# Patient Record
Sex: MALE | Race: WHITE | NOT HISPANIC OR LATINO | Employment: OTHER | URBAN - METROPOLITAN AREA
[De-identification: names, ages, dates, MRNs, and addresses within clinical notes are randomized per-mention and may not be internally consistent; named-entity substitution may affect disease eponyms.]

---

## 2017-01-18 ENCOUNTER — TELEPHONE (OUTPATIENT)
Dept: CARDIOLOGY | Facility: MEDICAL CENTER | Age: 67
End: 2017-01-18

## 2017-01-18 DIAGNOSIS — R91.8 PULMONARY NODULES: ICD-10-CM

## 2017-01-18 DIAGNOSIS — G47.33 OSA (OBSTRUCTIVE SLEEP APNEA): ICD-10-CM

## 2017-01-18 NOTE — TELEPHONE ENCOUNTER
----- Message from Evita Doherty sent at 1/18/2017 12:37 PM PST -----  Regarding: Referral to Pulmonary for sleep apnea  NEIL/Caro    Patient wants a call back about his referral to Pulmonary Medicine for sleep apnea. He said that the referral needs to be sent again and can be reached at [phone].

## 2017-01-18 NOTE — TELEPHONE ENCOUNTER
Pt states he received a letter from pulmonary stating since he did not return their call in 30 days his records were shredded, he needs referral resent so he can reinitiate. To MG. Thank you.

## 2017-01-18 NOTE — TELEPHONE ENCOUNTER
Pt prefers to be referred to [facility] as its near his house if they take his insurance. [phone]. Fax  [phone]

## 2017-01-20 ENCOUNTER — TELEPHONE (OUTPATIENT)
Dept: CARDIOLOGY | Facility: MEDICAL CENTER | Age: 67
End: 2017-01-20

## 2017-01-20 NOTE — TELEPHONE ENCOUNTER
"Dina Huddleston - REFERRAL TO PULMONOLOGY >','<< Less Detail',event)\" href=\"javascript:;\">More Detail >>     REFERRAL TO PULMONOLOGY        Dina Huddleston       Sent: Fri January 20, 2017  2:04 PM       To: Virgie Rome R.N.              Message        Patient has been referred to [facility]. If patient is not contacted in a timely manner, please contact [phone]. Thank You       "

## 2017-01-26 ENCOUNTER — ANTICOAGULATION VISIT (OUTPATIENT)
Dept: MEDICAL GROUP | Facility: MEDICAL CENTER | Age: 67
End: 2017-01-26
Payer: COMMERCIAL

## 2017-01-26 DIAGNOSIS — Z86.711 HISTORY OF PULMONARY EMBOLISM: ICD-10-CM

## 2017-01-26 LAB — INR PPP: 2.7 (ref 2–3.5)

## 2017-01-26 PROCEDURE — 85610 PROTHROMBIN TIME: CPT | Performed by: FAMILY MEDICINE

## 2017-01-26 NOTE — MR AVS SNAPSHOT
Abel Day III   2017 11:15 AM   Anticoagulation Visit   MRN: [id number]    Department:  [facility]   Dept Phone:  [phone]    Description:  Male : 1950   Provider:  [facility]AC[facility]           Allergies as of 2017     Allergen Noted Reactions    Other Misc 2016   Unspecified    Silk sutures (body rejected them)      You were diagnosed with     History of pulmonary embolism   [[id number]]         Vital Signs     Smoking Status                   Former Smoker           Basic Information     Date Of Birth Sex Race Ethnicity Preferred Language    1950 Male White Non- English      Your appointments     2017 11:20 AM   FOLLOW UP with Abel Gonsales M.D.   [facility] (--)    [address], [address]  [place] [id number] [phone]            2017 11:15 AM   Anti-Coag Routine with [facility] PHARMACIST   [facility]    [address]  [address]  [place] [address] [phone]              Problem List              ICD-10-CM Priority Class Noted - Resolved    Acid reflux disease K21.9 Low  2012 - Present    Obesity E66.9 Medium  6/3/2013 - Present    Left ventricular hypertrophy I51.7 Medium  6/3/2013 - Present    History of pulmonary embolism and DVT, unprovoked. 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 Low  10/17/2016 - Present    Hypokalemia E87.6 Low  10/17/2016 - Present    Atrial fibrillation (CMS-HCC) I48.91 Medium  10/17/2016 - Present    Pulmonary nodules, needs repeat CT chest between  4/2017 and 10/2017 R91.8 Low  10/17/2016 - Present      Health Maintenance        Date Due Completion Dates    IMM DTaP/Tdap/Td Vaccine (1 - Tdap) 1/14/1969 ---    COLONOSCOPY 1/14/2000 ---    IMM ZOSTER VACCINE 1/14/2010 ---            Results     POCT Protime      Component    INR    2.7                        Current Immunizations     13-VALENT PCV PREVNAR 10/5/2015    Influenza TIV (IM) 10/1/2012    Influenza Vaccine Adult HD 10/5/2015    Influenza Vaccine Quad Inj (Pf) 10/31/2016    Pneumococcal polysaccharide vaccine (PPSV-23) 12/6/2016  5:16 AM      Below and/or attached are the medications your provider expects you to take. Review all of your home medications and newly ordered medications with your provider and/or pharmacist. Follow medication instructions as directed by your provider and/or pharmacist. Please keep your medication list with you and share with your provider. Update the information when medications are discontinued, doses are changed, or new medications (including over-the-counter products) are added; and carry medication information at all times in the event of emergency situations     Allergies:  OTHER MISC - Unspecified               Medications  Valid as of: January 26, 2017 - 11:24 AM    Generic Name Brand Name Tablet Size Instructions for use    Calcium-Vitamin D   Take  by mouth.        Cetirizine HCl (Tab) ZYRTEC 10 MG Take 10 mg by mouth every morning. Indications: **OTC**        Lansoprazole   Take  by mouth.        Meclizine HCl (Tab) ANTIVERT 25 MG Take 1 Tab by mouth 3 times a day as needed.        Metoprolol Tartrate (Tab) LOPRESSOR 25 MG Take 1 Tab by mouth 2 times a day.        Multiple Vitamins-Minerals (Tab) ICAPS  Take 1 Tab by mouth 2 Times a Day.        Propafenone HCl (Tab) RYTHMOL 150 MG Take 1 Tab by mouth every 8 hours. One tablet PRN recurrence of atrial fibrillation        Warfarin Sodium (Tab) COUMADIN 5 MG Take 1 Tab by mouth every Friday. As directed per Protimes.         Warfarin Sodium (Tab) COUMADIN 2.5 MG Take 1 Tab by mouth every day.        .                 Medicines prescribed today were sent to:     [id number] - [place], [place] - [address]    [address] [place] [address]    Phone: [phone] Fax: [phone]    Open 24 Hours?: No    [facility] [place] [place] - [address] AT PORTAL TO REGISTERED [facility] SITES    [address] [facility] [address]    Phone: [phone] Fax: [phone]    Open 24 Hours?: No      Medication refill instructions:       If your prescription bottle indicates you have medication refills left, it is not necessary to call your provider’s office. Please contact your pharmacy and they will refill your medication.    If your prescription bottle indicates you do not have any refills left, you may request refills at any time through one of the following ways: The online Stir system (except Urgent Care), by calling your provider’s office, or by asking your pharmacy to contact your provider’s office with a refill request. Medication refills are processed only during regular business hours and may not be available until the next business day. Your provider may request additional information or to have a follow-up visit with you prior to refilling your medication.   *Please Note: Medication refills are assigned a new Rx number when refilled electronically. Your pharmacy may indicate that no refills were authorized even though a new prescription for the same medication is available at the pharmacy. Please request the medicine by name with the pharmacy before contacting your provider for a refill.        Warfarin Dosing Calendar   January 2017 Details    Sun Mon Tue Wed Thu Fri Sat     1               2               3               4               5               6               7                 8               9               10               11               12               13               14                  15               16               17               18               19               20               21                 22               23               24               25               26   2.7   2.5 mg   See details      27      5 mg         28      2.5 mg           29      2.5 mg         30      2.5 mg         31      2.5 mg              Date Details   01/26 This INR check   INR: 2.7               How to take your warfarin dose     To take:  2.5 mg Take 0.5 of a 5 mg tablet.    To take:  5 mg Take 1 of the 5 mg tablets.           Warfarin Dosing Calendar   February 2017 Details    Sun Mon Tue Wed Thu Fri Sat        1      2.5 mg         2      2.5 mg         3      5 mg         4      2.5 mg           5      2.5 mg         6      2.5 mg         7      2.5 mg         8      2.5 mg         9      2.5 mg         10      5 mg         11      2.5 mg           12      2.5 mg         13      2.5 mg         14      2.5 mg         15      2.5 mg         16      2.5 mg         17      5 mg         18      2.5 mg           19      2.5 mg         20      2.5 mg         21      2.5 mg         22      2.5 mg         23      2.5 mg         24      5 mg         25      2.5 mg           26      2.5 mg         27      2.5 mg         28      2.5 mg              Date Details   No additional details            How to take your warfarin dose     To take:  2.5 mg Take 0.5 of a 5 mg tablet.    To take:  5 mg Take 1 of the 5 mg tablets.           Warfarin Dosing Calendar   March 2017 Details    Sun Mon Tue Wed Thu Fri Sat        1      2.5 mg         2      2.5 mg         3               4                 5               6               7               8               9               10               11                 12               13               14               15               16               17               18                 19               20               21               22               23                24               25                 26               27               28               29               30               31                 Date Details   No additional details    Date of next INR:  3/2/2017         How to take your warfarin dose     To take:  2.5 mg Take 0.5 of a 5 mg tablet.              The Runthrough Access Code: Activation code not generated  Current The Runthrough Status: Active

## 2017-01-26 NOTE — PROGRESS NOTES
Anticoagulation Summary as of 1/26/2017     INR goal 2.0-3.0   Selected INR 2.7 (1/26/2017)   Maintenance plan 5 mg (5 mg x 1) on Fri; 2.5 mg (5 mg x 0.5) all other days   Weekly total 20 mg   Plan last modified Shana Barragan, PHARMD (12/12/2016)   Next INR check 3/2/2017   Target end date     Indications   Atrial fibrillation with RVR (HCC) (Resolved) [I48.91]  History of pulmonary embolism and DVT  unprovoked. [Z86.711]         Anticoagulation Episode Summary     INR check location     Preferred lab     Send INR reminders to     Comments       Anticoagulation Care Providers     Provider Role Specialty Phone number    [facility] Responsible  [phone]        Anticoagulation Patient Findings      Abel Day III seen in clinic today  INR  Therapeutic.  He has a appt with Cards in a few weeks, they may put him on a DOAC.   Denies signs/symptoms of bleeding and/or thrombosis.    Denies changes to diet or medications.   Follow up appointment in 5 week(s).    Continue weekly warfarin dose as noted    Kyree Al, PHARMD       Dr. Bloch,     I do not see a note form you.  Looks like he has A.FIB and DVT/PE, he had a ablation for the afib, but looks like cards does not want to take him off AC.

## 2017-02-03 ENCOUNTER — TELEPHONE (OUTPATIENT)
Dept: VASCULAR LAB | Facility: MEDICAL CENTER | Age: 67
End: 2017-02-03

## 2017-02-03 NOTE — TELEPHONE ENCOUNTER
Most recent anticoag note and d/c summary reviewed.    Pending any further recommendations from cardiology we will continue indefinite anticoagulation as recommended by cards in their d/c summary for h/o afib and 'unprovoked' DVT and PE.    Patient has cards f/u scheduled.  Will defer all further recs about whether or not to consider future d/c of anticoag and all other cardiovascular care to cardiology.    Michael Bloch, MD  Anticoagulation Clinic    Cc:    YAMILA Nguyen

## 2017-02-06 ENCOUNTER — OFFICE VISIT (OUTPATIENT)
Dept: CARDIOLOGY | Facility: MEDICAL CENTER | Age: 67
End: 2017-02-06
Payer: COMMERCIAL

## 2017-02-06 VITALS
HEART RATE: 73 BPM | SYSTOLIC BLOOD PRESSURE: 134 MMHG | BODY MASS INDEX: 38.92 KG/M2 | DIASTOLIC BLOOD PRESSURE: 90 MMHG | OXYGEN SATURATION: 95 % | WEIGHT: 313 LBS | HEIGHT: 75 IN

## 2017-02-06 DIAGNOSIS — I48.91 ATRIAL FIBRILLATION, UNSPECIFIED TYPE (HCC): ICD-10-CM

## 2017-02-06 LAB — EKG IMPRESSION: NORMAL

## 2017-02-06 PROCEDURE — 99213 OFFICE O/P EST LOW 20 MIN: CPT | Performed by: INTERNAL MEDICINE

## 2017-02-06 PROCEDURE — 93000 ELECTROCARDIOGRAM COMPLETE: CPT | Performed by: INTERNAL MEDICINE

## 2017-02-06 NOTE — MR AVS SNAPSHOT
"        Abel oGdinezter Barb III   2017 11:20 AM   Office Visit   MRN: [id number]    Department:  [facility]   Dept Phone:  [phone]    Description:  Male : 1950   Provider:  Abel Gonsales M.D.           Reason for Visit     Follow-Up           Allergies as of 2017     Allergen Noted Reactions    Other Misc 2016   Unspecified    Silk sutures (body rejected them)      You were diagnosed with     Atrial fibrillation, unspecified type (CMS-[facility])   [[id number]]         Vital Signs     Blood Pressure Pulse Height Weight Body Mass Index Oxygen Saturation    134/90 mmHg 73 1.905 m (6' 3\") 141.976 kg (313 lb) 39.12 kg/m2 95%    Smoking Status                   Former Smoker           Basic Information     Date Of Birth Sex Race Ethnicity Preferred Language    1950 Male White Non- English      Your appointments     2017 11:15 AM   Anti-Coag Routine with [facility] PHARMACIST   [facility]    [address]  [address]  [place] [id number] [phone]              Problem List              ICD-10-CM Priority Class Noted - Resolved    Acid reflux disease K21.9 Low  2012 - Present    Obesity E66.9 Medium  6/3/2013 - Present    Left ventricular hypertrophy I51.7 Medium  6/3/2013 - Present    History of pulmonary embolism and DVT, unprovoked. 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 Low  10/17/2016 - Present    Hypokalemia E87.6 Low  10/17/2016 - Present    Atrial fibrillation (CMS-HCC) I48.91 Medium  10/17/2016 - Present    Pulmonary nodules, needs repeat CT chest between 2017 and " 10/2017 R91.8 Low  10/17/2016 - Present      Health Maintenance        Date Due Completion Dates    IMM DTaP/Tdap/Td Vaccine (1 - Tdap) 1/14/1969 ---    COLONOSCOPY 1/14/2000 ---    IMM ZOSTER VACCINE 1/14/2010 ---            Results       Current Immunizations     13-VALENT PCV PREVNAR 10/5/2015    Influenza TIV (IM) 10/1/2012    Influenza Vaccine Adult HD 10/5/2015    Influenza Vaccine Quad Inj (Pf) 10/31/2016    Pneumococcal polysaccharide vaccine (PPSV-23) 12/6/2016  5:16 AM      Below and/or attached are the medications your provider expects you to take. Review all of your home medications and newly ordered medications with your provider and/or pharmacist. Follow medication instructions as directed by your provider and/or pharmacist. Please keep your medication list with you and share with your provider. Update the information when medications are discontinued, doses are changed, or new medications (including over-the-counter products) are added; and carry medication information at all times in the event of emergency situations     Allergies:  OTHER MISC - Unspecified               Medications  Valid as of: February 06, 2017 - 11:35 AM    Generic Name Brand Name Tablet Size Instructions for use    Calcium-Vitamin D   Take  by mouth.        Cetirizine HCl (Tab) ZYRTEC 10 MG Take 10 mg by mouth every morning. Indications: **OTC**        Lansoprazole   Take  by mouth.        Meclizine HCl (Tab) ANTIVERT 25 MG Take 1 Tab by mouth 3 times a day as needed.        Metoprolol Tartrate (Tab) LOPRESSOR 25 MG Take 1 Tab by mouth 2 times a day.        Multiple Vitamins-Minerals (Tab) ICAPS  Take 1 Tab by mouth 2 Times a Day.        Multiple Vitamins-Minerals   Take  by mouth 2 Times a Day.        Propafenone HCl (Tab) RYTHMOL 150 MG Take 1 Tab by mouth every 8 hours. One tablet PRN recurrence of atrial fibrillation        Warfarin Sodium (Tab) COUMADIN 5 MG Take 1 Tab by mouth every Friday. As directed per Protmartíns.         Warfarin Sodium (Tab) COUMADIN 2.5 MG Take 1 Tab by mouth every day.        .                 Medicines prescribed today were sent to:     [id number] - [place], [place] - [address]    [address] [place] [address]    Phone: [phone] Fax: [phone]    Open 24 Hours?: No    [facility] - [place], [place] - [address] AT PORTAL TO REGISTERED [facility] SITES    [address] [facility] [address]    Phone: [phone] Fax: [phone]    Open 24 Hours?: No      Medication refill instructions:       If your prescription bottle indicates you have medication refills left, it is not necessary to call your provider’s office. Please contact your pharmacy and they will refill your medication.    If your prescription bottle indicates you do not have any refills left, you may request refills at any time through one of the following ways: The online [employer] system (except Urgent Care), by calling your provider’s office, or by asking your pharmacy to contact your provider’s office with a refill request. Medication refills are processed only during regular business hours and may not be available until the next business day. Your provider may request additional information or to have a follow-up visit with you prior to refilling your medication.   *Please Note: Medication refills are assigned a new Rx number when refilled electronically. Your pharmacy may indicate that no refills were authorized even though a new prescription for the same medication is available at the pharmacy. Please request the medicine by name with the pharmacy before contacting your provider for a refill.           The[employer]adders Access Code: Activation code not generated  Current The[employer]ers Status: Active

## 2017-02-06 NOTE — PROGRESS NOTES
Subjective:   Abel Day III is a 67 y.o. male who presents today for follow up of a fib s/p ablation    No a fib since ablation.    Little palps initially but nothing in last 5-6 weeks at all.  Not needing any propafenone    Past Medical History   Diagnosis Date   • A-fib (CMS-HCC)    • GERD (gastroesophageal reflux disease)    • Paroxysmal atrial fibrillation (CMS-HCC) 11/26/2012   • Skin cancer      basil cell   • Hemorrhagic disorder      on coumadin   • ULCERATIVE COLITIS 11/26/2012   • Blood clotting disorder (CMS-[facility])      DVT right leg   • Obesity    • Cancer (CMS-[facility])      melanoma   • Hypertension      well controlled on meds     Past Surgical History   Procedure Laterality Date   • Colon resection           • Ileostomy     • Mass excision general Left 4/7/2016     Procedure: MASS EXCISION GENERAL FOR TEMPORAL MELANOMA;  Surgeon: Feng Sharpe M.D.;  Location: SURGERY SAME DAY [facility];  Service:    • Flap graft Left 4/7/2016     Procedure: FLAP GRAFT FOR CLOSURE WITH LOCAL FLAPS;  Surgeon: Feng Sharpe M.D.;  Location: SURGERY SAME DAY [facility];  Service:    • Recovery  5/4/2016     Procedure: [facility]-EPS ABLATION/AFIB 26371/92725/21641-LHJF I48.0;  Surgeon: Recoveryonhussain Surgery;  Location: SURGERY PRE-POST PROC UNIT [facility];  Service:      Family History   Problem Relation Age of Onset   • Hypertension Mother    • Heart Failure Father    • Heart Attack Maternal Uncle      History   Smoking status   • Former Smoker -- 1.00 packs/day for 10 years   • Types: Cigarettes   • Quit date: 11/26/1984   Smokeless tobacco   • Never Used     Allergies   Allergen Reactions   • Other Misc Unspecified     Silk sutures (body rejected them)     Outpatient Encounter Prescriptions as of 2/6/2017   Medication Sig Dispense Refill   • Multiple Vitamins-Minerals (PRESERVISION AREDS 2 PO) Take  by mouth 2 Times a Day.     • CALCIUM-VITAMIN D PO Take  by mouth.     • Lansoprazole  "(PREVACID PO) Take  by mouth.     • meclizine (ANTIVERT) 25 MG Tab Take 1 Tab by mouth 3 times a day as needed. 30 Tab 0   • warfarin (COUMADIN) 5 MG Tab Take 1 Tab by mouth every Friday. As directed per Protimes. 30 Tab 0   • warfarin (COUMADIN) 2.5 MG Tab Take 1 Tab by mouth every day. 30 Tab 0   • metoprolol (LOPRESSOR) 25 MG Tab Take 1 Tab by mouth 2 times a day. 180 Tab 3   • Multiple Vitamins-Minerals (ICAPS) TABS Take 1 Tab by mouth 2 Times a Day.     • cetirizine (ZYRTEC) 10 MG TABS Take 10 mg by mouth every morning. Indications: **OTC**     • propafenone (RYTHMOL) 150 MG Tab Take 1 Tab by mouth every 8 hours. One tablet PRN recurrence of atrial fibrillation 60 Tab 0     No facility-administered encounter medications on file as of 2/6/2017.     Review of Systems   Psychiatric/Behavioral: Negative for suicidal ideas.        Objective:   /90 mmHg  Pulse 73  Ht 1.905 m (6' 3\")  Wt 141.976 kg (313 lb)  BMI 39.12 kg/m2  SpO2 95%    Physical Exam   Constitutional: He is oriented to person, place, and time. He appears well-developed and well-nourished. No distress.   HENT:   Head: Normocephalic and atraumatic.   Right Ear: External ear normal.   Left Ear: External ear normal.   Nose: Nose normal.   Mouth/Throat: Oropharynx is clear and moist.   Eyes: Conjunctivae and EOM are normal. Pupils are equal, round, and reactive to light. Right eye exhibits no discharge. Left eye exhibits no discharge. No scleral icterus.   Neck: Normal range of motion. Neck supple. No JVD present. No tracheal deviation present.   Cardiovascular: Normal rate, regular rhythm, normal heart sounds and intact distal pulses.  Exam reveals no gallop and no friction rub.    No murmur heard.  Pulmonary/Chest: Effort normal and breath sounds normal. No stridor. No respiratory distress. He has no wheezes. He has no rales. He exhibits no tenderness.   Abdominal: Soft. He exhibits no distension. There is no tenderness.   Musculoskeletal: He " exhibits no edema or tenderness.   Neurological: He is alert and oriented to person, place, and time. No cranial nerve deficit. Coordination normal.   Skin: Skin is warm and dry. No rash noted. He is not diaphoretic. No erythema. No pallor.   Psychiatric: He has a normal mood and affect. His behavior is normal. Judgment and thought content normal.   Vitals reviewed.      Assessment:     1. Atrial fibrillation, unspecified type (CMS-HCC)  EKG       Medical Decision Making:  Today's Assessment / Status / Plan:   A fib- doing very well.  Continue oac as he has history of dvt/pe.  Wants to get home monitor as will be on coumadin long term  We discussed w/u for dayana and weight loss as most important factors to maintain nsr

## 2017-02-15 ENCOUNTER — TELEPHONE (OUTPATIENT)
Dept: CARDIOLOGY | Facility: MEDICAL CENTER | Age: 67
End: 2017-02-15

## 2017-02-15 NOTE — TELEPHONE ENCOUNTER
----- Message from Criss Herrera sent at 2/15/2017 11:11 AM PST -----  Regarding: Abel Deng is calling about Dr. Gonsales following patient's coumadin  JE/Caro Bhatt @ [facility] wants to speak to you about patient's coumadin management. He was told Dr. Gonsales is managing his INR and he wants a script sent to him. He can be reached at [phone], ext. [phone].

## 2017-02-22 ENCOUNTER — TELEPHONE (OUTPATIENT)
Dept: CARDIOLOGY | Facility: MEDICAL CENTER | Age: 67
End: 2017-02-22

## 2017-02-22 DIAGNOSIS — G47.30 SLEEP APNEA, UNSPECIFIED TYPE: ICD-10-CM

## 2017-02-22 DIAGNOSIS — R91.1 PULMONARY NODULE: ICD-10-CM

## 2017-02-22 NOTE — TELEPHONE ENCOUNTER
Pt has in the past declined to be scheduled with pulmonary.  Left detailed message on personal v/m:   Sleep study indicates treatment with CPAP/O2 may be beneficial.  Dr. Gonsales does not order these items and recommends seeing pulmonary to establish and receive Tx.   If pt does not wish to schedule with pulmonary it is his choice.  Call with questions.

## 2017-02-22 NOTE — TELEPHONE ENCOUNTER
"Dina Huddleston - REFERRAL TO PULMONOLOGY >','<< Less Detail',event)\" href=\"javascript:;\">More Detail >>     REFERRAL TO PULMONOLOGY         Dina Huddleston       Sent: Wed February 22, 2017  1:13 PM       To: Virgie Rome R.N.              Message        Patient has been sent to Central Scheduling for Pulmonology         If patient is not contacted in a timely manner, please contact [phone]              Thank You        "

## 2017-02-27 ENCOUNTER — ANTICOAGULATION VISIT (OUTPATIENT)
Dept: MEDICAL GROUP | Facility: MEDICAL CENTER | Age: 67
End: 2017-02-27
Payer: COMMERCIAL

## 2017-02-27 DIAGNOSIS — Z86.711 HISTORY OF PULMONARY EMBOLISM: ICD-10-CM

## 2017-02-27 LAB — INR PPP: 2 (ref 2–3.5)

## 2017-02-27 PROCEDURE — 85610 PROTHROMBIN TIME: CPT | Performed by: FAMILY MEDICINE

## 2017-02-27 NOTE — MR AVS SNAPSHOT
Abel Day III   2017 11:30 AM   Anticoagulation Visit   MRN: [id number]    Department[facility]  [facility]   Dept Phone:  [phone]    Description:  Male : 1950   Provider:  [facility]           Allergies as of 2017     Allergen Noted Reactions    Other Misc 2016   Unspecified    Silk sutures (body rejected them)      You were diagnosed with     History of pulmonary embolism   [050901]         Vital Signs     Smoking Status                   Former Smoker           Basic Information     Date Of Birth Sex Race Ethnicity Preferred Language    1950 Male White Non- English      Your appointments     2017 11:30 AM   Anti-Coag Routine with [facility]ARMAC[facility]   [facility]    [address]  [address]  [place] [id number] [phone]            Apr 10, 2017 11:15 AM   Anti-Coag Routine with [facility]ARMAC[facility]   [facility]    [address]  [address]  [place] [id number] [phone]              Problem List              ICD-10-CM Priority Class Noted - Resolved    Acid reflux disease K21.9 Low  2012 - Present    Obesity E66.9 Medium  6/3/2013 - Present    Left ventricular hypertrophy I51.7 Medium  6/3/2013 - Present    History of pulmonary embolism and DVT, unprovoked. 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 Low  10/17/2016 - Present    Hypokalemia E87.6 Low  10/17/2016 - Present    Atrial fibrillation (CMS-HCC) I48.91 Medium  10/17/2016 - Present    Pulmonary nodules, needs  repeat CT chest between 4/2017 and 10/2017 R91.8 Low  10/17/2016 - Present      Health Maintenance        Date Due Completion Dates    IMM DTaP/Tdap/Td Vaccine (1 - Tdap) 1/14/1969 ---    COLONOSCOPY 1/14/2000 ---    IMM ZOSTER VACCINE 1/14/2010 ---            Results     POCT Protime      Component    INR    2.0    Comment:     ic valid [id number] 160 exp 01/2018                        Current Immunizations     13-VALENT PCV PREVNAR 10/5/2015    Influenza TIV (IM) 10/1/2012    Influenza Vaccine Adult HD 10/5/2015    Influenza Vaccine Quad Inj (Pf) 10/31/2016    Pneumococcal polysaccharide vaccine (PPSV-23) 12/6/2016  5:16 AM      Below and/or attached are the medications your provider expects you to take. Review all of your home medications and newly ordered medications with your provider and/or pharmacist. Follow medication instructions as directed by your provider and/or pharmacist. Please keep your medication list with you and share with your provider. Update the information when medications are discontinued, doses are changed, or new medications (including over-the-counter products) are added; and carry medication information at all times in the event of emergency situations     Allergies:  OTHER MISC - Unspecified               Medications  Valid as of: February 27, 2017 - 11:09 AM    Generic Name Brand Name Tablet Size Instructions for use    Calcium-Vitamin D   Take  by mouth.        Cetirizine HCl (Tab) ZYRTEC 10 MG Take 10 mg by mouth every morning. Indications: **OTC**        Lansoprazole   Take  by mouth.        Meclizine HCl (Tab) ANTIVERT 25 MG Take 1 Tab by mouth 3 times a day as needed.        Metoprolol Tartrate (Tab) LOPRESSOR 25 MG TAKE 1 TAB BY MOUTH 2 TIMES A DAY.        Multiple Vitamins-Minerals (Tab) ICAPS  Take 1 Tab by mouth 2 Times a Day.        Multiple Vitamins-Minerals   Take  by mouth 2 Times a Day.        Propafenone HCl (Tab) RYTHMOL 150 MG Take 1 Tab by mouth every 8 hours. One tablet  PRN recurrence of atrial fibrillation        Warfarin Sodium (Tab) COUMADIN 5 MG Take 1 Tab by mouth every Friday. As directed per Protimes.        Warfarin Sodium (Tab) COUMADIN 2.5 MG Take 1 Tab by mouth every day.        .                 Medicines prescribed today were sent to:     [facility]/PHARMACY #[id number] - [place], [place] - [address]    [address] [place] [address]    Phone: [phone] Fax: [phone]    Open 24 Hours?: No    [facility] [place] [place] - [address] AT PORTAL TO [facility]ED [facility]    [address] [facility] [address]    Phone: [phone] Fax: [phone]    Open 24 Hours?: No      Medication refill instructions:       If your prescription bottle indicates you have medication refills left, it is not necessary to call your provider’s office. Please contact your pharmacy and they will refill your medication.    If your prescription bottle indicates you do not have any refills left, you may request refills at any time through one of the following ways: The online [employer] system (except Urgent Care), by calling your provider’s office, or by asking your pharmacy to contact your provider’s office with a refill request. Medication refills are processed only during regular business hours and may not be available until the next business day. Your provider may request additional information or to have a follow-up visit with you prior to refilling your medication.   *Please Note: Medication refills are assigned a new Rx number when refilled electronically. Your pharmacy may indicate that no refills were authorized even though a new prescription for the same medication is available at the pharmacy. Please request the medicine by name with the pharmacy before contacting your provider for a refill.        Warfarin Dosing Calendar   February 2017 Details    Sun Mon Tue Wed Thu Fri Sat        1               2               3               4                 5                 6               7               8               9               10               11                 12               13               14               15               16               17               18                 19               20               21               22               23               24               25                 26               27   2.0   2.5 mg   See details      28      2.5 mg              Date Details   02/27 This INR check   INR: 2.0   ic valid [id number] 160 exp 01/2018               How to take your warfarin dose     To take:  2.5 mg Take 0.5 of a 5 mg tablet.           Warfarin Dosing Calendar   March 2017 Details    Sun Mon Tue Wed Thu Fri Sat        1      2.5 mg         2      2.5 mg         3      5 mg         4      2.5 mg           5      2.5 mg         6      2.5 mg         7      2.5 mg         8      2.5 mg         9      2.5 mg         10      5 mg         11      2.5 mg           12      2.5 mg         13      2.5 mg         14      2.5 mg         15      2.5 mg         16      2.5 mg         17      5 mg         18      2.5 mg           19      2.5 mg         20      2.5 mg         21      2.5 mg         22      2.5 mg         23      2.5 mg         24      5 mg         25      2.5 mg           26      2.5 mg         27      2.5 mg         28      2.5 mg         29      2.5 mg         30      2.5 mg         31      5 mg           Date Details   No additional details            How to take your warfarin dose     To take:  2.5 mg Take 0.5 of a 5 mg tablet.    To take:  5 mg Take 1 of the 5 mg tablets.           Warfarin Dosing Calendar   April 2017 Details    Sun Mon Tue Wed Thu Fri Sat           1      2.5 mg           2      2.5 mg         3      2.5 mg         4      2.5 mg         5      2.5 mg         6      2.5 mg         7      5 mg         8      2.5 mg           9      2.5 mg         10      2.5 mg         11               12               13                14               15                 16               17               18               19               20               21               22                 23               24               25               26               27               28               29                 30                      Date Details   No additional details    Date of next INR:  4/10/2017         How to take your warfarin dose     To take:  2.5 mg Take 0.5 of a 5 mg tablet.    To take:  5 mg Take 1 of the 5 mg tablets.              [employer] Access Code: Activation code not generated  Current MaPS Status: Active

## 2017-02-27 NOTE — PROGRESS NOTES
Anticoagulation Summary as of 2/27/2017     INR goal 2.0-3.0   Selected INR 2.0 (2/27/2017)   Maintenance plan 5 mg (5 mg x 1) on Fri; 2.5 mg (5 mg x 0.5) all other days   Weekly total 20 mg   Plan last modified Shana Barragan, PHARMD (12/12/2016)   Next INR check 4/10/2017   Target end date Indefinite    Indications   Atrial fibrillation with RVR (HCC) (Resolved) [I48.91]  History of pulmonary embolism and DVT  unprovoked. [Z86.711]         Anticoagulation Episode Summary     INR check location     Preferred lab     Send INR reminders to     Comments       Anticoagulation Care Providers     Provider Role Specialty Phone number    [facility] Responsible  [phone]        Anticoagulation Patient Findings      Abel Day III seen in clinic today  INR  therapeutic.    Denies signs/symptoms of bleeding and/or thrombosis.    Denies changes to diet or medications.   Follow up appointment in 6 week(s).      Continue weekly warfarin dose as noted    Kyree Al, PHARMD

## 2017-04-10 ENCOUNTER — ANTICOAGULATION VISIT (OUTPATIENT)
Dept: MEDICAL GROUP | Facility: MEDICAL CENTER | Age: 67
End: 2017-04-10
Payer: COMMERCIAL

## 2017-04-10 VITALS — DIASTOLIC BLOOD PRESSURE: 72 MMHG | SYSTOLIC BLOOD PRESSURE: 155 MMHG | HEART RATE: 73 BPM

## 2017-04-10 DIAGNOSIS — Z86.711 HISTORY OF PULMONARY EMBOLISM: ICD-10-CM

## 2017-04-10 LAB — INR PPP: 3 (ref 2–3.5)

## 2017-04-10 PROCEDURE — 85610 PROTHROMBIN TIME: CPT | Performed by: PHYSICIAN ASSISTANT

## 2017-04-10 NOTE — PROGRESS NOTES
Anticoagulation Summary as of 4/10/2017     INR goal 2.0-3.0   Selected INR 3.0 (4/10/2017)   Maintenance plan 5 mg (5 mg x 1) on Fri; 2.5 mg (5 mg x 0.5) all other days   Weekly total 20 mg   Plan last modified Shana Barragan, PHARMD (12/12/2016)   Next INR check 6/5/2017   Target end date Indefinite    Indications   Atrial fibrillation with RVR (HCC) (Resolved) [I48.91]  History of pulmonary embolism and DVT  unprovoked. [Z86.711]         Anticoagulation Episode Summary     INR check location     Preferred lab     Send INR reminders to     Comments       Anticoagulation Care Providers     Provider Role Specialty Phone number    [facility] Responsible  [phone]        Anticoagulation Patient Findings      Abel Day III seen in clinic today  INR  therapeutic.    Denies signs/symptoms of bleeding and/or thrombosis.    Denies changes to diet or medications.   Follow up appointment in 8 week(s).    Continue weekly warfarin dose as noted    Kyree Al, PHARMD

## 2017-04-10 NOTE — MR AVS SNAPSHOT
Abel Day III   4/10/2017 11:15 AM   Anticoagulation Visit   MRN: [id number]    [facility]  [facility]   Dept Phone:  [phone]    Description:  Male : 1950   Provider:  [facility]           Allergies as of 4/10/2017     Allergen Noted Reactions    Other Misc 2016   Unspecified    Silk sutures (body rejected them)      You were diagnosed with     History of pulmonary embolism   [772727]         Vital Signs     Blood Pressure Pulse Smoking Status             155/72 mmHg 73 Former Smoker         Basic Information     Date Of Birth Sex Race Ethnicity Preferred Language    1950 Male White Non- English      Your appointments     2017 11:15 AM   Anti-Coag Routine with [facility]AC[facility]   [facility] [place] [facility]    [address]  [address]  [place] [id number] [phone]              Problem List              ICD-10-CM Priority Class Noted - Resolved    Acid reflux disease K21.9 Low  2012 - Present    Obesity E66.9 Medium  6/3/2013 - Present    Left ventricular hypertrophy I51.7 Medium  6/3/2013 - Present    History of pulmonary embolism and DVT, unprovoked. 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 Low  10/17/2016 - Present    Hypokalemia E87.6 Low  10/17/2016 - Present    Atrial fibrillation (CMS-HCC) I48.91 Medium  10/17/2016 - Present    Pulmonary nodules, needs repeat CT chest between 2017 and 10/2017 R91.8 Low  10/17/2016 - Present      Health Maintenance        Date Due Completion Dates    IMM DTaP/Tdap/Td Vaccine (1 - Tdap) 1969 ---    COLONOSCOPY 1/14/2000 ---    IMM ZOSTER VACCINE 1/14/2010 ---            Results     POCT Protime      Component    INR    3.0    Comment:     ic valid [id number] 160 exp 02/2018                        Current Immunizations     13-VALENT PCV PREVNAR 10/5/2015    Influenza TIV (IM) 10/1/2012    Influenza Vaccine Adult HD 10/5/2015    Influenza Vaccine Quad Inj (Pf) 10/31/2016    Pneumococcal polysaccharide vaccine (PPSV-23) 12/6/2016  5:16 AM      Below and/or attached are the medications your provider expects you to take. Review all of your home medications and newly ordered medications with your provider and/or pharmacist. Follow medication instructions as directed by your provider and/or pharmacist. Please keep your medication list with you and share with your provider. Update the information when medications are discontinued, doses are changed, or new medications (including over-the-counter products) are added; and carry medication information at all times in the event of emergency situations     Allergies:  OTHER MISC - Unspecified               Medications  Valid as of: April 10, 2017 - 11:20 AM    Generic Name Brand Name Tablet Size Instructions for use    Calcium-Vitamin D   Take  by mouth.        Cetirizine HCl (Tab) ZYRTEC 10 MG Take 10 mg by mouth every morning. Indications: **OTC**        Lansoprazole   Take  by mouth.        Meclizine HCl (Tab) ANTIVERT 25 MG Take 1 Tab by mouth 3 times a day as needed.        Metoprolol Tartrate (Tab) LOPRESSOR 25 MG TAKE 1 TAB BY MOUTH 2 TIMES A DAY.        Multiple Vitamins-Minerals (Tab) ICAPS  Take 1 Tab by mouth 2 Times a Day.        Multiple Vitamins-Minerals   Take  by mouth 2 Times a Day.        Propafenone HCl (Tab) RYTHMOL 150 MG Take 1 Tab by mouth every 8 hours. One tablet PRN recurrence of atrial fibrillation        Warfarin Sodium (Tab) COUMADIN 5 MG Take 1 Tab by mouth every Friday. As directed per Protimes.        Warfarin Sodium (Tab) COUMADIN 2.5 MG Take  1 Tab by mouth every day.        .                 Medicines prescribed today were sent to:     [facility]/PHARMACY #[id number] - [place], [place] - [address]    [address] [place] [address]    Phone: [phone] Fax: [phone]    Open 24 Hours?: No    [facility] - [place], [place] - [address] AT PORTAL TO REGISTERED [facility] SITES    [address] [facility] [address]    Phone: [phone] Fax: [phone]    Open 24 Hours?: No      Medication refill instructions:       If your prescription bottle indicates you have medication refills left, it is not necessary to call your provider’s office. Please contact your pharmacy and they will refill your medication.    If your prescription bottle indicates you do not have any refills left, you may request refills at any time through one of the following ways: The online [employer] system (except Urgent Care), by calling your provider’s office, or by asking your pharmacy to contact your provider’s office with a refill request. Medication refills are processed only during regular business hours and may not be available until the next business day. Your provider may request additional information or to have a follow-up visit with you prior to refilling your medication.   *Please Note: Medication refills are assigned a new Rx number when refilled electronically. Your pharmacy may indicate that no refills were authorized even though a new prescription for the same medication is available at the pharmacy. Please request the medicine by name with the pharmacy before contacting your provider for a refill.        Instructions    kos       Warfarin Dosing Calendar   April 2017 Details    Sun Mon Tue Wed Thu Fri Sat           1                 2               3               4               5               6               7               8                 9               10   3.0   2.5 mg   See details      11      2.5 mg         12      2.5 mg         13       2.5 mg         14      5 mg         15      2.5 mg           16      2.5 mg         17      2.5 mg         18      2.5 mg         19      2.5 mg         20      2.5 mg         21      5 mg         22      2.5 mg           23      2.5 mg         24      2.5 mg         25      2.5 mg         26      2.5 mg         27      2.5 mg         28      5 mg         29      2.5 mg           30      2.5 mg                Date Details   04/10 This INR check   INR: 3.0    valid [id number] 160 exp 02/2018               How to take your warfarin dose     To take:  2.5 mg Take 0.5 of a 5 mg tablet.    To take:  5 mg Take 1 of the 5 mg tablets.           Warfarin Dosing Calendar   May 2017 Details    Sun Mon Tue Wed Thu Fri Sat      1      2.5 mg         2      2.5 mg         3      2.5 mg         4      2.5 mg         5      5 mg         6      2.5 mg           7      2.5 mg         8      2.5 mg         9      2.5 mg         10      2.5 mg         11      2.5 mg         12      5 mg         13      2.5 mg           14      2.5 mg         15      2.5 mg         16      2.5 mg         17      2.5 mg         18      2.5 mg         19      5 mg         20      2.5 mg           21      2.5 mg         22      2.5 mg         23      2.5 mg         24      2.5 mg         25      2.5 mg         26      5 mg         27      2.5 mg           28      2.5 mg         29      2.5 mg         30      2.5 mg         31      2.5 mg             Date Details   No additional details            How to take your warfarin dose     To take:  2.5 mg Take 0.5 of a 5 mg tablet.    To take:  5 mg Take 1 of the 5 mg tablets.           Warfarin Dosing Calendar   June 2017 Details    Sun Mon Tue Wed Thu Fri Sat         1      2.5 mg         2      5 mg         3      2.5 mg           4      2.5 mg         5      2.5 mg         6               7               8               9               10                 11               12               13               14                 15               16               17                 18               19               20               21               22               23               24                 25               26               27               28               29               30                 Date Details   No additional details    Date of next INR:  6/5/2017         How to take your warfarin dose     To take:  2.5 mg Take 0.5 of a 5 mg tablet.    To take:  5 mg Take 1 of the 5 mg tablets.              [employer] Access Code: Activation code not generated  Current [employer] Status: Active

## 2017-06-05 ENCOUNTER — ANTICOAGULATION VISIT (OUTPATIENT)
Dept: MEDICAL GROUP | Facility: MEDICAL CENTER | Age: 67
End: 2017-06-05
Payer: COMMERCIAL

## 2017-06-05 VITALS — HEART RATE: 75 BPM | DIASTOLIC BLOOD PRESSURE: 93 MMHG | SYSTOLIC BLOOD PRESSURE: 163 MMHG

## 2017-06-05 DIAGNOSIS — Z86.711 HISTORY OF PULMONARY EMBOLISM: ICD-10-CM

## 2017-06-05 LAB — INR PPP: 2.1 (ref 2–3.5)

## 2017-06-05 PROCEDURE — 85610 PROTHROMBIN TIME: CPT | Performed by: PHYSICIAN ASSISTANT

## 2017-06-05 NOTE — PROGRESS NOTES
Anticoagulation Summary as of 6/5/2017     INR goal 2.0-3.0   Selected INR 2.1 (6/5/2017)   Maintenance plan 5 mg (5 mg x 1) on Fri; 2.5 mg (5 mg x 0.5) all other days   Weekly total 20 mg   Plan last modified Shana Barragan, PHARMD (12/12/2016)   Next INR check 7/31/2017   Target end date Indefinite    Indications   Atrial fibrillation with RVR (HCC) (Resolved) [I48.91]  History of pulmonary embolism and DVT  unprovoked. [Z86.711]         Anticoagulation Episode Summary     INR check location     Preferred lab     Send INR reminders to     Comments       Anticoagulation Care Providers     Provider Role Specialty Phone number    [facility] Responsible  [phone]        Anticoagulation Patient Findings      Current Outpatient Prescriptions on File Prior to Visit   Medication Sig Dispense Refill   • metoprolol (LOPRESSOR) 25 MG Tab TAKE 1 TAB BY MOUTH 2 TIMES A DAY. 180 Tab 3   • Multiple Vitamins-Minerals (PRESERVISION AREDS 2 PO) Take  by mouth 2 Times a Day.     • CALCIUM-VITAMIN D PO Take  by mouth.     • propafenone (RYTHMOL) 150 MG Tab Take 1 Tab by mouth every 8 hours. One tablet PRN recurrence of atrial fibrillation 60 Tab 0   • Lansoprazole (PREVACID PO) Take  by mouth.     • meclizine (ANTIVERT) 25 MG Tab Take 1 Tab by mouth 3 times a day as needed. 30 Tab 0   • warfarin (COUMADIN) 5 MG Tab Take 1 Tab by mouth every Friday. As directed per Protimes. 30 Tab 0   • warfarin (COUMADIN) 2.5 MG Tab Take 1 Tab by mouth every day. 30 Tab 0   • Multiple Vitamins-Minerals (ICAPS) TABS Take 1 Tab by mouth 2 Times a Day.     • cetirizine (ZYRTEC) 10 MG TABS Take 10 mg by mouth every morning. Indications: **OTC**       No current facility-administered medications on file prior to visit.       Lab Results   Component Value Date/Time    SODIUM 136 12/06/2016 01:29 AM    POTASSIUM 3.6 12/06/2016 01:29 AM    CHLORIDE 100 12/06/2016 01:29 AM    CO2 25 12/06/2016 01:29 AM    GLUCOSE 118* 12/06/2016  01:29 AM    BUN 13 12/06/2016 01:29 AM    CREATININE 0.78 12/06/2016 01:29 AM    BUN-CREATININE RATIO 19 06/03/2013 04:03 PM        Abel Day III seen in clinic today  INR  therapeutic.    Denies signs/symptoms of bleeding and/or thrombosis.    Denies changes to diet or medications.   Follow up appointment in 8 week(s).    Continue weekly warfarin dose as noted     Kyree Al, PHARMD

## 2017-06-05 NOTE — MR AVS SNAPSHOT
Abel Day III   2017 11:15 AM   Anticoagulation Visit   MRN: [id number]    Department:  [facility]   Dept Phone:  [phone]    Description:  Male : 1950   Provider:  [facility]           Allergies as of 2017     Allergen Noted Reactions    Other Misc 2016   Unspecified    Silk sutures (body rejected them)      You were diagnosed with     History of pulmonary embolism   [057207]         Vital Signs     Blood Pressure Pulse Smoking Status             163/93 mmHg 75 Former Smoker         Basic Information     Date Of Birth Sex Race Ethnicity Preferred Language    1950 Male White Non- English      Your appointments     2017 11:15 AM   Anti-Coag Routine with [facility]ARMACIST   [facility]    [address]  [address]  [place] [id number] [phone]              Problem List              ICD-10-CM Priority Class Noted - Resolved    Acid reflux disease K21.9 Low  2012 - Present    Obesity E66.9 Medium  6/3/2013 - Present    Left ventricular hypertrophy I51.7 Medium  6/3/2013 - Present    History of pulmonary embolism and DVT, unprovoked. 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 Low  10/17/2016 - Present    Hypokalemia E87.6 Low  10/17/2016 - Present    Atrial fibrillation (CMS-HCC) I48.91 Medium  10/17/2016 - Present    Pulmonary nodules, needs repeat CT chest between 2017 and 10/2017 R91.8 Low  10/17/2016 - Present      Health Maintenance        Date Due Completion Dates    IMM DTaP/Tdap/Td Vaccine (1 - Tdap) 1969 ---    COLONOSCOPY 1/14/2000 ---    IMM ZOSTER VACCINE 1/14/2010 ---            Results     POCT Protime      Component    INR    2.1    Comment:     ic valid [id number] 160 exp 03/2018                        Current Immunizations     13-VALENT PCV PREVNAR 10/5/2015    Influenza TIV (IM) 10/1/2012    Influenza Vaccine Adult HD 10/5/2015    Influenza Vaccine Quad Inj (Pf) 10/31/2016    Pneumococcal polysaccharide vaccine (PPSV-23) 12/6/2016  5:16 AM      Below and/or attached are the medications your provider expects you to take. Review all of your home medications and newly ordered medications with your provider and/or pharmacist. Follow medication instructions as directed by your provider and/or pharmacist. Please keep your medication list with you and share with your provider. Update the information when medications are discontinued, doses are changed, or new medications (including over-the-counter products) are added; and carry medication information at all times in the event of emergency situations     Allergies:  OTHER MISC - Unspecified               Medications  Valid as of: June 05, 2017 - 11:28 AM    Generic Name Brand Name Tablet Size Instructions for use    Calcium-Vitamin D   Take  by mouth.        Cetirizine HCl (Tab) ZYRTEC 10 MG Take 10 mg by mouth every morning. Indications: **OTC**        Lansoprazole   Take  by mouth.        Meclizine HCl (Tab) ANTIVERT 25 MG Take 1 Tab by mouth 3 times a day as needed.        Metoprolol Tartrate (Tab) LOPRESSOR 25 MG TAKE 1 TAB BY MOUTH 2 TIMES A DAY.        Multiple Vitamins-Minerals (Tab) ICAPS  Take 1 Tab by mouth 2 Times a Day.        Multiple Vitamins-Minerals   Take  by mouth 2 Times a Day.        Propafenone HCl (Tab) RYTHMOL 150 MG Take 1 Tab by mouth every 8 hours. One tablet PRN recurrence of atrial fibrillation        Warfarin Sodium (Tab) COUMADIN 5 MG Take 1 Tab by mouth every Friday. As directed per Protimes.        Warfarin Sodium (Tab) COUMADIN 2.5 MG Take  1 Tab by mouth every day.        .                 Medicines prescribed today were sent to:     [facility][id number] - [place], [place] - [address]    [address] [place] [address]    Phone: [phone] Fax: [phone]    Open 24 Hours?: No    [facility] - [place], [place] - [address] AT PORTAL TO REGISTERED [facility] SITES    [address] [facility] [address]    Phone: [phone] Fax: [phone]    Open 24 Hours?: No      Medication refill instructions:       If your prescription bottle indicates you have medication refills left, it is not necessary to call your provider’s office. Please contact your pharmacy and they will refill your medication.    If your prescription bottle indicates you do not have any refills left, you may request refills at any time through one of the following ways: The online [employer] system (except Urgent Care), by calling your provider’s office, or by asking your pharmacy to contact your provider’s office with a refill request. Medication refills are processed only during regular business hours and may not be available until the next business day. Your provider may request additional information or to have a follow-up visit with you prior to refilling your medication.   *Please Note: Medication refills are assigned a new Rx number when refilled electronically. Your pharmacy may indicate that no refills were authorized even though a new prescription for the same medication is available at the pharmacy. Please request the medicine by name with the pharmacy before contacting your provider for a refill.        Warfarin Dosing Calendar   June 2017 Details    Sun Mon Tue Wed Thu Fri Sat         1               2               3                 4               5   2.1   2.5 mg   See details      6      2.5 mg         7      2.5 mg         8      2.5 mg         9      5 mg         10      2.5 mg           11      2.5 mg         12      2.5 mg         13        2.5 mg         14      2.5 mg         15      2.5 mg         16      5 mg         17      2.5 mg           18      2.5 mg         19      2.5 mg         20      2.5 mg         21      2.5 mg         22      2.5 mg         23      5 mg         24      2.5 mg           25      2.5 mg         26      2.5 mg         27      2.5 mg         28      2.5 mg         29      2.5 mg         30      5 mg           Date Details   06/05 This INR check   INR: 2.1   ic valid [id number] 160 exp 03/2018               How to take your warfarin dose     To take:  2.5 mg Take 0.5 of a 5 mg tablet.    To take:  5 mg Take 1 of the 5 mg tablets.           Warfarin Dosing Calendar   July 2017 Details    Sun Mon Tue Wed Thu Fri Sat           1      2.5 mg           2      2.5 mg         3      2.5 mg         4      2.5 mg         5      2.5 mg         6      2.5 mg         7      5 mg         8      2.5 mg           9      2.5 mg         10      2.5 mg         11      2.5 mg         12      2.5 mg         13      2.5 mg         14      5 mg         15      2.5 mg           16      2.5 mg         17      2.5 mg         18      2.5 mg         19      2.5 mg         20      2.5 mg         21      5 mg         22      2.5 mg           23      2.5 mg         24      2.5 mg         25      2.5 mg         26      2.5 mg         27      2.5 mg         28      5 mg         29      2.5 mg           30      2.5 mg         31      2.5 mg               Date Details   No additional details    Date of next INR:  7/31/2017         How to take your warfarin dose     To take:  2.5 mg Take 0.5 of a 5 mg tablet.    To take:  5 mg Take 1 of the 5 mg tablets.              [employer] Access Code: Activation code not generated  Current [employer] Status: Active

## 2017-07-13 ENCOUNTER — ANTICOAGULATION MONITORING (OUTPATIENT)
Dept: VASCULAR LAB | Facility: MEDICAL CENTER | Age: 67
End: 2017-07-13

## 2017-07-13 DIAGNOSIS — Z86.711 HISTORY OF PULMONARY EMBOLISM: ICD-10-CM

## 2017-07-31 ENCOUNTER — ANTICOAGULATION VISIT (OUTPATIENT)
Dept: MEDICAL GROUP | Facility: MEDICAL CENTER | Age: 67
End: 2017-07-31
Payer: COMMERCIAL

## 2017-07-31 VITALS — HEART RATE: 85 BPM | DIASTOLIC BLOOD PRESSURE: 98 MMHG | SYSTOLIC BLOOD PRESSURE: 155 MMHG

## 2017-07-31 DIAGNOSIS — Z86.711 HISTORY OF PULMONARY EMBOLISM: ICD-10-CM

## 2017-07-31 LAB — INR PPP: 2.5 (ref 2–3.5)

## 2017-07-31 PROCEDURE — 85610 PROTHROMBIN TIME: CPT | Performed by: PHYSICIAN ASSISTANT

## 2017-07-31 NOTE — MR AVS SNAPSHOT
Abel Ogden Barb III   2017 11:15 AM   Anticoagulation Visit   MRN: [id number]    Department:  [facility]   Dept Phone:  [phone]    Description:  Male : 1950   Provider:  [facility]ARMAC[facility]           Allergies as of 2017     Allergen Noted Reactions    Other Misc 2016   Unspecified    Silk sutures (body rejected them)      You were diagnosed with     History of pulmonary embolism   [408290]         Vital Signs     Smoking Status                   Former Smoker           Basic Information     Date Of Birth Sex Race Ethnicity Preferred Language    1950 Male White Non- English      Your appointments     Oct 09, 2017 11:15 AM   Anti-Coag Routine with [facility] PHARMACIST   [facility]    [address]  [address]  [place] [address] [phone]              Problem List              ICD-10-CM Priority Class Noted - Resolved    Acid reflux disease K21.9 Low  2012 - Present    Obesity E66.9 Medium  6/3/2013 - Present    Left ventricular hypertrophy I51.7 Medium  6/3/2013 - Present    History of pulmonary embolism and DVT, unprovoked. 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 Low  10/17/2016 - Present    Hypokalemia E87.6 Low  10/17/2016 - Present    Atrial fibrillation (CMS-HCC) I48.91 Medium  10/17/2016 - Present    Pulmonary nodules, needs repeat CT chest between 2017 and 10/2017 R91.8 Low  10/17/2016 - Present      Health Maintenance        Date Due Completion Dates    IMM DTaP/Tdap/Td Vaccine (1 - Tdap) 1969 ---    COLONOSCOPY 2000 ---    IMM ZOSTER VACCINE 1/14/2010 ---    IMM INFLUENZA (1) 9/1/2017 10/31/2016, 10/5/2015, 10/1/2012            Results     POCT Protime      Component    INR    2.5    Comment:     ic valid [id number] 160 exp 03/2018                        Current Immunizations     13-VALENT PCV PREVNAR 10/5/2015    Influenza TIV (IM) 10/1/2012    Influenza Vaccine Adult HD 10/5/2015    Influenza Vaccine Quad Inj (Pf) 10/31/2016    Pneumococcal polysaccharide vaccine (PPSV-23) 12/6/2016  5:16 AM      Below and/or attached are the medications your provider expects you to take. Review all of your home medications and newly ordered medications with your provider and/or pharmacist. Follow medication instructions as directed by your provider and/or pharmacist. Please keep your medication list with you and share with your provider. Update the information when medications are discontinued, doses are changed, or new medications (including over-the-counter products) are added; and carry medication information at all times in the event of emergency situations     Allergies:  OTHER MISC - Unspecified               Medications  Valid as of: July 31, 2017 - 11:24 AM    Generic Name Brand Name Tablet Size Instructions for use    Calcium-Vitamin D   Take  by mouth.        Cetirizine HCl (Tab) ZYRTEC 10 MG Take 10 mg by mouth every morning. Indications: **OTC**        Lansoprazole   Take  by mouth.        Meclizine HCl (Tab) ANTIVERT 25 MG Take 1 Tab by mouth 3 times a day as needed.        Metoprolol Tartrate (Tab) LOPRESSOR 25 MG TAKE 1 TAB BY MOUTH 2 TIMES A DAY.        Multiple Vitamins-Minerals (Tab) ICAPS  Take 1 Tab by mouth 2 Times a Day.        Multiple Vitamins-Minerals   Take  by mouth 2 Times a Day.        Propafenone HCl (Tab) RYTHMOL 150 MG Take 1 Tab by mouth every 8 hours. One tablet PRN recurrence of atrial fibrillation        Warfarin Sodium (Tab) COUMADIN 5 MG Take 1 Tab by mouth every Friday. As directed per Protimes.        Warfarin  Sodium (Tab) COUMADIN 2.5 MG Take 1 Tab by mouth every day.        .                 Medicines prescribed today were sent to:     [facility]Y #[id number] - [place], [place] - [address]    [address] [place] [place] [address]    Phone: [phone] Fax: [phone]    Open 24 Hours?: No    [facility] [place], [place] - [address] AT PORTAL TO REGISTERED [facility]    [address] [facility] [address]    Phone: [phone] Fax: [phone]    Open 24 Hours?: No      Medication refill instructions:       If your prescription bottle indicates you have medication refills left, it is not necessary to call your provider’s office. Please contact your pharmacy and they will refill your medication.    If your prescription bottle indicates you do not have any refills left, you may request refills at any time through one of the following ways: The online [employer] system (except Urgent Care), by calling your provider’s office, or by asking your pharmacy to contact your provider’s office with a refill request. Medication refills are processed only during regular business hours and may not be available until the next business day. Your provider may request additional information or to have a follow-up visit with you prior to refilling your medication.   *Please Note: Medication refills are assigned a new Rx number when refilled electronically. Your pharmacy may indicate that no refills were authorized even though a new prescription for the same medication is available at the pharmacy. Please request the medicine by name with the pharmacy before contacting your provider for a refill.        Warfarin Dosing Calendar   July 2017 Details    Sun Mon Tue Wed Thu Fri Sat           1                 2               3               4               5               6               7               8                 9               10               11               12               13               14                  15                 16               17               18               19               20               21               22                 23               24               25               26               27               28               29                 30               31   2.5   2.5 mg   See details            Date Details   07/31 This INR check   INR: 2.5   ic valid [id number] 160 exp 03/2018               How to take your warfarin dose     To take:  2.5 mg Take 0.5 of a 5 mg tablet.           Warfarin Dosing Calendar   August 2017 Details    Sun Mon Tue Wed Thu Fri Sat       1      2.5 mg         2      2.5 mg         3      2.5 mg         4      5 mg         5      2.5 mg           6      2.5 mg         7      2.5 mg         8      2.5 mg         9      2.5 mg         10      2.5 mg         11      5 mg         12      2.5 mg           13      2.5 mg         14      2.5 mg         15      2.5 mg         16      2.5 mg         17      2.5 mg         18      5 mg         19      2.5 mg           20      2.5 mg         21      2.5 mg         22      2.5 mg         23      2.5 mg         24      2.5 mg         25      5 mg         26      2.5 mg           27      2.5 mg         28      2.5 mg         29      2.5 mg         30      2.5 mg         31      2.5 mg            Date Details   No additional details            How to take your warfarin dose     To take:  2.5 mg Take 0.5 of a 5 mg tablet.    To take:  5 mg Take 1 of the 5 mg tablets.           Warfarin Dosing Calendar   September 2017 Details    Sun Mon Tue Wed Thu Fri Sat          1      5 mg         2      2.5 mg           3      2.5 mg         4      2.5 mg         5      2.5 mg         6      2.5 mg         7      2.5 mg         8      5 mg         9      2.5 mg           10      2.5 mg         11      2.5 mg         12      2.5 mg         13      2.5 mg         14      2.5 mg         15      5 mg         16      2.5 mg           17      2.5 mg          18      2.5 mg         19      2.5 mg         20      2.5 mg         21      2.5 mg         22      5 mg         23      2.5 mg           24      2.5 mg         25      2.5 mg         26      2.5 mg         27      2.5 mg         28      2.5 mg         29      5 mg         30      2.5 mg          Date Details   No additional details            How to take your warfarin dose     To take:  2.5 mg Take 0.5 of a 5 mg tablet.    To take:  5 mg Take 1 of the 5 mg tablets.           Warfarin Dosing Calendar   October 2017 Details    Sun Mon Tue Wed Thu Fri Sat     1      2.5 mg         2      2.5 mg         3      2.5 mg         4      2.5 mg         5      2.5 mg         6      5 mg         7      2.5 mg           8      2.5 mg         9      2.5 mg         10               11               12               13               14                 15               16               17               18               19               20               21                 22               23               24               25               26               27               28                 29               30               31                    Date Details   No additional details    Date of next INR:  10/9/2017         How to take your warfarin dose     To take:  2.5 mg Take 0.5 of a 5 mg tablet.    To take:  5 mg Take 1 of the 5 mg tablets.              [employer] Access Code: Activation code not generated  Current [employer] Status: Active

## 2017-07-31 NOTE — PROGRESS NOTES
Anticoagulation Summary as of 7/31/2017     INR goal 2.0-3.0   Selected INR 2.5 (7/31/2017)   Maintenance plan 5 mg (5 mg x 1) on Fri; 2.5 mg (5 mg x 0.5) all other days   Weekly total 20 mg   Plan last modified Shana Barragan, PHARMD (12/12/2016)   Next INR check 10/9/2017   Target end date Indefinite    Indications   Atrial fibrillation with RVR (HCC) (Resolved) [I48.91]  History of pulmonary embolism and DVT  unprovoked. [Z86.711]         Anticoagulation Episode Summary     INR check location     Preferred lab     Send INR reminders to     Comments       Anticoagulation Care Providers     Provider Role Specialty Phone number    [facility] Responsible  [phone]        Anticoagulation Patient Findings      Current Outpatient Prescriptions on File Prior to Visit   Medication Sig Dispense Refill   • metoprolol (LOPRESSOR) 25 MG Tab TAKE 1 TAB BY MOUTH 2 TIMES A DAY. 180 Tab 3   • Multiple Vitamins-Minerals (PRESERVISION AREDS 2 PO) Take  by mouth 2 Times a Day.     • CALCIUM-VITAMIN D PO Take  by mouth.     • propafenone (RYTHMOL) 150 MG Tab Take 1 Tab by mouth every 8 hours. One tablet PRN recurrence of atrial fibrillation 60 Tab 0   • Lansoprazole (PREVACID PO) Take  by mouth.     • meclizine (ANTIVERT) 25 MG Tab Take 1 Tab by mouth 3 times a day as needed. 30 Tab 0   • warfarin (COUMADIN) 5 MG Tab Take 1 Tab by mouth every Friday. As directed per Protimes. 30 Tab 0   • warfarin (COUMADIN) 2.5 MG Tab Take 1 Tab by mouth every day. 30 Tab 0   • Multiple Vitamins-Minerals (ICAPS) TABS Take 1 Tab by mouth 2 Times a Day.     • cetirizine (ZYRTEC) 10 MG TABS Take 10 mg by mouth every morning. Indications: **OTC**       No current facility-administered medications on file prior to visit.       Lab Results   Component Value Date/Time    SODIUM 136 12/06/2016 01:29 AM    POTASSIUM 3.6 12/06/2016 01:29 AM    CHLORIDE 100 12/06/2016 01:29 AM    CO2 25 12/06/2016 01:29 AM    GLUCOSE 118* 12/06/2016  01:29 AM    BUN 13 12/06/2016 01:29 AM    CREATININE 0.78 12/06/2016 01:29 AM    BUN-CREATININE RATIO 19 06/03/2013 04:03 PM          Abel Day III seen in clinic today  INR  therapeutic.    Denies signs/symptoms of bleeding and/or thrombosis.    Denies changes to diet or medications.   Follow up appointment in 10 week(s).    Continue weekly warfarin dose as noted     Kyree Al, PHARMD

## 2017-10-09 ENCOUNTER — APPOINTMENT (OUTPATIENT)
Dept: MEDICAL GROUP | Facility: MEDICAL CENTER | Age: 67
End: 2017-10-09
Payer: COMMERCIAL

## 2017-10-12 ENCOUNTER — ANTICOAGULATION VISIT (OUTPATIENT)
Dept: MEDICAL GROUP | Facility: MEDICAL CENTER | Age: 67
End: 2017-10-12
Payer: COMMERCIAL

## 2017-10-12 VITALS — HEART RATE: 74 BPM | DIASTOLIC BLOOD PRESSURE: 84 MMHG | SYSTOLIC BLOOD PRESSURE: 155 MMHG

## 2017-10-12 DIAGNOSIS — Z86.711 HISTORY OF PULMONARY EMBOLISM: ICD-10-CM

## 2017-10-12 LAB — INR PPP: 2.1 (ref 2–3.5)

## 2017-10-12 PROCEDURE — 85610 PROTHROMBIN TIME: CPT | Performed by: FAMILY MEDICINE

## 2017-10-12 PROCEDURE — 99211 OFF/OP EST MAY X REQ PHY/QHP: CPT | Performed by: FAMILY MEDICINE

## 2017-10-12 NOTE — PROGRESS NOTES
Anticoagulation Summary  As of 10/12/2017    INR goal:   2.0-3.0   TTR:   54.8 % (2.4 y)   Today's INR:   2.1   Maintenance plan:   5 mg (5 mg x 1) on Fri; 2.5 mg (5 mg x 0.5) all other days   Weekly total:   20 mg   Plan last modified:   Yoni KirbyD (12/12/2016)   Next INR check:   1/4/2018   Target end date:   Indefinite    Indications    Atrial fibrillation with RVR (HCC) (Resolved) [I48.91]  History of pulmonary embolism and DVT  unprovoked. [Z86.711]             Anticoagulation Episode Summary     INR check location:       Preferred lab:       Send INR reminders to:       Comments:         Anticoagulation Care Providers     Provider Role Specialty Phone number    [facility] Responsible  [phone]        Anticoagulation Patient Findings  Patient Findings     Negatives:   Signs/symptoms of thrombosis, Signs/symptoms of bleeding, Laboratory test error suspected, Change in health, Change in alcohol use, Change in activity, Upcoming invasive procedure, Emergency department visit, Upcoming dental procedure, Missed doses, Extra doses, Change in medications, Change in diet/appetite, Hospital admission, Bruising, Other complaints        History of Present Illness: follow up appointment for chronic anticoagulation for atrial fibrillation with history of PE    Pt remains therapeutic today. Pt is to continue with current warfarin dosing regimen.  Pt denies any unusual s/s of bleeding, bruising, clotting or any changes to diet or medications.  Follow up in 12 weeks.    Makenna Hopkins, YoniD

## 2017-12-18 ENCOUNTER — TELEPHONE (OUTPATIENT)
Dept: CARDIOLOGY | Facility: MEDICAL CENTER | Age: 67
End: 2017-12-18

## 2017-12-18 NOTE — TELEPHONE ENCOUNTER
Spoke to pt, for the last week he has felt intermittent flutters  For 5-10 seconds episodes lasting up to 30 min. He was told by [facility] at last ov can use prophenone prn if needed. He will restart at 150 tid prn. To Je should he restart daily, what dose use prn? Has Jan fu. Please advise what to do until then.

## 2017-12-18 NOTE — TELEPHONE ENCOUNTER
----- Message from Geovani Christensen, Med Ass't sent at 12/18/2017  9:46 AM PST -----  Regarding: BONY pt   BONY Ramirez pt states that he is having similar symptoms to what he had before his ablasion in 2016. He is leaving town tomorrow, but would like a call back at your earliest convenience.     Thanks,  Geovani [phone]

## 2017-12-21 NOTE — TELEPHONE ENCOUNTER
Discussed with pt.  He has had no flutters in 3 days but will use propafenone 150mg at 1/2 tab 3 times a day PRN.

## 2018-01-04 ENCOUNTER — ANTICOAGULATION VISIT (OUTPATIENT)
Dept: MEDICAL GROUP | Facility: MEDICAL CENTER | Age: 68
End: 2018-01-04
Payer: COMMERCIAL

## 2018-01-04 VITALS — DIASTOLIC BLOOD PRESSURE: 97 MMHG | HEART RATE: 73 BPM | SYSTOLIC BLOOD PRESSURE: 152 MMHG

## 2018-01-04 DIAGNOSIS — Z86.711 HISTORY OF PULMONARY EMBOLISM: ICD-10-CM

## 2018-01-04 LAB — INR PPP: 1.5 (ref 2–3.5)

## 2018-01-04 PROCEDURE — 99211 OFF/OP EST MAY X REQ PHY/QHP: CPT | Performed by: INTERNAL MEDICINE

## 2018-01-04 PROCEDURE — 85610 PROTHROMBIN TIME: CPT | Performed by: INTERNAL MEDICINE

## 2018-01-04 NOTE — PROGRESS NOTES
OP Anticoagulation Service Note    Date: 1/4/2018  Vitals:    01/04/18 0918   BP: 152/97   Pulse: 73       Anticoagulation Summary  As of 1/4/2018    INR goal:   2.0-3.0   TTR:   51.4 % (2.6 y)   Today's INR:   1.5!   Maintenance plan:   5 mg (5 mg x 1) on Mon, Fri; 2.5 mg (5 mg x 0.5) all other days   Weekly total:   22.5 mg   Plan last modified:   Shana Barragan PharmD (1/4/2018)   Next INR check:   1/18/2018   Target end date:   Indefinite    Indications    Atrial fibrillation with RVR (HCC) (Resolved) [I48.91]  History of pulmonary embolism and DVT  unprovoked. [Z86.711]             Anticoagulation Episode Summary     INR check location:       Preferred lab:       Send INR reminders to:       Comments:         Anticoagulation Care Providers     Provider Role Specialty Phone number    [facility] Responsible  [phone]        Anticoagulation Patient Findings      HPI:   Abel Day III seen in clinic today, on anticoagulation therapy with warfarin (a high risk medication) for a fib and hx of PE and DVT      Pt is here today to evaluate anticoagulation therapy    Previous INR was 2.1 on 10-12-17, pt was instructed to continue current regimen    Confirmed warfarin dosing regimen, denies missed or extra doses of coumadin.   Diet has been consistent with foods rich in vitamin K: No, he started the bodaplanes diet  Changes in ETOH:  No  Changes in smoking status: No  Changes in medication: No   Cost restriction: No  S/s of bleeding:  No  Signs/symptoms  thrombosis since the last appt: No    A/P   INR  sub-therapeutic today, will require dose adjust ment today to prevent  recurrence of thrombosis or stroke and closer follow up.     Tonight 5 mg then begin increased weekly regimen     Pt educated to contact our clinic with any changes in medications or s/s of bleeding or thrombosis    Follow up appointment in 2 week(s) to reduce risk of adverse events from warfarin  Shana Barragan, Pharm  D

## 2018-01-18 ENCOUNTER — ANTICOAGULATION VISIT (OUTPATIENT)
Dept: MEDICAL GROUP | Facility: MEDICAL CENTER | Age: 68
End: 2018-01-18
Payer: COMMERCIAL

## 2018-01-18 VITALS — HEART RATE: 66 BPM | DIASTOLIC BLOOD PRESSURE: 77 MMHG | SYSTOLIC BLOOD PRESSURE: 148 MMHG

## 2018-01-18 DIAGNOSIS — Z86.711 HISTORY OF PULMONARY EMBOLISM: ICD-10-CM

## 2018-01-18 LAB — INR PPP: 1.6 (ref 2–3.5)

## 2018-01-18 PROCEDURE — 99211 OFF/OP EST MAY X REQ PHY/QHP: CPT | Performed by: INTERNAL MEDICINE

## 2018-01-18 PROCEDURE — 85610 PROTHROMBIN TIME: CPT | Performed by: INTERNAL MEDICINE

## 2018-01-18 NOTE — PROGRESS NOTES
OP Anticoagulation Service Note    Date: 1/18/2018  Vitals:    01/18/18 0924 01/18/18 0930   BP: 159/74 148/77   Pulse: 63 66         Anticoagulation Summary  As of 1/18/2018    INR goal:   2.0-3.0   TTR:   50.7 % (2.6 y)   Today's INR:   1.6!   Maintenance plan:   2.5 mg (5 mg x 0.5) on Sun, Tue, Thu; 5 mg (5 mg x 1) all other days   Weekly total:   27.5 mg   Plan last modified:   Shana Barragan, PharmD (1/18/2018)   Next INR check:   2/1/2018   Target end date:   Indefinite    Indications    Atrial fibrillation with RVR (HCC) (Resolved) [I48.91]  History of pulmonary embolism and DVT  unprovoked. [Z86.711]             Anticoagulation Episode Summary     INR check location:       Preferred lab:       Send INR reminders to:       Comments:         Anticoagulation Care Providers     Provider Role Specialty Phone number    [facility] Responsible  [phone]        Anticoagulation Patient Findings      HPI:   Abel Day III seen in clinic today, on anticoagulation therapy with warfarin (a high risk medication) for atrial fibrillation and DVT      Pt is here today to evaluate anticoagulation therapy    Previous INR was 1.5 on 1-4-18, pt was instructed to increase weekly warfarin dosing regimen    Confirmed warfarin dosing regimen, denies missed or extra doses of coumadin.   Diet has been consistent with foods rich in vitamin K: Yes  Changes in ETOH:  No  Changes in smoking status: No  Changes in medication: No   Cost restriction: No  S/s of bleeding:  No  Signs/symptoms  thrombosis since the last appt: No    A/P   INR sub-therapeutic today, will require dose adjust ment today to prevent  recurrence of thrombosis  and closer follow up.   Tonight take 5 mg then begin increased weekly regimen     Pt educated to contact our clinic with any changes in medications or s/s of bleeding or thrombosis    Follow up appointment in 2 week(s) to reduce risk of adverse events from warfarin  Shana  Laurel, Pharm D

## 2018-01-26 ENCOUNTER — OFFICE VISIT (OUTPATIENT)
Dept: CARDIOLOGY | Facility: MEDICAL CENTER | Age: 68
End: 2018-01-26
Payer: COMMERCIAL

## 2018-01-26 VITALS
BODY MASS INDEX: 36.8 KG/M2 | WEIGHT: 296 LBS | HEART RATE: 84 BPM | DIASTOLIC BLOOD PRESSURE: 78 MMHG | SYSTOLIC BLOOD PRESSURE: 122 MMHG | HEIGHT: 75 IN | OXYGEN SATURATION: 94 %

## 2018-01-26 DIAGNOSIS — Z98.890 S/P ABLATION OF ATRIAL FIBRILLATION: ICD-10-CM

## 2018-01-26 DIAGNOSIS — I48.0 PAF (PAROXYSMAL ATRIAL FIBRILLATION) (HCC): ICD-10-CM

## 2018-01-26 DIAGNOSIS — Z86.79 S/P ABLATION OF ATRIAL FIBRILLATION: ICD-10-CM

## 2018-01-26 DIAGNOSIS — I48.91 ATRIAL FIBRILLATION, UNSPECIFIED TYPE (HCC): ICD-10-CM

## 2018-01-26 PROCEDURE — 99214 OFFICE O/P EST MOD 30 MIN: CPT | Performed by: INTERNAL MEDICINE

## 2018-01-26 PROCEDURE — 93000 ELECTROCARDIOGRAM COMPLETE: CPT | Performed by: INTERNAL MEDICINE

## 2018-01-26 RX ORDER — TOBRAMYCIN AND DEXAMETHASONE 3; 1 MG/ML; MG/ML
SUSPENSION/ DROPS OPHTHALMIC
Refills: 3 | COMMUNITY
Start: 2017-11-01 | End: 2018-03-07

## 2018-01-26 NOTE — PROGRESS NOTES
Subjective:   Abel Day III is a 68 y.o. male who presents today for follow up    Chief Complaint: some palpitations    Fluttering every once in a while gets a passing feeling.  Never sustained    Working on weight but backslid over holidays.  Has gone on [facility] and doing very well    Past Medical History:   Diagnosis Date   • A-fib (CMS-[facility])    • Blood clotting disorder (CMS-[facility])     DVT right leg   • Cancer (CMS-[facility])     melanoma   • GERD (gastroesophageal reflux disease)    • Hemorrhagic disorder     on coumadin   • Hypertension     well controlled on meds   • Obesity    • Paroxysmal atrial fibrillation (CMS-HCC) 11/26/2012   • Skin cancer     basil cell   • ULCERATIVE COLITIS 11/26/2012     Past Surgical History:   Procedure Laterality Date   • RECOVERY  5/4/2016    Procedure: CATH LAB-[facility]-EPS ABLATION/AFIB [id number] I48.0;  Surgeon: Recoveryonhussain Surgery;  Location: SURGERY PRE-POST PROC UNIT [facility] [place];  Service:    • MASS EXCISION GENERAL Left 4/7/2016    Procedure: MASS EXCISION GENERAL FOR TEMPORAL MELANOMA;  Surgeon: Feng Sharpe M.D.;  Location: SURGERY SAME DAY [facility];  Service:    • FLAP GRAFT Left 4/7/2016    Procedure: FLAP GRAFT FOR CLOSURE WITH LOCAL FLAPS;  Surgeon: Feng Sharpe M.D.;  Location: SURGERY SAME DAY [facility];  Service:    • COLON RESECTION          • ILEOSTOMY       Family History   Problem Relation Age of Onset   • Hypertension Mother    • Heart Failure Father    • Heart Attack Maternal Uncle      History   Smoking Status   • Former Smoker   • Packs/day: 1.00   • Years: 10.00   • Types: Cigarettes   • Quit date: 11/26/1984   Smokeless Tobacco   • Never Used     Allergies   Allergen Reactions   • Other Misc Unspecified     Silk sutures (body rejected them)     Outpatient Encounter Prescriptions as of 1/26/2018   Medication Sig Dispense Refill   • metoprolol (LOPRESSOR) 25 MG Tab TAKE 1 TAB BY MOUTH 2 TIMES A DAY. 180 Tab 3   •  "Multiple Vitamins-Minerals (PRESERVISION AREDS 2 PO) Take  by mouth 2 Times a Day.     • CALCIUM-VITAMIN D PO Take  by mouth.     • Lansoprazole (PREVACID PO) Take  by mouth.     • warfarin (COUMADIN) 5 MG Tab Take 1 Tab by mouth every Friday. As directed per Protimes. 30 Tab 0   • warfarin (COUMADIN) 2.5 MG Tab Take 1 Tab by mouth every day. 30 Tab 0   • Multiple Vitamins-Minerals (ICAPS) TABS Take 1 Tab by mouth 2 Times a Day.     • cetirizine (ZYRTEC) 10 MG TABS Take 10 mg by mouth every morning. Indications: **OTC**     • ciclopirox (LOPROX) 0.77 % cream 1 APPLICATION APPLY ON THE SKIN TWICE A DAY  5   • tobramycin-dexamethasone (TOBRADEX) 0.3-0.1 % Suspension INSITLL ONE DROP IN BOTH EYES FOUR TIMES DAILY  3   • propafenone (RYTHMOL) 150 MG Tab Take 1 Tab by mouth every 8 hours. One tablet PRN recurrence of atrial fibrillation 60 Tab 0   • meclizine (ANTIVERT) 25 MG Tab Take 1 Tab by mouth 3 times a day as needed. 30 Tab 0     No facility-administered encounter medications on file as of 1/26/2018.      Review of Systems   Cardiovascular: Positive for palpitations.        Objective:   /78   Pulse 84   Ht 1.905 m (6' 3\")   Wt (!) 134.3 kg (296 lb)   SpO2 94%   BMI 37.00 kg/m²     Physical Exam   Constitutional: He is oriented to person, place, and time. He appears well-developed and well-nourished. No distress.   obese   HENT:   Head: Normocephalic and atraumatic.   Right Ear: External ear normal.   Left Ear: External ear normal.   Nose: Nose normal.   Mouth/Throat: Oropharynx is clear and moist.   Eyes: Conjunctivae and EOM are normal. Pupils are equal, round, and reactive to light. Right eye exhibits no discharge. Left eye exhibits no discharge. No scleral icterus.   Neck: Normal range of motion. Neck supple. No JVD present. No tracheal deviation present.   Cardiovascular: Normal rate, regular rhythm, normal heart sounds and intact distal pulses.  Exam reveals no gallop and no friction rub.    No " murmur heard.  Pulmonary/Chest: Effort normal and breath sounds normal. No stridor. No respiratory distress. He has no wheezes. He has no rales. He exhibits no tenderness.   Abdominal: Soft. He exhibits no distension. There is no tenderness.   Musculoskeletal: He exhibits no edema or tenderness.   Neurological: He is alert and oriented to person, place, and time. No cranial nerve deficit. Coordination normal.   Skin: Skin is warm and dry. No rash noted. He is not diaphoretic. No erythema. No pallor.   Psychiatric: He has a normal mood and affect. His behavior is normal. Judgment and thought content normal.   Vitals reviewed.      Assessment:     1. PAF (paroxysmal atrial fibrillation) (CMS-[facility])  EKG       Medical Decision Making:  Today's Assessment / Status / Plan:   zio to document etiology of palpiations and if this is recurrence of a fib or just short runs of arrhythmia.  Can take propafenone as needed to suppress.  Encourage weight loss.  Would continue oac

## 2018-01-28 LAB — EKG IMPRESSION: NORMAL

## 2018-02-01 ENCOUNTER — ANTICOAGULATION VISIT (OUTPATIENT)
Dept: MEDICAL GROUP | Facility: MEDICAL CENTER | Age: 68
End: 2018-02-01
Payer: COMMERCIAL

## 2018-02-01 DIAGNOSIS — Z86.711 HISTORY OF PULMONARY EMBOLISM: ICD-10-CM

## 2018-02-01 LAB — INR PPP: 2.9 (ref 2–3.5)

## 2018-02-01 PROCEDURE — 99999 PR NO CHARGE: CPT | Performed by: INTERNAL MEDICINE

## 2018-02-01 PROCEDURE — 85610 PROTHROMBIN TIME: CPT | Performed by: INTERNAL MEDICINE

## 2018-02-01 NOTE — PROGRESS NOTES
OP Anticoagulation Service Note    Date: 2/1/2018      Anticoagulation Summary  As of 2/1/2018    INR goal:   2.0-3.0   TTR:   50.9 % (2.7 y)   Today's INR:   2.9   Maintenance plan:   2.5 mg (5 mg x 0.5) on Sun, Tue, Thu; 5 mg (5 mg x 1) all other days   Weekly total:   27.5 mg   Plan last modified:   Shana Barragan PharmD (1/18/2018)   Next INR check:   3/1/2018   Target end date:   Indefinite    Indications    Atrial fibrillation with RVR (HCC) (Resolved) [I48.91]  History of pulmonary embolism and DVT  unprovoked. [Z86.711]             Anticoagulation Episode Summary     INR check location:       Preferred lab:       Send INR reminders to:       Comments:         Anticoagulation Care Providers     Provider Role Specialty Phone number    [facility] Responsible  [phone]        Anticoagulation Patient Findings      HPI:   Abel Day III seen in clinic today, on anticoagulation therapy with warfarin (a high risk medication) for atrial fibrillation and hx of PE and DVT    Pt is here today to evaluate anticoagulation therapy    Previous INR was 1.6 on 1-18-18, pt was instructed to increase weekly warfarin regimen    Confirmed warfarin dosing regimen, denies missed or extra doses of coumadin.   Diet has been consistent with foods rich in vitamin K: Yes, he continues to diet and lose weight  Changes in ETOH:  No  Changes in smoking status: No  Changes in medication: No   Cost restriction: No  S/s of bleeding:  No  Signs/symptoms  thrombosis since the last appt: No    A/P   INR  therapeutic today,will require close follow up as previous INR was sub-therapeutic   Continue current warfarin regimen     Pt educated to contact our clinic with any changes in medications or s/s of bleeding or thrombosis    Follow up appointment in 4 week(s) to reduce risk of adverse events from warfarin  Shana Barragan, Pharm D

## 2018-02-02 ENCOUNTER — NON-PROVIDER VISIT (OUTPATIENT)
Dept: CARDIOLOGY | Facility: MEDICAL CENTER | Age: 68
End: 2018-02-02
Attending: INTERNAL MEDICINE
Payer: COMMERCIAL

## 2018-02-02 ENCOUNTER — TELEPHONE (OUTPATIENT)
Dept: CARDIOLOGY | Facility: MEDICAL CENTER | Age: 68
End: 2018-02-02

## 2018-02-02 DIAGNOSIS — I47.29 NSVT (NONSUSTAINED VENTRICULAR TACHYCARDIA) (HCC): ICD-10-CM

## 2018-02-02 DIAGNOSIS — I48.91 ATRIAL FIBRILLATION, UNSPECIFIED TYPE (HCC): ICD-10-CM

## 2018-02-02 DIAGNOSIS — Z86.79 S/P ABLATION OF ATRIAL FIBRILLATION: ICD-10-CM

## 2018-02-02 DIAGNOSIS — Z98.890 S/P ABLATION OF ATRIAL FIBRILLATION: ICD-10-CM

## 2018-03-01 ENCOUNTER — ANTICOAGULATION VISIT (OUTPATIENT)
Dept: MEDICAL GROUP | Facility: MEDICAL CENTER | Age: 68
End: 2018-03-01
Payer: COMMERCIAL

## 2018-03-01 VITALS — DIASTOLIC BLOOD PRESSURE: 78 MMHG | SYSTOLIC BLOOD PRESSURE: 144 MMHG | HEART RATE: 76 BPM

## 2018-03-01 DIAGNOSIS — Z86.711 HISTORY OF PULMONARY EMBOLISM: ICD-10-CM

## 2018-03-01 LAB — INR PPP: 2.9 (ref 2–3.5)

## 2018-03-01 PROCEDURE — 85610 PROTHROMBIN TIME: CPT | Performed by: FAMILY MEDICINE

## 2018-03-01 PROCEDURE — 99999 PR NO CHARGE: CPT | Performed by: FAMILY MEDICINE

## 2018-03-01 NOTE — PROGRESS NOTES
OP Anticoagulation Service Note    Date: 3/1/2018  Vitals:    03/01/18 1106   BP: 144/78   Pulse: 76         Anticoagulation Summary  As of 3/1/2018    INR goal:   2.0-3.0   TTR:   52.3 % (2.7 y)   Today's INR:   2.9   Maintenance plan:   2.5 mg (5 mg x 0.5) on Sun, Tue, Thu; 5 mg (5 mg x 1) all other days   Weekly total:   27.5 mg   Plan last modified:   Shana Barragan PharmD (1/18/2018)   Next INR check:   4/5/2018   Target end date:   Indefinite    Indications    Atrial fibrillation with RVR (HCC) (Resolved) [I48.91]  History of pulmonary embolism and DVT  unprovoked. [Z86.711]             Anticoagulation Episode Summary     INR check location:       Preferred lab:       Send INR reminders to:       Comments:         Anticoagulation Care Providers     Provider Role Specialty Phone number    [facility] Responsible  [phone]        Anticoagulation Patient Findings      HPI:   Abel Day III seen in clinic today, on anticoagulation therapy with warfarin (a high risk medication) for a fib with hx of PE and DVT    Pt is here today to evaluate anticoagulation therapy    Previous INR was  2.9 on 2-1-18    Pt was instructed to Continue current warfarin regimen       Confirmed warfarin dosing regimen, denies missed or extra doses of coumadin.   Diet has been consistent with foods rich in vitamin K: Yes, he continues to diet  Changes in ETOH:  No  Changes in smoking status: No  Changes in medication: No   Cost restriction: No  S/s of bleeding:  No  Signs/symptoms  thrombosis since the last appt: No    A/P   INR  therapeutic today,  Continue current warfarin regimen        Pt educated to contact our clinic with any changes in medications or s/s of bleeding or thrombosis    Follow up appointment in 5 week(s) to reduce risk of adverse events from warfarin  Shana Barragan, Pharm D

## 2018-03-05 PROCEDURE — 0296T PR EXT ECG > 48HR TO 21 DAY RCRD W/CONECT INTL RCRD: CPT | Performed by: INTERNAL MEDICINE

## 2018-03-05 PROCEDURE — 0298T PR EXT ECG > 48HR TO 21 DAY REVIEW AND INTERPRETATN: CPT | Performed by: INTERNAL MEDICINE

## 2018-03-07 ENCOUNTER — OFFICE VISIT (OUTPATIENT)
Dept: CARDIOLOGY | Facility: MEDICAL CENTER | Age: 68
End: 2018-03-07
Payer: COMMERCIAL

## 2018-03-07 VITALS
WEIGHT: 288 LBS | DIASTOLIC BLOOD PRESSURE: 76 MMHG | OXYGEN SATURATION: 97 % | HEART RATE: 76 BPM | BODY MASS INDEX: 35.81 KG/M2 | HEIGHT: 75 IN | SYSTOLIC BLOOD PRESSURE: 124 MMHG

## 2018-03-07 DIAGNOSIS — I47.20 VT (VENTRICULAR TACHYCARDIA) (HCC): ICD-10-CM

## 2018-03-07 DIAGNOSIS — I48.0 PAF (PAROXYSMAL ATRIAL FIBRILLATION) (HCC): ICD-10-CM

## 2018-03-07 LAB — EKG IMPRESSION: NORMAL

## 2018-03-07 PROCEDURE — 93000 ELECTROCARDIOGRAM COMPLETE: CPT | Performed by: INTERNAL MEDICINE

## 2018-03-07 PROCEDURE — 99214 OFFICE O/P EST MOD 30 MIN: CPT | Performed by: INTERNAL MEDICINE

## 2018-03-07 RX ORDER — PROPAFENONE HYDROCHLORIDE 150 MG/1
150 TABLET, COATED ORAL EVERY 8 HOURS
Qty: 60 TAB | Refills: 5 | Status: SHIPPED | OUTPATIENT
Start: 2018-03-07 | End: 2019-06-20

## 2018-03-07 ASSESSMENT — ENCOUNTER SYMPTOMS
PALPITATIONS: 1
PALPITATIONS: 1

## 2018-03-07 NOTE — PROGRESS NOTES
Subjective:   Abel Day III is a 68 y.o. male who presents today for follow up  S/p a fib ablation    Chief Complaint: feeling palps a lot while on the zio but not much since.    Reviewed the zio together.  10 second runs both wide and narrow.      Past Medical History:   Diagnosis Date   • A-fib (CMS-HCC)    • Blood clotting disorder (CMS-HCC)     DVT right leg   • Cancer (CMS-HCC)     melanoma   • GERD (gastroesophageal reflux disease)    • Hemorrhagic disorder     on coumadin   • Hypertension     well controlled on meds   • Obesity    • Paroxysmal atrial fibrillation (CMS-HCC) 11/26/2012   • Skin cancer     basil cell   • ULCERATIVE COLITIS 11/26/2012     Past Surgical History:   Procedure Laterality Date   • RECOVERY  5/4/2016    Procedure: CATH LAB-[facility]-EPS ABLATION/AFIB [id number] I48.0;  Surgeon: Recoveryonhussain Surgery;  Location: SURGERY PRE-POST PROC UNIT [facility] [place];  Service:    • MASS EXCISION GENERAL Left 4/7/2016    Procedure: MASS EXCISION GENERAL FOR TEMPORAL MELANOMA;  Surgeon: Feng Sharpe M.D.;  Location: SURGERY SAME DAY [facility];  Service:    • FLAP GRAFT Left 4/7/2016    Procedure: FLAP GRAFT FOR CLOSURE WITH LOCAL FLAPS;  Surgeon: Feng Sharpe M.D.;  Location: SURGERY SAME DAY [facility];  Service:    • COLON RESECTION          • ILEOSTOMY       Family History   Problem Relation Age of Onset   • Hypertension Mother    • Heart Failure Father    • Heart Attack Maternal Uncle      History   Smoking Status   • Former Smoker   • Packs/day: 1.00   • Years: 10.00   • Types: Cigarettes   • Quit date: 11/26/1984   Smokeless Tobacco   • Never Used     Allergies   Allergen Reactions   • Other Misc Unspecified     Silk sutures (body rejected them)     Outpatient Encounter Prescriptions as of 3/7/2018   Medication Sig Dispense Refill   • propafenone (RYTHMOL) 150 MG Tab Take 1 Tab by mouth every 8 hours. One tablet PRN recurrence of atrial fibrillation 60 Tab 5   •  "ciclopirox (LOPROX) 0.77 % cream 1 APPLICATION APPLY ON THE SKIN TWICE A DAY  5   • metoprolol (LOPRESSOR) 25 MG Tab TAKE 1 TAB BY MOUTH 2 TIMES A DAY. 180 Tab 3   • Multiple Vitamins-Minerals (PRESERVISION AREDS 2 PO) Take  by mouth 2 Times a Day.     • CALCIUM-VITAMIN D PO Take  by mouth.     • Lansoprazole (PREVACID PO) Take  by mouth.     • warfarin (COUMADIN) 5 MG Tab Take 1 Tab by mouth every Friday. As directed per Protimes. 30 Tab 0   • warfarin (COUMADIN) 2.5 MG Tab Take 1 Tab by mouth every day. 30 Tab 0   • Multiple Vitamins-Minerals (ICAPS) TABS Take 1 Tab by mouth 2 Times a Day.     • cetirizine (ZYRTEC) 10 MG TABS Take 10 mg by mouth every morning. Indications: **OTC**     • [DISCONTINUED] tobramycin-dexamethasone (TOBRADEX) 0.3-0.1 % Suspension INSITLL ONE DROP IN BOTH EYES FOUR TIMES DAILY  3   • [DISCONTINUED] propafenone (RYTHMOL) 150 MG Tab Take 1 Tab by mouth every 8 hours. One tablet PRN recurrence of atrial fibrillation 60 Tab 0   • [DISCONTINUED] meclizine (ANTIVERT) 25 MG Tab Take 1 Tab by mouth 3 times a day as needed. 30 Tab 0     No facility-administered encounter medications on file as of 3/7/2018.      Review of Systems   Cardiovascular: Positive for palpitations.        Objective:   /76   Pulse 76   Ht 1.905 m (6' 3\")   Wt (!) 130.6 kg (288 lb)   SpO2 97%   BMI 36.00 kg/m²     Physical Exam   Constitutional: He is oriented to person, place, and time. He appears well-developed and well-nourished. No distress.   HENT:   Head: Normocephalic and atraumatic.   Right Ear: External ear normal.   Left Ear: External ear normal.   Nose: Nose normal.   Mouth/Throat: Oropharynx is clear and moist.   Eyes: Conjunctivae and EOM are normal. Pupils are equal, round, and reactive to light. Right eye exhibits no discharge. Left eye exhibits no discharge. No scleral icterus.   Neck: Normal range of motion. Neck supple. No JVD present. No tracheal deviation present.   Cardiovascular: Normal " rate, regular rhythm, normal heart sounds and intact distal pulses.  Exam reveals no gallop and no friction rub.    No murmur heard.  Pulmonary/Chest: Effort normal and breath sounds normal. No stridor. No respiratory distress. He has no wheezes. He has no rales. He exhibits no tenderness.   Abdominal: Soft. He exhibits no distension. There is no tenderness.   Musculoskeletal: He exhibits no edema or tenderness.   Neurological: He is alert and oriented to person, place, and time. No cranial nerve deficit. Coordination normal.   Skin: Skin is warm and dry. No rash noted. He is not diaphoretic. No erythema. No pallor.   Psychiatric: He has a normal mood and affect. His behavior is normal. Judgment and thought content normal.   Vitals reviewed.      Assessment:     1. PAF (paroxysmal atrial fibrillation) (CMS-[facility])  EKG       Medical Decision Making:  Today's Assessment / Status / Plan:   Echo to see that heart still structurally normal given wide complex rhythm  Can take propafenone prn for a fib spectrum arrhythmia.  Continue oac

## 2018-03-14 ENCOUNTER — HOSPITAL ENCOUNTER (OUTPATIENT)
Dept: CARDIOLOGY | Facility: MEDICAL CENTER | Age: 68
End: 2018-03-14
Attending: INTERNAL MEDICINE
Payer: COMMERCIAL

## 2018-03-14 DIAGNOSIS — I47.20 VT (VENTRICULAR TACHYCARDIA) (HCC): ICD-10-CM

## 2018-03-14 DIAGNOSIS — I48.0 PAF (PAROXYSMAL ATRIAL FIBRILLATION) (HCC): ICD-10-CM

## 2018-03-14 PROCEDURE — 93306 TTE W/DOPPLER COMPLETE: CPT

## 2018-03-14 PROCEDURE — 93306 TTE W/DOPPLER COMPLETE: CPT | Mod: 26 | Performed by: INTERNAL MEDICINE

## 2018-03-15 LAB
LV EJECT FRACT  99904: 60
LV EJECT FRACT MOD 2C 99903: 67.63
LV EJECT FRACT MOD 4C 99902: 66.17
LV EJECT FRACT MOD BP 99901: 66.85

## 2018-03-21 ENCOUNTER — TELEPHONE (OUTPATIENT)
Dept: CARDIOLOGY | Facility: MEDICAL CENTER | Age: 68
End: 2018-03-21

## 2018-03-21 DIAGNOSIS — I27.20 PULMONARY HTN (HCC): ICD-10-CM

## 2018-03-21 NOTE — TELEPHONE ENCOUNTER
Left detailed vm for pt providing echo results. Forwarded to Dr. Gonsales for additional review and recommendations as necessary.

## 2018-03-21 NOTE — TELEPHONE ENCOUNTER
----- Message from Evita Doherty sent at 3/21/2018 10:12 AM PDT -----  Regarding: Patient wants to get Echo results  BONY/Verna    Patient wants to get the results of his Echo that was done on 3/14 at [facility] and he can be reached at [phone].

## 2018-03-31 NOTE — TELEPHONE ENCOUNTER
Mild lvh.  Increased pulmonary pressures.  Suspect obesity hypoventilation syndrome.  Encourage weight loss.  As this is as much an issue as his arrhythmia, would have him establish with SW or RO.

## 2018-04-05 ENCOUNTER — ANTICOAGULATION VISIT (OUTPATIENT)
Dept: MEDICAL GROUP | Facility: MEDICAL CENTER | Age: 68
End: 2018-04-05
Payer: COMMERCIAL

## 2018-04-05 DIAGNOSIS — Z86.711 HISTORY OF PULMONARY EMBOLISM: ICD-10-CM

## 2018-04-05 LAB — INR PPP: 4.5 (ref 2–3.5)

## 2018-04-05 PROCEDURE — 99211 OFF/OP EST MAY X REQ PHY/QHP: CPT | Performed by: FAMILY MEDICINE

## 2018-04-05 PROCEDURE — 85610 PROTHROMBIN TIME: CPT | Performed by: FAMILY MEDICINE

## 2018-04-05 NOTE — PROGRESS NOTES
OP Anticoagulation Service Note    Date: 4/5/2018  There were no vitals filed for this visit.   pt declined vitals    Anticoagulation Summary  As of 4/5/2018    INR goal:   2.0-3.0   TTR:   50.8 % (2.8 y)   Today's INR:   4.5!   Maintenance plan:   5 mg (5 mg x 1) on Mon, Wed, Fri; 2.5 mg (5 mg x 0.5) all other days   Weekly total:   25 mg   Plan last modified:   Shana Barragan, PharmD (4/5/2018)   Next INR check:   5/1/2018   Target end date:   Indefinite    Indications    Atrial fibrillation with RVR (HCC) (Resolved) [I48.91]  History of pulmonary embolism and DVT  unprovoked. [Z86.711]             Anticoagulation Episode Summary     INR check location:       Preferred lab:       Send INR reminders to:       Comments:         Anticoagulation Care Providers     Provider Role Specialty Phone number    [facility] Responsible  [phone]        Anticoagulation Patient Findings  Patient Findings     Negatives:   Signs/symptoms of thrombosis, Signs/symptoms of bleeding, Laboratory test error suspected, Change in health, Change in alcohol use, Change in activity, Upcoming invasive procedure, Emergency department visit, Upcoming dental procedure, Missed doses, Extra doses, Change in medications, Change in diet/appetite, Hospital admission, Bruising, Other complaints          HPI:   Abel Alin Day III seen in clinic today, on anticoagulation therapy with warfarin (a high risk medication) for atrial fibrillation and hx of DVT    Pt is here today to evaluate anticoagulation therapy    Previous INR was  2.9 on 3-1-18    Pt was instructed to Continue current warfarin regimen       Confirmed warfarin dosing regimen, denies missed or extra doses of coumadin.   Diet has been consistent with foods rich in vitamin K: Yes, he is still dieting, wt is stay stable  Changes in ETOH:  No  Changes in smoking status: No  Changes in medication: No   Cost restriction: No  S/s of bleeding:  No  Signs/symptoms   thrombosis since the last appt: No    A/P   INR  supratherapeutic today, will require dose adjust ment today to prevent bleeding complications and closer follow up.   Tonight no warfarin then decrease weekly regimen     Pt educated to contact our clinic with any changes in medications or s/s of bleeding or thrombosis    Follow up appointment in 3 week(s) as this is soonest pt can return.   Shana Barragan, Pharm D

## 2018-04-20 DIAGNOSIS — I48.91 ATRIAL FIBRILLATION, UNSPECIFIED TYPE (HCC): ICD-10-CM

## 2018-05-01 ENCOUNTER — ANTICOAGULATION VISIT (OUTPATIENT)
Dept: MEDICAL GROUP | Facility: MEDICAL CENTER | Age: 68
End: 2018-05-01
Payer: COMMERCIAL

## 2018-05-01 VITALS — DIASTOLIC BLOOD PRESSURE: 93 MMHG | HEART RATE: 78 BPM | SYSTOLIC BLOOD PRESSURE: 142 MMHG | HEIGHT: 75 IN

## 2018-05-01 DIAGNOSIS — Z86.711 HISTORY OF PULMONARY EMBOLISM: ICD-10-CM

## 2018-05-01 LAB — INR PPP: 3.6 (ref 2–3.5)

## 2018-05-01 PROCEDURE — 99999 POCT PROTIME: CPT | Performed by: PHYSICIAN ASSISTANT

## 2018-05-01 PROCEDURE — 85610 PROTHROMBIN TIME: CPT | Performed by: PHYSICIAN ASSISTANT

## 2018-05-01 PROCEDURE — 99211 OFF/OP EST MAY X REQ PHY/QHP: CPT | Performed by: PHYSICIAN ASSISTANT

## 2018-05-01 NOTE — PROGRESS NOTES
Anticoagulation Summary  As of 5/1/2018    INR goal:   2.0-3.0   TTR:   --   Today's INR:   3.6!   Warfarin maintenance plan:   5 mg (5 mg x 1) on Mon, Wed, Fri; 2.5 mg (5 mg x 0.5) all other days   Weekly warfarin total:   25 mg   Plan last modified:   Shana Barragan, PharmD (4/5/2018)   Next INR check:   5/22/2018   Target end date:   Indefinite    Indications    Atrial fibrillation with RVR (HCC) (Resolved) [I48.91]  History of pulmonary embolism and DVT  unprovoked. [Z86.711]             Anticoagulation Episode Summary     INR check location:       Preferred lab:       Send INR reminders to:       Comments:         Anticoagulation Care Providers     Provider Role Specialty Phone number    [facility] Responsible  [phone]        Anticoagulation Patient Findings  Patient Findings     Positives:   Change in alcohol use    Negatives:   Signs/symptoms of thrombosis, Signs/symptoms of bleeding, Laboratory test error suspected, Change in health, Change in activity, Upcoming invasive procedure, Emergency department visit, Upcoming dental procedure, Missed doses, Extra doses, Change in medications, Change in diet/appetite, Hospital admission, Bruising, Other complaints        HPI:   Seen in clinic today, on anticoagulation therapy with warfarin for stroke prevention due to history of atrial fibrillation and PE/DVT    Previous INR  4.5 on 4/5/18     Does patient have any changes to current medical/health status since last appt (Y/N):  NO  Does patient have any signs/symptoms of bleeding and/or thrombosis since the last appt (Y/N):  NO  Does patient have any interval changes to diet or medications since last appt (Y/N):  NO  Are there any complications or cost restrictions with current therapy (Y/N):  NO      Vitals:  Patient BP/vitals in chart     Asssessment:       INR SUPRAtherapeutic therefore putting patient at higher risk of bleeding  Reason(s) for out of range INR today: increased alcohol  intake over the weekend     Plan:  Instructed patient to hold today's dose, then resume current warfarin regimen.       Follow up:  Because warfarin is a high risk medication and current CHEST guidelines recommend regular monitoring intervals (few days up to 12 weeks), will have patient return to clinic in 3 weeks to recheck INR.    Che Rogers, PharmD

## 2018-05-22 ENCOUNTER — ANTICOAGULATION VISIT (OUTPATIENT)
Dept: MEDICAL GROUP | Facility: MEDICAL CENTER | Age: 68
End: 2018-05-22
Payer: COMMERCIAL

## 2018-05-22 DIAGNOSIS — Z86.711 HISTORY OF PULMONARY EMBOLISM: ICD-10-CM

## 2018-05-22 DIAGNOSIS — Z79.01 LONG TERM (CURRENT) USE OF ANTICOAGULANTS: Primary | ICD-10-CM

## 2018-05-22 LAB — INR PPP: 3.4 (ref 2–3.5)

## 2018-05-22 PROCEDURE — 85610 PROTHROMBIN TIME: CPT | Performed by: FAMILY MEDICINE

## 2018-05-22 PROCEDURE — 99211 OFF/OP EST MAY X REQ PHY/QHP: CPT | Performed by: FAMILY MEDICINE

## 2018-05-22 NOTE — PROGRESS NOTES
OP Anticoagulation Service Note    Date: 5/22/2018  There were no vitals filed for this visit.   pt declined vitals    Anticoagulation Summary  As of 5/22/2018    INR goal:   2.0-3.0   TTR:   48.5 % (3 y)   Today's INR:   3.4!   Warfarin maintenance plan:   5 mg (5 mg x 1) on Mon, Fri; 2.5 mg (5 mg x 0.5) all other days   Weekly warfarin total:   22.5 mg   Plan last modified:   Shana Barragan, PharmD (5/22/2018)   Next INR check:   6/5/2018   Target end date:   Indefinite    Indications    Atrial fibrillation with RVR (HCC) (Resolved) [I48.91]  History of pulmonary embolism and DVT  unprovoked. [Z86.711]             Anticoagulation Episode Summary     INR check location:       Preferred lab:       Send INR reminders to:       Comments:         Anticoagulation Care Providers     Provider Role Specialty Phone number    [facility] Responsible  [phone]        Anticoagulation Patient Findings      HPI:   Abel Day III seen in clinic today, on anticoagulation therapy with warfarin (a high risk medication) for atrial fibrillation with hx of DVT and PE      Pt is here today to evaluate anticoagulation therapy    Previous INR was  3.6 on 5-1-18    Pt was instructed to HOLD then continue current warfarin regimen      Confirmed warfarin dosing regimen, denies missed or extra doses of coumadin.   Diet has been consistent with foods rich in vitamin K: Yes - still dieting to lose weight, has lost about 35 lbs  Changes in ETOH:  No  Changes in smoking status: No  Changes in medication: No   Cost restriction: No  S/s of bleeding:  No  Signs/symptoms  thrombosis since the last appt: No    A/P   INR  supratherapeutic today, will require dose adjust ment today to prevent bleeding complications  and closer follow up.   Begin lower weekly regimen       7-2018 check referral      Pt educated to contact our clinic with any changes in medications or s/s of bleeding or thrombosis. Pt is aware to seek  immediate medical attention for falls, head injury or deep cuts    Follow up appointment in 2 week(s) to reduce risk of adverse events from warfarin   Shana Barragan, Yoni D

## 2018-06-07 ENCOUNTER — ANTICOAGULATION VISIT (OUTPATIENT)
Dept: MEDICAL GROUP | Facility: MEDICAL CENTER | Age: 68
End: 2018-06-07
Payer: COMMERCIAL

## 2018-06-07 VITALS — HEART RATE: 79 BPM | SYSTOLIC BLOOD PRESSURE: 140 MMHG | DIASTOLIC BLOOD PRESSURE: 81 MMHG

## 2018-06-07 DIAGNOSIS — Z79.01 LONG TERM (CURRENT) USE OF ANTICOAGULANTS: Primary | ICD-10-CM

## 2018-06-07 DIAGNOSIS — Z86.711 HISTORY OF PULMONARY EMBOLISM: ICD-10-CM

## 2018-06-07 LAB — INR PPP: 2.3 (ref 2–3.5)

## 2018-06-07 PROCEDURE — 99999 PR NO CHARGE: CPT | Performed by: INTERNAL MEDICINE

## 2018-06-07 PROCEDURE — 85610 PROTHROMBIN TIME: CPT | Performed by: INTERNAL MEDICINE

## 2018-06-07 NOTE — PROGRESS NOTES
OP Anticoagulation Service Note    Date: 6/7/2018  Vitals:    06/07/18 0938   BP: 140/81   Pulse: 79         Anticoagulation Summary  As of 6/7/2018    INR goal:   2.0-3.0   TTR:   48.8 % (3 y)   Today's INR:   2.3   Warfarin maintenance plan:   5 mg (5 mg x 1) on Mon, Fri; 2.5 mg (5 mg x 0.5) all other days   Weekly warfarin total:   22.5 mg   Plan last modified:   Shana Barragan, PharmD (5/22/2018)   Next INR check:   6/21/2018   Target end date:   Indefinite    Indications    History of pulmonary embolism and DVT  unprovoked. [Z86.711]  Atrial fibrillation with RVR (HCC) (Resolved) [I48.91]  Long term (current) use of anticoagulants [Z79.01] [Z79.01]             Anticoagulation Episode Summary     INR check location:       Preferred lab:       Send INR reminders to:       Comments:         Anticoagulation Care Providers     Provider Role Specialty Phone number    [facility] Responsible  [phone]        Anticoagulation Patient Findings      HPI:   Abel Day III seen in clinic today, on anticoagulation therapy with warfarin (a high risk medication) for hx of PE and DVT and atrial fibrillation      Pt is here today to evaluate anticoagulation therapy    Previous INR was  3.4 on 5-22-18    Pt was instructed to lower weekly regimen.     Confirmed warfarin dosing regimen, denies missed or extra doses of coumadin.   Diet has been consistent with foods rich in vitamin K: Yes  Changes in ETOH:  No  Changes in smoking status: No  Changes in medication: No   Cost restriction: No  S/s of bleeding:  No  Signs/symptoms  thrombosis since the last appt: No    A/P   INR  therapeutic today, will require close follow up as previous INR was supra-therapeutic   Continue current warfarin regimen   Pt will be leaving town for the summer, will give SO for pt/inr before he leaves       7-2018 check referral    Pt educated to contact our clinic with any changes in medications or s/s of bleeding or  thrombosis. Pt is aware to seek immediate medical attention for falls, head injury or deep cuts    Follow up appointment in 2 week(s) to reduce risk of adverse events from warfarin  Yoni Lacey D

## 2018-06-21 ENCOUNTER — ANTICOAGULATION VISIT (OUTPATIENT)
Dept: MEDICAL GROUP | Facility: MEDICAL CENTER | Age: 68
End: 2018-06-21
Payer: COMMERCIAL

## 2018-06-21 VITALS — DIASTOLIC BLOOD PRESSURE: 82 MMHG | SYSTOLIC BLOOD PRESSURE: 151 MMHG | HEART RATE: 74 BPM

## 2018-06-21 DIAGNOSIS — Z79.01 LONG TERM (CURRENT) USE OF ANTICOAGULANTS: Primary | ICD-10-CM

## 2018-06-21 DIAGNOSIS — Z86.711 HISTORY OF PULMONARY EMBOLISM: ICD-10-CM

## 2018-06-21 DIAGNOSIS — I48.91 ATRIAL FIBRILLATION, UNSPECIFIED TYPE (HCC): ICD-10-CM

## 2018-06-21 LAB — INR PPP: 3.5 (ref 2–3.5)

## 2018-06-21 PROCEDURE — 99211 OFF/OP EST MAY X REQ PHY/QHP: CPT | Performed by: INTERNAL MEDICINE

## 2018-06-21 PROCEDURE — 85610 PROTHROMBIN TIME: CPT | Performed by: INTERNAL MEDICINE

## 2018-06-22 NOTE — PROGRESS NOTES
OP Anticoagulation Service Note    Date: 6/21/2018  Vitals:    06/21/18 1616   BP: 151/82   Pulse: 74       Anticoagulation Summary  As of 6/21/2018    INR goal:   2.0-3.0   TTR:   48.9 % (3 y)   Today's INR:   3.5!   Warfarin maintenance plan:   5 mg (5 mg x 1) on Mon, Fri; 2.5 mg (5 mg x 0.5) all other days   Weekly warfarin total:   22.5 mg   Plan last modified:   Shana Barragan, PharmD (5/22/2018)   Next INR check:   7/12/2018   Target end date:   Indefinite    Indications    History of pulmonary embolism and DVT  unprovoked. [Z86.711]  Atrial fibrillation with RVR (HCC) (Resolved) [I48.91]  Long term (current) use of anticoagulants [Z79.01] [Z79.01]             Anticoagulation Episode Summary     INR check location:       Preferred lab:       Send INR reminders to:       Comments:         Anticoagulation Care Providers     Provider Role Specialty Phone number    [facility] Responsible  [phone]        Anticoagulation Patient Findings      HPI:   Abel Day III seen in clinic today, on anticoagulation therapy with warfarin (a high risk medication) for hx of PE and DVT and atrial fibrillation      Pt is here today to evaluate anticoagulation therapy    Previous INR was  2.3 on 6-7-2018    Pt was instructed to Continue current warfarin regimen       Confirmed warfarin dosing regimen, denies missed or extra doses of coumadin.   Diet has been consistent with foods rich in vitamin K: Yes  Changes in ETOH:  No  Changes in smoking status: No  Changes in medication: No   Cost restriction: No  S/s of bleeding:  No  Signs/symptoms  thrombosis since the last appt: No    A/P   INR  supratherapeutic today, will require dose adjust ment today to prevent bleeding complications and closer follow up.    Tonight no warfarin then resume usual regimen.      Pt given pt/INR to get INR while on vacation in [place]    7-2018 check referral    Pt educated to contact our clinic with any changes in  medications or s/s of bleeding or thrombosis. Pt is aware to seek immediate medical attention for falls, head injury or deep cuts    Follow up appointment in 2 week(s) to reduce risk of adverse events from warfarin   Shana Barragan, PharmD

## 2018-07-07 LAB — INR PPP: 2.68 (ref 2–3.5)

## 2018-07-13 ENCOUNTER — ANTICOAGULATION MONITORING (OUTPATIENT)
Dept: VASCULAR LAB | Facility: MEDICAL CENTER | Age: 68
End: 2018-07-13

## 2018-07-13 DIAGNOSIS — Z86.711 HISTORY OF PULMONARY EMBOLISM: ICD-10-CM

## 2018-07-13 DIAGNOSIS — Z79.01 LONG TERM (CURRENT) USE OF ANTICOAGULANTS: ICD-10-CM

## 2018-07-13 NOTE — PROGRESS NOTES
OP Anticoagulation Service Note    Date: 7/13/2018  Anticoagulation Summary  As of 7/13/2018    INR goal:   2.0-3.0   TTR:   48.8 % (3.1 y)   Today's INR:   2.68 (7/7/2018)   Warfarin maintenance plan:   5 mg (5 mg x 1) on Mon, Fri; 2.5 mg (5 mg x 0.5) all other days   Weekly warfarin total:   22.5 mg   No change documented:   Yonas Lan Ass't   Plan last modified:   Shana Barragan, PharmD (5/22/2018)   Next INR check:   7/20/2018   Target end date:   Indefinite    Indications    History of pulmonary embolism and DVT  unprovoked. [Z86.711]  Atrial fibrillation with RVR (HCC) (Resolved) [I48.91]  Long term (current) use of anticoagulants [Z79.01] [Z79.01]             Anticoagulation Episode Summary     INR check location:       Preferred lab:       Send INR reminders to:       Comments:         Anticoagulation Care Providers     Provider Role Specialty Phone number    [facility] Responsible  [phone]        Anticoagulation Patient Findings  Patient Findings     Negatives:   Signs/symptoms of thrombosis, Signs/symptoms of bleeding, Laboratory test error suspected, Change in health, Change in alcohol use, Change in activity, Upcoming invasive procedure, Emergency department visit, Upcoming dental procedure, Missed doses, Extra doses, Change in medications, Change in diet/appetite, Hospital admission, Bruising, Other complaints        Plan: Left patient a message. Patient is therapeutic and will remain on the same dose. Patient was instructed to call back if needed to report any unusual bleeding or bruising or any changes to medication or diet. Patient is to be checked again in 1 week.    Yonas Lan. Ass't  [facility]    I have reviewed and am in agreement with the above stated plan on 7-13-18.  Daniel Rutledge, YoniD

## 2018-07-26 ENCOUNTER — APPOINTMENT (OUTPATIENT)
Dept: MEDICAL GROUP | Facility: MEDICAL CENTER | Age: 68
End: 2018-07-26
Payer: COMMERCIAL

## 2018-08-02 ENCOUNTER — ANTICOAGULATION VISIT (OUTPATIENT)
Dept: MEDICAL GROUP | Facility: MEDICAL CENTER | Age: 68
End: 2018-08-02
Payer: COMMERCIAL

## 2018-08-02 VITALS — HEART RATE: 75 BPM | SYSTOLIC BLOOD PRESSURE: 133 MMHG | DIASTOLIC BLOOD PRESSURE: 69 MMHG

## 2018-08-02 DIAGNOSIS — Z86.711 HISTORY OF PULMONARY EMBOLISM: ICD-10-CM

## 2018-08-02 DIAGNOSIS — Z79.01 LONG TERM (CURRENT) USE OF ANTICOAGULANTS: Primary | ICD-10-CM

## 2018-08-02 LAB — INR PPP: 3.2 (ref 2–3.5)

## 2018-08-02 PROCEDURE — 99211 OFF/OP EST MAY X REQ PHY/QHP: CPT | Performed by: PHYSICIAN ASSISTANT

## 2018-08-02 PROCEDURE — 85610 PROTHROMBIN TIME: CPT | Performed by: PHYSICIAN ASSISTANT

## 2018-08-02 NOTE — PROGRESS NOTES
OP Anticoagulation Service Note    Date: 8/2/2018  Vitals:    08/02/18 0953   BP: 133/69   Pulse: 75       Anticoagulation Summary  As of 8/2/2018    INR goal:   2.0-3.0   TTR:   49.0 % (3.2 y)   Today's INR:   3.2!   Warfarin maintenance plan:   5 mg (5 mg x 1) on Mon, Fri; 2.5 mg (5 mg x 0.5) all other days   Weekly warfarin total:   22.5 mg   Plan last modified:   Shana Barragan, PharmD (5/22/2018)   Next INR check:   9/6/2018   Target end date:   Indefinite    Indications    History of pulmonary embolism and DVT  unprovoked. [Z86.711]  Atrial fibrillation with RVR (HCC) (Resolved) [I48.91]  Long term (current) use of anticoagulants [Z79.01] [Z79.01]             Anticoagulation Episode Summary     INR check location:       Preferred lab:       Send INR reminders to:       Comments:         Anticoagulation Care Providers     Provider Role Specialty Phone number    [facility] Responsible  [phone]        Anticoagulation Patient Findings      HPI:   Abel Day III seen in clinic today, on anticoagulation therapy with warfarin (a high risk medication) for hx of PE and DVT      Pt is here today to evaluate anticoagulation therapy    Previous INR was  2.7 on 7-    Pt was instructed to Continue current warfarin regimen       Confirmed warfarin dosing regimen, denies missed or extra doses of coumadin.   Diet has been consistent with foods rich in vitamin K: Yes  Changes in ETOH:  No  Changes in smoking status: No  Changes in medication: No   Cost restriction: No  S/s of bleeding:  No  Signs/symptoms  thrombosis since the last appt: No    A/P   INR  supra-therapeutic today, will require dose adjust ment today to prevent bleeding complications and closer follow up.   HOLD warfarin today then Continue current warfarin regimen          7-2018 check referral    Pt educated to contact our clinic with any changes in medications or s/s of bleeding or thrombosis. Pt is aware to seek  immediate medical attention for falls, head injury or deep cuts    Follow up appointment in 4 week(s) to reduce risk of adverse events from warfarin  Shana Barragan, PharmD

## 2018-09-06 ENCOUNTER — ANTICOAGULATION VISIT (OUTPATIENT)
Dept: MEDICAL GROUP | Facility: MEDICAL CENTER | Age: 68
End: 2018-09-06
Payer: COMMERCIAL

## 2018-09-06 DIAGNOSIS — Z86.711 HISTORY OF PULMONARY EMBOLISM: ICD-10-CM

## 2018-09-06 DIAGNOSIS — Z79.01 LONG TERM (CURRENT) USE OF ANTICOAGULANTS: Primary | ICD-10-CM

## 2018-09-06 LAB — INR PPP: 2.5 (ref 2–3.5)

## 2018-09-06 PROCEDURE — 85610 PROTHROMBIN TIME: CPT | Performed by: INTERNAL MEDICINE

## 2018-09-06 PROCEDURE — 99999 PR NO CHARGE: CPT | Performed by: INTERNAL MEDICINE

## 2018-09-06 NOTE — PROGRESS NOTES
OP Anticoagulation Service Note    Date: 9/6/2018  There were no vitals filed for this visit.   pt declined vitals    Anticoagulation Summary  As of 9/6/2018    INR goal:   2.0-3.0   TTR:   49.7 % (3.3 y)   Today's INR:   2.5   Warfarin maintenance plan:   5 mg (5 mg x 1) on Mon, Fri; 2.5 mg (5 mg x 0.5) all other days   Weekly warfarin total:   22.5 mg   Plan last modified:   Shana Barragan, PharmD (5/22/2018)   Next INR check:   10/25/2018   Target end date:   Indefinite    Indications    History of pulmonary embolism and DVT  unprovoked. [Z86.711]  Atrial fibrillation with RVR (HCC) (Resolved) [I48.91]  Long term (current) use of anticoagulants [Z79.01] [Z79.01]             Anticoagulation Episode Summary     INR check location:       Preferred lab:       Send INR reminders to:       Comments:         Anticoagulation Care Providers     Provider Role Specialty Phone number    [facility] Responsible  [phone]        Anticoagulation Patient Findings      HPI:   Abel Day III seen in clinic today, on anticoagulation therapy with warfarin (a high risk medication) for hx of PE and DVT and atrial fibrillation    Pt is here today to evaluate anticoagulation therapy    Previous INR was  3.2 on 8-2-2018    Pt was instructed to hold then continue current regimen    Confirmed warfarin dosing regimen, denies missed or extra doses of coumadin.   Diet has been consistent with foods rich in vitamin K: Yes  Changes in ETOH:  No  Changes in smoking status: No  Changes in medication: No   Cost restriction: No  S/s of bleeding:  No  Signs/symptoms  thrombosis since the last appt: No    A/P   INR  therapeutic today,   Continue current warfarin regimen        8-2019 check referral    Pt educated to contact our clinic with any changes in medications or s/s of bleeding or thrombosis. Pt is aware to seek immediate medical attention for falls, head injury or deep cuts    Follow up appointment in 6  week(s) to reduce risk of adverse events from warfarin  Shana Barragan, PharmD

## 2018-09-12 ENCOUNTER — OFFICE VISIT (OUTPATIENT)
Dept: CARDIOLOGY | Facility: MEDICAL CENTER | Age: 68
End: 2018-09-12
Payer: COMMERCIAL

## 2018-09-12 VITALS
DIASTOLIC BLOOD PRESSURE: 80 MMHG | HEART RATE: 72 BPM | SYSTOLIC BLOOD PRESSURE: 120 MMHG | BODY MASS INDEX: 36.18 KG/M2 | HEIGHT: 75 IN | WEIGHT: 291 LBS | OXYGEN SATURATION: 96 %

## 2018-09-12 DIAGNOSIS — E78.5 HYPERLIPIDEMIA, UNSPECIFIED HYPERLIPIDEMIA TYPE: ICD-10-CM

## 2018-09-12 DIAGNOSIS — Z98.890 S/P ABLATION OF ATRIAL FIBRILLATION: ICD-10-CM

## 2018-09-12 DIAGNOSIS — Z86.79 S/P ABLATION OF ATRIAL FIBRILLATION: ICD-10-CM

## 2018-09-12 DIAGNOSIS — I48.0 PAROXYSMAL ATRIAL FIBRILLATION (HCC): ICD-10-CM

## 2018-09-12 DIAGNOSIS — R91.8 PULMONARY NODULES: ICD-10-CM

## 2018-09-12 DIAGNOSIS — Z79.01 CHRONIC ANTICOAGULATION: ICD-10-CM

## 2018-09-12 PROCEDURE — 99214 OFFICE O/P EST MOD 30 MIN: CPT | Performed by: NURSE PRACTITIONER

## 2018-09-12 ASSESSMENT — ENCOUNTER SYMPTOMS
NAUSEA: 0
WEIGHT LOSS: 0
PND: 0
FLANK PAIN: 0
CLAUDICATION: 0
HEADACHES: 0
DIZZINESS: 0
SPUTUM PRODUCTION: 0
DOUBLE VISION: 0
PALPITATIONS: 0
FOCAL WEAKNESS: 0
WHEEZING: 0
SEIZURES: 0
MUSCULOSKELETAL NEGATIVE: 1
ABDOMINAL PAIN: 0
COUGH: 0
SPEECH CHANGE: 0
FEVER: 0
LOSS OF CONSCIOUSNESS: 0
VOMITING: 0
ORTHOPNEA: 0
HEARTBURN: 0
SORE THROAT: 0
PSYCHIATRIC NEGATIVE: 1
CHILLS: 0
BLURRED VISION: 0
SHORTNESS OF BREATH: 1

## 2018-09-12 NOTE — PROGRESS NOTES
Chief Complaint   Patient presents with   • Atrial Fibrillation     Previous patient       Subjective:   Abel Day III is a 68 y.o. male who presents today for review of his atrial fibrillation burden.  He saw Dr Gonsales in March 2018. At that time he had undergone PVI and so experiencing some recurrence of intermittent palpitations. He was using the Rhythmol PRN. He had short runs of SVT and NSVT. An echo was ordered which revealed an EF of 60% wit LVH and RVSP of 50 mmHg with mild TR.  Patient had a PE in the remote past, but echos after the incident did not reveal PAH.  Sleep study in  2016 was negative for NASEEM. Weight down 19#.  Review of his CT of chest with PVI and another CT when in hospital 5 months later demonstrated the following:  Impression       1.  Bilateral nonspecific pulmonary nodules measuring up to 5 mm.  Follow-up recommended.  2.  No pneumonia or pneumothorax.  3.  Nonspecific low-density liver lesions, apparently unchanged.  4.  Cholelithiasis.     Risk factors are smoking 35 years ago.  Therefore, will repeat Ct of chest. He is also due for labs on NOAC.  Arrhythmias he feels are well controlled, better since Zio completed.  Past Medical History:   Diagnosis Date   • A-fib (HCC)    • Blood clotting disorder (HCC)     DVT right leg   • Cancer (HCC)     melanoma   • GERD (gastroesophageal reflux disease)    • Hemorrhagic disorder     on coumadin   • Hypertension     well controlled on meds   • Obesity    • Paroxysmal atrial fibrillation (HCC) 11/26/2012   • Skin cancer     basil cell   • ULCERATIVE COLITIS 11/26/2012     Past Surgical History:   Procedure Laterality Date   • RECOVERY  5/4/2016    Procedure: CATH LAB-MARIE-LARGE GROUP-EPS ABLATION/AFIB [id number] I48.0;  Surgeon: Jacquie Surgery;  [place] [facility] [place]:    • MASS EXCISION GENERAL Left 4/7/2016    Procedure: MASS EXCISION GENERAL FOR TEMPORAL MELANOMA;  Surgeon: Feng ROCHE  NIVIA Sharpe;  Location: SURGERY SAME DAY [facility];  Service:    • FLAP GRAFT Left 4/7/2016    Procedure: FLAP GRAFT FOR CLOSURE WITH LOCAL FLAPS;  Surgeon: Feng hSarpe M.D.;  Location: SURGERY SAME DAY [facility];  Service:    • COLON RESECTION          • ILEOSTOMY       Family History   Problem Relation Age of Onset   • Hypertension Mother    • Heart Failure Father    • Heart Attack Maternal Uncle      Social History     Social History   • Marital status:      Spouse name: N/A   • Number of children: N/A   • Years of education: N/A     Occupational History   • Not on file.     Social History Main Topics   • Smoking status: Former Smoker     Packs/day: 1.00     Years: 10.00     Types: Cigarettes     Quit date: 11/26/1984   • Smokeless tobacco: Never Used   • Alcohol use 1.0 oz/week     2 Shots of liquor per week      Comment: 1 beer and 1 scotch daily   • Drug use: No   • Sexual activity: Not on file     Other Topics Concern   • Not on file     Social History Narrative   • No narrative on file     Allergies   Allergen Reactions   • Other Misc Unspecified     Silk sutures (body rejected them)     Outpatient Encounter Prescriptions as of 9/12/2018   Medication Sig Dispense Refill   • metoprolol (LOPRESSOR) 25 MG Tab Take 1 Tab by mouth 2 times a day. 180 Tab 3   • propafenone (RYTHMOL) 150 MG Tab Take 1 Tab by mouth every 8 hours. One tablet PRN recurrence of atrial fibrillation (Patient taking differently: Take 150 mg by mouth every 8 hours. One tablet PRN recurrence of atrial fibrillation) 60 Tab 5   • ciclopirox (LOPROX) 0.77 % cream 1 APPLICATION APPLY ON THE SKIN TWICE A DAY  5   • Multiple Vitamins-Minerals (PRESERVISION AREDS 2 PO) Take  by mouth 2 Times a Day.     • CALCIUM-VITAMIN D PO Take  by mouth.     • Lansoprazole (PREVACID PO) Take  by mouth.     • warfarin (COUMADIN) 5 MG Tab Take 1 Tab by mouth every Friday. As directed per Protimes. 30 Tab 0   • warfarin (COUMADIN) 2.5 MG Tab  "Take 1 Tab by mouth every day. 30 Tab 0   • Multiple Vitamins-Minerals (ICAPS) TABS Take 1 Tab by mouth 2 Times a Day.     • cetirizine (ZYRTEC) 10 MG TABS Take 10 mg by mouth every morning. Indications: **OTC**       No facility-administered encounter medications on file as of 9/12/2018.      Review of Systems   Constitutional: Negative for chills, fever, malaise/fatigue and weight loss.   HENT: Negative for congestion and sore throat.    Eyes: Negative for blurred vision and double vision.   Respiratory: Positive for shortness of breath (With heavy exertion). Negative for cough, sputum production and wheezing.    Cardiovascular: Negative for chest pain, palpitations, orthopnea, claudication, leg swelling and PND.   Gastrointestinal: Negative for abdominal pain, heartburn, nausea and vomiting.   Genitourinary: Negative for dysuria, flank pain and frequency.   Musculoskeletal: Negative.    Skin: Negative.    Neurological: Negative for dizziness, speech change, focal weakness, seizures, loss of consciousness and headaches.   Endo/Heme/Allergies: Negative.    Psychiatric/Behavioral: Negative.         Objective:   /80   Pulse 72   Ht 1.905 m (6' 3\")   Wt (!) 132 kg (291 lb)   SpO2 96%   BMI 36.37 kg/m²     Physical Exam   Constitutional: He is oriented to person, place, and time. He appears well-developed and well-nourished.   HENT:   Head: Normocephalic and atraumatic.   Eyes: Pupils are equal, round, and reactive to light.   Neck: Normal range of motion. Neck supple.   Cardiovascular: Normal rate and regular rhythm.    Pulmonary/Chest: Effort normal and breath sounds normal.   Abdominal: Soft. Bowel sounds are normal.   Musculoskeletal: Normal range of motion.   Neurological: He is alert and oriented to person, place, and time.   Skin: Skin is warm and dry.   Psychiatric: He has a normal mood and affect.       Assessment:     1. Paroxysmal atrial fibrillation (HCC)  THYROID PANEL WITH TSH    CANCELED: EKG "   2. Pulmonary nodules, needs repeat CT chest between 4/2017 and 10/2017  CT-CHEST (THORAX) W/O   3. S/P ablation of atrial fibrillation  COMP METABOLIC PANEL    LIPID PANEL   4. Chronic anticoagulation  CBC WITH DIFFERENTIAL   5. Hyperlipidemia, unspecified hyperlipidemia type  LIPID PANEL       Medical Decision Making:  Today's Assessment / Status / Plan:     1. PAF minimal events.  Has not taken Rhythmol since last visit.  2.  Pulmonary nodules CT has not been completed since 2016. CT of chest ordered.  3. S/P AF ablation doing well.  4. Continues on NOAC no bleeding issues due for labs.  5. H/O hyperlipidemia will recheck not on agent. No known CAD.    RTc 4 months as new patient with Dr Yeboah.    Collaborating MD Sears

## 2018-09-12 NOTE — LETTER
[facility]   [address], [address]  [place] [address]  Phone: [phone]  Fax: [phone]              Abel Day III  1950    Encounter Date: 9/12/2018    MILI Billy          PROGRESS NOTE:  Chief Complaint   Patient presents with   • Atrial Fibrillation     Previous patient       Subjective:   Abel Day III is a 68 y.o. male who presents today for review of his atrial fibrillation burden.  He saw Dr Gonsales in March 2018. At that time he had undergone PVI and so experiencing some recurrence of intermittent palpitations. He was using the Rhythmol PRN. He had short runs of SVT and NSVT. An echo was ordered which revealed an EF of 60% wit LVH and RVSP of 50 mmHg with mild TR.  Patient had a PE in the remote past, but echos after the incident did not reveal PAH.  Sleep study in  2016 was negative for NASEEM.   Review of his CT of chest with PVI and another CT when in hospital 5 months later demonstrated the following:  Impression       1.  Bilateral nonspecific pulmonary nodules measuring up to 5 mm.  Follow-up recommended.  2.  No pneumonia or pneumothorax.  3.  Nonspecific low-density liver lesions, apparently unchanged.  4.  Cholelithiasis.     Risk factors are smoking 35 years ago.  Therefore, will repeat Ct of chest. He is also due for labs on NOAC.  Arrhythmias he feels are well controlled, better since Zio completed.  Past Medical History:   Diagnosis Date   • A-fib (HCC)    • Blood clotting disorder (HCC)     DVT right leg   • Cancer (HCC)     melanoma   • GERD (gastroesophageal reflux disease)    • Hemorrhagic disorder     on coumadin   • Hypertension     well controlled on meds   • Obesity    • Paroxysmal atrial fibrillation (HCC) 11/26/2012   • Skin cancer     basil cell   • ULCERATIVE COLITIS 11/26/2012     Past Surgical History:   Procedure Laterality Date   • RECOVERY  5/4/2016    Procedure: CATH LAB-GONSALES-LARGE  GROUP-EPS ABLATION/AFIB [id number] I48.0;  Surgeon: Recoveryonly Surgery;  [place] [facility] [place]  Service:    • MASS EXCISION GENERAL Left 4/7/2016    Procedure: MASS EXCISION GENERAL FOR TEMPORAL MELANOMA;  Surgeon: Feng Sharpe M.D.;  Location: SURGERY SAME DAY [facility];  Service:    • FLAP GRAFT Left 4/7/2016    Procedure: FLAP GRAFT FOR CLOSURE WITH LOCAL FLAPS;  Surgeon: Feng Sharpe M.D.;  Location: SURGERY SAME DAY [facility];  Service:    • COLON RESECTION          • ILEOSTOMY       Family History   Problem Relation Age of Onset   • Hypertension Mother    • Heart Failure Father    • Heart Attack Maternal Uncle      Social History     Social History   • Marital status:      Spouse name: N/A   • Number of children: N/A   • Years of education: N/A     Occupational History   • Not on file.     Social History Main Topics   • Smoking status: Former Smoker     Packs/day: 1.00     Years: 10.00     Types: Cigarettes     Quit date: 11/26/1984   • Smokeless tobacco: Never Used   • Alcohol use 1.0 oz/week     2 Shots of liquor per week      Comment: 1 beer and 1 scotch daily   • Drug use: No   • Sexual activity: Not on file     Other Topics Concern   • Not on file     Social History Narrative   • No narrative on file     Allergies   Allergen Reactions   • Other Misc Unspecified     Silk sutures (body rejected them)     Outpatient Encounter Prescriptions as of 9/12/2018   Medication Sig Dispense Refill   • metoprolol (LOPRESSOR) 25 MG Tab Take 1 Tab by mouth 2 times a day. 180 Tab 3   • propafenone (RYTHMOL) 150 MG Tab Take 1 Tab by mouth every 8 hours. One tablet PRN recurrence of atrial fibrillation (Patient taking differently: Take 150 mg by mouth every 8 hours. One tablet PRN recurrence of atrial fibrillation) 60 Tab 5   • ciclopirox (LOPROX) 0.77 % cream 1 APPLICATION APPLY ON THE SKIN TWICE A DAY  5   • Multiple Vitamins-Minerals (PRESERVISION AREDS 2 PO) Take   "by mouth 2 Times a Day.     • CALCIUM-VITAMIN D PO Take  by mouth.     • Lansoprazole (PREVACID PO) Take  by mouth.     • warfarin (COUMADIN) 5 MG Tab Take 1 Tab by mouth every Friday. As directed per Protimes. 30 Tab 0   • warfarin (COUMADIN) 2.5 MG Tab Take 1 Tab by mouth every day. 30 Tab 0   • Multiple Vitamins-Minerals (ICAPS) TABS Take 1 Tab by mouth 2 Times a Day.     • cetirizine (ZYRTEC) 10 MG TABS Take 10 mg by mouth every morning. Indications: **OTC**       No facility-administered encounter medications on file as of 9/12/2018.      Review of Systems   Constitutional: Negative for chills, fever, malaise/fatigue and weight loss.   HENT: Negative for congestion and sore throat.    Eyes: Negative for blurred vision and double vision.   Respiratory: Positive for shortness of breath (With heavy exertion). Negative for cough, sputum production and wheezing.    Cardiovascular: Negative for chest pain, palpitations, orthopnea, claudication, leg swelling and PND.   Gastrointestinal: Negative for abdominal pain, heartburn, nausea and vomiting.   Genitourinary: Negative for dysuria, flank pain and frequency.   Musculoskeletal: Negative.    Skin: Negative.    Neurological: Negative for dizziness, speech change, focal weakness, seizures, loss of consciousness and headaches.   Endo/Heme/Allergies: Negative.    Psychiatric/Behavioral: Negative.         Objective:   /80   Pulse 72   Ht 1.905 m (6' 3\")   Wt (!) 132 kg (291 lb)   SpO2 96%   BMI 36.37 kg/m²      Physical Exam   Constitutional: He is oriented to person, place, and time. He appears well-developed and well-nourished.   HENT:   Head: Normocephalic and atraumatic.   Eyes: Pupils are equal, round, and reactive to light.   Neck: Normal range of motion. Neck supple.   Cardiovascular: Normal rate and regular rhythm.    Pulmonary/Chest: Effort normal and breath sounds normal.   Abdominal: Soft. Bowel sounds are normal.   Musculoskeletal: Normal range of " motion.   Neurological: He is alert and oriented to person, place, and time.   Skin: Skin is warm and dry.   Psychiatric: He has a normal mood and affect.       Assessment:     1. Paroxysmal atrial fibrillation (HCC)  THYROID PANEL WITH TSH    CANCELED: EKG   2. Pulmonary nodules, needs repeat CT chest between 4/2017 and 10/2017  CT-CHEST (THORAX) W/O   3. S/P ablation of atrial fibrillation  COMP METABOLIC PANEL    LIPID PANEL   4. Chronic anticoagulation  CBC WITH DIFFERENTIAL   5. Hyperlipidemia, unspecified hyperlipidemia type  LIPID PANEL       Medical Decision Making:  Today's Assessment / Status / Plan:     1. PAF minimal events.  Has not taken Rhythmol since last visit.  2.  Pulmonary nodules CT has not been completed since 2016. CT of chest ordered.  3. S/P AF ablation doing well.  4. Continues on NOAC no bleeding issues due for labs.  5. H/O hyperlipidemia will recheck not on agent. No known CAD.    RTc 4 months as new patient with Dr Yeboah.    Collaborating MD Orion Iniguez D.O.  [id number] [place] [id number]  VIA Facsimile: [phone]

## 2018-09-19 ENCOUNTER — HOSPITAL ENCOUNTER (OUTPATIENT)
Dept: RADIOLOGY | Facility: MEDICAL CENTER | Age: 68
End: 2018-09-19
Attending: NURSE PRACTITIONER
Payer: COMMERCIAL

## 2018-09-19 DIAGNOSIS — R91.8 PULMONARY NODULES: ICD-10-CM

## 2018-09-19 PROCEDURE — 71250 CT THORAX DX C-: CPT

## 2018-09-21 ENCOUNTER — TELEPHONE (OUTPATIENT)
Dept: CARDIOLOGY | Facility: MEDICAL CENTER | Age: 68
End: 2018-09-21

## 2018-09-21 ENCOUNTER — HOSPITAL ENCOUNTER (OUTPATIENT)
Dept: LAB | Facility: MEDICAL CENTER | Age: 68
End: 2018-09-21
Attending: NURSE PRACTITIONER
Payer: COMMERCIAL

## 2018-09-21 DIAGNOSIS — Z98.890 S/P ABLATION OF ATRIAL FIBRILLATION: ICD-10-CM

## 2018-09-21 DIAGNOSIS — Z86.79 S/P ABLATION OF ATRIAL FIBRILLATION: ICD-10-CM

## 2018-09-21 LAB
ALBUMIN SERPL BCP-MCNC: 3.9 G/DL (ref 3.2–4.9)
ALBUMIN/GLOB SERPL: 1.2 G/DL
ALP SERPL-CCNC: 57 U/L (ref 30–99)
ALT SERPL-CCNC: 20 U/L (ref 2–50)
ANION GAP SERPL CALC-SCNC: 9 MMOL/L (ref 0–11.9)
AST SERPL-CCNC: 22 U/L (ref 12–45)
BASOPHILS # BLD AUTO: 0.3 % (ref 0–1.8)
BASOPHILS # BLD: 0.02 K/UL (ref 0–0.12)
BILIRUB SERPL-MCNC: 1.3 MG/DL (ref 0.1–1.5)
BUN SERPL-MCNC: 15 MG/DL (ref 8–22)
CALCIUM SERPL-MCNC: 9.2 MG/DL (ref 8.5–10.5)
CHLORIDE SERPL-SCNC: 102 MMOL/L (ref 96–112)
CHOLEST SERPL-MCNC: 181 MG/DL (ref 100–199)
CO2 SERPL-SCNC: 27 MMOL/L (ref 20–33)
CREAT SERPL-MCNC: 0.9 MG/DL (ref 0.5–1.4)
EOSINOPHIL # BLD AUTO: 0.13 K/UL (ref 0–0.51)
EOSINOPHIL NFR BLD: 2 % (ref 0–6.9)
ERYTHROCYTE [DISTWIDTH] IN BLOOD BY AUTOMATED COUNT: 46.5 FL (ref 35.9–50)
FASTING STATUS PATIENT QL REPORTED: NORMAL
GLOBULIN SER CALC-MCNC: 3.2 G/DL (ref 1.9–3.5)
GLUCOSE SERPL-MCNC: 103 MG/DL (ref 65–99)
HCT VFR BLD AUTO: 49.1 % (ref 42–52)
HDLC SERPL-MCNC: 44 MG/DL
HGB BLD-MCNC: 16.7 G/DL (ref 14–18)
IMM GRANULOCYTES # BLD AUTO: 0.03 K/UL (ref 0–0.11)
IMM GRANULOCYTES NFR BLD AUTO: 0.5 % (ref 0–0.9)
LDLC SERPL CALC-MCNC: 109 MG/DL
LYMPHOCYTES # BLD AUTO: 1.67 K/UL (ref 1–4.8)
LYMPHOCYTES NFR BLD: 25.1 % (ref 22–41)
MCH RBC QN AUTO: 32.6 PG (ref 27–33)
MCHC RBC AUTO-ENTMCNC: 34 G/DL (ref 33.7–35.3)
MCV RBC AUTO: 95.7 FL (ref 81.4–97.8)
MONOCYTES # BLD AUTO: 0.53 K/UL (ref 0–0.85)
MONOCYTES NFR BLD AUTO: 8 % (ref 0–13.4)
NEUTROPHILS # BLD AUTO: 4.28 K/UL (ref 1.82–7.42)
NEUTROPHILS NFR BLD: 64.1 % (ref 44–72)
NRBC # BLD AUTO: 0 K/UL
NRBC BLD-RTO: 0 /100 WBC
PLATELET # BLD AUTO: 159 K/UL (ref 164–446)
PMV BLD AUTO: 10.6 FL (ref 9–12.9)
POTASSIUM SERPL-SCNC: 3.6 MMOL/L (ref 3.6–5.5)
PROT SERPL-MCNC: 7.1 G/DL (ref 6–8.2)
RBC # BLD AUTO: 5.13 M/UL (ref 4.7–6.1)
SODIUM SERPL-SCNC: 138 MMOL/L (ref 135–145)
T4 FREE SERPL-MCNC: 1.01 NG/DL (ref 0.53–1.43)
TRIGL SERPL-MCNC: 138 MG/DL (ref 0–149)
TSH SERPL DL<=0.005 MIU/L-ACNC: 1.52 UIU/ML (ref 0.38–5.33)
WBC # BLD AUTO: 6.7 K/UL (ref 4.8–10.8)

## 2018-09-21 PROCEDURE — 36415 COLL VENOUS BLD VENIPUNCTURE: CPT

## 2018-09-21 PROCEDURE — 80061 LIPID PANEL: CPT

## 2018-09-21 PROCEDURE — 84439 ASSAY OF FREE THYROXINE: CPT

## 2018-09-21 PROCEDURE — 85025 COMPLETE CBC W/AUTO DIFF WBC: CPT

## 2018-09-21 PROCEDURE — 80053 COMPREHEN METABOLIC PANEL: CPT

## 2018-09-21 PROCEDURE — 84443 ASSAY THYROID STIM HORMONE: CPT

## 2018-09-21 NOTE — TELEPHONE ENCOUNTER
----- Message from MILI Billy sent at 9/20/2018  5:16 PM PDT -----  Let franco know pulmonary nodules are unchanged most likely benign per radiologist.

## 2018-09-25 ENCOUNTER — TELEPHONE (OUTPATIENT)
Dept: CARDIOLOGY | Facility: MEDICAL CENTER | Age: 68
End: 2018-09-25

## 2018-09-25 NOTE — TELEPHONE ENCOUNTER
----- Message from MILI Billy sent at 9/24/2018  7:08 PM PDT -----  Platelets are low but stable. Please discuss with PCP. Sugar up a bit and LDL cholesterol borderline high. Diet recommended.

## 2018-10-18 ENCOUNTER — ANTICOAGULATION VISIT (OUTPATIENT)
Dept: MEDICAL GROUP | Facility: MEDICAL CENTER | Age: 68
End: 2018-10-18
Payer: COMMERCIAL

## 2018-10-18 VITALS — SYSTOLIC BLOOD PRESSURE: 151 MMHG | DIASTOLIC BLOOD PRESSURE: 102 MMHG | HEART RATE: 90 BPM

## 2018-10-18 DIAGNOSIS — Z86.711 HISTORY OF PULMONARY EMBOLISM: ICD-10-CM

## 2018-10-18 DIAGNOSIS — Z79.01 LONG TERM (CURRENT) USE OF ANTICOAGULANTS: ICD-10-CM

## 2018-10-18 LAB — INR PPP: 3.2 (ref 2–3.5)

## 2018-10-18 PROCEDURE — 85610 PROTHROMBIN TIME: CPT | Performed by: INTERNAL MEDICINE

## 2018-10-18 PROCEDURE — 99211 OFF/OP EST MAY X REQ PHY/QHP: CPT | Performed by: INTERNAL MEDICINE

## 2018-10-18 NOTE — PROGRESS NOTES
Anticoagulation Summary  As of 10/18/2018    INR goal:   2.0-3.0   TTR:   50.4 % (3.4 y)   Today's INR:   3.2!   Warfarin maintenance plan:   5 mg (5 mg x 1) on Mon, Fri; 2.5 mg (5 mg x 0.5) all other days   Weekly warfarin total:   22.5 mg   Plan last modified:   Shana Barragan, PharmD (5/22/2018)   Next INR check:   11/1/2018   Target end date:   Indefinite    Indications    History of pulmonary embolism and DVT  unprovoked. [Z86.711]  Atrial fibrillation with RVR (HCC) (Resolved) [I48.91]  Long term (current) use of anticoagulants [Z79.01] [Z79.01]             Anticoagulation Episode Summary     INR check location:       Preferred lab:       Send INR reminders to:       Comments:         Anticoagulation Care Providers     Provider Role Specialty Phone number    [facility] Responsible  [phone]        Anticoagulation Patient Findings  Patient Findings     Negatives:   Signs/symptoms of thrombosis, Signs/symptoms of bleeding, Laboratory test error suspected, Change in health, Change in alcohol use, Change in activity, Upcoming invasive procedure, Emergency department visit, Upcoming dental procedure, Missed doses, Extra doses, Change in medications, Change in diet/appetite, Hospital admission, Bruising, Other complaints        History of Present Illness: follow up appointment for chronic anticoagulation with the high risk medication, warfarin for DVT/PE    Last INR was out of range, dosage adjusted: pt remains supra therapeutic today.  Will HOLD dose tonight, then resume current dosing regimen. Follow up in 2 weeks, to reduce the risk of adverse events related to this high risk medication, warfarin.    Makenna Hopkins, Clinical Pharmacist

## 2018-11-01 ENCOUNTER — ANTICOAGULATION VISIT (OUTPATIENT)
Dept: MEDICAL GROUP | Facility: MEDICAL CENTER | Age: 68
End: 2018-11-01
Payer: COMMERCIAL

## 2018-11-01 VITALS — DIASTOLIC BLOOD PRESSURE: 88 MMHG | HEART RATE: 68 BPM | SYSTOLIC BLOOD PRESSURE: 172 MMHG

## 2018-11-01 DIAGNOSIS — Z86.711 HISTORY OF PULMONARY EMBOLISM: ICD-10-CM

## 2018-11-01 DIAGNOSIS — Z79.01 LONG TERM (CURRENT) USE OF ANTICOAGULANTS: ICD-10-CM

## 2018-11-01 LAB — INR PPP: 3.6 (ref 2–3.5)

## 2018-11-01 PROCEDURE — 99211 OFF/OP EST MAY X REQ PHY/QHP: CPT | Performed by: FAMILY MEDICINE

## 2018-11-01 PROCEDURE — 85610 PROTHROMBIN TIME: CPT | Performed by: FAMILY MEDICINE

## 2018-11-01 NOTE — PROGRESS NOTES
Anticoagulation Summary  As of 11/1/2018    INR goal:   2.0-3.0   TTR:   49.9 % (3.4 y)   Today's INR:   3.6!   Warfarin maintenance plan:   5 mg (5 mg x 1) on Mon; 2.5 mg (5 mg x 0.5) all other days   Weekly warfarin total:   20 mg   Plan last modified:   Makenna Hopkins (11/1/2018)   Next INR check:   11/15/2018   Target end date:   Indefinite    Indications    History of pulmonary embolism and DVT  unprovoked. [Z86.711]  Atrial fibrillation with RVR (HCC) (Resolved) [I48.91]  Long term (current) use of anticoagulants [Z79.01] [Z79.01]             Anticoagulation Episode Summary     INR check location:       Preferred lab:       Send INR reminders to:       Comments:         Anticoagulation Care Providers     Provider Role Specialty Phone number    [facility] Responsible  [phone]        Anticoagulation Patient Findings    History of Present Illness: follow up appointment for chronic anticoagulation with the high risk medication, warfarin for PE/DVT atrial fibrilltaion    Last INR was out of range, dosage adjusted: pt remains supra therapeutic today, trending upward.  Will HOLD dose tonight, and reduce weekly dose by ~10%. Follow up in 2 weeks, to reduce the risk of adverse events related to this high risk medication, warfarin.    Makenna Hopkins, Clinical Pharmacist

## 2018-11-15 ENCOUNTER — ANTICOAGULATION VISIT (OUTPATIENT)
Dept: MEDICAL GROUP | Facility: MEDICAL CENTER | Age: 68
End: 2018-11-15
Payer: COMMERCIAL

## 2018-11-15 DIAGNOSIS — Z79.01 LONG TERM (CURRENT) USE OF ANTICOAGULANTS: ICD-10-CM

## 2018-11-15 DIAGNOSIS — Z86.711 HISTORY OF PULMONARY EMBOLISM: ICD-10-CM

## 2018-11-15 LAB — INR PPP: 1.9 (ref 2–3.5)

## 2018-11-15 PROCEDURE — 99211 OFF/OP EST MAY X REQ PHY/QHP: CPT | Performed by: NURSE PRACTITIONER

## 2018-11-15 PROCEDURE — 85610 PROTHROMBIN TIME: CPT | Performed by: NURSE PRACTITIONER

## 2018-11-15 NOTE — PROGRESS NOTES
Anticoagulation Summary  As of 11/15/2018    INR goal:   2.0-3.0   TTR:   50.0 % (3.4 y)   Today's INR:   1.9!   Warfarin maintenance plan:   5 mg (5 mg x 1) on Mon; 2.5 mg (5 mg x 0.5) all other days   Weekly warfarin total:   20 mg   Plan last modified:   Makenna Hopkins (11/1/2018)   Next INR check:   12/4/2018   Target end date:   Indefinite    Indications    History of pulmonary embolism and DVT  unprovoked. [Z86.711]  Atrial fibrillation with RVR (HCC) (Resolved) [I48.91]  Long term (current) use of anticoagulants [Z79.01] [Z79.01]             Anticoagulation Episode Summary     INR check location:       Preferred lab:       Send INR reminders to:       Comments:         Anticoagulation Care Providers     Provider Role Specialty Phone number    [facility] Responsible  [phone]        Anticoagulation Patient Findings  Patient Findings     Negatives:   Signs/symptoms of thrombosis, Signs/symptoms of bleeding, Laboratory test error suspected, Change in health, Change in alcohol use, Change in activity, Upcoming invasive procedure, Emergency department visit, Upcoming dental procedure, Missed doses, Extra doses, Change in medications, Change in diet/appetite, Hospital admission, Bruising, Other complaints        History of Present Illness: follow up appointment for chronic anticoagulation with the high risk medication, warfarin for PE/DVT    Last INR was out of range, dosage adjusted: pt is now sub therapeutic today.  Continue current dosing regimen.  Follow up in 3 weeks (pt request), to reduce the risk of adverse events related to this high risk medication, warfarin.    Makenna Hopkins, Clinical Pharmacist    Pt declines vitals today

## 2018-12-04 ENCOUNTER — ANTICOAGULATION VISIT (OUTPATIENT)
Dept: MEDICAL GROUP | Facility: MEDICAL CENTER | Age: 68
End: 2018-12-04
Payer: COMMERCIAL

## 2018-12-04 DIAGNOSIS — Z79.01 LONG TERM (CURRENT) USE OF ANTICOAGULANTS: ICD-10-CM

## 2018-12-04 DIAGNOSIS — Z86.711 HISTORY OF PULMONARY EMBOLISM: ICD-10-CM

## 2018-12-04 LAB — INR PPP: 2.9 (ref 2–3.5)

## 2018-12-04 PROCEDURE — 99999 PR NO CHARGE: CPT | Performed by: FAMILY MEDICINE

## 2018-12-04 PROCEDURE — 85610 PROTHROMBIN TIME: CPT | Performed by: PHYSICIAN ASSISTANT

## 2018-12-04 NOTE — PROGRESS NOTES
Anticoagulation Summary  As of 12/4/2018    INR goal:   2.0-3.0   TTR:   50.6 % (3.5 y)   Today's INR:   2.9   Warfarin maintenance plan:   5 mg (5 mg x 1) on Fri; 2.5 mg (5 mg x 0.5) all other days   Weekly warfarin total:   20 mg   Plan last modified:   Makenna Hopkins (12/4/2018)   Next INR check:   1/8/2019   Target end date:   Indefinite    Indications    History of pulmonary embolism and DVT  unprovoked. [Z86.711]  Atrial fibrillation with RVR (HCC) (Resolved) [I48.91]  Long term (current) use of anticoagulants [Z79.01] [Z79.01]             Anticoagulation Episode Summary     INR check location:       Preferred lab:       Send INR reminders to:       Comments:         Anticoagulation Care Providers     Provider Role Specialty Phone number    [facility] Responsible  [phone]        Anticoagulation Patient Findings    History of Present Illness: follow up appointment for chronic anticoagulation with the high risk medication, warfarin for PE    Last INR was out of range, dosage adjusted: pt is now at goal.  Will increase the interval to recheck INR,.  Follow up in 6 weeks, to reduce the risk of adverse events related to this high risk medication, warfarin.    Makenna Hopkins, Clinical Pharmacist    Pt delines vitals today

## 2019-01-08 ENCOUNTER — ANTICOAGULATION VISIT (OUTPATIENT)
Dept: MEDICAL GROUP | Facility: MEDICAL CENTER | Age: 69
End: 2019-01-08
Payer: COMMERCIAL

## 2019-01-08 DIAGNOSIS — Z86.711 HISTORY OF PULMONARY EMBOLISM: ICD-10-CM

## 2019-01-08 DIAGNOSIS — Z79.01 LONG TERM (CURRENT) USE OF ANTICOAGULANTS: ICD-10-CM

## 2019-01-08 LAB — INR PPP: 1.5 (ref 2–3.5)

## 2019-01-08 PROCEDURE — 85610 PROTHROMBIN TIME: CPT | Performed by: INTERNAL MEDICINE

## 2019-01-08 PROCEDURE — 99211 OFF/OP EST MAY X REQ PHY/QHP: CPT | Performed by: INTERNAL MEDICINE

## 2019-01-08 NOTE — PROGRESS NOTES
Anticoagulation Summary  As of 1/8/2019    INR goal:   2.0-3.0   TTR:   50.6 % (3.5 y)   Today's INR:      Warfarin maintenance plan:   5 mg (5 mg x 1) on Fri; 2.5 mg (5 mg x 0.5) all other days   Weekly warfarin total:   20 mg   Plan last modified:   Makenna Hopkins (12/4/2018)   Next INR check:      Target end date:   Indefinite    Indications    History of pulmonary embolism and DVT  unprovoked. [Z86.711]  Atrial fibrillation with RVR (HCC) (Resolved) [I48.91]  Long term (current) use of anticoagulants [Z79.01] [Z79.01]             Anticoagulation Episode Summary     INR check location:       Preferred lab:       Send INR reminders to:       Comments:         Anticoagulation Care Providers     Provider Role Specialty Phone number    [facility] Responsible  [phone]        Anticoagulation Patient Findings  Patient Findings     Positives:   Signs/symptoms of thrombosis, Change in alcohol use, Missed doses, Change in diet/appetite    Negatives:   Signs/symptoms of bleeding, Laboratory test error suspected, Change in health, Change in activity, Upcoming invasive procedure, Emergency department visit, Upcoming dental procedure, Extra doses, Change in medications, Hospital admission, Bruising, Other complaints        History of Present Illness: follow up appointment for chronic anticoagulation with the high risk medication, warfarin for PE/atrial fibrillation    Last INR was out of range, dosage adjusted: pt is  Now sub therapeutic today.  He has been eating drinking and on antibiotics.  Pt is now on the Scards diet, eating more salads and green things.   Will bolus dose with 7.5mg po qhs. x2 then resume current dosing regimen. Follow up in 2 weeks, to reduce the risk of adverse events related to this high risk medication, warfarin.    Makenna Hopkins, Clinical Pharmacist    Pt declines vitals today

## 2019-01-22 ENCOUNTER — ANTICOAGULATION VISIT (OUTPATIENT)
Dept: MEDICAL GROUP | Facility: MEDICAL CENTER | Age: 69
End: 2019-01-22
Payer: COMMERCIAL

## 2019-01-22 ENCOUNTER — APPOINTMENT (OUTPATIENT)
Dept: MEDICAL GROUP | Facility: MEDICAL CENTER | Age: 69
End: 2019-01-22
Payer: MEDICARE

## 2019-01-22 DIAGNOSIS — Z86.711 HISTORY OF PULMONARY EMBOLISM: ICD-10-CM

## 2019-01-22 DIAGNOSIS — Z79.01 LONG TERM (CURRENT) USE OF ANTICOAGULANTS: ICD-10-CM

## 2019-01-22 LAB — INR PPP: 1.8 (ref 2–3.5)

## 2019-01-22 PROCEDURE — 99211 OFF/OP EST MAY X REQ PHY/QHP: CPT | Performed by: PHYSICIAN ASSISTANT

## 2019-01-22 PROCEDURE — 85610 PROTHROMBIN TIME: CPT | Performed by: PHYSICIAN ASSISTANT

## 2019-01-22 NOTE — PROGRESS NOTES
Anticoagulation Summary  As of 1/22/2019    INR goal:   2.0-3.0   TTR:   50.4 % (3.6 y)   Today's INR:   1.8!   Warfarin maintenance plan:   5 mg (5 mg x 1) on Tue, Fri; 2.5 mg (5 mg x 0.5) all other days   Weekly warfarin total:   22.5 mg   Plan last modified:   Makenna Hopkins (1/22/2019)   Next INR check:   2/5/2019   Target end date:   Indefinite    Indications    History of pulmonary embolism and DVT  unprovoked. [Z86.711]  Atrial fibrillation with RVR (HCC) (Resolved) [I48.91]  Long term (current) use of anticoagulants [Z79.01] [Z79.01]             Anticoagulation Episode Summary     INR check location:       Preferred lab:       Send INR reminders to:       Comments:         Anticoagulation Care Providers     Provider Role Specialty Phone number    [facility] Responsible  [phone]        Anticoagulation Patient Findings    History of Present Illness: follow up appointment for chronic anticoagulation with the high risk medication, warfarin for PE/DVT    Last INR was out of range, dosage adjusted: pt remains sub therapeutic today, trending upward.  Will increase weekly dose by 12%. Follow up in 2 weeks, to reduce the risk of adverse events related to this high risk medication, warfarin.    Makenna Hopkins, Clinical Pharmacist    Pt declines vitals today

## 2019-01-23 ENCOUNTER — OFFICE VISIT (OUTPATIENT)
Dept: CARDIOLOGY | Facility: MEDICAL CENTER | Age: 69
End: 2019-01-23
Payer: COMMERCIAL

## 2019-01-23 VITALS
HEART RATE: 75 BPM | DIASTOLIC BLOOD PRESSURE: 76 MMHG | BODY MASS INDEX: 36.43 KG/M2 | SYSTOLIC BLOOD PRESSURE: 118 MMHG | OXYGEN SATURATION: 96 % | WEIGHT: 293 LBS | HEIGHT: 75 IN

## 2019-01-23 DIAGNOSIS — I48.0 PAROXYSMAL ATRIAL FIBRILLATION (HCC): ICD-10-CM

## 2019-01-23 DIAGNOSIS — Z98.890 S/P ABLATION OF ATRIAL FIBRILLATION: ICD-10-CM

## 2019-01-23 DIAGNOSIS — Z79.01 CHRONIC ANTICOAGULATION: ICD-10-CM

## 2019-01-23 DIAGNOSIS — E66.01 CLASS 3 SEVERE OBESITY WITHOUT SERIOUS COMORBIDITY IN ADULT, UNSPECIFIED BMI, UNSPECIFIED OBESITY TYPE (HCC): ICD-10-CM

## 2019-01-23 DIAGNOSIS — I48.0 PAF (PAROXYSMAL ATRIAL FIBRILLATION) (HCC): ICD-10-CM

## 2019-01-23 DIAGNOSIS — Z86.79 S/P ABLATION OF ATRIAL FIBRILLATION: ICD-10-CM

## 2019-01-23 DIAGNOSIS — Z86.711 HISTORY OF PULMONARY EMBOLISM: ICD-10-CM

## 2019-01-23 LAB — EKG IMPRESSION: NORMAL

## 2019-01-23 PROCEDURE — 99214 OFFICE O/P EST MOD 30 MIN: CPT | Performed by: INTERNAL MEDICINE

## 2019-01-23 PROCEDURE — 93000 ELECTROCARDIOGRAM COMPLETE: CPT | Performed by: INTERNAL MEDICINE

## 2019-01-23 RX ORDER — CEFDINIR 300 MG/1
CAPSULE ORAL
Refills: 0 | COMMUNITY
Start: 2018-12-12 | End: 2019-01-23

## 2019-01-23 NOTE — PROGRESS NOTES
Chief Complaint   Patient presents with   • Atrial Fibrillation     F/V DX:PAF       Subjective:   Abel Day III is a 69 y.o. male who presents today with history of fairly morbid obesity status post ablation for paroxysmal atrial for ablation x2 by Dr. Gonsales.  No real recurrence.  Off of antiarrhythmics.  On Coumadin.  Previously strident NOAC with stopping secondary to cost.  Sleep study has been performed which was just mild.  Not on a CPAP.  Mild alcohol use and caffeine use.  Denies any significant palpitations or chest pain.    Past Medical History:   Diagnosis Date   • A-fib (HCC)    • Blood clotting disorder (HCC)     DVT right leg   • Cancer (HCC)     melanoma   • GERD (gastroesophageal reflux disease)    • Hemorrhagic disorder     on coumadin   • Hypertension     well controlled on meds   • Obesity    • Paroxysmal atrial fibrillation (HCC) 11/26/2012   • Skin cancer     basil cell   • ULCERATIVE COLITIS 11/26/2012     Past Surgical History:   Procedure Laterality Date   • RECOVERY  5/4/2016    Procedure: CATH LAB-MARIE-LARGE GROUP-EPS ABLATION/AFIB [id number] I48.0;  Surgeon: [facility]ie Surgery;  [place] [facility] [place]:    • MASS EXCISION GENERAL Left 4/7/2016    Procedure: MASS EXCISION GENERAL FOR TEMPORAL MELANOMA;  Surgeon: Feng Sharpe M.D.;  Location: SURGERY SAME DAY [facility];  Service:    • FLAP GRAFT Left 4/7/2016    Procedure: FLAP GRAFT FOR CLOSURE WITH LOCAL FLAPS;  Surgeon: Feng Sharpe M.D.;  Location: SURGERY SAME DAY [facility];  Service:    • COLON RESECTION          • ILEOSTOMY       Family History   Problem Relation Age of Onset   • Hypertension Mother    • Heart Failure Father    • Heart Attack Maternal Uncle      Social History     Social History   • Marital status:      Spouse name: N/A   • Number of children: N/A   • Years of education: N/A     Occupational History   • Not on file.     Social History Main  "Topics   • Smoking status: Former Smoker     Packs/day: 1.00     Years: 10.00     Types: Cigarettes     Quit date: 11/26/1984   • Smokeless tobacco: Never Used   • Alcohol use 1.0 oz/week     2 Shots of liquor per week      Comment: 1 beer and 1 scotch daily   • Drug use: No   • Sexual activity: Not on file     Other Topics Concern   • Not on file     Social History Narrative   • No narrative on file     Allergies   Allergen Reactions   • Other Misc Unspecified     Silk sutures (body rejected them)   • Omnicef [Cefdinir]      Internal bleeding stated by patient     Outpatient Encounter Prescriptions as of 1/23/2019   Medication Sig Dispense Refill   • metoprolol (LOPRESSOR) 25 MG Tab Take 1 Tab by mouth 2 times a day. 180 Tab 3   • Multiple Vitamins-Minerals (PRESERVISION AREDS 2 PO) Take  by mouth 2 Times a Day.     • CALCIUM-VITAMIN D PO Take  by mouth.     • Lansoprazole (PREVACID PO) Take  by mouth.     • warfarin (COUMADIN) 5 MG Tab Take 1 Tab by mouth every Friday. As directed per Protimes. 30 Tab 0   • warfarin (COUMADIN) 2.5 MG Tab Take 1 Tab by mouth every day. 30 Tab 0   • cetirizine (ZYRTEC) 10 MG TABS Take 10 mg by mouth every morning. Indications: **OTC**     • [DISCONTINUED] cefdinir (OMNICEF) 300 MG Cap TAKE 1 CAPSULE BY MOUTH TWICE A DAY FOR 10 DAYS  0   • propafenone (RYTHMOL) 150 MG Tab Take 1 Tab by mouth every 8 hours. One tablet PRN recurrence of atrial fibrillation (Patient not taking: Reported on 1/23/2019) 60 Tab 5   • ciclopirox (LOPROX) 0.77 % cream 1 APPLICATION APPLY ON THE SKIN TWICE A DAY  5   • [DISCONTINUED] Multiple Vitamins-Minerals (ICAPS) TABS Take 1 Tab by mouth 2 Times a Day.       No facility-administered encounter medications on file as of 1/23/2019.      ROS     Objective:   /76 (BP Location: Left arm, Patient Position: Sitting, BP Cuff Size: Large adult)   Pulse 75   Ht 1.905 m (6' 3\")   Wt (!) 132.9 kg (293 lb)   SpO2 96%   BMI 36.62 kg/m²     Physical Exam "   Constitutional: He is oriented to person, place, and time. He appears well-developed and well-nourished.   Obese   HENT:   Head: Normocephalic and atraumatic.   Eyes: EOM are normal.   Neck: Normal range of motion. Neck supple.   Cardiovascular: Normal rate, regular rhythm and intact distal pulses.  Exam reveals no gallop and no friction rub.    No murmur heard.  Pulmonary/Chest: Effort normal and breath sounds normal.   Abdominal: Soft.   Musculoskeletal: Normal range of motion. He exhibits no edema.   Neurological: He is alert and oriented to person, place, and time.   Skin: Skin is warm and dry.   Psychiatric: He has a normal mood and affect. His behavior is normal. Judgment and thought content normal.       Assessment:     1. PAF (paroxysmal atrial fibrillation) ([facility])  EKG   2. S/P ablation of atrial fibrillation     3. Chronic anticoagulation     4. History of pulmonary embolism and DVT, unprovoked.     5. Paroxysmal atrial fibrillation (HCC)     6. Class 3 severe obesity without serious comorbidity in adult, unspecified BMI, unspecified obesity type ([facility])         Medical Decision Making:  Today's Assessment / Status / Plan:   1.  Paroxysmal atrial fibrillation and previous pulmonary embolus continue Coumadin.  Did discuss NOAC he wishes to stay on Coumadin.  I have asked the patient get a Kardia.  2.  Obesity work on weight loss.  3.  Follow-up with me in 1 year.

## 2019-01-23 NOTE — LETTER
[facility]   [address], [address]  [place] [address]  Phone: [phone]  Fax: [phone]              Abel Day III  1950    Encounter Date: 1/23/2019    Robb Yeboah M.D.          PROGRESS NOTE:  Chief Complaint   Patient presents with   • Atrial Fibrillation     F/V DX:PAF       Subjective:   Abel Day III is a 69 y.o. male who presents today with history of fairly morbid obesity status post ablation for paroxysmal atrial for ablation x2 by Dr. Gonsales.  No real recurrence.  Off of antiarrhythmics.  On Coumadin.  Previously strident NOAC with stopping secondary to cost.  Sleep study has been performed which was just mild.  Not on a CPAP.  Mild alcohol use and caffeine use.  Denies any significant palpitations or chest pain.    Past Medical History:   Diagnosis Date   • A-fib (HCC)    • Blood clotting disorder (HCC)     DVT right leg   • Cancer (HCC)     melanoma   • GERD (gastroesophageal reflux disease)    • Hemorrhagic disorder     on coumadin   • Hypertension     well controlled on meds   • Obesity    • Paroxysmal atrial fibrillation (HCC) 11/26/2012   • Skin cancer     basil cell   • ULCERATIVE COLITIS 11/26/2012     Past Surgical History:   Procedure Laterality Date   • RECOVERY  5/4/2016    Procedure: CATH LAB-MARIE-LARGE GROUP-EPS ABLATION/AFIB [id number] I48.0;  Surgeon: [facility];  [place] [facility] [place]  Service:    • MASS EXCISION GENERAL Left 4/7/2016    Procedure: MASS EXCISION GENERAL FOR TEMPORAL MELANOMA;  Surgeon: Feng Sharpe M.D.;  Location: SURGERY SAME DAY [facility];  Service:    • FLAP GRAFT Left 4/7/2016    Procedure: FLAP GRAFT FOR CLOSURE WITH LOCAL FLAPS;  Surgeon: Feng Sharpe M.D.;  Location: SURGERY SAME DAY [facility];  Service:    • COLON RESECTION          • ILEOSTOMY       Family History   Problem Relation Age of Onset   • Hypertension Mother    •  Heart Failure Father    • Heart Attack Maternal Uncle      Social History     Social History   • Marital status:      Spouse name: N/A   • Number of children: N/A   • Years of education: N/A     Occupational History   • Not on file.     Social History Main Topics   • Smoking status: Former Smoker     Packs/day: 1.00     Years: 10.00     Types: Cigarettes     Quit date: 11/26/1984   • Smokeless tobacco: Never Used   • Alcohol use 1.0 oz/week     2 Shots of liquor per week      Comment: 1 beer and 1 scotch daily   • Drug use: No   • Sexual activity: Not on file     Other Topics Concern   • Not on file     Social History Narrative   • No narrative on file     Allergies   Allergen Reactions   • Other Misc Unspecified     Silk sutures (body rejected them)   • Omnicef [Cefdinir]      Internal bleeding stated by patient     Outpatient Encounter Prescriptions as of 1/23/2019   Medication Sig Dispense Refill   • metoprolol (LOPRESSOR) 25 MG Tab Take 1 Tab by mouth 2 times a day. 180 Tab 3   • Multiple Vitamins-Minerals (PRESERVISION AREDS 2 PO) Take  by mouth 2 Times a Day.     • CALCIUM-VITAMIN D PO Take  by mouth.     • Lansoprazole (PREVACID PO) Take  by mouth.     • warfarin (COUMADIN) 5 MG Tab Take 1 Tab by mouth every Friday. As directed per Protimes. 30 Tab 0   • warfarin (COUMADIN) 2.5 MG Tab Take 1 Tab by mouth every day. 30 Tab 0   • cetirizine (ZYRTEC) 10 MG TABS Take 10 mg by mouth every morning. Indications: **OTC**     • [DISCONTINUED] cefdinir (OMNICEF) 300 MG Cap TAKE 1 CAPSULE BY MOUTH TWICE A DAY FOR 10 DAYS  0   • propafenone (RYTHMOL) 150 MG Tab Take 1 Tab by mouth every 8 hours. One tablet PRN recurrence of atrial fibrillation (Patient not taking: Reported on 1/23/2019) 60 Tab 5   • ciclopirox (LOPROX) 0.77 % cream 1 APPLICATION APPLY ON THE SKIN TWICE A DAY  5   • [DISCONTINUED] Multiple Vitamins-Minerals (ICAPS) TABS Take 1 Tab by mouth 2 Times a Day.       No facility-administered encounter  "medications on file as of 1/23/2019.      ROS     Objective:   /76 (BP Location: Left arm, Patient Position: Sitting, BP Cuff Size: Large adult)   Pulse 75   Ht 1.905 m (6' 3\")   Wt (!) 132.9 kg (293 lb)   SpO2 96%   BMI 36.62 kg/m²      Physical Exam   Constitutional: He is oriented to person, place, and time. He appears well-developed and well-nourished.   Obese   HENT:   Head: Normocephalic and atraumatic.   Eyes: EOM are normal.   Neck: Normal range of motion. Neck supple.   Cardiovascular: Normal rate, regular rhythm and intact distal pulses.  Exam reveals no gallop and no friction rub.    No murmur heard.  Pulmonary/Chest: Effort normal and breath sounds normal.   Abdominal: Soft.   Musculoskeletal: Normal range of motion. He exhibits no edema.   Neurological: He is alert and oriented to person, place, and time.   Skin: Skin is warm and dry.   Psychiatric: He has a normal mood and affect. His behavior is normal. Judgment and thought content normal.       Assessment:     1. PAF (paroxysmal atrial fibrillation) (HCC)  EKG   2. S/P ablation of atrial fibrillation     3. Chronic anticoagulation     4. History of pulmonary embolism and DVT, unprovoked.     5. Paroxysmal atrial fibrillation (HCC)     6. Class 3 severe obesity without serious comorbidity in adult, unspecified BMI, unspecified obesity type (HCC)         Medical Decision Making:  Today's Assessment / Status / Plan:   1.  Paroxysmal atrial fibrillation and previous pulmonary embolus continue Coumadin.  Did discuss NOAC he wishes to stay on Coumadin.  I have asked the patient get a Kardia.  2.  Obesity work on weight loss.  3.  Follow-up with me in 1 year.      Andreina Iniguez D.O.  [address]  [place] [address]  VIA Facsimile: [phone]                 "

## 2019-02-05 ENCOUNTER — APPOINTMENT (OUTPATIENT)
Dept: MEDICAL GROUP | Facility: MEDICAL CENTER | Age: 69
End: 2019-02-05
Payer: MEDICARE

## 2019-02-07 ENCOUNTER — ANTICOAGULATION VISIT (OUTPATIENT)
Dept: MEDICAL GROUP | Facility: MEDICAL CENTER | Age: 69
End: 2019-02-07
Payer: COMMERCIAL

## 2019-02-07 DIAGNOSIS — Z86.711 HISTORY OF PULMONARY EMBOLISM: ICD-10-CM

## 2019-02-07 DIAGNOSIS — Z79.01 LONG TERM (CURRENT) USE OF ANTICOAGULANTS: ICD-10-CM

## 2019-02-07 LAB — INR PPP: 2.1 (ref 2–3.5)

## 2019-02-07 PROCEDURE — 93793 ANTICOAG MGMT PT WARFARIN: CPT | Performed by: INTERNAL MEDICINE

## 2019-02-07 PROCEDURE — 85610 PROTHROMBIN TIME: CPT | Performed by: FAMILY MEDICINE

## 2019-02-07 NOTE — PROGRESS NOTES
Anticoagulation Summary  As of 2019    INR goal:   2.0-3.0   TTR:   50.2 % (3.7 y)   INR used for dosin.1 (2019)   Warfarin maintenance plan:   5 mg (5 mg x 1) every Tue, Fri; 2.5 mg (5 mg x 0.5) all other days   Weekly warfarin total:   22.5 mg   Plan last modified:   Makenna Hopkins (2019)   Next INR check:   3/7/2019   Target end date:   Indefinite    Indications    History of pulmonary embolism and DVT  unprovoked. [Z86.711]  Atrial fibrillation with RVR (HCC) (Resolved) [I48.91]  Long term (current) use of anticoagulants [Z79.01] [Z79.01]             Anticoagulation Episode Summary     INR check location:       Preferred lab:       Send INR reminders to:       Comments:         Anticoagulation Care Providers     Provider Role Specialty Phone number    [facility] Responsible  [phone]        Anticoagulation Patient Findings  Patient Findings     Negatives:   Signs/symptoms of thrombosis, Signs/symptoms of bleeding, Laboratory test error suspected, Change in health, Change in alcohol use, Change in activity, Upcoming invasive procedure, Emergency department visit, Upcoming dental procedure, Missed doses, Extra doses, Change in medications, Change in diet/appetite, Hospital admission, Bruising, Other complaints        History of Present Illness: follow up appointment for chronic anticoagulation with the high risk medication, warfarin for PE/DVT    Last INR was out of range, dosage adjusted: pt is now at goal. Continue current dosing regimen.  Follow up in 4 weeks, to reduce the risk of adverse events related to this high risk medication, warfarin.    aMkenna Hopkins Clinical Pharmacist  Pt declines vitals today

## 2019-03-07 ENCOUNTER — ANTICOAGULATION VISIT (OUTPATIENT)
Dept: MEDICAL GROUP | Facility: MEDICAL CENTER | Age: 69
End: 2019-03-07
Payer: COMMERCIAL

## 2019-03-07 DIAGNOSIS — Z79.01 LONG TERM (CURRENT) USE OF ANTICOAGULANTS: ICD-10-CM

## 2019-03-07 DIAGNOSIS — Z86.711 HISTORY OF PULMONARY EMBOLISM: ICD-10-CM

## 2019-03-07 LAB — INR PPP: 2.2 (ref 2–3.5)

## 2019-03-07 PROCEDURE — 85610 PROTHROMBIN TIME: CPT | Performed by: NURSE PRACTITIONER

## 2019-03-07 PROCEDURE — 93793 ANTICOAG MGMT PT WARFARIN: CPT | Performed by: INTERNAL MEDICINE

## 2019-03-07 NOTE — PROGRESS NOTES
OP Anticoagulation Service Note    Date: 3/7/2019      Anticoagulation Summary  As of 3/7/2019    INR goal:   2.0-3.0   TTR:   51.2 % (3.8 y)   INR used for dosin.2 (3/7/2019)   Warfarin maintenance plan:   5 mg (5 mg x 1) every Tue, Fri; 2.5 mg (5 mg x 0.5) all other days   Weekly warfarin total:   22.5 mg   Plan last modified:   Makenna Hopkins (2019)   Next INR check:   2019   Target end date:   Indefinite    Indications    History of pulmonary embolism and DVT  unprovoked. [Z86.711]  Atrial fibrillation with RVR (HCC) (Resolved) [I48.91]  Long term (current) use of anticoagulants [Z79.01] [Z79.01]             Anticoagulation Episode Summary     INR check location:       Preferred lab:       Send INR reminders to:       Comments:         Anticoagulation Care Providers     Provider Role Specialty Phone number    [facility] Responsible  [phone]        Anticoagulation Patient Findings      HPI:   Abel Day III seen in clinic today, on anticoagulation therapy with warfarin (a high risk medication) for hx of PE and DVT, atrial fibrillation,       Pt is here today to evaluate anticoagulation therapy    Previous INR was  2.1 on 2019    Pt was instructed to Continue current warfarin regimen       Confirmed warfarin dosing regimen, denies missed or extra doses of coumadin.   Diet has been consistent with foods rich in vitamin K: Yes  Changes in ETOH:  No  Changes in smoking status: No  Changes in medication: No   Cost restriction: No  S/s of bleeding:  No  Falls or accidents since last visit No  Signs/symptoms  thrombosis since the last appt: No    A/P   INR  therapeutic today  Continue current warfarin regimen           check referral    Pt educated to contact our clinic with any changes in medications or s/s of bleeding or thrombosis. Pt is aware to seek immediate medical attention for falls, head injury or deep cuts    Follow up appointment in 4 week(s) to  reduce risk of adverse events from warfarin   Shana Barragan, PharmD

## 2019-04-08 ENCOUNTER — ANTICOAGULATION VISIT (OUTPATIENT)
Dept: MEDICAL GROUP | Facility: MEDICAL CENTER | Age: 69
End: 2019-04-08
Payer: COMMERCIAL

## 2019-04-08 DIAGNOSIS — Z86.711 HISTORY OF PULMONARY EMBOLISM: ICD-10-CM

## 2019-04-08 DIAGNOSIS — Z79.01 LONG TERM (CURRENT) USE OF ANTICOAGULANTS: Primary | ICD-10-CM

## 2019-04-08 LAB — INR PPP: 2 (ref 2–3.5)

## 2019-04-08 PROCEDURE — 85610 PROTHROMBIN TIME: CPT | Performed by: FAMILY MEDICINE

## 2019-04-08 PROCEDURE — 93793 ANTICOAG MGMT PT WARFARIN: CPT | Performed by: FAMILY MEDICINE

## 2019-04-08 NOTE — PROGRESS NOTES
OP Anticoagulation Service Note    Date: 2019      Anticoagulation Summary  As of 2019    INR goal:   2.0-3.0   TTR:   52.3 % (3.8 y)   INR used for dosin.0 (2019)   Warfarin maintenance plan:   5 mg (5 mg x 1) every Tue, Fri; 2.5 mg (5 mg x 0.5) all other days   Weekly warfarin total:   22.5 mg   Plan last modified:   Makenna Hopkins (2019)   Next INR check:      Target end date:   Indefinite    Indications    History of pulmonary embolism and DVT  unprovoked. [Z86.711]  Atrial fibrillation with RVR (HCC) (Resolved) [I48.91]  Long term (current) use of anticoagulants [Z79.01] [Z79.01]             Anticoagulation Episode Summary     INR check location:       Preferred lab:       Send INR reminders to:       Comments:         Anticoagulation Care Providers     Provider Role Specialty Phone number    [facility] Responsible  [phone]        Anticoagulation Patient Findings      HPI:   Abel Day III seen in clinic today, on anticoagulation therapy with warfarin (a high risk medication) for hx of PE, DVT and atrial fibrillation,       Pt is here today to evaluate anticoagulation therapy    Previous INR was  2.2 on 3-7-2019    Pt was instructed to Continue current warfarin regimen       Confirmed warfarin dosing regimen, denies missed or extra doses of coumadin.   Diet has been consistent with foods rich in vitamin K: Yes - he continues to diet to lose weight. [employer] diet.   Changes in ETOH:  No  Changes in smoking status: No  Changes in medication: No   Cost restriction: No  S/s of bleeding:  No  Falls or accidents since last visit No  Signs/symptoms  thrombosis since the last appt: No    A/P   INR  therapeutic today,  Continue current warfarin regimen           check referral    Pt educated to contact our clinic with any changes in medications or s/s of bleeding or thrombosis. Pt is aware to seek immediate medical attention for falls, head injury or  deep cuts    Follow up appointment in 4 week(s) to reduce risk of adverse events from warfarin  Shana Barragan, PharmD

## 2019-05-03 DIAGNOSIS — I48.91 ATRIAL FIBRILLATION, UNSPECIFIED TYPE (HCC): ICD-10-CM

## 2019-05-06 ENCOUNTER — ANTICOAGULATION VISIT (OUTPATIENT)
Dept: MEDICAL GROUP | Facility: MEDICAL CENTER | Age: 69
End: 2019-05-06
Payer: COMMERCIAL

## 2019-05-06 DIAGNOSIS — Z79.01 LONG TERM (CURRENT) USE OF ANTICOAGULANTS: Primary | ICD-10-CM

## 2019-05-06 DIAGNOSIS — Z86.711 HISTORY OF PULMONARY EMBOLISM: ICD-10-CM

## 2019-05-06 LAB — INR PPP: 2 (ref 2–3.5)

## 2019-05-06 PROCEDURE — 93793 ANTICOAG MGMT PT WARFARIN: CPT | Performed by: FAMILY MEDICINE

## 2019-05-06 PROCEDURE — 85610 PROTHROMBIN TIME: CPT | Performed by: FAMILY MEDICINE

## 2019-05-06 NOTE — PROGRESS NOTES
OP Anticoagulation Service Note    Date: 2019      Anticoagulation Summary  As of 2019    INR goal:   2.0-3.0   TTR:   53.3 % (3.9 y)   INR used for dosin.00 (2019)   Warfarin maintenance plan:   5 mg (5 mg x 1) every Mon, Wed, Fri; 2.5 mg (5 mg x 0.5) all other days   Weekly warfarin total:   25 mg   Plan last modified:   Shana Barragan, PharmD (2019)   Next INR check:   6/3/2019   Target end date:   Indefinite    Indications    History of pulmonary embolism and DVT  unprovoked. [Z86.711]  Atrial fibrillation with RVR (HCC) (Resolved) [I48.91]  Long term (current) use of anticoagulants [Z79.01] [Z79.01]             Anticoagulation Episode Summary     INR check location:       Preferred lab:       Send INR reminders to:       Comments:         Anticoagulation Care Providers     Provider Role Specialty Phone number    [facility] Responsible  [phone]        Anticoagulation Patient Findings      HPI:   Abel Day III seen in clinic today, on anticoagulation therapy with warfarin (a high risk medication) for atrial fibrillation and hx of PE/DVT      Pt is here today to evaluate anticoagulation therapy    Previous INR was  2.0 on 2019    Pt was instructed to continue current regimen    Confirmed warfarin dosing regimen, denies missed or extra doses of coumadin.   Diet has been consistent with foods rich in vitamin K: Yes  Changes in ETOH:  No  Changes in smoking status: No  Changes in medication: No   Cost restriction: No  S/s of bleeding:  No  Falls or accidents since last visit No  Signs/symptoms  thrombosis since the last appt: No    A/P   INR  therapeutic today, but still on lower end of range   Increase weekly regimen.        919 check referral    Pt educated to contact our clinic with any changes in medications or s/s of bleeding or thrombosis. Pt is aware to seek immediate medical attention for falls, head injury or deep cuts    Follow up appointment  in 4 week(s) to reduce risk of adverse events from warfarin  Shana Barragan, PharmD

## 2019-06-03 ENCOUNTER — ANTICOAGULATION VISIT (OUTPATIENT)
Dept: MEDICAL GROUP | Facility: MEDICAL CENTER | Age: 69
End: 2019-06-03
Payer: COMMERCIAL

## 2019-06-03 DIAGNOSIS — Z79.01 LONG TERM (CURRENT) USE OF ANTICOAGULANTS: ICD-10-CM

## 2019-06-03 DIAGNOSIS — Z86.711 HISTORY OF PULMONARY EMBOLISM: ICD-10-CM

## 2019-06-03 LAB — INR PPP: 3 (ref 2–3.5)

## 2019-06-03 PROCEDURE — 85610 PROTHROMBIN TIME: CPT | Performed by: FAMILY MEDICINE

## 2019-06-03 NOTE — PROGRESS NOTES
OP Anticoagulation Service Note    Date: 6/3/2019    Anticoagulation Summary  As of 6/3/2019    INR goal:   2.0-3.0   TTR:   54.2 % (4 y)   INR used for dosing:   3.00 (6/3/2019)   Warfarin maintenance plan:   5 mg (5 mg x 1) every Mon, Fri; 2.5 mg (5 mg x 0.5) all other days   Weekly warfarin total:   22.5 mg   Plan last modified:   Shana Barragan, PharmD (6/3/2019)   Next INR check:   7/1/2019   Target end date:   Indefinite    Indications    History of pulmonary embolism and DVT  unprovoked. [Z86.711]  Atrial fibrillation with RVR (HCC) (Resolved) [I48.91]  Long term (current) use of anticoagulants [Z79.01] [Z79.01]             Anticoagulation Episode Summary     INR check location:       Preferred lab:       Send INR reminders to:       Comments:         Anticoagulation Care Providers     Provider Role Specialty Phone number    [facility] Responsible  [phone]        Anticoagulation Patient Findings      HPI:   Abel Day III seen in clinic today, on anticoagulation therapy with warfarin (a high risk medication) for atrial fibrillation, hx of PE and DVT       Pt is here today to evaluate anticoagulation therapy    Previous INR was  2.0 on 5-6-19    Pt was instructed to increase weekly regimen    Confirmed warfarin dosing regimen, denies missed or extra doses of coumadin.   Diet has been consistent with foods rich in vitamin K: Yes  Changes in ETOH:  No  Changes in smoking status: No  Changes in medication: No   Cost restriction: No  S/s of bleeding:  Yes pt reports he has noticed when he cuts himself it bleeds more than usual  Falls or accidents since last visit No  Signs/symptoms  thrombosis since the last appt: No    A/P   INR  therapeutic today,   Pt would like to reduce regimen d/t bleeding from cuts that he has gotten on his hand. Will reduce weekly regimen.     Pt has non-[facility] PCP. Will submit [facility] application for pt, he will follow up at our [facility] location if he  does not have HM by 7-1-19 9-19 check referral    Pt educated to contact our clinic with any changes in medications or s/s of bleeding or thrombosis. Pt is aware to seek immediate medical attention for falls, head injury or deep cuts    Follow up appointment in 4 week(s) to reduce risk of adverse events from warfarin   Shana Barragan, PharmD

## 2019-06-20 ENCOUNTER — OFFICE VISIT (OUTPATIENT)
Dept: CARDIOLOGY | Facility: MEDICAL CENTER | Age: 69
End: 2019-06-20
Payer: COMMERCIAL

## 2019-06-20 VITALS
WEIGHT: 273 LBS | DIASTOLIC BLOOD PRESSURE: 80 MMHG | OXYGEN SATURATION: 96 % | HEART RATE: 70 BPM | SYSTOLIC BLOOD PRESSURE: 130 MMHG | HEIGHT: 75 IN | BODY MASS INDEX: 33.94 KG/M2

## 2019-06-20 DIAGNOSIS — Z79.01 LONG TERM (CURRENT) USE OF ANTICOAGULANTS: ICD-10-CM

## 2019-06-20 DIAGNOSIS — I48.0 PAF (PAROXYSMAL ATRIAL FIBRILLATION) (HCC): ICD-10-CM

## 2019-06-20 PROCEDURE — 93000 ELECTROCARDIOGRAM COMPLETE: CPT | Performed by: INTERNAL MEDICINE

## 2019-06-20 PROCEDURE — 99214 OFFICE O/P EST MOD 30 MIN: CPT | Performed by: NURSE PRACTITIONER

## 2019-06-20 RX ORDER — PROPAFENONE HYDROCHLORIDE 150 MG/1
150 TABLET, COATED ORAL EVERY 8 HOURS
Qty: 60 TAB | Refills: 5 | Status: SHIPPED | OUTPATIENT
Start: 2019-06-20 | End: 2020-06-29

## 2019-06-20 ASSESSMENT — ENCOUNTER SYMPTOMS
FOCAL WEAKNESS: 0
ABDOMINAL PAIN: 0
DIZZINESS: 0
NAUSEA: 0
SPUTUM PRODUCTION: 0
CLAUDICATION: 0
PALPITATIONS: 0
SHORTNESS OF BREATH: 0
SEIZURES: 0
FEVER: 0
DOUBLE VISION: 0
SORE THROAT: 0
PND: 0
ORTHOPNEA: 0
SPEECH CHANGE: 0
CHILLS: 0
VOMITING: 0
PSYCHIATRIC NEGATIVE: 1
BLURRED VISION: 0
MUSCULOSKELETAL NEGATIVE: 1
COUGH: 0
HEARTBURN: 0
HEADACHES: 0
WEIGHT LOSS: 0
LOSS OF CONSCIOUSNESS: 0
FLANK PAIN: 0
WHEEZING: 0

## 2019-06-20 NOTE — PROGRESS NOTES
Chief Complaint   Patient presents with   • Atrial Fibrillation     F/V DX:PAF       Subjective:   Abel Day III is a 69 y.o. male who presents today for review of his atrial fibrillation burden.  He saw Dr Gonsales in March 2018. At that time he had undergone PVI and so experiencing some recurrence of intermittent palpitations. He was using the Rhythmol PRN. He had short runs of SVT and NSVT. An echo was ordered which revealed an EF of 60% with LVH and RVSP of 50 mmHg with mild TR.  Patient had a PE in the remote past, but echos after the incident did not reveal PAH.  Sleep study in  2016 was negative for NASEEM. Weight down 21#.  Review of his CT of chest with PVI and another CT when in hospital 5 months later demonstrated the following:  Impression        1.  Bilateral nonspecific pulmonary nodules measuring up to 5 mm.  Follow-up recommended.  2.  No pneumonia or pneumothorax.  3.  Nonspecific low-density liver lesions, apparently unchanged.  4.  Cholelithiasis.      Risk factors are smoking 35 years ago.  Arrhythmias he feels are well controlled  Off Propafenone no arrhythmias except one episode for few hours after a few drinks in February at a super bowl party.  He is loosing weight and he is feeling well.  Due for labs in September.    Past Medical History:   Diagnosis Date   • A-fib (HCC)    • Blood clotting disorder (HCC)     DVT right leg   • Cancer (HCC)     melanoma   • GERD (gastroesophageal reflux disease)    • Hemorrhagic disorder     on coumadin   • Hypertension     well controlled on meds   • Obesity    • Paroxysmal atrial fibrillation (HCC) 11/26/2012   • Skin cancer     basil cell   • ULCERATIVE COLITIS 11/26/2012     Past Surgical History:   Procedure Laterality Date   • RECOVERY  5/4/2016    Procedure: CATH LAB-MARIE-LARGE GROUP-EPS ABLATION/AFIB [id number] I48.0;  Surgeon: Jacquie Surgery;  Location[place] SURG[place] PRE-[place] [facility] [place];  Service:    • MASS EXCISION GENERAL Left  4/7/2016    Procedure: MASS EXCISION GENERAL FOR TEMPORAL MELANOMA;  Surgeon: Feng Sharpe M.D.;  Location: SURGERY SAME DAY [facility];  Service:    • FLAP GRAFT Left 4/7/2016    Procedure: FLAP GRAFT FOR CLOSURE WITH LOCAL FLAPS;  Surgeon: Feng Sharpe M.D.;  Location: SURGERY SAME DAY [facility];  Service:    • COLON RESECTION          • ILEOSTOMY       Family History   Problem Relation Age of Onset   • Hypertension Mother    • Heart Failure Father    • Heart Attack Maternal Uncle      Social History     Social History   • Marital status:      Spouse name: N/A   • Number of children: N/A   • Years of education: N/A     Occupational History   • Not on file.     Social History Main Topics   • Smoking status: Former Smoker     Packs/day: 1.00     Years: 10.00     Types: Cigarettes     Quit date: 11/26/1984   • Smokeless tobacco: Never Used   • Alcohol use 1.0 oz/week     2 Shots of liquor per week      Comment: 1 beer and 1 scotch daily   • Drug use: No   • Sexual activity: Not on file     Other Topics Concern   • Not on file     Social History Narrative   • No narrative on file     Allergies   Allergen Reactions   • Other Misc Unspecified     Silk sutures (body rejected them)   • Omnicef [Cefdinir]      Internal bleeding stated by patient     Outpatient Encounter Prescriptions as of 6/20/2019   Medication Sig Dispense Refill   • propafenone (RYTHMOL) 150 MG Tab Take 1 Tab by mouth every 8 hours. One tablet PRN recurrence of atrial fibrillation 60 Tab 5   • metoprolol (LOPRESSOR) 25 MG Tab Take 1 Tab by mouth 2 times a day. 180 Tab 3   • Multiple Vitamins-Minerals (PRESERVISION AREDS 2 PO) Take  by mouth 2 Times a Day.     • CALCIUM-VITAMIN D PO Take  by mouth.     • Lansoprazole (PREVACID PO) Take  by mouth.     • warfarin (COUMADIN) 5 MG Tab Take 1 Tab by mouth every Friday. As directed per Protimes. 30 Tab 0   • warfarin (COUMADIN) 2.5 MG Tab Take 1 Tab by mouth every day. 30 Tab 0   •  "cetirizine (ZYRTEC) 10 MG TABS Take 10 mg by mouth every morning. Indications: **OTC**     • [DISCONTINUED] propafenone (RYTHMOL) 150 MG Tab Take 1 Tab by mouth every 8 hours. One tablet PRN recurrence of atrial fibrillation (Patient not taking: Reported on 1/23/2019) 60 Tab 5   • ciclopirox (LOPROX) 0.77 % cream 1 APPLICATION APPLY ON THE SKIN TWICE A DAY  5     No facility-administered encounter medications on file as of 6/20/2019.      Review of Systems   Constitutional: Negative for chills, fever, malaise/fatigue and weight loss.   HENT: Negative for congestion and sore throat.    Eyes: Negative for blurred vision and double vision.   Respiratory: Negative for cough, sputum production, shortness of breath and wheezing.    Cardiovascular: Negative for chest pain, palpitations, orthopnea, claudication, leg swelling and PND.   Gastrointestinal: Negative for abdominal pain, heartburn, nausea and vomiting.   Genitourinary: Negative for dysuria, flank pain and frequency.   Musculoskeletal: Negative.    Skin: Negative.    Neurological: Negative for dizziness, speech change, focal weakness, seizures, loss of consciousness and headaches.   Endo/Heme/Allergies: Negative.    Psychiatric/Behavioral: Negative.         Objective:   /80 (BP Location: Left arm, Patient Position: Sitting, BP Cuff Size: Large adult)   Pulse 70   Ht 1.905 m (6' 3\")   Wt 123.8 kg (273 lb)   SpO2 96%   BMI 34.12 kg/m²     Physical Exam   Constitutional: He is oriented to person, place, and time. He appears well-developed and well-nourished.   HENT:   Head: Normocephalic and atraumatic.   Eyes: Pupils are equal, round, and reactive to light.   Neck: Normal range of motion. Neck supple.   Cardiovascular: Normal rate and regular rhythm.    Pulmonary/Chest: Effort normal and breath sounds normal.   Abdominal: Soft. Bowel sounds are normal.   Musculoskeletal: Normal range of motion.   Neurological: He is alert and oriented to person, place, " and time.   Skin: Skin is warm and dry.   Psychiatric: He has a normal mood and affect.       Assessment:     1. PAF (paroxysmal atrial fibrillation) (HCC)  EKG    CBC WITH DIFFERENTIAL    Comp Metabolic Panel    THYROID PANEL WITH TSH    LIPID PANEL   2. Long term (current) use of anticoagulants [Z79.01]         Medical Decision Making:  Today's Assessment / Status / Plan:     1. PAF stable off AA. May use propafenone PRN for break through. New Rx given to patient. Call if recurrence of break through of AF.  2. OAC on Warfarin will start finger sticks at home.  Due for labs as noted.  RTC 1 year.    Collaborating MD Barrera

## 2019-06-20 NOTE — LETTER
[facility]   [address], [address]  [place] [address]  Phone: [phone]  Fax: [phone]              Abel Day III  1950    Encounter Date: 6/20/2019    MILI Billy          PROGRESS NOTE:  Chief Complaint   Patient presents with   • Atrial Fibrillation     F/V DX:PAF       Subjective:   Abel Day III is a 69 y.o. male who presents today for review of his atrial fibrillation burden.  He saw Dr Gonsales in March 2018. At that time he had undergone PVI and so experiencing some recurrence of intermittent palpitations. He was using the Rhythmol PRN. He had short runs of SVT and NSVT. An echo was ordered which revealed an EF of 60% with LVH and RVSP of 50 mmHg with mild TR.  Patient had a PE in the remote past, but echos after the incident did not reveal PAH.  Sleep study in  2016 was negative for NASEEM. Weight down 21#.  Review of his CT of chest with PVI and another CT when in hospital 5 months later demonstrated the following:  Impression        1.  Bilateral nonspecific pulmonary nodules measuring up to 5 mm.  Follow-up recommended.  2.  No pneumonia or pneumothorax.  3.  Nonspecific low-density liver lesions, apparently unchanged.  4.  Cholelithiasis.      Risk factors are smoking 35 years ago.  Arrhythmias he feels are well controlled  Off Propafenone no arrhythmias except one episode for few hours after a few drinks in February at a super [employer] party.  He is loosing weight and he is feeling well.  Due for labs in September.    Past Medical History:   Diagnosis Date   • A-fib (HCC)    • Blood clotting disorder (HCC)     DVT right leg   • Cancer (HCC)     melanoma   • GERD (gastroesophageal reflux disease)    • Hemorrhagic disorder     on coumadin   • Hypertension     well controlled on meds   • Obesity    • Paroxysmal atrial fibrillation (HCC) 11/26/2012   • Skin cancer     basil cell   • ULCERATIVE COLITIS 11/26/2012     Past  Surgical History:   Procedure Laterality Date   • RECOVERY  5/4/2016    [facility] [id number] I48.0;  Surgeon: Recoveryonly Surgery;  Location: SURGERY PRE-POST PROC UNIT [facility] [place];  Service:    • MASS EXCISION GENERAL Left 4/7/2016    Procedure: MASS EXCISION GENERAL FOR TEMPORAL MELANOMA;  Surgeon: Feng Sharpe M.D.;  Location: SURGERY SAME DAY [facility];  Service:    • FLAP GRAFT Left 4/7/2016    Procedure: FLAP GRAFT FOR CLOSURE WITH LOCAL FLAPS;  Surgeon: Feng Sharpe M.D.;  Location: SURGERY SAME DAY [facility];  Service:    • COLON RESECTION          • ILEOSTOMY       Family History   Problem Relation Age of Onset   • Hypertension Mother    • Heart Failure Father    • Heart Attack Maternal Uncle      Social History     Social History   • Marital status:      Spouse name: N/A   • Number of children: N/A   • Years of education: N/A     Occupational History   • Not on file.     Social History Main Topics   • Smoking status: Former Smoker     Packs/day: 1.00     Years: 10.00     Types: Cigarettes     Quit date: 11/26/1984   • Smokeless tobacco: Never Used   • Alcohol use 1.0 oz/week     2 Shots of liquor per week      Comment: 1 beer and 1 scotch daily   • Drug use: No   • Sexual activity: Not on file     Other Topics Concern   • Not on file     Social History Narrative   • No narrative on file     Allergies   Allergen Reactions   • Other Misc Unspecified     Silk sutures (body rejected them)   • Omnicef [Cefdinir]      Internal bleeding stated by patient     Outpatient Encounter Prescriptions as of 6/20/2019   Medication Sig Dispense Refill   • propafenone (RYTHMOL) 150 MG Tab Take 1 Tab by mouth every 8 hours. One tablet PRN recurrence of atrial fibrillation 60 Tab 5   • metoprolol (LOPRESSOR) 25 MG Tab Take 1 Tab by mouth 2 times a day. 180 Tab 3   • Multiple Vitamins-Minerals (PRESERVISION AREDS 2 PO) Take  by mouth 2 Times a Day.     •  "CALCIUM-VITAMIN D PO Take  by mouth.     • Lansoprazole (PREVACID PO) Take  by mouth.     • warfarin (COUMADIN) 5 MG Tab Take 1 Tab by mouth every Friday. As directed per Protimes. 30 Tab 0   • warfarin (COUMADIN) 2.5 MG Tab Take 1 Tab by mouth every day. 30 Tab 0   • cetirizine (ZYRTEC) 10 MG TABS Take 10 mg by mouth every morning. Indications: **OTC**     • [DISCONTINUED] propafenone (RYTHMOL) 150 MG Tab Take 1 Tab by mouth every 8 hours. One tablet PRN recurrence of atrial fibrillation (Patient not taking: Reported on 1/23/2019) 60 Tab 5   • ciclopirox (LOPROX) 0.77 % cream 1 APPLICATION APPLY ON THE SKIN TWICE A DAY  5     No facility-administered encounter medications on file as of 6/20/2019.      Review of Systems   Constitutional: Negative for chills, fever, malaise/fatigue and weight loss.   HENT: Negative for congestion and sore throat.    Eyes: Negative for blurred vision and double vision.   Respiratory: Negative for cough, sputum production, shortness of breath and wheezing.    Cardiovascular: Negative for chest pain, palpitations, orthopnea, claudication, leg swelling and PND.   Gastrointestinal: Negative for abdominal pain, heartburn, nausea and vomiting.   Genitourinary: Negative for dysuria, flank pain and frequency.   Musculoskeletal: Negative.    Skin: Negative.    Neurological: Negative for dizziness, speech change, focal weakness, seizures, loss of consciousness and headaches.   Endo/Heme/Allergies: Negative.    Psychiatric/Behavioral: Negative.         Objective:   /80 (BP Location: Left arm, Patient Position: Sitting, BP Cuff Size: Large adult)   Pulse 70   Ht 1.905 m (6' 3\")   Wt 123.8 kg (273 lb)   SpO2 96%   BMI 34.12 kg/m²      Physical Exam   Constitutional: He is oriented to person, place, and time. He appears well-developed and well-nourished.   HENT:   Head: Normocephalic and atraumatic.   Eyes: Pupils are equal, round, and reactive to light.   Neck: Normal range of motion. " Neck supple.   Cardiovascular: Normal rate and regular rhythm.    Pulmonary/Chest: Effort normal and breath sounds normal.   Abdominal: Soft. Bowel sounds are normal.   Musculoskeletal: Normal range of motion.   Neurological: He is alert and oriented to person, place, and time.   Skin: Skin is warm and dry.   Psychiatric: He has a normal mood and affect.       Assessment:     1. PAF (paroxysmal atrial fibrillation) ([facility])  EKG    CBC WITH DIFFERENTIAL    Comp Metabolic Panel    THYROID PANEL WITH TSH    LIPID PANEL   2. Long term (current) use of anticoagulants [Z79.01]         Medical Decision Making:  Today's Assessment / Status / Plan:     1. PAF stable off AA. May use propafenone PRN for break through. New Rx given to patient. Call if recurrence of break through of AF.  2. OAC on Warfarin will start finger sticks at home.  Due for labs as noted.  RTC 1 year.    Collaborating MD Holly Iniguez D.O.  [address]  [place] [address]  VIA Facsimile: [phone]

## 2019-06-21 LAB — EKG IMPRESSION: NORMAL

## 2019-07-09 LAB — INR PPP: 1.9 (ref 2–3.5)

## 2019-07-10 ENCOUNTER — ANTICOAGULATION MONITORING (OUTPATIENT)
Dept: VASCULAR LAB | Facility: MEDICAL CENTER | Age: 69
End: 2019-07-10

## 2019-07-10 DIAGNOSIS — Z86.711 HISTORY OF PULMONARY EMBOLISM: ICD-10-CM

## 2019-07-10 DIAGNOSIS — Z79.01 LONG TERM (CURRENT) USE OF ANTICOAGULANTS: ICD-10-CM

## 2019-07-10 NOTE — PROGRESS NOTES
OP Telephone Anticoagulation Service Note    Date: 7/10/2019      Anticoagulation Summary  As of 7/10/2019    INR goal:   2.0-3.0   TTR:   55.0 % (4.1 y)   INR used for dosin.90! (2019)   Warfarin maintenance plan:   5 mg (5 mg x 1) every Mon, Fri; 2.5 mg (5 mg x 0.5) all other days   Weekly warfarin total:   22.5 mg   Plan last modified:   Shana Barragan, PharmD (6/3/2019)   Next INR check:   2019   Target end date:   Indefinite    Indications    History of pulmonary embolism and DVT  unprovoked. [Z86.711]  Atrial fibrillation with RVR (HCC) (Resolved) [I48.91]  Long term (current) use of anticoagulants [Z79.01] [Z79.01]             Anticoagulation Episode Summary     INR check location:       Preferred lab:       Send INR reminders to:       Comments:         Anticoagulation Care Providers     Provider Role Specialty Phone number    [facility] Responsible  [phone]        Anticoagulation Patient Findings        Plan: Spoke with patient on the phone. Patient is subtherapeutic today. Confirmed dosing. No missed tablets in the last week. Patient denies any changes in medications or diet. Patient denies any signs or symptoms of bleeding or clotting. Instructed patient to call clinic if any unusual bleeding or bruising occurs. Instructed patient to bolus once with 5 mg, then resume normal dosing regimen. Will follow-up with patient in 1 week(s).              Herman Watson, Pharmacy Intern

## 2019-07-10 NOTE — PROGRESS NOTES
OP Telephone Anticoagulation Service Note    Date: 7/10/2019      Anticoagulation Summary  As of 7/10/2019    INR goal:   2.0-3.0   TTR:   55.0 % (4.1 y)   INR used for dosin.90! (2019)   Warfarin maintenance plan:   5 mg (5 mg x 1) every Mon, Fri; 2.5 mg (5 mg x 0.5) all other days   Weekly warfarin total:   22.5 mg   Plan last modified:   Shana Barragan, PharmD (6/3/2019)   Next INR check:   2019   Target end date:   Indefinite    Indications    History of pulmonary embolism and DVT  unprovoked. [Z86.711]  Atrial fibrillation with RVR (HCC) (Resolved) [I48.91]  Long term (current) use of anticoagulants [Z79.01] [Z79.01]             Anticoagulation Episode Summary     INR check location:       Preferred lab:       Send INR reminders to:       Comments:         Anticoagulation Care Providers     Provider Role Specialty Phone number    [facility] Responsible  [phone]        Anticoagulation Patient Findings        Plan: Spoke with patient on the phone. Patient is subtherapeutic today. Confirmed dosing. No missed tablets in the last week. Patient denies any changes in medications or diet. Patient denies any signs or symptoms of bleeding or clotting. Instructed patient to call clinic if any unusual bleeding or bruising occurs. Instructed patient to bolus once with 5 mg, then resume normal dosing schedule. Will follow-up with patient in 1 week(s). This plan was discussed with Makenna Watson, Pharmacy Intern

## 2019-07-17 ENCOUNTER — ANTICOAGULATION MONITORING (OUTPATIENT)
Dept: VASCULAR LAB | Facility: MEDICAL CENTER | Age: 69
End: 2019-07-17

## 2019-07-17 DIAGNOSIS — Z79.01 LONG TERM (CURRENT) USE OF ANTICOAGULANTS: ICD-10-CM

## 2019-07-17 DIAGNOSIS — Z86.711 HISTORY OF PULMONARY EMBOLISM: ICD-10-CM

## 2019-07-17 LAB — INR PPP: 2.7 (ref 2–3.5)

## 2019-07-17 NOTE — PROGRESS NOTES
Anticoagulation Summary  As of 2019    INR goal:   2.0-3.0   TTR:   55.2 % (4.1 y)   INR used for dosin.70 (2019)   Warfarin maintenance plan:   5 mg (5 mg x 1) every Mon, Fri; 2.5 mg (5 mg x 0.5) all other days   Weekly warfarin total:   22.5 mg   Plan last modified:   Yoni KirbyD (6/3/2019)   Next INR check:   2019   Target end date:   Indefinite    Indications    History of pulmonary embolism and DVT  unprovoked. [Z86.711]  Atrial fibrillation with RVR (HCC) (Resolved) [I48.91]  Long term (current) use of anticoagulants [Z79.01] [Z79.01]             Anticoagulation Episode Summary     INR check location:       Preferred lab:       Send INR reminders to:       Comments:         Anticoagulation Care Providers     Provider Role Specialty Phone number    [facility] Responsible  [phone]        Anticoagulation Patient Findings  Patient Findings     Negatives:   Signs/symptoms of thrombosis, Signs/symptoms of bleeding, Laboratory test error suspected, Change in health, Change in alcohol use, Change in activity, Upcoming invasive procedure, Emergency department visit, Upcoming dental procedure, Missed doses, Extra doses, Change in medications, Change in diet/appetite, Hospital admission, Bruising, Other complaints        Spoke with patient today regarding therapeutic INR of 2.7.  Patient denies any signs/symptoms of bruising or bleeding or any changes in diet and medications.  Instructed patient to call clinic with any questions or concerns.  Pt is to continue with current warfarin dosing regimen.  Follow up in 2 weeks, to reduce risk of adverse events related to this high risk medication,  Warfarin.    Daniel Rutledge, PharmD, BCACP

## 2019-07-31 ENCOUNTER — ANTICOAGULATION MONITORING (OUTPATIENT)
Dept: VASCULAR LAB | Facility: MEDICAL CENTER | Age: 69
End: 2019-07-31

## 2019-07-31 DIAGNOSIS — Z86.711 HISTORY OF PULMONARY EMBOLISM: ICD-10-CM

## 2019-07-31 DIAGNOSIS — Z79.01 LONG TERM (CURRENT) USE OF ANTICOAGULANTS: ICD-10-CM

## 2019-07-31 LAB — INR PPP: 3.4 (ref 2–3.5)

## 2019-07-31 NOTE — PROGRESS NOTES
Anticoagulation Summary  As of 7/31/2019    INR goal:   2.0-3.0   TTR:   55.1 % (4.2 y)   INR used for dosing:   3.40! (7/31/2019)   Warfarin maintenance plan:   5 mg (5 mg x 1) every Mon, Fri; 2.5 mg (5 mg x 0.5) all other days   Weekly warfarin total:   22.5 mg   Plan last modified:   Yoni KirbyD (6/3/2019)   Next INR check:   8/14/2019   Target end date:   Indefinite    Indications    History of pulmonary embolism and DVT  unprovoked. [Z86.711]  Atrial fibrillation with RVR (HCC) (Resolved) [I48.91]  Long term (current) use of anticoagulants [Z79.01] [Z79.01]             Anticoagulation Episode Summary     INR check location:       Preferred lab:       Send INR reminders to:       Comments:   [place]      Anticoagulation Care Providers     Provider Role Specialty Phone number    [facility] Responsible  [phone]        Anticoagulation Patient Findings          HPI:  Abel Day III, on anticoagulation therapy with warfarin for PE.   Changes to current medical/health status since last appt: none  Denies signs/symptoms of bleeding and/or thrombosis since the last appt.    Denies any interval changes to diet  Denies any interval changes to medications since last appt.   Denies any complications or cost restrictions with current therapy.     A/P   INR  SUPRA-therapeutic.   No known cause of elevated INR.   Hold today then Pt is to continue with current warfarin dosing regimen.     Next INR in 2 week(s).    Ra Bell, PharmD

## 2019-08-16 ENCOUNTER — ANTICOAGULATION MONITORING (OUTPATIENT)
Dept: VASCULAR LAB | Facility: MEDICAL CENTER | Age: 69
End: 2019-08-16

## 2019-08-16 DIAGNOSIS — Z86.711 HISTORY OF PULMONARY EMBOLISM: ICD-10-CM

## 2019-08-16 DIAGNOSIS — Z79.01 LONG TERM (CURRENT) USE OF ANTICOAGULANTS: ICD-10-CM

## 2019-08-16 LAB — INR PPP: 1.9 (ref 2–3.5)

## 2019-08-16 NOTE — PROGRESS NOTES
Anticoagulation Summary  As of 2019    INR goal:   2.0-3.0   TTR:   55.2 % (4.2 y)   INR used for dosin.90! (2019)   Warfarin maintenance plan:   5 mg (5 mg x 1) every Mon, Fri; 2.5 mg (5 mg x 0.5) all other days   Weekly warfarin total:   22.5 mg   Plan last modified:   Shana Barragan, PharmD (6/3/2019)   Next INR check:   2019   Target end date:   Indefinite    Indications    History of pulmonary embolism and DVT  unprovoked. [Z86.711]  Atrial fibrillation with RVR (HCC) (Resolved) [I48.91]  Long term (current) use of anticoagulants [Z79.01] [Z79.01]             Anticoagulation Episode Summary     INR check location:       Preferred lab:       Send INR reminders to:       Comments:   [place]      Anticoagulation Care Providers     Provider Role Specialty Phone number    [facility] Responsible  [phone]        Anticoagulation Patient Findings  Patient Findings     Negatives:   Signs/symptoms of thrombosis, Signs/symptoms of bleeding, Laboratory test error suspected, Change in health, Change in alcohol use, Change in activity, Upcoming invasive procedure, Emergency department visit, Upcoming dental procedure, Missed doses, Extra doses, Change in medications, Change in diet/appetite, Hospital admission, Bruising, Other complaints        Spoke with the patient on the phone today, reporting a slightly SUB-therapeutic INR of 1.9.  Confirmed the current warfarin dosing regimen and patient compliance.  Patient did miss one 2.5mg dose last week. Patient denies any interval changes to diet and/or medications. Patient denies any signs/symptoms of bleeding or clotting.  Patient instructed to bolus with extra 2.5mg TONIGHT, but patient would rather not bolus. According to CHEST guidelines, no dosage adjustment is necessary if within 0.1 of goal range. Patient instructed to continue with the current warfarin dosing regimen, and asked to follow up again in 2 weeks.     Jay Styles   PharmD

## 2019-09-03 DIAGNOSIS — Z79.01 CHRONIC ANTICOAGULATION: ICD-10-CM

## 2019-09-10 ENCOUNTER — ANTICOAGULATION MONITORING (OUTPATIENT)
Dept: VASCULAR LAB | Facility: MEDICAL CENTER | Age: 69
End: 2019-09-10

## 2019-09-10 DIAGNOSIS — Z86.711 HISTORY OF PULMONARY EMBOLISM: ICD-10-CM

## 2019-09-10 DIAGNOSIS — Z79.01 LONG TERM (CURRENT) USE OF ANTICOAGULANTS: ICD-10-CM

## 2019-09-10 LAB — INR PPP: 3 (ref 2–3.5)

## 2019-09-10 NOTE — PROGRESS NOTES
Anticoagulation Summary  As of 9/10/2019    INR goal:   2.0-3.0   TTR:   55.8 % (4.3 y)   INR used for dosing:   3.00 (9/10/2019)   Warfarin maintenance plan:   5 mg (5 mg x 1) every Mon, Fri; 2.5 mg (5 mg x 0.5) all other days   Weekly warfarin total:   22.5 mg   Plan last modified:   Shana Barragan PharmD (6/3/2019)   Next INR check:   9/24/2019   Target end date:   Indefinite    Indications    History of pulmonary embolism and DVT  unprovoked. [Z86.711]  Atrial fibrillation with RVR (HCC) (Resolved) [I48.91]  Long term (current) use of anticoagulants [Z79.01] [Z79.01]             Anticoagulation Episode Summary     INR check location:       Preferred lab:       Send INR reminders to:       Comments:   [place]      Anticoagulation Care Providers     Provider Role Specialty Phone number    [facility] Responsible  [phone]        Anticoagulation Patient Findings  Patient Findings     Negatives:   Signs/symptoms of thrombosis, Signs/symptoms of bleeding, Laboratory test error suspected, Change in health, Change in alcohol use, Change in activity, Upcoming invasive procedure, Emergency department visit, Upcoming dental procedure, Missed doses, Extra doses, Change in medications, Change in diet/appetite, Hospital admission, Bruising, Other complaints        Spoke with the patient on the phone today, reporting a therapeutic INR of 3.0.  Confirmed the current warfarin dosing regimen and patient compliance. Patient denies any interval changes to diet and/or medications. Patient denies any signs/symptoms of bleeding or clotting.  Patient instructed to continue with the current warfarin dosing regimen, and asked to follow up again in 2 weeks.     Jay Styles  PharmD

## 2019-10-06 LAB — INR PPP: 2.4 (ref 2–3.5)

## 2019-10-07 ENCOUNTER — ANTICOAGULATION MONITORING (OUTPATIENT)
Dept: MEDICAL GROUP | Facility: PHYSICIAN GROUP | Age: 69
End: 2019-10-07

## 2019-10-07 DIAGNOSIS — Z86.711 HISTORY OF PULMONARY EMBOLISM: ICD-10-CM

## 2019-10-07 DIAGNOSIS — Z79.01 LONG TERM (CURRENT) USE OF ANTICOAGULANTS: ICD-10-CM

## 2019-10-07 NOTE — PROGRESS NOTES
Anticoagulation Summary  As of 10/7/2019    INR goal:   2.0-3.0   TTR:   56.5 % (4.3 y)   INR used for dosin.40 (10/6/2019)   Warfarin maintenance plan:   5 mg (5 mg x 1) every Mon, Fri; 2.5 mg (5 mg x 0.5) all other days   Weekly warfarin total:   22.5 mg   No change documented:   Yonas Burnette Ass't   Plan last modified:   Yoni KirbyD (6/3/2019)   Next INR check:   2019   Target end date:   Indefinite    Indications    History of pulmonary embolism and DVT  unprovoked. [Z86.711]  Atrial fibrillation with RVR (HCC) (Resolved) [I48.91]  Long term (current) use of anticoagulants [Z79.01] [Z79.01]             Anticoagulation Episode Summary     INR check location:       Preferred lab:       Send INR reminders to:       Comments:   [place]      Anticoagulation Care Providers     Provider Role Specialty Phone number    [facility] Responsible  [phone]        Anticoagulation Patient Findings  Patient Findings     Negatives:   Signs/symptoms of thrombosis, Signs/symptoms of bleeding, Laboratory test error suspected, Change in health, Change in alcohol use, Change in activity, Upcoming invasive procedure, Emergency department visit, Upcoming dental procedure, Missed doses, Extra doses, Change in medications, Change in diet/appetite, Hospital admission, Bruising, Other complaints      Left voicemail message to report 2.4 therapeutic INR of 2.0-3.0.  Patient to continue with current warfarin dosing regimen. Requested pt contact the clinic for any change to diet or medication, and to report any signs or symptoms of bleeding, bruising or clotting.  Pt to follow up in 4 weeks.  Saranya Santacruz Med Ass't     I have reviewed and agree with the plan  on  10/7/2019    Shana Barragan, Pharm D

## 2019-10-24 ENCOUNTER — HOSPITAL ENCOUNTER (OUTPATIENT)
Dept: LAB | Facility: MEDICAL CENTER | Age: 69
End: 2019-10-24
Attending: FAMILY MEDICINE
Payer: MEDICARE

## 2019-10-24 ENCOUNTER — HOSPITAL ENCOUNTER (OUTPATIENT)
Dept: LAB | Facility: MEDICAL CENTER | Age: 69
End: 2019-10-24
Attending: NURSE PRACTITIONER
Payer: MEDICARE

## 2019-10-24 DIAGNOSIS — I48.0 PAF (PAROXYSMAL ATRIAL FIBRILLATION) (HCC): ICD-10-CM

## 2019-10-24 LAB
ALBUMIN SERPL BCP-MCNC: 3.9 G/DL (ref 3.2–4.9)
ALBUMIN SERPL BCP-MCNC: 4 G/DL (ref 3.2–4.9)
ALBUMIN/GLOB SERPL: 1.2 G/DL
ALBUMIN/GLOB SERPL: 1.3 G/DL
ALP SERPL-CCNC: 58 U/L (ref 30–99)
ALP SERPL-CCNC: 59 U/L (ref 30–99)
ALT SERPL-CCNC: 17 U/L (ref 2–50)
ALT SERPL-CCNC: 18 U/L (ref 2–50)
ANION GAP SERPL CALC-SCNC: 9 MMOL/L (ref 0–11.9)
ANION GAP SERPL CALC-SCNC: 9 MMOL/L (ref 0–11.9)
AST SERPL-CCNC: 21 U/L (ref 12–45)
AST SERPL-CCNC: 21 U/L (ref 12–45)
BASOPHILS # BLD AUTO: 0.3 % (ref 0–1.8)
BASOPHILS # BLD AUTO: 0.4 % (ref 0–1.8)
BASOPHILS # BLD: 0.02 K/UL (ref 0–0.12)
BASOPHILS # BLD: 0.03 K/UL (ref 0–0.12)
BILIRUB SERPL-MCNC: 1.2 MG/DL (ref 0.1–1.5)
BILIRUB SERPL-MCNC: 1.2 MG/DL (ref 0.1–1.5)
BUN SERPL-MCNC: 17 MG/DL (ref 8–22)
BUN SERPL-MCNC: 17 MG/DL (ref 8–22)
CALCIUM SERPL-MCNC: 8.9 MG/DL (ref 8.5–10.5)
CALCIUM SERPL-MCNC: 9 MG/DL (ref 8.5–10.5)
CHLORIDE SERPL-SCNC: 101 MMOL/L (ref 96–112)
CHLORIDE SERPL-SCNC: 102 MMOL/L (ref 96–112)
CHOLEST SERPL-MCNC: 197 MG/DL (ref 100–199)
CHOLEST SERPL-MCNC: 199 MG/DL (ref 100–199)
CO2 SERPL-SCNC: 27 MMOL/L (ref 20–33)
CO2 SERPL-SCNC: 28 MMOL/L (ref 20–33)
CREAT SERPL-MCNC: 0.91 MG/DL (ref 0.5–1.4)
CREAT SERPL-MCNC: 0.94 MG/DL (ref 0.5–1.4)
EOSINOPHIL # BLD AUTO: 0.11 K/UL (ref 0–0.51)
EOSINOPHIL # BLD AUTO: 0.13 K/UL (ref 0–0.51)
EOSINOPHIL NFR BLD: 1.6 % (ref 0–6.9)
EOSINOPHIL NFR BLD: 1.9 % (ref 0–6.9)
ERYTHROCYTE [DISTWIDTH] IN BLOOD BY AUTOMATED COUNT: 48 FL (ref 35.9–50)
ERYTHROCYTE [DISTWIDTH] IN BLOOD BY AUTOMATED COUNT: 48.9 FL (ref 35.9–50)
FASTING STATUS PATIENT QL REPORTED: NORMAL
GLOBULIN SER CALC-MCNC: 3.2 G/DL (ref 1.9–3.5)
GLOBULIN SER CALC-MCNC: 3.3 G/DL (ref 1.9–3.5)
GLUCOSE SERPL-MCNC: 104 MG/DL (ref 65–99)
GLUCOSE SERPL-MCNC: 105 MG/DL (ref 65–99)
HCT VFR BLD AUTO: 46.8 % (ref 42–52)
HCT VFR BLD AUTO: 47.8 % (ref 42–52)
HDLC SERPL-MCNC: 48 MG/DL
HDLC SERPL-MCNC: 48 MG/DL
HGB BLD-MCNC: 16.3 G/DL (ref 14–18)
HGB BLD-MCNC: 16.4 G/DL (ref 14–18)
IMM GRANULOCYTES # BLD AUTO: 0.01 K/UL (ref 0–0.11)
IMM GRANULOCYTES # BLD AUTO: 0.01 K/UL (ref 0–0.11)
IMM GRANULOCYTES NFR BLD AUTO: 0.1 % (ref 0–0.9)
IMM GRANULOCYTES NFR BLD AUTO: 0.1 % (ref 0–0.9)
LDLC SERPL CALC-MCNC: 116 MG/DL
LDLC SERPL CALC-MCNC: 118 MG/DL
LYMPHOCYTES # BLD AUTO: 1.49 K/UL (ref 1–4.8)
LYMPHOCYTES # BLD AUTO: 1.51 K/UL (ref 1–4.8)
LYMPHOCYTES NFR BLD: 21.8 % (ref 22–41)
LYMPHOCYTES NFR BLD: 21.9 % (ref 22–41)
MCH RBC QN AUTO: 34.2 PG (ref 27–33)
MCH RBC QN AUTO: 34.2 PG (ref 27–33)
MCHC RBC AUTO-ENTMCNC: 34.3 G/DL (ref 33.7–35.3)
MCHC RBC AUTO-ENTMCNC: 34.8 G/DL (ref 33.7–35.3)
MCV RBC AUTO: 98.3 FL (ref 81.4–97.8)
MCV RBC AUTO: 99.8 FL (ref 81.4–97.8)
MONOCYTES # BLD AUTO: 0.56 K/UL (ref 0–0.85)
MONOCYTES # BLD AUTO: 0.57 K/UL (ref 0–0.85)
MONOCYTES NFR BLD AUTO: 8.2 % (ref 0–13.4)
MONOCYTES NFR BLD AUTO: 8.2 % (ref 0–13.4)
NEUTROPHILS # BLD AUTO: 4.6 K/UL (ref 1.82–7.42)
NEUTROPHILS # BLD AUTO: 4.69 K/UL (ref 1.82–7.42)
NEUTROPHILS NFR BLD: 67.6 % (ref 44–72)
NEUTROPHILS NFR BLD: 67.9 % (ref 44–72)
NRBC # BLD AUTO: 0 K/UL
NRBC # BLD AUTO: 0 K/UL
NRBC BLD-RTO: 0 /100 WBC
NRBC BLD-RTO: 0 /100 WBC
PLATELET # BLD AUTO: 143 K/UL (ref 164–446)
PLATELET # BLD AUTO: 148 K/UL (ref 164–446)
PMV BLD AUTO: 10.5 FL (ref 9–12.9)
PMV BLD AUTO: 10.9 FL (ref 9–12.9)
POTASSIUM SERPL-SCNC: 3.6 MMOL/L (ref 3.6–5.5)
POTASSIUM SERPL-SCNC: 3.6 MMOL/L (ref 3.6–5.5)
PROT SERPL-MCNC: 7.2 G/DL (ref 6–8.2)
PROT SERPL-MCNC: 7.2 G/DL (ref 6–8.2)
PSA SERPL-MCNC: 1.7 NG/ML (ref 0–4)
RBC # BLD AUTO: 4.76 M/UL (ref 4.7–6.1)
RBC # BLD AUTO: 4.79 M/UL (ref 4.7–6.1)
SODIUM SERPL-SCNC: 137 MMOL/L (ref 135–145)
SODIUM SERPL-SCNC: 139 MMOL/L (ref 135–145)
T4 FREE SERPL-MCNC: 0.98 NG/DL (ref 0.53–1.43)
TRIGL SERPL-MCNC: 163 MG/DL (ref 0–149)
TRIGL SERPL-MCNC: 165 MG/DL (ref 0–149)
TSH SERPL DL<=0.005 MIU/L-ACNC: 1.55 UIU/ML (ref 0.38–5.33)
TSH SERPL DL<=0.005 MIU/L-ACNC: 1.65 UIU/ML (ref 0.38–5.33)
URATE SERPL-MCNC: 7.8 MG/DL (ref 2.5–8.3)
WBC # BLD AUTO: 6.8 K/UL (ref 4.8–10.8)
WBC # BLD AUTO: 6.9 K/UL (ref 4.8–10.8)

## 2019-10-24 PROCEDURE — 84153 ASSAY OF PSA TOTAL: CPT | Mod: GA

## 2019-10-24 PROCEDURE — 84443 ASSAY THYROID STIM HORMONE: CPT

## 2019-10-24 PROCEDURE — 84550 ASSAY OF BLOOD/URIC ACID: CPT

## 2019-10-24 PROCEDURE — 84443 ASSAY THYROID STIM HORMONE: CPT | Mod: 91

## 2019-10-24 PROCEDURE — 80053 COMPREHEN METABOLIC PANEL: CPT | Mod: 91

## 2019-10-24 PROCEDURE — 85025 COMPLETE CBC W/AUTO DIFF WBC: CPT

## 2019-10-24 PROCEDURE — 80061 LIPID PANEL: CPT | Mod: 91

## 2019-10-24 PROCEDURE — 36415 COLL VENOUS BLD VENIPUNCTURE: CPT

## 2019-10-24 PROCEDURE — 80061 LIPID PANEL: CPT

## 2019-10-24 PROCEDURE — 84439 ASSAY OF FREE THYROXINE: CPT

## 2019-10-24 PROCEDURE — 85025 COMPLETE CBC W/AUTO DIFF WBC: CPT | Mod: 91

## 2019-10-24 PROCEDURE — 80053 COMPREHEN METABOLIC PANEL: CPT

## 2019-11-04 ENCOUNTER — ANTICOAGULATION MONITORING (OUTPATIENT)
Dept: VASCULAR LAB | Facility: MEDICAL CENTER | Age: 69
End: 2019-11-04

## 2019-11-04 DIAGNOSIS — Z79.01 LONG TERM (CURRENT) USE OF ANTICOAGULANTS: ICD-10-CM

## 2019-11-04 DIAGNOSIS — Z86.711 HISTORY OF PULMONARY EMBOLISM: ICD-10-CM

## 2019-11-04 LAB — INR PPP: 2.1 (ref 2–3.5)

## 2019-11-05 NOTE — PROGRESS NOTES
Anticoagulation Summary  As of 2019    INR goal:   2.0-3.0   TTR:   57.3 % (4.4 y)   INR used for dosin.10 (2019)   Warfarin maintenance plan:   5 mg (5 mg x 1) every Mon, Fri; 2.5 mg (5 mg x 0.5) all other days   Weekly warfarin total:   22.5 mg   Plan last modified:   Shana Barragan PharmD (6/3/2019)   Next INR check:   2019   Target end date:   Indefinite    Indications    History of pulmonary embolism and DVT  unprovoked. [Z86.711]  Atrial fibrillation with RVR (HCC) (Resolved) [I48.91]  Long term (current) use of anticoagulants [Z79.01] [Z79.01]             Anticoagulation Episode Summary     INR check location:       Preferred lab:       Send INR reminders to:       Comments:   [place]      Anticoagulation Care Providers     Provider Role Specialty Phone number    [facility] Responsible  [phone]        Anticoagulation Patient Findings     Left a message on the patient's voicemail today, informing the patient of a therapeutic INR of 2.1.  Instructed patient to call the clinic with any changes to diet or medications, with any signs/sx of bleeding or clotting or with any questions or concerns.     Patient instructed to continue with the current warfarin dosing regimen, and asked to follow up again in 4 weeks.     Jay Styles  PharmD

## 2019-12-04 ENCOUNTER — ANTICOAGULATION MONITORING (OUTPATIENT)
Dept: VASCULAR LAB | Facility: MEDICAL CENTER | Age: 69
End: 2019-12-04

## 2019-12-04 DIAGNOSIS — Z86.711 HISTORY OF PULMONARY EMBOLISM: ICD-10-CM

## 2019-12-04 DIAGNOSIS — Z79.01 LONG TERM (CURRENT) USE OF ANTICOAGULANTS: ICD-10-CM

## 2019-12-04 LAB — INR PPP: 3.6 (ref 2–3.5)

## 2019-12-04 NOTE — PROGRESS NOTES
Anticoagulation Summary  As of 12/4/2019    INR goal:   2.0-3.0   TTR:   57.3 % (4.5 y)   INR used for dosing:   3.60! (12/4/2019)   Warfarin maintenance plan:   5 mg (5 mg x 1) every Mon, Fri; 2.5 mg (5 mg x 0.5) all other days   Weekly warfarin total:   22.5 mg   Plan last modified:   Shana Barragan, PharmD (6/3/2019)   Next INR check:   12/18/2019   Target end date:   Indefinite    Indications    History of pulmonary embolism and DVT  unprovoked. [Z86.711]  Atrial fibrillation with RVR (HCC) (Resolved) [I48.91]  Long term (current) use of anticoagulants [Z79.01] [Z79.01]             Anticoagulation Episode Summary     INR check location:       Preferred lab:       Send INR reminders to:       Comments:   [place]      Anticoagulation Care Providers     Provider Role Specialty Phone number    [facility] Responsible  [phone]        Anticoagulation Patient Findings          Left voicemail message to report a SUPRA- therapeutic INR of 3.60.   Hold warfarin tonight then pt to continue with current warfarin dosing regimen.  Requested pt contact the clinic for any s/s of unusual bleeding, bruising, clotting or any changes to diet or medication.   Will follow up in 2 weeks.    Kayleigh Watters, Pharmacy Intern

## 2019-12-19 ENCOUNTER — ANTICOAGULATION MONITORING (OUTPATIENT)
Dept: VASCULAR LAB | Facility: MEDICAL CENTER | Age: 69
End: 2019-12-19

## 2019-12-19 DIAGNOSIS — Z86.711 HISTORY OF PULMONARY EMBOLISM: ICD-10-CM

## 2019-12-19 DIAGNOSIS — Z79.01 LONG TERM (CURRENT) USE OF ANTICOAGULANTS: ICD-10-CM

## 2019-12-19 LAB — INR PPP: 2 (ref 2–3.5)

## 2019-12-20 NOTE — PROGRESS NOTES
OP Telephone Anticoagulation Service Note    Date: 2019      Anticoagulation Summary  As of 2019    INR goal:   2.0-3.0   TTR:   57.4 % (4.5 y)   INR used for dosin.00 (2019)   Warfarin maintenance plan:   5 mg (5 mg x 1) every Mon, Fri; 2.5 mg (5 mg x 0.5) all other days   Weekly warfarin total:   22.5 mg   Plan last modified:   Shana Barragan PharmD (6/3/2019)   Next INR check:   2020   Target end date:   Indefinite    Indications    History of pulmonary embolism and DVT  unprovoked. [Z86.711]  Atrial fibrillation with RVR (HCC) (Resolved) [I48.91]  Long term (current) use of anticoagulants [Z79.01] [Z79.01]             Anticoagulation Episode Summary     INR check location:       Preferred lab:       Send INR reminders to:       Comments:   [place]      Anticoagulation Care Providers     Provider Role Specialty Phone number    [facility] Responsible  [phone]        Anticoagulation Patient Findings        Plan: INR is in range. Left message on patient's answering machine/voicemail. Instructed patient to call back with any concerns regarding any unusual bleeding or bruising, any medication or diet changes or any signs or symptoms of thrombosis. Instructed patient to resume medication as outlined above. Patient to follow up in 2 week(s).             Shana Barragan, [place]

## 2019-12-28 ENCOUNTER — NON-PROVIDER VISIT (OUTPATIENT)
Dept: WOUND CARE | Facility: MEDICAL CENTER | Age: 69
End: 2019-12-28
Attending: FAMILY MEDICINE
Payer: MEDICARE

## 2019-12-28 PROCEDURE — 99211 OFF/OP EST MAY X REQ PHY/QHP: CPT

## 2019-12-29 NOTE — CERTIFICATION
"Advanced [facility]  [facility]  [address]  [place] [address]  [phone] Fax#    Initial Ostomy Evaluation  For 90 Day Certification Period:12/28/2019 - 03/28/2019      Referring Provider:  Dr. Andreina Iniguez MD  Primary Provider:same  Consulting Providers:  Surgeon: \"jacoby whipple 30 years ago in [place] ...\" per pt      Start of Care:12/28/2019  Reason for referral:leaking ileostomy troubleshooting      SUBJECTIVE:  \"I'Ve been using the same stuff for 30 years, and now its leaking. I have gained about 100lbs since then tho\"    HPI: Pt is a 69 year old genleman who reports having a colectomy with ileostomy 30 years ago in [place], secondary to uncontrolled UC.     Past Medical Hx:  Past Medical History:   Diagnosis Date   • A-fib (HCC)    • Blood clotting disorder (HCC)     DVT right leg   • Cancer (HCC)     melanoma   • GERD (gastroesophageal reflux disease)    • Hemorrhagic disorder     on coumadin   • Hypertension     well controlled on meds   • Obesity    • Paroxysmal atrial fibrillation (HCC) 11/26/2012   • Skin cancer     basil cell   • ULCERATIVE COLITIS 11/26/2012     Surgical Hx:  Past Surgical History:   Procedure Laterality Date   • RECOVERY  5/4/2016    Procedure: CATH LAB-[facility]-EPS ABLATION/AFIB [id number] I48.0;  Surgeon: Recoveryonly Surgery;  [place] [facility] [place]  Service:    • MASS EXCISION GENERAL Left 4/7/2016    Procedure: MASS EXCISION GENERAL FOR TEMPORAL MELANOMA;  Surgeon: Feng Sharpe M.D.;  Location: SURGERY SAME DAY [facility];  Service:    • FLAP GRAFT Left 4/7/2016    Procedure: FLAP GRAFT FOR CLOSURE WITH LOCAL FLAPS;  Surgeon: Feng Sharpe M.D.;  Location: SURGERY SAME DAY [facility];  Service:    • COLON RESECTION          • ILEOSTOMY       Medications:  Current Outpatient Medications:   •  propafenone (RYTHMOL) 150 MG Tab, Take 1 Tab by mouth every 8 hours. One tablet PRN " recurrence of atrial fibrillation, Disp: 60 Tab, Rfl: 5  •  metoprolol (LOPRESSOR) 25 MG Tab, Take 1 Tab by mouth 2 times a day., Disp: 180 Tab, Rfl: 3  •  ciclopirox (LOPROX) 0.77 % cream, 1 APPLICATION APPLY ON THE SKIN TWICE A DAY, Disp: , Rfl: 5  •  Multiple Vitamins-Minerals (PRESERVISION AREDS 2 PO), Take  by mouth 2 Times a Day., Disp: , Rfl:   •  CALCIUM-VITAMIN D PO, Take  by mouth., Disp: , Rfl:   •  Lansoprazole (PREVACID PO), Take  by mouth., Disp: , Rfl:   •  warfarin (COUMADIN) 5 MG Tab, Take 1 Tab by mouth every Friday. As directed per Protimes., Disp: 30 Tab, Rfl: 0  •  warfarin (COUMADIN) 2.5 MG Tab, Take 1 Tab by mouth every day., Disp: 30 Tab, Rfl: 0  •  cetirizine (ZYRTEC) 10 MG TABS, Take 10 mg by mouth every morning. Indications: **OTC**, Disp: , Rfl:   Allergies:Other misc and Omnicef [cefdinir]    Social Hx:  Social History     Socioeconomic History   • Marital status:      Spouse name: Not on file   • Number of children: Not on file   • Years of education: Not on file   • Highest education level: Not on file   Occupational History   • Not on file   Social Needs   • Financial resource strain: Not on file   • Food insecurity:     Worry: Not on file     Inability: Not on file   • Transportation needs:     Medical: Not on file     Non-medical: Not on file   Tobacco Use   • Smoking status: Former Smoker     Packs/day: 1.00     Years: 10.00     Pack years: 10.00     Types: Cigarettes     Last attempt to quit: 1984     Years since quittin.1   • Smokeless tobacco: Never Used   Substance and Sexual Activity   • Alcohol use: Yes     Alcohol/week: 1.0 oz     Types: 2 Shots of liquor per week     Comment: 1 beer and 1 scotch daily   • Drug use: No   • Sexual activity: Not on file   Lifestyle   • Physical activity:     Days per week: Not on file     Minutes per session: Not on file   • Stress: Not on file   Relationships   • Social connections:     Talks on phone: Not on file     Gets  "together: Not on file     Attends Rastafari service: Not on file     Active member of club or organization: Not on file     Attends meetings of clubs or organizations: Not on file     Relationship status: Not on file   • Intimate partner violence:     Fear of current or ex partner: Not on file     Emotionally abused: Not on file     Physically abused: Not on file     Forced sexual activity: Not on file   Other Topics Concern   • Not on file   Social History Narrative   • Not on file         OBJECTIVE:     STOMA ASSESSMENT:   Type:  __x__Ileostomy     ____Colostomy    ____Urostomy    ____Other     Location:  RLQ    Size:3.0 x 3.5 cm   Shape:oval, and mushroomed   Color:red, moist   Protrudes:      ___ >1 inch               ___ <1 inch   [place]:  Central and patent   Mucocutaneous Junction:  Intact circumferentially   Skin indentations, creases or scar tissue:deep crease 3-9 o'clock when pt sits   Divya-stomal Skin Problems:none - skin looks good   Effluent / Flatus:   Photo  __x__ Yes ____ No            Pouching Procedure:   Old appliance removed using [place] adhesive spray remover.    Peristomal skin cleansed with:warm water on a washcloth   Peristomal skin treated with: Linda medical adhesive spray   Ostomy appliance used:  Brava sheet CTF, then pie wedges cut from a 4\" Adapt paste ring - 2 stacked to 3 and 2 to 9 o'clock positions to build up crease. Then applied a 70mm [place] Cera plus CTF flange with corresponding pouch, Brava strip to sides, and then a stoma belt.       Patient Education:   Change ileostomies every 3-5 days.  Change appliance immediately if it is leaking or peristomal skin feels irritated, has itching, or  burning.   To change the appliance, remove previous appliance (using adhesive remover spray), cleanse peristomal skin with warm water/washcloth (NO SOAP!), pat dry.  Use template to trace ostomy shape onto back of Brava sheet, then cut out.  Spray skin around stoma with medical " "adhesive spray, spread it out with finger, then wait approx 10 seconds until it feels tacky.  Apply the cut to fit Brava sheet sticky side (printed side) down.  Spray over this with medical adhesive spray, allow to get tacky, then apply Adapt paste pie wedges to the 3 and 9 o'clock positions to buil up the crease (2 per side).  Now apply the Convex barrier ring, with the \"volcano\" side facing down and flat side up.  Now place the cut to fit flange over this, and secure both sides with a Brava strip.  Apply pouch and snap into place.  Now apply the stoma belt and tighten so it is snug but comfortable.    Empty pouches when no more than ½ full. Check contents every 2 hours or as needed. Do not leave soap residue on skin and do not use baby wipes or skin prep wipes.     Should you experience any significant changes in your condition, such as infection (redness, swelling, localized heat, increased pain, fever > 101 F, chills) or have any questions regarding your home care instructions, please contact the wound center at [phone]. If after hours, contact your primary care physician or go to the hospital emergency room.               Response: Pt and spouse with good understanding of instr      PLAN:weekly to assess efficacy    Supplies Needed:   Appliance type:    [place] as above              Other:      Accessories:    As above     Supplier:[employer]    Frequency:  weekly      Professional Collaboration:  Evaluation notes forwarded to referring provider.      At the time of each visit, a thorough assessment of the patient is completed to assure appropriateness of our plan of care.  The plan of care may need to be adapted from the original plan of care to address any significant changes in patient status.    Clinician Signature:  _________________________________  Date:  ____________    Physician Signature:  ________________________________  Date:  ____________       "

## 2020-01-02 ENCOUNTER — OFFICE VISIT (OUTPATIENT)
Dept: WOUND CARE | Facility: MEDICAL CENTER | Age: 70
End: 2020-01-02
Attending: FAMILY MEDICINE
Payer: MEDICARE

## 2020-01-02 VITALS
HEART RATE: 62 BPM | SYSTOLIC BLOOD PRESSURE: 151 MMHG | RESPIRATION RATE: 18 BRPM | OXYGEN SATURATION: 97 % | TEMPERATURE: 98.6 F | DIASTOLIC BLOOD PRESSURE: 82 MMHG

## 2020-01-02 DIAGNOSIS — Z93.2 ILEOSTOMY PRESENT (HCC): ICD-10-CM

## 2020-01-02 DIAGNOSIS — E66.01 CLASS 3 SEVERE OBESITY WITHOUT SERIOUS COMORBIDITY IN ADULT, UNSPECIFIED BMI, UNSPECIFIED OBESITY TYPE (HCC): ICD-10-CM

## 2020-01-02 PROCEDURE — 99213 OFFICE O/P EST LOW 20 MIN: CPT | Performed by: NURSE PRACTITIONER

## 2020-01-02 PROCEDURE — 99212 OFFICE O/P EST SF 10 MIN: CPT

## 2020-01-02 ASSESSMENT — ENCOUNTER SYMPTOMS
NAUSEA: 0
CHILLS: 0
SHORTNESS OF BREATH: 0
FEVER: 0
COUGH: 0
VOMITING: 0

## 2020-01-02 ASSESSMENT — PAIN SCALES - GENERAL: PAINLEVEL: NO PAIN

## 2020-01-02 NOTE — PATIENT INSTRUCTIONS
Change urostomies and ileostomies every 3-5 days. Change colostomies every 5-7 days. Change appliance immediately if it is leaking or peristomal skin feels irritated, has itching, or  burning.   To change the appliance, remove previous appliance, cleanse peristomal skin with warm water/washcloth, pat dry, make an ostomy template or use cardboard measuring guide and trace ostomy shape onto back of barrier, cut out barrier, apply a paste ring around barrier opening and apply appliance. Empty pouches when no more than ½ full. Check contents every 2 hours or as needed. Do not leave soap residue on tissue and do not use baby wipes or skin prep wipes.     Should you experience any significant changes in your condition, such as infection (redness, swelling, localized heat, increased pain, fever > 101 F, chills) or have any questions regarding your home care instructions, please contact the wound center at [phone]. If after hours, contact your primary care physician or go to the hospital emergency room.

## 2020-01-02 NOTE — WOUND TEAM
"Advanced [facility]ound [facility]  [facility]  [address]  [place] [address]  [phone] Fax#     Ostomy Encounter  For 90 Day Certification Period:12/28/2019 - 03/28/2019      Referring Provider:  Dr. Andreina Iniguez MD  Primary Provider:same  Consulting Providers:  Surgeon: \"jacoby whipple 30 years ago in [place] ...\" per pt      Start of Care:12/28/2019  Reason for referral:leaking ileostomy troubleshooting      SUBJECTIVE:  \"I leaked on the 3rd day\"    VS - see entry under rooming    HPI: Pt is a 69 year old genleman who reports having a colectomy with ileostomy 30 years ago in [place], secondary to uncontrolled UC.     Past Medical Hx:  Past Medical History:   Diagnosis Date   • A-fib (HCC)    • Blood clotting disorder (HCC)     DVT right leg   • Cancer (HCC)     melanoma   • GERD (gastroesophageal reflux disease)    • Hemorrhagic disorder     on coumadin   • Hypertension     well controlled on meds   • Obesity    • Paroxysmal atrial fibrillation (HCC) 11/26/2012   • Skin cancer     basil cell   • ULCERATIVE COLITIS 11/26/2012     Surgical Hx:  Past Surgical History:   Procedure Laterality Date   • RECOVERY  5/4/2016    Procedure: CATH LAB-[facility]-EPS ABLATION/AFIB [id number] I48.0;  Surgeon: Jacquie Surgery;  [place]: [place] PRE[place] PROC [place] [facility] [place];  Service:    • MASS EXCISION GENERAL Left 4/7/2016    Procedure: MASS EXCISION GENERAL FOR TEMPORAL MELANOMA;  Surgeon: Feng Sharpe M.D.;  Location: SURGERY SAME DAY [facility];  Service:    • FLAP GRAFT Left 4/7/2016    Procedure: FLAP GRAFT FOR CLOSURE WITH LOCAL FLAPS;  Surgeon: Feng Sharpe M.D.;  Location: SURGERY SAME DAY [facility];  Service:    • COLON RESECTION          • ILEOSTOMY       Medications:  Current Outpatient Medications:   •  propafenone (RYTHMOL) 150 MG Tab, Take 1 Tab by mouth every 8 hours. One tablet PRN recurrence of atrial fibrillation, Disp: 60 Tab, Rfl: 5  •  " metoprolol (LOPRESSOR) 25 MG Tab, Take 1 Tab by mouth 2 times a day., Disp: 180 Tab, Rfl: 3  •  ciclopirox (LOPROX) 0.77 % cream, 1 APPLICATION APPLY ON THE SKIN TWICE A DAY, Disp: , Rfl: 5  •  Multiple Vitamins-Minerals (PRESERVISION AREDS 2 PO), Take  by mouth 2 Times a Day., Disp: , Rfl:   •  CALCIUM-VITAMIN D PO, Take  by mouth., Disp: , Rfl:   •  Lansoprazole (PREVACID PO), Take  by mouth., Disp: , Rfl:   •  warfarin (COUMADIN) 5 MG Tab, Take 1 Tab by mouth every Friday. As directed per Protimes., Disp: 30 Tab, Rfl: 0  •  warfarin (COUMADIN) 2.5 MG Tab, Take 1 Tab by mouth every day., Disp: 30 Tab, Rfl: 0  •  cetirizine (ZYRTEC) 10 MG TABS, Take 10 mg by mouth every morning. Indications: **OTC**, Disp: , Rfl:   Allergies:Other misc and Omnicef [cefdinir]    Social Hx:  Social History     Socioeconomic History   • Marital status:      Spouse name: Not on file   • Number of children: Not on file   • Years of education: Not on file   • Highest education level: Not on file   Occupational History   • Not on file   Social Needs   • Financial resource strain: Not on file   • Food insecurity:     Worry: Not on file     Inability: Not on file   • Transportation needs:     Medical: Not on file     Non-medical: Not on file   Tobacco Use   • Smoking status: Former Smoker     Packs/day: 1.00     Years: 10.00     Pack years: 10.00     Types: Cigarettes     Last attempt to quit: 1984     Years since quittin.1   • Smokeless tobacco: Never Used   Substance and Sexual Activity   • Alcohol use: Yes     Alcohol/week: 1.0 oz     Types: 2 Shots of liquor per week     Comment: 1 beer and 1 scotch daily   • Drug use: No   • Sexual activity: Not on file   Lifestyle   • Physical activity:     Days per week: Not on file     Minutes per session: Not on file   • Stress: Not on file   Relationships   • Social connections:     Talks on phone: Not on file     Gets together: Not on file     Attends Orthodoxy service: Not on  "file     Active member of club or organization: Not on file     Attends meetings of clubs or organizations: Not on file     Relationship status: Not on file   • Intimate partner violence:     Fear of current or ex partner: Not on file     Emotionally abused: Not on file     Physically abused: Not on file     Forced sexual activity: Not on file   Other Topics Concern   • Not on file   Social History Narrative   • Not on file         OBJECTIVE:     STOMA ASSESSMENT:   Type:  __x__Ileostomy     ____Colostomy    ____Urostomy    ____Other     Location:  RLQ    Size:3.0 x 3.5 cm   Shape:oval, and mushroomed   Color:red, moist   Protrudes:      ___ >1 inch               ___ <1 inch   [place]:  Central and patent   Mucocutaneous Junction:  Intact circumferentially   Skin indentations, creases or scar tissue:deep crease 3-9 o'clock when pt sits   Divya-stomal Skin Problems:minimal tape irritation/contact dermatitis at area of borders of applianceskin looks good   Effluent / Flatus:   Photo  __x__ Yes ____ No            Pouching Procedure:   Old appliance removed using Linda adhesive spray remover.    Peristomal skin cleansed with:warm water on a washcloth   Peristomal skin treated with: [place] medical adhesive spray   Ostomy appliance used:  Pie wedges cut from a 4\" Adapt paste ring - 2 stacked to 3 and 2 to 9 o'clock positions to build up crease. Then applied a Linda Cera plus CTF 80810 1 piece appliance (Brava strips NOT USED due to possible contact dermatitis), and then a stoma belt.       Patient Education:   Change ileostomies every 3-5 days.  Change appliance immediately if it is leaking or peristomal skin feels irritated, has itching, or  burning.   To change the appliance, remove previous appliance (using adhesive remover spray), cleanse peristomal skin with warm water/washcloth (NO SOAP!), pat dry.  Use template to trace ostomy shape onto back of Brava sheet, then cut out.  Spray skin around stoma with " "medical adhesive spray, spread it out with finger, then wait approx 10 seconds until it feels tacky.  Apply the cut to fit Brava sheet sticky side (printed side) down.  Spray over this with medical adhesive spray, allow to get tacky, then apply Adapt paste pie wedges to the 3 and 9 o'clock positions to buil up the crease (2 per side).  Now apply the Convex barrier ring, with the \"volcano\" side facing down and flat side up.  Now place the cut to fit flange over this, and secure both sides with a Brava strip.  Apply pouch and snap into place.  Now apply the stoma belt and tighten so it is snug but comfortable.    Empty pouches when no more than ½ full. Check contents every 2 hours or as needed. Do not leave soap residue on skin and do not use baby wipes or skin prep wipes.     Should you experience any significant changes in your condition, such as infection (redness, swelling, localized heat, increased pain, fever > 101 F, chills) or have any questions regarding your home care instructions, please contact the wound center at [phone]. If after hours, contact your primary care physician or go to the hospital emergency room.               Response: Pt and spouse with good understanding of instr      PLAN:weekly to assess efficacy    Supplies Needed:   Appliance type:    Linda as above              Other:      Accessories:    As above     Supplier:[employer]    Frequency:  weekly      Professional Collaboration:  Pt seen by Jessica LAM       At the time of each visit, a thorough assessment of the patient is completed to assure appropriateness of our plan of care.  The plan of care may need to be adapted from the original plan of care to address any significant changes in patient status.    Clinician Signature:  _________________________________  Date:  ____________    Physician Signature:  ________________________________  Date:  ____________       "

## 2020-01-03 NOTE — PROGRESS NOTES
Provider Encounter- Ostomy Encounter        HISTORY OF PRESENT ILLNESS  Wound History:    START OF CARE IN CLINIC: 12/28/2019    REFERRING PROVIDER: Dr. Andreina Iniguez MD     OSTOMY TYPE ileostomy   LOCATION: Right lower quad              OSTOMY HISTORY: Patient underwent a total colectomy with ileostomy creation in 1990 due to ulcerative colitis.  He is been managing his ostomy independently since then.  Started having problems with frequent leakage after gaining a significant amount of weight.  Referred to [facility] for assist with peristomal skin breakdown and pouching.   SURGERY: Total colectomy in 1990   OSTOMY ISSUES: Frequent leakage, peristomal skin breakdown    Pertinent Medical History: Ulcerative colitis status post total colectomy, obesity,    Patient's problem list, allergies, and current medications reviewed and updated in Epic    Interval History:  1/2/2020 Joint visit with ostomy RN, refer to nursing note.  The appliance placed in clinic last visit lasted for approximately 2 days.  Frequency of emptying 4-6 times per day.  Prior to coming to the clinic, he was using [employer] appliances, the same pouches he was placed into after his surgery 30 years ago.      REVIEW OF SYSTEMS:   Review of Systems   Constitutional: Negative for chills and fever.   Respiratory: Negative for cough and shortness of breath.    Cardiovascular: Negative for chest pain.   Gastrointestinal: Negative for nausea and vomiting.        Ileostomy x30 years   Genitourinary: Negative for dysuria.       PHYSICAL EXAMINATION:   /82   Pulse 62   Temp 37 °C (98.6 °F)   Resp 18   SpO2 97%     Physical Exam   Constitutional: He is oriented to person, place, and time and well-developed, well-nourished, and in no distress.   Obese   HENT:   Head: Normocephalic.   Eyes: Pupils are equal, round, and reactive to light.   Pulmonary/Chest: Effort normal.   Abdominal: Soft.   Musculoskeletal: Normal range of motion.   Neurological: He is  alert and oriented to person, place, and time.   Skin: Skin is warm.   Rash/denudation peristomal skin distal from stoma, mimicking shape of removed barrier.  Skin directly around stoma is intact and without redness.   Psychiatric: Mood, memory, affect and judgment normal.             STOMA ASSESSMENT/PROCEDURE: Peristomal skin care, pouching procedure and ostomy teaching by ostomy RN.  Refer to Ostomy RN Assessment and Photo.      ASSESSMENT AND PLAN:   1. Ileostomy present (HCC)  Comments: Ileostomy x30 years after total colectomy for treatment of ulcerative colitis    1/2/2020: Initial provider visit.  Patient reports that the appliance placed last clinic visit lasted 2 days.  -Pouching modified by ostomy RN, please refer to his notes    2. Peristomal skin complication    1/20/2020: Rash to peristomal skin distal from stoma, mimicking shape of removed barrier.  -Skin treated by ostomy RN, pouching modified    3. Class 3 severe obesity without serious comorbidity in adult, unspecified BMI, unspecified obesity type ([facility])  Comments: Patient admits to significant weight gain over the past few years, developed problems with pouching, frequently leaks beginning several months ago.    1/20/2020:  -Benefits of weight loss for improved adhesion of ostomy appliance and for overall better health discussed.  Patient states he is trying to lose weight.    15 min spent face to face with patient, >50% of time spent counseling, coordinating care, reviewing records, discussing POC, educating patient regarding ostomy care, peristomal skin care, nutrition. Emotional support provided regarding life change following ostomy- support resources provided.     Please note that this note may have been created using voice recognition software. I have worked with technical experts from [employer] to optimize the interface.  I have made every reasonable attempt to correct obvious errors, but there may be errors of grammar and possibly  content that I did not discover before finalizing the note.    N

## 2020-01-06 NOTE — WOUND TEAM
"Advanced Wound Care  [facility]  [address]  [place] [address]  [phone] Fax#     Ostomy Encounter  For 90 Day Certification Period:12/28/2019 - 03/28/2019      Referring Provider:  Dr. Andreina Iniguez MD  Primary Provider:same  Consulting Providers:  Surgeon: \"jacoby whipple 30 years ago in [place] ...\" per pt      Start of Care:12/28/2019  Reason for referral:leaking ileostomy troubleshooting      SUBJECTIVE:  \"I left it on for five days, haven't changed it since I was here.\"    VS - see entry under rooming    HPI: Pt is a 69 year old genleman who reports having a colectomy with ileostomy 30 years ago in [place], secondary to uncontrolled UC.     Past Medical Hx:  Past Medical History:   Diagnosis Date   • A-fib (HCC)    • Blood clotting disorder (HCC)     DVT right leg   • Cancer (HCC)     melanoma   • GERD (gastroesophageal reflux disease)    • Hemorrhagic disorder     on coumadin   • Hypertension     well controlled on meds   • Obesity    • Paroxysmal atrial fibrillation (HCC) 11/26/2012   • Skin cancer     basil cell   • ULCERATIVE COLITIS 11/26/2012     Surgical Hx:  Past Surgical History:   Procedure Laterality Date   • RECOVERY  5/4/2016    Procedure: CATH LAB-[facility]-EPS ABLATION/AFIB [id number] I48.0;  Surgeon: Recoveryonly Surgery;  [place] [facility]  Service:    • MASS EXCISION GENERAL Left 4/7/2016    Procedure: MASS EXCISION GENERAL FOR TEMPORAL MELANOMA;  Surgeon: Feng Sharpe M.D.;  Location: SURGERY SAME DAY [facility];  Service:    • FLAP GRAFT Left 4/7/2016    Procedure: FLAP GRAFT FOR CLOSURE WITH LOCAL FLAPS;  Surgeon: Feng Sharpe M.D.;  Location: SURGERY SAME DAY [facility];  Service:    • COLON RESECTION          • ILEOSTOMY       Medications:  Current Outpatient Medications:   •  propafenone (RYTHMOL) 150 MG Tab, Take 1 Tab by mouth every 8 hours. One tablet PRN recurrence of atrial " "fibrillation, Disp: 60 Tab, Rfl: 5  •  metoprolol (LOPRESSOR) 25 MG Tab, Take 1 Tab by mouth 2 times a day., Disp: 180 Tab, Rfl: 3  •  ciclopirox (LOPROX) 0.77 % cream, 1 APPLICATION APPLY ON THE SKIN TWICE A DAY, Disp: , Rfl: 5  •  Multiple Vitamins-Minerals (PRESERVISION AREDS 2 PO), Take  by mouth 2 Times a Day., Disp: , Rfl:   •  CALCIUM-VITAMIN D PO, Take  by mouth., Disp: , Rfl:   •  Lansoprazole (PREVACID PO), Take  by mouth., Disp: , Rfl:   •  warfarin (COUMADIN) 5 MG Tab, Take 1 Tab by mouth every Friday. As directed per Protimes., Disp: 30 Tab, Rfl: 0  •  warfarin (COUMADIN) 2.5 MG Tab, Take 1 Tab by mouth every day., Disp: 30 Tab, Rfl: 0  •  cetirizine (ZYRTEC) 10 MG TABS, Take 10 mg by mouth every morning. Indications: **OTC**, Disp: , Rfl:   Allergies:Other misc and Omnicef [cefdinir]          OBJECTIVE:     STOMA ASSESSMENT:   Type:  __x__Ileostomy     ____Colostomy    ____Urostomy    ____Other     Location:  RLQ    Size:3.0 x 3.5 cm   Shape:oval, and mushroomed   Color:red, moist   Protrudes:      ___ >1 inch               ___ <1 inch   [place]:  Central and patent   Mucocutaneous Junction:  Intact circumferentially   Skin indentations, creases or scar tissue:deep crease 3-9 o'clock when pt sits   Divya-stomal Skin Problems: irritation and denudation from 2-10 o'clock   Effluent / Flatus:   Photo  __x__ Yes ____ No                Pouching Procedure:   Old appliance removed using Linda adhesive spray remover.    Peristomal skin cleansed with:warm water on a washcloth   Peristomal skin treated with: crusting with stoma powder and No Sting, Linda medical adhesive spray   Ostomy appliance used:  Pie wedges cut from a 4\" Adapt paste ring - 2 stacked to 3 and 2 to 9 o'clock positions to build up crease. Then applied 42832 oval convex barrier ring, then a [place] Cera plus CTF 00184 1 piece appliance (Brava strips NOT USED due to possible contact dermatitis), secured with Hy Tapeand then a stoma belt. " "      Patient Education: Pt would like to keep system as simple as possible, so I encouraged him to change the appliance every three days as is standard for ileostomy,  crust if necessary and then we will consider tweaking appliance as necessary. Pt stated he would like to keep this system. He will also need to order appliances before mid-February as he is going on a trip. Also gave pt MARYJO schedule.     Change ileostomies every 3-5 days.  Change appliance immediately if it is leaking or peristomal skin feels irritated, has itching, or  burning.   To change the appliance, remove previous appliance (using adhesive remover spray), cleanse peristomal skin with warm water/washcloth (NO SOAP!), pat dry.  Use template to trace ostomy shape onto back of Brava sheet, then cut out.  Spray skin around stoma with medical adhesive spray, spread it out with finger, then wait approx 10 seconds until it feels tacky.  Apply the cut to fit Brava sheet sticky side (printed side) down.  Spray over this with medical adhesive spray, allow to get tacky, then apply Adapt paste pie wedges to the 3 and 9 o'clock positions to buil up the crease (2 per side).  Now apply the Convex barrier ring, with the \"volcano\" side facing down and flat side up.  Now place the cut to fit flange over this, and secure both sides with a Brava strip.  Apply pouch and snap into place.  Now apply the stoma belt and tighten so it is snug but comfortable.    Empty pouches when no more than ½ full. Check contents every 2 hours or as needed. Do not leave soap residue on skin and do not use baby wipes or skin prep wipes.     Should you experience any significant changes in your condition, such as infection (redness, swelling, localized heat, increased pain, fever > 101 F, chills) or have any questions regarding your home care instructions, please contact the wound center at [phone]. If after hours, contact your primary care physician or go to the hospital " emergency room.               Response: Pt and spouse with good understanding of instr      PLAN:weekly to assess efficacy    Supplies Needed:   Appliance type:    [place] as above              Other:      Accessories:    As above     Supplier:[facility]    Frequency:  weekly      Professional Collaboration:  Pt seen by Jessica LAM       At the time of each visit, a thorough assessment of the patient is completed to assure appropriateness of our plan of care.  The plan of care may need to be adapted from the original plan of care to address any significant changes in patient status.    Clinician Signature:  _________________________________  Date:  ____________    Physician Signature:  ________________________________  Date:  ____________

## 2020-01-07 ENCOUNTER — NON-PROVIDER VISIT (OUTPATIENT)
Dept: WOUND CARE | Facility: MEDICAL CENTER | Age: 70
End: 2020-01-07
Attending: FAMILY MEDICINE
Payer: MEDICARE

## 2020-01-07 ENCOUNTER — APPOINTMENT (OUTPATIENT)
Dept: WOUND CARE | Facility: MEDICAL CENTER | Age: 70
End: 2020-01-07
Payer: MEDICARE

## 2020-01-07 PROCEDURE — 99211 OFF/OP EST MAY X REQ PHY/QHP: CPT

## 2020-01-08 ENCOUNTER — ANTICOAGULATION MONITORING (OUTPATIENT)
Dept: VASCULAR LAB | Facility: MEDICAL CENTER | Age: 70
End: 2020-01-08

## 2020-01-08 DIAGNOSIS — Z79.01 LONG TERM (CURRENT) USE OF ANTICOAGULANTS: ICD-10-CM

## 2020-01-08 DIAGNOSIS — Z86.711 HISTORY OF PULMONARY EMBOLISM: ICD-10-CM

## 2020-01-08 LAB — INR PPP: 1.9 (ref 2–3.5)

## 2020-01-08 NOTE — PROGRESS NOTES
OP Telephone Anticoagulation Service Note    Date: 2020      Anticoagulation Summary  As of 2020    INR goal:   2.0-3.0   TTR:   56.7 % (4.6 y)   INR used for dosin.90! (2020)   Warfarin maintenance plan:   5 mg (5 mg x 1) every Mon, Fri; 2.5 mg (5 mg x 0.5) all other days   Weekly warfarin total:   22.5 mg   Plan last modified:   Shana Barragan, PharmD (6/3/2019)   Next INR check:   2020   Target end date:   Indefinite    Indications    History of pulmonary embolism and DVT  unprovoked. [Z86.711]  Atrial fibrillation with RVR (HCC) (Resolved) [I48.91]  Long term (current) use of anticoagulants [Z79.01] [Z79.01]             Anticoagulation Episode Summary     INR check location:       Preferred lab:       Send INR reminders to:       Comments:   [place]      Anticoagulation Care Providers     Provider Role Specialty Phone number    [facility] Responsible  [phone]        Anticoagulation Patient Findings        Plan: INR 1.9. Left message on patient's answering machine/voicemail. Instructed patient to call back with any concerns regarding any unusual bleeding or bruising, any medication or diet changes or any signs or symptoms of thrombosis. Instructed patient to resume medication as outlined above. Patient to follow up in 2 week(s).         Isabel Irwin, Pharmacy Intern

## 2020-01-16 ENCOUNTER — OFFICE VISIT (OUTPATIENT)
Dept: WOUND CARE | Facility: MEDICAL CENTER | Age: 70
End: 2020-01-16
Attending: FAMILY MEDICINE
Payer: MEDICARE

## 2020-01-16 PROCEDURE — 99211 OFF/OP EST MAY X REQ PHY/QHP: CPT

## 2020-01-16 NOTE — WOUND TEAM
"Advanced Wound Care  [facility]  [address]  [place] [address]  [phone] Fax#     Ostomy Encounter  For 90 Day Certification Period:12/28/2019 - 03/28/2019      Referring Provider:  Dr. Andreina Iniguez MD  Primary Provider:same  Consulting Providers:  Surgeon: \"jacoby whipple 30 years ago in [place] ...\" per pt      Start of Care:12/28/2019  Reason for referral:leaking ileostomy troubleshooting      SUBJECTIVE:  \"I left it on for five days, haven't changed it since I was here. It hasn't leaked\"    VS - see entry under rooming    HPI: Pt is a 69 year old genleman who reports having a colectomy with ileostomy 30 years ago in [place], secondary to uncontrolled UC.     Past Medical Hx:  Past Medical History:   Diagnosis Date   • A-fib (HCC)    • Blood clotting disorder (HCC)     DVT right leg   • Cancer (HCC)     melanoma   • GERD (gastroesophageal reflux disease)    • Hemorrhagic disorder     on coumadin   • Hypertension     well controlled on meds   • Obesity    • Paroxysmal atrial fibrillation (HCC) 11/26/2012   • Skin cancer     basil cell   • ULCERATIVE COLITIS 11/26/2012     Surgical Hx:  Past Surgical History:   Procedure Laterality Date   • RECOVERY  5/4/2016    Procedure: CATH LAB-[facility]-EPS ABLATION/AFIB [id number] I48.0;  Surgeon: Recoveryonly Surgery;  Location: SURG[place] PRE-[place] PROC [place]IT [facility] [place];  Service:    • MASS EXCISION GENERAL Left 4/7/2016    Procedure: MASS EXCISION GENERAL FOR TEMPORAL MELANOMA;  Surgeon: Feng Sharpe M.D.;  Location: SURGERY SAME DAY [facility];  Service:    • FLAP GRAFT Left 4/7/2016    Procedure: FLAP GRAFT FOR CLOSURE WITH LOCAL FLAPS;  Surgeon: Feng Sharpe M.D.;  Location: SURGERY SAME DAY [facility];  Service:    • COLON RESECTION          • ILEOSTOMY       Medications:  Current Outpatient Medications:   •  propafenone (RYTHMOL) 150 MG Tab, Take 1 Tab by mouth every 8 hours. One tablet PRN " "recurrence of atrial fibrillation, Disp: 60 Tab, Rfl: 5  •  metoprolol (LOPRESSOR) 25 MG Tab, Take 1 Tab by mouth 2 times a day., Disp: 180 Tab, Rfl: 3  •  ciclopirox (LOPROX) 0.77 % cream, 1 APPLICATION APPLY ON THE SKIN TWICE A DAY, Disp: , Rfl: 5  •  Multiple Vitamins-Minerals (PRESERVISION AREDS 2 PO), Take  by mouth 2 Times a Day., Disp: , Rfl:   •  CALCIUM-VITAMIN D PO, Take  by mouth., Disp: , Rfl:   •  Lansoprazole (PREVACID PO), Take  by mouth., Disp: , Rfl:   •  warfarin (COUMADIN) 5 MG Tab, Take 1 Tab by mouth every Friday. As directed per Protimes., Disp: 30 Tab, Rfl: 0  •  warfarin (COUMADIN) 2.5 MG Tab, Take 1 Tab by mouth every day., Disp: 30 Tab, Rfl: 0  •  cetirizine (ZYRTEC) 10 MG TABS, Take 10 mg by mouth every morning. Indications: **OTC**, Disp: , Rfl:   Allergies:Other misc and Omnicef [cefdinir]          OBJECTIVE:     STOMA ASSESSMENT:   Type:  __x__Ileostomy     ____Colostomy    ____Urostomy    ____Other     Location:  RLQ    Size:3.0 x 3.5 cm   Shape:oval, and mushroomed   Color:red, moist   Protrudes:      ___ >1 inch               ___ <1 inch   [place]:  Central and patent   Mucocutaneous Junction:  Intact circumferentially   Skin indentations, creases or scar tissue:deep crease 3-9 o'clock when pt sits   Divya-stomal Skin Problems: very mild erythema circumferentially probably from the pressure of the convexity - pt wearing belt too tight, instr to loosen it up   Effluent / Flatus:green/brown, paste-like   Photo  __x__ Yes ____ No        Pouching Procedure:   Old appliance removed using Linda adhesive spray remover.    Peristomal skin cleansed with:warm water on a washcloth   Peristomal skin treated with: crusting with stoma powder and No Sting, Linda medical adhesive spray   Ostomy appliance used:  Pie wedges cut from a 4\" Adapt paste ring - 2 stacked to 3 and 2 to 9 o'clock positions to build up crease. Then applied 21248 oval convex barrier ring, then a [place] Cera plus CTF " "53402 1 piece appliance (Brava strips NOT USED due to possible contact dermatitis), secured with Hy Tapeand then a stoma belt.       Patient Education: Pt would like to keep system as simple as possible, so I encouraged him to change the appliance every three days as is standard for ileostomy,  crust if necessary and then we will consider tweaking appliance as necessary. Pt stated he would like to keep this system. He will also need to order appliances before mid-February as he is going on a trip. Also gave pt MARYJO schedule.     Change ileostomies every 3-5 days.  Change appliance immediately if it is leaking or peristomal skin feels irritated, has itching, or  burning.   To change the appliance, remove previous appliance (using adhesive remover spray), cleanse peristomal skin with warm water/washcloth (NO SOAP!), pat dry.  Use template to trace ostomy shape onto back of Brava sheet, then cut out.  Spray skin around stoma with medical adhesive spray, spread it out with finger, then wait approx 10 seconds until it feels tacky.  Apply the cut to fit Brava sheet sticky side (printed side) down.  Spray over this with medical adhesive spray, allow to get tacky, then apply Adapt paste pie wedges to the 3 and 9 o'clock positions to buil up the crease (2 per side).  Now apply the Convex barrier ring, with the \"volcano\" side facing down and flat side up.  Now place the cut to fit flange over this, and secure both sides with a Brava strip.  Apply pouch and snap into place.  Now apply the stoma belt and tighten so it is snug but comfortable.    Empty pouches when no more than ½ full. Check contents every 2 hours or as needed. Do not leave soap residue on skin and do not use baby wipes or skin prep wipes.     Should you experience any significant changes in your condition, such as infection (redness, swelling, localized heat, increased pain, fever > 101 F, chills) or have any questions regarding your home care instructions, " please contact the wound center at [phone]. If after hours, contact your primary care physician or go to the hospital emergency room.               Response: Pt and spouse with good understanding of instr      PLAN:pt will call for appt prn for problems. Will place supply order with [facility] today. Pt is agreeable.   Supplies Needed:   Appliance type:    [place] as above              Other:      Accessories:    As above     Supplier:[facility]    Frequency:  weekly      Professional Collaboration:  Supply order placed with [facility]. Pt given a copy of Rx      At the time of each visit, a thorough assessment of the patient is completed to assure appropriateness of our plan of care.  The plan of care may need to be adapted from the original plan of care to address any significant changes in patient status.    Clinician Signature:  _________________________________  Date:  ____________    Physician Signature:  ________________________________  Date:  ____________

## 2020-01-16 NOTE — PATIENT INSTRUCTIONS
Change urostomies and ileostomies every 3-5 days. Change colostomies every 5-7 days. Change appliance immediately if it is leaking or peristomal skin feels irritated, has itching, or  burning.   To change the appliance, remove previous appliance, cleanse peristomal skin with warm water/washcloth, pat dry, make an ostomy template or use cardboard measuring guide and trace ostomy shape onto back of barrier, cut out barrier, apply a paste ring around barrier opening and apply appliance. Empty pouches when no more than ½ full. Check contents every 2 hours or as needed. Do not leave soap residue on tissue and do not use baby wipes or skin prep wipes.     Should you experience any significant changes in your condition, such as infection (redness, swelling, localized heat, increased pain, fever > 101 F, chills) or have any questions regarding your home care instructions, please contact the wound center at [phone]. If after hours, contact your primary care physician or go to the hospital emergency room.

## 2020-01-23 ENCOUNTER — APPOINTMENT (OUTPATIENT)
Dept: WOUND CARE | Facility: MEDICAL CENTER | Age: 70
End: 2020-01-23
Attending: FAMILY MEDICINE
Payer: MEDICARE

## 2020-01-27 ENCOUNTER — ANTICOAGULATION MONITORING (OUTPATIENT)
Dept: VASCULAR LAB | Facility: MEDICAL CENTER | Age: 70
End: 2020-01-27

## 2020-01-27 DIAGNOSIS — Z86.711 HISTORY OF PULMONARY EMBOLISM: ICD-10-CM

## 2020-01-27 DIAGNOSIS — Z79.01 LONG TERM (CURRENT) USE OF ANTICOAGULANTS: ICD-10-CM

## 2020-01-27 LAB — INR PPP: 2.4 (ref 2–3.5)

## 2020-01-30 ENCOUNTER — APPOINTMENT (OUTPATIENT)
Dept: WOUND CARE | Facility: MEDICAL CENTER | Age: 70
End: 2020-01-30
Attending: FAMILY MEDICINE
Payer: MEDICARE

## 2020-02-23 LAB — INR PPP: 2.5 (ref 2–3.5)

## 2020-02-24 ENCOUNTER — ANTICOAGULATION MONITORING (OUTPATIENT)
Dept: VASCULAR LAB | Facility: MEDICAL CENTER | Age: 70
End: 2020-02-24

## 2020-02-24 DIAGNOSIS — Z79.01 LONG TERM (CURRENT) USE OF ANTICOAGULANTS: ICD-10-CM

## 2020-02-24 DIAGNOSIS — Z86.711 HISTORY OF PULMONARY EMBOLISM: ICD-10-CM

## 2020-02-24 NOTE — PROGRESS NOTES
OP Telephone Anticoagulation Service Note    Date: 2020      Anticoagulation Summary  As of 2020    INR goal:   2.0-3.0   TTR:   57.6 % (4.7 y)   INR used for dosin.50 (2020)   Warfarin maintenance plan:   5 mg (5 mg x 1) every Mon, Fri; 2.5 mg (5 mg x 0.5) all other days   Weekly warfarin total:   22.5 mg   Plan last modified:   Shana Barragan PharmD (6/3/2019)   Next INR check:   3/23/2020   Target end date:   Indefinite    Indications    History of pulmonary embolism and DVT  unprovoked. [Z86.711]  Atrial fibrillation with RVR (HCC) (Resolved) [I48.91]  Long term (current) use of anticoagulants [Z79.01] [Z79.01]             Anticoagulation Episode Summary     INR check location:       Preferred lab:       Send INR reminders to:       Comments:   [facility]      Anticoagulation Care Providers     Provider Role Specialty Phone number    [facility] Responsible  [phone]        Anticoagulation Patient Findings        Plan: Sent [employer] message.  Instructed patient to call back with any concerns regarding any unusual bleeding or bruising, any medication or diet changes or any signs or symptoms of thrombosis. Instructed patient to resume medication as outlined above. Patient to follow up in 4 week(s).             Shana Barragan, PharmD

## 2020-03-12 DIAGNOSIS — I48.91 ATRIAL FIBRILLATION, UNSPECIFIED TYPE (HCC): ICD-10-CM

## 2020-03-23 ENCOUNTER — ANTICOAGULATION MONITORING (OUTPATIENT)
Dept: VASCULAR LAB | Facility: MEDICAL CENTER | Age: 70
End: 2020-03-23

## 2020-03-23 DIAGNOSIS — Z79.01 LONG TERM (CURRENT) USE OF ANTICOAGULANTS: ICD-10-CM

## 2020-03-23 DIAGNOSIS — Z86.711 HISTORY OF PULMONARY EMBOLISM: ICD-10-CM

## 2020-03-23 LAB — INR PPP: 2.1 (ref 2–3.5)

## 2020-03-23 NOTE — PROGRESS NOTES
Anticoagulation Summary  As of 3/23/2020    INR goal:   2.0-3.0   TTR:   58.3 % (4.8 y)   INR used for dosin.10 (3/23/2020)   Warfarin maintenance plan:   5 mg (5 mg x 1) every Mon, Fri; 2.5 mg (5 mg x 0.5) all other days   Weekly warfarin total:   22.5 mg   Plan last modified:   Shana Barragan PharmD (6/3/2019)   Next INR check:   2020   Target end date:   Indefinite    Indications    History of pulmonary embolism and DVT  unprovoked. [Z86.711]  Atrial fibrillation with RVR (HCC) (Resolved) [I48.91]  Long term (current) use of anticoagulants [Z79.01] [Z79.01]             Anticoagulation Episode Summary     INR check location:       Preferred lab:       Send INR reminders to:       Comments:   [place]      Anticoagulation Care Providers     Provider Role Specialty Phone number    [facility] Responsible  [phone]        Anticoagulation Patient Findings        Spoke to patient on the phone.   INR  therapeutic.   Denies signs/symptoms of bleeding and/or thrombosis.   Denies changes to diet or medications.   Follow up appointment in 2 week(s).    Continue weekly warfarin dose as noted      Kyree Al, PharmD, MS, BCACP, LCC    This note was created using voice recognition software (Dragon). The accuracy of the dictation is limited by the abilities of the software. I have reviewed the note prior to signing, however some errors in grammar and context are still possible. If you have any questions related to this note please do not hesitate to contact our office.

## 2020-04-24 ENCOUNTER — TELEPHONE (OUTPATIENT)
Dept: VASCULAR LAB | Facility: MEDICAL CENTER | Age: 70
End: 2020-04-24

## 2020-04-27 ENCOUNTER — ANTICOAGULATION MONITORING (OUTPATIENT)
Dept: VASCULAR LAB | Facility: MEDICAL CENTER | Age: 70
End: 2020-04-27

## 2020-04-27 DIAGNOSIS — Z79.01 LONG TERM (CURRENT) USE OF ANTICOAGULANTS: ICD-10-CM

## 2020-04-27 DIAGNOSIS — Z86.711 HISTORY OF PULMONARY EMBOLISM: ICD-10-CM

## 2020-04-27 LAB — INR PPP: 2.5 (ref 2–3.5)

## 2020-04-27 NOTE — PROGRESS NOTES
Anticoagulation Summary  As of 2020    INR goal:   2.0-3.0   TTR:   59.2 % (4.9 y)   INR used for dosin.50 (2020)   Warfarin maintenance plan:   5 mg (5 mg x 1) every Mon, Fri; 2.5 mg (5 mg x 0.5) all other days   Weekly warfarin total:   22.5 mg   Plan last modified:   Yoni KirbyD (6/3/2019)   Next INR check:   2020   Target end date:   Indefinite    Indications    History of pulmonary embolism and DVT  unprovoked. [Z86.711]  Atrial fibrillation with RVR (HCC) (Resolved) [I48.91]  Long term (current) use of anticoagulants [Z79.01] [Z79.01]             Anticoagulation Episode Summary     INR check location:       Preferred lab:       Send INR reminders to:       Comments:   [place]      Anticoagulation Care Providers     Provider Role Specialty Phone number    [facility] Responsible  [phone]        Anticoagulation Patient Findings          HPI:  Abel Day III, on anticoagulation therapy with warfarin for PE.   Changes to current medical/health status since last appt: none  Denies signs/symptoms of bleeding and/or thrombosis since the last appt.    Denies any interval changes to diet  Denies any interval changes to medications since last appt.   Denies any complications or cost restrictions with current therapy.     A/P   INR  therapeutic.   Pt is to continue with current warfarin dosing regimen.     Next INR in 2 week(s).    Ra Bell, PharmD

## 2020-05-27 LAB — INR PPP: 3.7 (ref 2–3.5)

## 2020-05-28 ENCOUNTER — ANTICOAGULATION MONITORING (OUTPATIENT)
Dept: MEDICAL GROUP | Facility: PHYSICIAN GROUP | Age: 70
End: 2020-05-28

## 2020-05-28 DIAGNOSIS — Z79.01 LONG TERM (CURRENT) USE OF ANTICOAGULANTS: ICD-10-CM

## 2020-05-28 DIAGNOSIS — Z86.711 HISTORY OF PULMONARY EMBOLISM: ICD-10-CM

## 2020-05-28 NOTE — PROGRESS NOTES
Anticoagulation Summary  As of 5/28/2020    INR goal:   2.0-3.0   TTR:   58.9 % (5 y)   INR used for dosing:   3.70! (5/27/2020)   Warfarin maintenance plan:   5 mg (5 mg x 1) every Mon, Fri; 2.5 mg (5 mg x 0.5) all other days   Weekly warfarin total:   22.5 mg   Plan last modified:   Shana Barragan PharmD (6/3/2019)   Next INR check:   6/4/2020   Target end date:   Indefinite    Indications    History of pulmonary embolism and DVT  unprovoked. [Z86.711]  Atrial fibrillation with RVR (HCC) (Resolved) [I48.91]  Long term (current) use of anticoagulants [Z79.01] [Z79.01]             Anticoagulation Episode Summary     INR check location:       Preferred lab:       Send INR reminders to:       Comments:   [place]      Anticoagulation Care Providers     Provider Role Specialty Phone number    [facility] Responsible  [phone]        Anticoagulation Patient Findings      INR  supra-therapeutic.   Left a voice message for the patient, asked patient to please call the anticoagulation clinic if they have any signs/symptoms of bleeding and/or thrombosis or any changes to diet or medications.    Follow up appointment in 1-2 week(s)    Hold warfarin today then continue weekly warfarin dose as noted      Kyree Al, PharmD, MS, BCACP, LCC    This note was created using voice recognition software (Dragon). The accuracy of the dictation is limited by the abilities of the software. I have reviewed the note prior to signing, however some errors in grammar and context are still possible. If you have any questions related to this note please do not hesitate to contact our office.   '

## 2020-06-12 LAB — INR PPP: 2.1 (ref 2–3.5)

## 2020-06-15 ENCOUNTER — ANTICOAGULATION MONITORING (OUTPATIENT)
Dept: MEDICAL GROUP | Facility: PHYSICIAN GROUP | Age: 70
End: 2020-06-15

## 2020-06-15 DIAGNOSIS — Z79.01 LONG TERM (CURRENT) USE OF ANTICOAGULANTS: ICD-10-CM

## 2020-06-15 DIAGNOSIS — Z86.711 HISTORY OF PULMONARY EMBOLISM: ICD-10-CM

## 2020-06-15 NOTE — PROGRESS NOTES
Anticoagulation Summary  As of 6/15/2020    INR goal:   2.0-3.0   TTR:   58.8 % (5 y)   INR used for dosin.10 (2020)   Warfarin maintenance plan:   5 mg (5 mg x 1) every Mon, Fri; 2.5 mg (5 mg x 0.5) all other days   Weekly warfarin total:   22.5 mg   Plan last modified:   Shana Barragan, PharmD (6/3/2019)   Next INR check:   2020   Target end date:   Indefinite    Indications    History of pulmonary embolism and DVT  unprovoked. [Z86.711]  Atrial fibrillation with RVR (HCC) (Resolved) [I48.91]  Long term (current) use of anticoagulants [Z79.01] [Z79.01]             Anticoagulation Episode Summary     INR check location:       Preferred lab:       Send INR reminders to:       Comments:   [place]      Anticoagulation Care Providers     Provider Role Specialty Phone number    [facility] Responsible  [phone]        Anticoagulation Patient Findings          Left voicemail message to report a  therapeutic INR of 2.1.  Pt to continue with current warfarin dosing regimen. Requested pt contact the clinic for any s/s of unusual bleeding, bruising, clotting or any changes to diet or medication.  Follow up in 2 weeks, to reduce the risk of adverse events related to this high risk medication, warfarin.    Makenna Hopkins, Clinical Pharmacist

## 2020-06-20 LAB — INR PPP: 2.5 (ref 2–3.5)

## 2020-06-22 ENCOUNTER — TELEPHONE (OUTPATIENT)
Dept: MEDICAL GROUP | Facility: MEDICAL CENTER | Age: 70
End: 2020-06-22

## 2020-06-22 ENCOUNTER — ANTICOAGULATION MONITORING (OUTPATIENT)
Dept: VASCULAR LAB | Facility: MEDICAL CENTER | Age: 70
End: 2020-06-22

## 2020-06-22 DIAGNOSIS — Z86.711 HISTORY OF PULMONARY EMBOLISM: ICD-10-CM

## 2020-06-22 DIAGNOSIS — Z79.01 LONG TERM (CURRENT) USE OF ANTICOAGULANTS: ICD-10-CM

## 2020-06-22 NOTE — TELEPHONE ENCOUNTER
Called and left VM for pt regarding message below. Also sent my chart message.     Shana Barragan PharmD    ----- Message from Marilou Rose sent at 6/22/2020  1:18 PM PDT -----  Regarding: interaction with coumadin and a new antibiotic  Pt lft vm requesting a call back regarding an interaction with coumadin and a new antibiotic.    Patient can be reached at [phone] (X)

## 2020-06-22 NOTE — PROGRESS NOTES
OP   Telephone Anticoagulation Service Note      Anticoagulation Summary  As of 2020    INR goal:   2.0-3.0   TTR:   59.0 % (5 y)   INR used for dosin.50 (2020)   Warfarin maintenance plan:   5 mg (5 mg x 1) every Mon, Fri; 2.5 mg (5 mg x 0.5) all other days   Weekly warfarin total:   22.5 mg   No change documented:   Britney Styles   Plan last modified:   Shana Barragan, PharmD (6/3/2019)   Next INR check:   2020   Target end date:   Indefinite    Indications    History of pulmonary embolism and DVT  unprovoked. [Z86.711]  Atrial fibrillation with RVR (HCC) (Resolved) [I48.91]  Long term (current) use of anticoagulants [Z79.01] [Z79.01]             Anticoagulation Episode Summary     INR check location:       Preferred lab:       Send INR reminders to:       Comments:   [place]      Anticoagulation Care Providers     Provider Role Specialty Phone number    [facility] Responsible  [phone]        Anticoagulation Patient Findings    Left a message on the patient's voicemail today, informing the patient of a therapeutic INR of 2.5. Instructed the patient to call the clinic with any changes to diet or medications, with any signs/sx of bleeding or clotting or with any questions or concerns.     Patient instructed to continue with the current warfarin dosing regimen, and asked to follow up again in 2 weeks.     Jay VallejoD

## 2020-06-29 ENCOUNTER — OFFICE VISIT (OUTPATIENT)
Dept: CARDIOLOGY | Facility: MEDICAL CENTER | Age: 70
End: 2020-06-29
Payer: MEDICARE

## 2020-06-29 VITALS
SYSTOLIC BLOOD PRESSURE: 130 MMHG | HEART RATE: 82 BPM | WEIGHT: 309 LBS | HEIGHT: 75 IN | DIASTOLIC BLOOD PRESSURE: 82 MMHG | BODY MASS INDEX: 38.42 KG/M2 | OXYGEN SATURATION: 95 %

## 2020-06-29 DIAGNOSIS — Z86.79 S/P ABLATION OF ATRIAL FIBRILLATION: ICD-10-CM

## 2020-06-29 DIAGNOSIS — I48.0 PAF (PAROXYSMAL ATRIAL FIBRILLATION) (HCC): ICD-10-CM

## 2020-06-29 DIAGNOSIS — Z98.890 S/P ABLATION OF ATRIAL FIBRILLATION: ICD-10-CM

## 2020-06-29 DIAGNOSIS — I48.0 PAROXYSMAL ATRIAL FIBRILLATION (HCC): ICD-10-CM

## 2020-06-29 DIAGNOSIS — Z86.711 HISTORY OF PULMONARY EMBOLISM: ICD-10-CM

## 2020-06-29 DIAGNOSIS — E78.2 MIXED HYPERLIPIDEMIA: ICD-10-CM

## 2020-06-29 DIAGNOSIS — Z79.01 CHRONIC ANTICOAGULATION: ICD-10-CM

## 2020-06-29 LAB — EKG IMPRESSION: NORMAL

## 2020-06-29 PROCEDURE — 93000 ELECTROCARDIOGRAM COMPLETE: CPT | Performed by: INTERNAL MEDICINE

## 2020-06-29 PROCEDURE — 99214 OFFICE O/P EST MOD 30 MIN: CPT | Performed by: INTERNAL MEDICINE

## 2020-06-29 RX ORDER — DIAZEPAM 2 MG/1
2 TABLET ORAL EVERY 6 HOURS PRN
COMMUNITY
End: 2021-02-24

## 2020-06-29 RX ORDER — AMOXICILLIN 875 MG/1
875 TABLET, COATED ORAL
COMMUNITY
Start: 2020-06-16 | End: 2020-08-24

## 2020-06-29 ASSESSMENT — FIBROSIS 4 INDEX: FIB4 SCORE: 2.42

## 2020-06-29 NOTE — PROGRESS NOTES
Chief Complaint   Patient presents with   • Atrial Fibrillation     Follow up       Subjective:   Abel Day III is a 70 y.o. male who presents today with history of paroxysmal atrial fibrillation status post ablation by Dr. Marie ness.  First one a  PVI.  Second ablation PAC ablations from other locations, episode of atrial fibrillation this weekend.  No excess stimulants.  No excess alcohol.  Broke the next morning.  On metoprolol.  Not on antiarrhythmics.  On beta-blockers.  Obesity.  Sleep apnea just mild.    Past Medical History:   Diagnosis Date   • Cancer (HCC)     melanoma   • GERD (gastroesophageal reflux disease)    • Hemorrhagic disorder     on coumadin   • Obesity    • Paroxysmal atrial fibrillation (HCC) 11/26/2012   • Skin cancer     basil cell   • ULCERATIVE COLITIS 11/26/2012     Past Surgical History:   Procedure Laterality Date   • RECOVERY  5/4/2016    Procedure: CATH LAB-[facility]-EPS ABLATION/AFIB [id number] I48.0;  Surgeon: [facility];  Location: SURGERY PRE-POST PROC UNIT [facility];  Service:    • MASS EXCISION GENERAL Left 4/7/2016    Procedure: MASS EXCISION GENERAL FOR TEMPORAL MELANOMA;  Surgeon: Feng Sharpe M.D.;  Location: SURGERY SAME DAY [facility];  Service:    • FLAP GRAFT Left 4/7/2016    Procedure: FLAP GRAFT FOR CLOSURE WITH LOCAL FLAPS;  Surgeon: Feng Sharpe M.D.;  Location: SURGERY SAME DAY [facility];  Service:    • COLON RESECTION          • ILEOSTOMY       Family History   Problem Relation Age of Onset   • Hypertension Mother    • Heart Failure Father    • Heart Attack Maternal Uncle      Social History     Socioeconomic History   • Marital status:      Spouse name: Not on file   • Number of children: Not on file   • Years of education: Not on file   • Highest education level: Not on file   Occupational History   • Not on file   Social Needs   • Financial resource strain: Not on file   • Food insecurity     Worry: Not on  file     Inability: Not on file   • Transportation needs     Medical: Not on file     Non-medical: Not on file   Tobacco Use   • Smoking status: Former Smoker     Packs/day: 1.00     Years: 10.00     Pack years: 10.00     Types: Cigarettes     Last attempt to quit: 1984     Years since quittin.6   • Smokeless tobacco: Never Used   Substance and Sexual Activity   • Alcohol use: Yes     Alcohol/week: 1.0 oz     Types: 2 Shots of liquor per week     Comment: 1 beer and 1 scotch daily   • Drug use: No   • Sexual activity: Not on file   Lifestyle   • Physical activity     Days per week: Not on file     Minutes per session: Not on file   • Stress: Not on file   Relationships   • Social connections     Talks on phone: Not on file     Gets together: Not on file     Attends Holiness service: Not on file     Active member of club or organization: Not on file     Attends meetings of clubs or organizations: Not on file     Relationship status: Not on file   • Intimate partner violence     Fear of current or ex partner: Not on file     Emotionally abused: Not on file     Physically abused: Not on file     Forced sexual activity: Not on file   Other Topics Concern   • Not on file   Social History Narrative   • Not on file     Allergies   Allergen Reactions   • Other Misc Unspecified     Silk sutures (body rejected them)   • Omnicef [Cefdinir]      Internal bleeding stated by patient     Outpatient Encounter Medications as of 2020   Medication Sig Dispense Refill   • amoxicillin (AMOXIL) 875 MG tablet Take 875 mg by mouth.     • diazePAM (VALIUM) 2 MG Tab Take 2 mg by mouth every 6 hours as needed for Anxiety.     • metoprolol (LOPRESSOR) 25 MG Tab TAKE 1 TABLET BY MOUTH TWICE A  Tab 1   • ciclopirox (LOPROX) 0.77 % cream 1 APPLICATION APPLY ON THE SKIN TWICE A DAY  5   • Multiple Vitamins-Minerals (PRESERVISION AREDS 2 PO) Take  by mouth 2 Times a Day.     • CALCIUM-VITAMIN D PO Take  by mouth.     •  "Lansoprazole (PREVACID PO) Take  by mouth.     • warfarin (COUMADIN) 5 MG Tab Take 1 Tab by mouth every Friday. As directed per Protimes. 30 Tab 0   • warfarin (COUMADIN) 2.5 MG Tab Take 1 Tab by mouth every day. 30 Tab 0   • cetirizine (ZYRTEC) 10 MG TABS Take 10 mg by mouth every morning. Indications: **OTC**     • [DISCONTINUED] propafenone (RYTHMOL) 150 MG Tab Take 1 Tab by mouth every 8 hours. One tablet PRN recurrence of atrial fibrillation (Patient not taking: Reported on 6/29/2020) 60 Tab 5     No facility-administered encounter medications on file as of 6/29/2020.      ROS     Objective:   /82 (BP Location: Left arm, Patient Position: Sitting, BP Cuff Size: Adult)   Pulse 82   Ht 1.905 m (6' 3\")   Wt (!) 140.2 kg (309 lb)   SpO2 95%   BMI 38.62 kg/m²     Physical Exam   Constitutional: He is oriented to person, place, and time. He appears well-developed and well-nourished.   HENT:   Head: Normocephalic and atraumatic.   Eyes: EOM are normal.   Neck: Normal range of motion. Neck supple.   Cardiovascular: Normal rate, regular rhythm and intact distal pulses. Exam reveals no gallop and no friction rub.   No murmur heard.  Pulmonary/Chest: Effort normal and breath sounds normal.   Abdominal: Soft.   Musculoskeletal: Normal range of motion.         General: No edema.   Neurological: He is alert and oriented to person, place, and time.   Skin: Skin is warm and dry.   Psychiatric: He has a normal mood and affect. His behavior is normal. Judgment and thought content normal.       Assessment:     1. PAF (paroxysmal atrial fibrillation) ([facility])  EKG   2. Chronic anticoagulation     3. S/P ablation of atrial fibrillation     4. Paroxysmal atrial fibrillation (HCC)     5. Mixed hyperlipidemia     6. History of pulmonary embolism and DVT, unprovoked.         Medical Decision Making:  Today's Assessment / Status / Plan:   1.  Paroxysmal atrial fibrillation continue metoprolol.  Can use extra as needed for " breakthroughs.  If significant breakthroughs consider antiarrhythmics.  Get a Kardia.  2.  Obesity work on diet and exercise.'  3.  Anticoagulation continue.  4.  Follow-up in 6 months.

## 2020-07-09 ENCOUNTER — ANTICOAGULATION MONITORING (OUTPATIENT)
Dept: VASCULAR LAB | Facility: MEDICAL CENTER | Age: 70
End: 2020-07-09

## 2020-07-09 DIAGNOSIS — Z86.711 HISTORY OF PULMONARY EMBOLISM: ICD-10-CM

## 2020-07-09 LAB — INR PPP: 3.6 (ref 2–3.5)

## 2020-07-09 NOTE — PROGRESS NOTES
OP Telephone Anticoagulation Service Note    Date: 7/9/2020      Anticoagulation Summary  As of 7/9/2020    INR goal:   2.0-3.0   TTR:   58.9 % (5.1 y)   INR used for dosing:   3.60! (7/9/2020)   Warfarin maintenance plan:   5 mg (5 mg x 1) every Mon, Fri; 2.5 mg (5 mg x 0.5) all other days   Weekly warfarin total:   22.5 mg   Plan last modified:   Shana Barragan PharmD (6/3/2019)   Next INR check:   7/23/2020   Target end date:   Indefinite    Indications    History of pulmonary embolism and DVT  unprovoked. [Z86.711]  Atrial fibrillation with RVR (HCC) (Resolved) [I48.91]             Anticoagulation Episode Summary     INR check location:       Preferred lab:       Send INR reminders to:       Comments:   [facility]      Anticoagulation Care Providers     Provider Role Specialty Phone number    [facility] Responsible  [phone]        Anticoagulation Patient Findings        Plan: Spoke with patient on the phone. Patient is supra therapeutic today. Confirmed dosing. No missed tablets in the last week. Patient just finished up antibiotic amoxicillin or augmentin. Patient denies any signs or symptoms of bleeding or clotting. Instructed patient to call clinic if any unusual bleeding or bruising occurs. Will have pt HOLD today then continue dosing as outlined. Will follow-up with patient in 2 week(s).      Shana Barragan, YoniD

## 2020-07-10 ENCOUNTER — TELEPHONE (OUTPATIENT)
Dept: CARDIOLOGY | Facility: MEDICAL CENTER | Age: 70
End: 2020-07-10

## 2020-07-10 DIAGNOSIS — I48.0 PAF (PAROXYSMAL ATRIAL FIBRILLATION) (HCC): ICD-10-CM

## 2020-07-10 NOTE — TELEPHONE ENCOUNTER
Abel Day III 14 hours ago (7:33 PM)          Thanks very much Nurse Neema!  I have attached 5 out of the dozen that I have as you suggested.  Best Regards,  Abel Mckenzie III 16 hours ago (5:04 PM)          Jarrett Roman-  Yes, please send the EKGs- how about one from each day?  Dr. Yeboah will be in the office tomorrow and will review.   Thanks  Jannet Bender, Yonas Ass't routed conversation to You 17 hours ago (4:24 PM)       Abel Day III, David E, M.D. 17 hours ago (4:15 PM)          For Dr. Yeboah  It feels like I have been in and out of afib or irregular rhythm intermittently for the past week. I have been able to convert to NSR on my own after a few hours. But I am not liking the trend here.  Per your suggestion, I now have a Arista Powera device and can upload ekgs to you. I have about a dozen recordings going back to July 2. Would you like to see all of these or just a sample? Which ones?  Thanks,  Abel Day

## 2020-07-21 ENCOUNTER — HOSPITAL ENCOUNTER (OUTPATIENT)
Dept: LAB | Facility: MEDICAL CENTER | Age: 70
End: 2020-07-21
Attending: FAMILY MEDICINE
Payer: MEDICARE

## 2020-07-21 LAB
ALBUMIN SERPL BCP-MCNC: 4.1 G/DL (ref 3.2–4.9)
ALBUMIN/GLOB SERPL: 1.1 G/DL
ALP SERPL-CCNC: 64 U/L (ref 30–99)
ALT SERPL-CCNC: 27 U/L (ref 2–50)
ANION GAP SERPL CALC-SCNC: 11 MMOL/L (ref 7–16)
AST SERPL-CCNC: 29 U/L (ref 12–45)
BASOPHILS # BLD AUTO: 0.3 % (ref 0–1.8)
BASOPHILS # BLD: 0.03 K/UL (ref 0–0.12)
BILIRUB SERPL-MCNC: 1.1 MG/DL (ref 0.1–1.5)
BUN SERPL-MCNC: 12 MG/DL (ref 8–22)
CALCIUM SERPL-MCNC: 9.6 MG/DL (ref 8.4–10.2)
CHLORIDE SERPL-SCNC: 99 MMOL/L (ref 96–112)
CO2 SERPL-SCNC: 28 MMOL/L (ref 20–33)
CREAT SERPL-MCNC: 0.92 MG/DL (ref 0.5–1.4)
CRP SERPL HS-MCNC: 1.21 MG/DL (ref 0–0.75)
EOSINOPHIL # BLD AUTO: 0.03 K/UL (ref 0–0.51)
EOSINOPHIL NFR BLD: 0.3 % (ref 0–6.9)
ERYTHROCYTE [DISTWIDTH] IN BLOOD BY AUTOMATED COUNT: 46.5 FL (ref 35.9–50)
ERYTHROCYTE [SEDIMENTATION RATE] IN BLOOD BY WESTERGREN METHOD: 24 MM/HOUR (ref 0–20)
GLOBULIN SER CALC-MCNC: 3.7 G/DL (ref 1.9–3.5)
GLUCOSE SERPL-MCNC: 110 MG/DL (ref 65–99)
HCT VFR BLD AUTO: 48.7 % (ref 42–52)
HGB BLD-MCNC: 16.8 G/DL (ref 14–18)
IMM GRANULOCYTES # BLD AUTO: 0.03 K/UL (ref 0–0.11)
IMM GRANULOCYTES NFR BLD AUTO: 0.3 % (ref 0–0.9)
LYMPHOCYTES # BLD AUTO: 1.67 K/UL (ref 1–4.8)
LYMPHOCYTES NFR BLD: 17.8 % (ref 22–41)
MCH RBC QN AUTO: 32.7 PG (ref 27–33)
MCHC RBC AUTO-ENTMCNC: 34.5 G/DL (ref 33.7–35.3)
MCV RBC AUTO: 94.9 FL (ref 81.4–97.8)
MONOCYTES # BLD AUTO: 0.76 K/UL (ref 0–0.85)
MONOCYTES NFR BLD AUTO: 8.1 % (ref 0–13.4)
NEUTROPHILS # BLD AUTO: 6.88 K/UL (ref 1.82–7.42)
NEUTROPHILS NFR BLD: 73.2 % (ref 44–72)
NRBC # BLD AUTO: 0 K/UL
NRBC BLD-RTO: 0 /100 WBC
PLATELET # BLD AUTO: 149 K/UL (ref 164–446)
PMV BLD AUTO: 9.8 FL (ref 9–12.9)
POTASSIUM SERPL-SCNC: 4.5 MMOL/L (ref 3.6–5.5)
PROT SERPL-MCNC: 7.8 G/DL (ref 6–8.2)
RBC # BLD AUTO: 5.13 M/UL (ref 4.7–6.1)
SODIUM SERPL-SCNC: 138 MMOL/L (ref 135–145)
URATE SERPL-MCNC: 7.4 MG/DL (ref 2.5–8.3)
WBC # BLD AUTO: 9.4 K/UL (ref 4.8–10.8)

## 2020-07-21 PROCEDURE — 85025 COMPLETE CBC W/AUTO DIFF WBC: CPT

## 2020-07-21 PROCEDURE — 86140 C-REACTIVE PROTEIN: CPT

## 2020-07-21 PROCEDURE — 84550 ASSAY OF BLOOD/URIC ACID: CPT

## 2020-07-21 PROCEDURE — 85652 RBC SED RATE AUTOMATED: CPT

## 2020-07-21 PROCEDURE — 36415 COLL VENOUS BLD VENIPUNCTURE: CPT

## 2020-07-21 PROCEDURE — 80053 COMPREHEN METABOLIC PANEL: CPT

## 2020-07-24 ENCOUNTER — ANTICOAGULATION MONITORING (OUTPATIENT)
Dept: VASCULAR LAB | Facility: MEDICAL CENTER | Age: 70
End: 2020-07-24

## 2020-07-24 DIAGNOSIS — Z86.711 HISTORY OF PULMONARY EMBOLISM: ICD-10-CM

## 2020-07-24 LAB — INR PPP: 2.1 (ref 2–3.5)

## 2020-07-24 NOTE — PROGRESS NOTES
Anticoagulation Summary  As of 2020    INR goal:   2.0-3.0   TTR:   58.9 % (5.1 y)   INR used for dosin.10 (2020)   Warfarin maintenance plan:   5 mg (5 mg x 1) every Mon, Fri; 2.5 mg (5 mg x 0.5) all other days   Weekly warfarin total:   22.5 mg   Plan last modified:   Shana Barragan PharmD (6/3/2019)   Next INR check:   2020   Target end date:   Indefinite    Indications    History of pulmonary embolism and DVT  unprovoked. [Z86.711]  Atrial fibrillation with RVR (HCC) (Resolved) [I48.91]             Anticoagulation Episode Summary     INR check location:       Preferred lab:       Send INR reminders to:       Comments:   [place]      Anticoagulation Care Providers     Provider Role Specialty Phone number    [facility] Responsible  [phone]        Anticoagulation Patient Findings        Spoke to patient on the phone.   INR  therapeutic.   Denies signs/symptoms of bleeding and/or thrombosis.   Denies changes to diet or medications.   Follow up appointment in 2 week(s).    Continue weekly warfarin dose as noted      Kyree Al, PharmD, MS, BCACP, LCC    This note was created using voice recognition software (Dragon). The accuracy of the dictation is limited by the abilities of the software. I have reviewed the note prior to signing, however some errors in grammar and context are still possible. If you have any questions related to this note please do not hesitate to contact our office.

## 2020-07-29 ENCOUNTER — HOSPITAL ENCOUNTER (OUTPATIENT)
Dept: RADIOLOGY | Facility: MEDICAL CENTER | Age: 70
End: 2020-07-29
Attending: FAMILY MEDICINE
Payer: MEDICARE

## 2020-07-29 DIAGNOSIS — M79.674 PAIN IN TOE OF RIGHT FOOT: ICD-10-CM

## 2020-07-29 PROCEDURE — 73620 X-RAY EXAM OF FOOT: CPT | Mod: RT

## 2020-08-06 ENCOUNTER — TELEPHONE (OUTPATIENT)
Dept: CARDIOLOGY | Facility: MEDICAL CENTER | Age: 70
End: 2020-08-06

## 2020-08-06 ENCOUNTER — NON-PROVIDER VISIT (OUTPATIENT)
Dept: CARDIOLOGY | Facility: MEDICAL CENTER | Age: 70
End: 2020-08-06
Attending: INTERNAL MEDICINE
Payer: MEDICARE

## 2020-08-06 DIAGNOSIS — I48.0 PAF (PAROXYSMAL ATRIAL FIBRILLATION) (HCC): ICD-10-CM

## 2020-08-06 DIAGNOSIS — I49.1 PAC (PREMATURE ATRIAL CONTRACTION): ICD-10-CM

## 2020-08-23 LAB — INR PPP: 2.3 (ref 2–3.5)

## 2020-08-24 ENCOUNTER — ANTICOAGULATION MONITORING (OUTPATIENT)
Dept: VASCULAR LAB | Facility: MEDICAL CENTER | Age: 70
End: 2020-08-24

## 2020-08-24 DIAGNOSIS — Z86.711 HISTORY OF PULMONARY EMBOLISM: ICD-10-CM

## 2020-08-24 NOTE — PROGRESS NOTES
Anticoagulation Summary  As of 2020    INR goal:  2.0-3.0   TTR:  59.5 % (5.2 y)   INR used for dosin.30 (2020)   Warfarin maintenance plan:  5 mg (5 mg x 1) every Mon, Fri; 2.5 mg (5 mg x 0.5) all other days   Weekly warfarin total:  22.5 mg   Plan last modified:  Kyree Al PharmD (2020)   Next INR check:  2020   Target end date:  Indefinite    Indications    History of pulmonary embolism and DVT  unprovoked. [Z86.711]  Atrial fibrillation with RVR (HCC) (Resolved) [I48.91]             Anticoagulation Episode Summary     INR check location:      Preferred lab:      Send INR reminders to:      Comments:  [place]      Anticoagulation Care Providers     Provider Role Specialty Phone number    [facility] Responsible  [phone]        Anticoagulation Patient Findings        Spoke to patient on the phone.   INR  therapeutic.   Denies signs/symptoms of bleeding and/or thrombosis.   Denies changes to diet or medications.   Follow up appointment in 4 week(s) per pt     Continue weekly warfarin dose as noted      Kyree Al, PharmD, MS, BCACP, LCC    This note was created using voice recognition software (Dragon). The accuracy of the dictation is limited by the abilities of the software. I have reviewed the note prior to signing, however some errors in grammar and context are still possible. If you have any questions related to this note please do not hesitate to contact our office.

## 2020-08-28 ENCOUNTER — PATIENT MESSAGE (OUTPATIENT)
Dept: CARDIOLOGY | Facility: MEDICAL CENTER | Age: 70
End: 2020-08-28

## 2020-08-28 ENCOUNTER — APPOINTMENT (OUTPATIENT)
Dept: RADIOLOGY | Facility: MEDICAL CENTER | Age: 70
End: 2020-08-28
Attending: FAMILY MEDICINE
Payer: MEDICARE

## 2020-08-28 DIAGNOSIS — L02.611 CELLULITIS AND ABSCESS OF TOE, RIGHT: ICD-10-CM

## 2020-08-28 DIAGNOSIS — L03.031 CELLULITIS AND ABSCESS OF TOE, RIGHT: ICD-10-CM

## 2020-08-28 PROCEDURE — 73718 MRI LOWER EXTREMITY W/O DYE: CPT | Mod: RT

## 2020-08-28 PROCEDURE — 0298T PR EXT ECG > 48HR TO 21 DAY REVIEW AND INTERPRETATN: CPT | Performed by: INTERNAL MEDICINE

## 2020-08-28 PROCEDURE — 0296T PR EXT ECG > 48HR TO 21 DAY RCRD W/CONECT INTL RCRD: CPT | Performed by: INTERNAL MEDICINE

## 2020-09-01 ENCOUNTER — ANTICOAGULATION MONITORING (OUTPATIENT)
Dept: VASCULAR LAB | Facility: MEDICAL CENTER | Age: 70
End: 2020-09-01

## 2020-09-01 DIAGNOSIS — Z86.711 HISTORY OF PULMONARY EMBOLISM: ICD-10-CM

## 2020-09-01 LAB — INR PPP: 2.7 (ref 2–3.5)

## 2020-09-01 NOTE — PROGRESS NOTES
Anticoagulation Summary  As of 2020    INR goal:  2.0-3.0   TTR:  59.7 % (5.2 y)   INR used for dosin.70 (2020)   Warfarin maintenance plan:  5 mg (5 mg x 1) every Mon, Fri; 2.5 mg (5 mg x 0.5) all other days   Weekly warfarin total:  22.5 mg   Plan last modified:  Kyree Al, PharmD (2020)   Next INR check:  2020   Target end date:  Indefinite    Indications    History of pulmonary embolism and DVT  unprovoked. [Z86.711]  Atrial fibrillation with RVR (HCC) (Resolved) [I48.91]             Anticoagulation Episode Summary     INR check location:      Preferred lab:      Send INR reminders to:      Comments:  Al[place]      Anticoagulation Care Providers     Provider Role Specialty Phone number    [facility] Responsible  [phone]          Patient Findings     Positives:  Change in medications    Negatives:  Signs/symptoms of thrombosis, Signs/symptoms of bleeding, Laboratory test error suspected, Change in health, Change in alcohol use, Change in activity, Upcoming invasive procedure, Emergency department visit, Upcoming dental procedure, Missed doses, Extra doses, Change in diet/appetite, Hospital admission, Bruising, Other complaints    Comments:  Patient recently started colchicine for gout       Spoke with patient today regarding therapeutic INR of 2.7.  Patient denies any signs/symptoms of bruising or bleeding or any changes in diet and medications.  Instructed patient to call clinic with any questions or concerns.    Pt is to continue with current warfarin dosing regimen.    Patient was recently put on Colchicine for gout and per his PCP, he needs to follow up sooner on the INR.    Follow up in 3 days, to reduce risk of adverse events related to this high risk medication,  Warfarin.    Alexis Garrett, Pharmacy Intern

## 2020-09-03 ENCOUNTER — TELEPHONE (OUTPATIENT)
Dept: CARDIOLOGY | Facility: MEDICAL CENTER | Age: 70
End: 2020-09-03

## 2020-09-03 NOTE — TELEPHONE ENCOUNTER
DS    Patient called to get the results from their ZioPatch. Please call the Pt back at [phone].      Thank you,  Carla MONTOYA

## 2020-09-08 ENCOUNTER — ANTICOAGULATION MONITORING (OUTPATIENT)
Dept: VASCULAR LAB | Facility: MEDICAL CENTER | Age: 70
End: 2020-09-08

## 2020-09-08 DIAGNOSIS — Z86.711 HISTORY OF PULMONARY EMBOLISM: ICD-10-CM

## 2020-09-08 LAB — INR PPP: 2.9 (ref 2–3.5)

## 2020-09-08 NOTE — PROGRESS NOTES
Anticoagulation Summary  As of 2020    INR goal:  2.0-3.0   TTR:  59.8 % (5.3 y)   INR used for dosin.90 (2020)   Warfarin maintenance plan:  5 mg (5 mg x 1) every Mon, Fri; 2.5 mg (5 mg x 0.5) all other days   Weekly warfarin total:  22.5 mg   Plan last modified:  Kyree Al, PharmD (2020)   Next INR check:  2020   Target end date:  Indefinite    Indications    History of pulmonary embolism and DVT  unprovoked. [Z86.711]  Atrial fibrillation with RVR (HCC) (Resolved) [I48.91]             Anticoagulation Episode Summary     INR check location:      Preferred lab:      Send INR reminders to:      Comments:  [place]      Anticoagulation Care Providers     Provider Role Specialty Phone number    [facility] Responsible  [phone]        Anticoagulation Patient Findings      Left voicemail message to report a therapeutic INR of 2.902.    Will have pt take continue with current warfarin dosing regimen. Requested pt contact the clinic for any s/s of unusual bleeding, bruising, clotting or any changes to diet or medication.    FU INR in 1 week.    Suyapa Reyes, Pharmacy Intern

## 2020-09-24 ENCOUNTER — ANTICOAGULATION MONITORING (OUTPATIENT)
Dept: VASCULAR LAB | Facility: MEDICAL CENTER | Age: 70
End: 2020-09-24

## 2020-09-24 DIAGNOSIS — Z86.711 HISTORY OF PULMONARY EMBOLISM: ICD-10-CM

## 2020-09-24 LAB — INR PPP: 2.9 (ref 2–3.5)

## 2020-09-25 NOTE — PROGRESS NOTES
OP Telephone Anticoagulation Service Note    Date: 2020      Anticoagulation Summary  As of 2020    INR goal:  2.0-3.0   TTR:  60.2 % (5.3 y)   INR used for dosin.90 (2020)   Warfarin maintenance plan:  5 mg (5 mg x 1) every Mon, Fri; 2.5 mg (5 mg x 0.5) all other days   Weekly warfarin total:  22.5 mg   Plan last modified:  Kyree Al, PharmD (2020)   Next INR check:  10/8/2020   Target end date:  Indefinite    Indications    History of pulmonary embolism and DVT  unprovoked. [Z86.711]  Atrial fibrillation with RVR (HCC) (Resolved) [I48.91]             Anticoagulation Episode Summary     INR check location:      Preferred lab:      Send INR reminders to:      Comments:  [place]      Anticoagulation Care Providers     Provider Role Specialty Phone number    [facility] Responsible  [phone]        Anticoagulation Patient Findings        Plan: INR is in range. Left message on patient's answering machine/voicemail. Instructed patient to call back with any concerns regarding any unusual bleeding or bruising, any medication or diet changes or any signs or symptoms of thrombosis. Instructed patient to resume medication as outlined above. Patient to follow up in 2 week(s).         hSana Barragan, PharmD

## 2020-10-02 DIAGNOSIS — I48.91 ATRIAL FIBRILLATION, UNSPECIFIED TYPE (HCC): ICD-10-CM

## 2020-10-13 ENCOUNTER — ANTICOAGULATION MONITORING (OUTPATIENT)
Dept: VASCULAR LAB | Facility: MEDICAL CENTER | Age: 70
End: 2020-10-13

## 2020-10-13 DIAGNOSIS — Z86.711 HISTORY OF PULMONARY EMBOLISM: ICD-10-CM

## 2020-10-13 LAB — INR PPP: 3.1 (ref 2–3.5)

## 2020-11-12 LAB — INR PPP: 2.2 (ref 2–3.5)

## 2020-11-13 ENCOUNTER — ANTICOAGULATION MONITORING (OUTPATIENT)
Dept: VASCULAR LAB | Facility: MEDICAL CENTER | Age: 70
End: 2020-11-13

## 2020-11-13 DIAGNOSIS — Z86.711 HISTORY OF PULMONARY EMBOLISM: ICD-10-CM

## 2020-11-14 NOTE — PROGRESS NOTES
OP   Telephone Anticoagulation Service Note      Anticoagulation Summary  As of 2020    INR goal:  2.0-3.0   TTR:  60.5 % (5.4 y)   INR used for dosin.20 (2020)   Warfarin maintenance plan:  5 mg (5 mg x 1) every Mon, Fri; 2.5 mg (5 mg x 0.5) all other days   Weekly warfarin total:  22.5 mg   Plan last modified:  Yoni GoreD (2020)   Next INR check:  2020   Target end date:  Indefinite    Indications    History of pulmonary embolism and DVT  unprovoked. [Z86.711]  Atrial fibrillation with RVR (HCC) (Resolved) [I48.91]             Anticoagulation Episode Summary     INR check location:      Preferred lab:      Send INR reminders to:      Comments:  [place]      Anticoagulation Care Providers     Provider Role Specialty Phone number    [facility] Responsible  [phone]        Anticoagulation Patient Findings    Left a message on the patient's voicemail today, informing the patient of a therapeutic INR of 2.2.   Instructed the patient to call the clinic with any changes to diet or medications, with any signs/sx of bleeding or clotting or with any questions or concerns.     Patient instructed to continue with the current warfarin dosing regimen, and asked to follow up again in 2 weeks.     Jay Styles  PharmD

## 2020-12-04 LAB — INR PPP: 2.3 (ref 2–3.5)

## 2020-12-07 ENCOUNTER — ANTICOAGULATION MONITORING (OUTPATIENT)
Dept: VASCULAR LAB | Facility: MEDICAL CENTER | Age: 70
End: 2020-12-07

## 2020-12-07 DIAGNOSIS — Z86.711 HISTORY OF PULMONARY EMBOLISM: ICD-10-CM

## 2020-12-08 NOTE — PROGRESS NOTES
Anticoagulation Summary  As of 2020    INR goal:  2.0-3.0   TTR:  61.0 % (5.5 y)   INR used for dosin.30 (2020)   Warfarin maintenance plan:  5 mg (5 mg x 1) every Mon, Fri; 2.5 mg (5 mg x 0.5) all other days   Weekly warfarin total:  22.5 mg   Plan last modified:  Yoni GoreD (2020)   Next INR check:  2020   Target end date:  Indefinite    Indications    History of pulmonary embolism and DVT  unprovoked. [Z86.711]  Atrial fibrillation with RVR (HCC) (Resolved) [I48.91]             Anticoagulation Episode Summary     INR check location:      Preferred lab:      Send INR reminders to:      Comments:  [place]      Anticoagulation Care Providers     Provider Role Specialty Phone number    [facility] Responsible  [phone]        Anticoagulation Patient Findings      Left voicemail message to report a therapeutic INR of 2.3.      Pt to continue with current warfarin dosing regimen. Requested pt contact the clinic for any s/s of unusual bleeding, bruising, clotting or any changes to diet or medication.    FU INR in 2 week(s).    Roselia Butcher, PharmD

## 2020-12-29 ENCOUNTER — ANTICOAGULATION MONITORING (OUTPATIENT)
Dept: CARDIOLOGY | Facility: MEDICAL CENTER | Age: 70
End: 2020-12-29

## 2020-12-29 DIAGNOSIS — Z86.711 HISTORY OF PULMONARY EMBOLISM: ICD-10-CM

## 2020-12-29 LAB — INR PPP: 2.1 (ref 2–3.5)

## 2020-12-29 NOTE — PROGRESS NOTES
OP Anticoagulation Service Note    Date: 2020    Anticoagulation Summary  As of 2020    INR goal:  2.0-3.0   TTR:  61.5 % (5.6 y)   INR used for dosin.10 (2020)   Warfarin maintenance plan:  5 mg (5 mg x 1) every Mon, Fri; 2.5 mg (5 mg x 0.5) all other days   Weekly warfarin total:  22.5 mg   Plan last modified:  Kyree Al, PharmD (2020)   Next INR check:     Target end date:  Indefinite    Indications    History of pulmonary embolism and DVT  unprovoked. [Z86.711]  Atrial fibrillation with RVR (HCC) (Resolved) [I48.91]             Anticoagulation Episode Summary     INR check location:      Preferred lab:      Send INR reminders to:      Comments:  [place]      Anticoagulation Care Providers     Provider Role Specialty Phone number    [facility] Responsible  [phone]        Anticoagulation Patient Findings      This appointment was conducted over the phone.     HPI:   The reason for today's call is to prevent morbidity and mortality from a blood clot and/or stroke and to reduce the risk of bleeding while on a anticoagulant.     PCP:  Andreina Iniguez D.O.  [id number] [facility] [id number]    Assessment:     • INR  therapeutic.       Current Outpatient Medications:   •  metoprolol, TAKE 1 TABLET BY MOUTH TWICE A DAY  •  diazePAM, 2 mg, Oral, Q6HRS PRN  •  ciclopirox, 1 APPLICATION APPLY ON THE SKIN TWICE A DAY  •  Multiple Vitamins-Minerals (PRESERVISION AREDS 2 PO), Take  by mouth 2 Times a Day.  •  CALCIUM-VITAMIN D PO, Take  by mouth.  •  Lansoprazole (PREVACID PO), Take  by mouth.  •  warfarin, 5 mg, Oral, Q FRIDAY  •  warfarin, 2.5 mg, Oral, DAILY  •  cetirizine, 10 mg, Oral, QAM      Plan:     • Instructed patient to continue with the current warfarin regimen.    Follow-up:     • Our protocol suggests we test in 2 weeks.        Additional information discussed with patient:     • Asked patient to please call the anticoagulation clinic if they  have any signs/symptoms of bleeding and/or thrombosis or any changes to diet or medications.      National recommendations regarding anticoagulation therapy:     The CHEST guidelines recommends frequent INR monitoring at regular intervals (a few days up to a max of 12 weeks) to ensure patients are on the proper dose of warfarin, and patients are not having any complications from therapy.  INRs can dramatically change over a short time period due to diet, medications, and medical conditions.     Luis Smith, Pharmacy Intern  [facility]  Phone [phone] fax [phone]    This note was created using voice recognition software (Dragon). The accuracy of the dictation is limited by the abilities of the software. I have reviewed the note prior to signing, however some errors in grammar and context are still possible. If you have any questions related to this note please do not hesitate to contact our office.

## 2021-01-15 DIAGNOSIS — Z23 NEED FOR VACCINATION: ICD-10-CM

## 2021-01-22 ENCOUNTER — ANTICOAGULATION MONITORING (OUTPATIENT)
Dept: VASCULAR LAB | Facility: MEDICAL CENTER | Age: 71
End: 2021-01-22

## 2021-01-22 DIAGNOSIS — Z86.711 HISTORY OF PULMONARY EMBOLISM: ICD-10-CM

## 2021-01-22 LAB — INR PPP: 2.6 (ref 2–3.5)

## 2021-01-22 NOTE — PROGRESS NOTES
OP Anticoagulation Service Note    Date: 2021    Anticoagulation Summary  As of 2021    INR goal:  2.0-3.0   TTR:  61.9 % (5.6 y)   INR used for dosin.60 (2021)   Warfarin maintenance plan:  5 mg (5 mg x 1) every Mon, Fri; 2.5 mg (5 mg x 0.5) all other days   Weekly warfarin total:  22.5 mg   Plan last modified:  Kyree Al, PharmD (2020)   Next INR check:  2021   Target end date:  Indefinite    Indications    History of pulmonary embolism and DVT  unprovoked. [Z86.711]  Atrial fibrillation with RVR (HCC) (Resolved) [I48.91]             Anticoagulation Episode Summary     INR check location:      Preferred lab:      Send INR reminders to:      Comments:  Alverto      Anticoagulation Care Providers     Provider Role Specialty Phone number    [facility] Responsible  [phone]        Anticoagulation Patient Findings      This appointment was conducted over the phone.     HPI:   The reason for today's call is to prevent morbidity and mortality from a blood clot and/or stroke and to reduce the risk of bleeding while on a anticoagulant.     PCP:  Andreina Iniguez D.O.  [address] [facility] [address]    Assessment:     • INR  therapeutic.       Current Outpatient Medications:   •  metoprolol tartrate, TAKE 1 TABLET BY MOUTH TWICE A DAY  •  diazePAM, 2 mg, Oral, Q6HRS PRN  •  ciclopirox, 1 APPLICATION APPLY ON THE SKIN TWICE A DAY  •  Multiple Vitamins-Minerals (PRESERVISION AREDS 2 PO), Take  by mouth 2 Times a Day.  •  CALCIUM-VITAMIN D PO, Take  by mouth.  •  Lansoprazole (PREVACID PO), Take  by mouth.  •  warfarin, 5 mg, Oral, Q FRIDAY  •  warfarin, 2.5 mg, Oral, DAILY  •  cetirizine, 10 mg, Oral, QAM      Plan:     • Continue the same warfarin dose, as noted above.       Follow-up:     • Our protocol suggests we test in 2 weeks.        Additional information discussed with patient:     • Asked patient to please call the anticoagulation clinic if they  have any signs/symptoms of bleeding and/or thrombosis or any changes to diet or medications.      National recommendations regarding anticoagulation therapy:     The CHEST guidelines recommends frequent INR monitoring at regular intervals (a few days up to a max of 12 weeks) to ensure patients are on the proper dose of warfarin, and patients are not having any complications from therapy.  INRs can dramatically change over a short time period due to diet, medications, and medical conditions.       Kyree Al, PharmD, MS, BCACP, LCC  [facility]  Phone [phone] fax [phone]    This note was created using voice recognition software (Dragon). The accuracy of the dictation is limited by the abilities of the software. I have reviewed the note prior to signing, however some errors in grammar and context are still possible. If you have any questions related to this note please do not hesitate to contact our office.

## 2021-02-06 LAB — INR PPP: 2.1 (ref 2–3.5)

## 2021-02-08 ENCOUNTER — ANTICOAGULATION MONITORING (OUTPATIENT)
Dept: VASCULAR LAB | Facility: MEDICAL CENTER | Age: 71
End: 2021-02-08

## 2021-02-08 DIAGNOSIS — Z86.711 HISTORY OF PULMONARY EMBOLISM: ICD-10-CM

## 2021-02-08 NOTE — PROGRESS NOTES
OP Anticoagulation Service Note    Date: 2021    Anticoagulation Summary  As of 2021    INR goal:  2.0-3.0   TTR:  62.2 % (5.7 y)   INR used for dosin.10 (2021)   Warfarin maintenance plan:  5 mg (5 mg x 1) every Mon, Fri; 2.5 mg (5 mg x 0.5) all other days   Weekly warfarin total:  22.5 mg   Plan last modified:  Kyree Al, PharmD (2020)   Next INR check:  2021   Target end date:  Indefinite    Indications    History of pulmonary embolism and DVT  unprovoked. [Z86.711]  Atrial fibrillation with RVR (HCC) (Resolved) [I48.91]             Anticoagulation Episode Summary     INR check location:      Preferred lab:      Send INR reminders to:      Comments:  Alverto      Anticoagulation Care Providers     Provider Role Specialty Phone number    [facility] Responsible  [phone]        Anticoagulation Patient Findings      This appointment was conducted over the phone.     HPI:   The reason for today's call is to prevent morbidity and mortality from a blood clot and/or stroke and to reduce the risk of bleeding while on a anticoagulant.     PCP:  Andreina Iniguez D.O.  [address] [facility] [address]    Assessment:     • INR  therapeutic.       Current Outpatient Medications:   •  metoprolol tartrate, TAKE 1 TABLET BY MOUTH TWICE A DAY  •  diazePAM, 2 mg, Oral, Q6HRS PRN  •  ciclopirox, 1 APPLICATION APPLY ON THE SKIN TWICE A DAY  •  Multiple Vitamins-Minerals (PRESERVISION AREDS 2 PO), Take  by mouth 2 Times a Day.  •  CALCIUM-VITAMIN D PO, Take  by mouth.  •  Lansoprazole (PREVACID PO), Take  by mouth.  •  warfarin, 5 mg, Oral, Q FRIDAY  •  warfarin, 2.5 mg, Oral, DAILY  •  cetirizine, 10 mg, Oral, QAM      Plan:     • Continue the same warfarin dose, as noted above.       Follow-up:     • Our protocol suggests we test in 2 weeks.        Additional information discussed with patient:     • Asked patient to please call the anticoagulation clinic if they  have any signs/symptoms of bleeding and/or thrombosis or any changes to diet or medications.      National recommendations regarding anticoagulation therapy:     The CHEST guidelines recommends frequent INR monitoring at regular intervals (a few days up to a max of 12 weeks) to ensure patients are on the proper dose of warfarin, and patients are not having any complications from therapy.  INRs can dramatically change over a short time period due to diet, medications, and medical conditions.       Kyree Al, PharmD, MS, BCACP, LCC  [facility]  Phone [phone] fax [phone]    This note was created using voice recognition software (Dragon). The accuracy of the dictation is limited by the abilities of the software. I have reviewed the note prior to signing, however some errors in grammar and context are still possible. If you have any questions related to this note please do not hesitate to contact our office.

## 2021-02-11 ENCOUNTER — HOSPITAL ENCOUNTER (OUTPATIENT)
Dept: RADIOLOGY | Facility: MEDICAL CENTER | Age: 71
End: 2021-02-11
Attending: FAMILY MEDICINE
Payer: MEDICARE

## 2021-02-11 DIAGNOSIS — R06.02 SHORTNESS OF BREATH: ICD-10-CM

## 2021-02-11 PROCEDURE — 71046 X-RAY EXAM CHEST 2 VIEWS: CPT

## 2021-02-16 ENCOUNTER — HOSPITAL ENCOUNTER (OUTPATIENT)
Dept: LAB | Facility: MEDICAL CENTER | Age: 71
End: 2021-02-16
Attending: FAMILY MEDICINE
Payer: MEDICARE

## 2021-02-16 ENCOUNTER — OFFICE VISIT (OUTPATIENT)
Dept: SLEEP MEDICINE | Facility: MEDICAL CENTER | Age: 71
End: 2021-02-16
Payer: MEDICARE

## 2021-02-16 VITALS
DIASTOLIC BLOOD PRESSURE: 74 MMHG | BODY MASS INDEX: 39.17 KG/M2 | SYSTOLIC BLOOD PRESSURE: 134 MMHG | WEIGHT: 315 LBS | TEMPERATURE: 97.5 F | OXYGEN SATURATION: 95 % | HEART RATE: 94 BPM | RESPIRATION RATE: 18 BRPM | HEIGHT: 75 IN

## 2021-02-16 DIAGNOSIS — E66.01 CLASS 3 SEVERE OBESITY WITH BODY MASS INDEX (BMI) OF 40.0 TO 44.9 IN ADULT, UNSPECIFIED OBESITY TYPE, UNSPECIFIED WHETHER SERIOUS COMORBIDITY PRESENT (HCC): ICD-10-CM

## 2021-02-16 DIAGNOSIS — I48.0 PAROXYSMAL ATRIAL FIBRILLATION (HCC): ICD-10-CM

## 2021-02-16 DIAGNOSIS — G47.33 OSA (OBSTRUCTIVE SLEEP APNEA): ICD-10-CM

## 2021-02-16 DIAGNOSIS — K51.919 ULCERATIVE COLITIS WITH COMPLICATION, UNSPECIFIED LOCATION (HCC): ICD-10-CM

## 2021-02-16 DIAGNOSIS — R06.02 SOB (SHORTNESS OF BREATH): ICD-10-CM

## 2021-02-16 LAB
ALBUMIN SERPL BCP-MCNC: 4 G/DL (ref 3.2–4.9)
ALBUMIN/GLOB SERPL: 1.2 G/DL
ALP SERPL-CCNC: 63 U/L (ref 30–99)
ALT SERPL-CCNC: 28 U/L (ref 2–50)
ANION GAP SERPL CALC-SCNC: 11 MMOL/L (ref 7–16)
AST SERPL-CCNC: 27 U/L (ref 12–45)
BASOPHILS # BLD AUTO: 0.2 % (ref 0–1.8)
BASOPHILS # BLD: 0.02 K/UL (ref 0–0.12)
BILIRUB SERPL-MCNC: 0.7 MG/DL (ref 0.1–1.5)
BUN SERPL-MCNC: 17 MG/DL (ref 8–22)
CALCIUM SERPL-MCNC: 9.5 MG/DL (ref 8.4–10.2)
CHLORIDE SERPL-SCNC: 101 MMOL/L (ref 96–112)
CO2 SERPL-SCNC: 24 MMOL/L (ref 20–33)
CREAT SERPL-MCNC: 0.9 MG/DL (ref 0.5–1.4)
D DIMER PPP IA.FEU-MCNC: 0.29 UG/ML (FEU) (ref 0–0.5)
EOSINOPHIL # BLD AUTO: 0.07 K/UL (ref 0–0.51)
EOSINOPHIL NFR BLD: 0.8 % (ref 0–6.9)
ERYTHROCYTE [DISTWIDTH] IN BLOOD BY AUTOMATED COUNT: 46.7 FL (ref 35.9–50)
GLOBULIN SER CALC-MCNC: 3.3 G/DL (ref 1.9–3.5)
GLUCOSE SERPL-MCNC: 105 MG/DL (ref 65–99)
HCT VFR BLD AUTO: 47.8 % (ref 42–52)
HGB BLD-MCNC: 16.4 G/DL (ref 14–18)
IMM GRANULOCYTES # BLD AUTO: 0.03 K/UL (ref 0–0.11)
IMM GRANULOCYTES NFR BLD AUTO: 0.3 % (ref 0–0.9)
LYMPHOCYTES # BLD AUTO: 2.13 K/UL (ref 1–4.8)
LYMPHOCYTES NFR BLD: 23.2 % (ref 22–41)
MCH RBC QN AUTO: 33.4 PG (ref 27–33)
MCHC RBC AUTO-ENTMCNC: 34.3 G/DL (ref 33.7–35.3)
MCV RBC AUTO: 97.4 FL (ref 81.4–97.8)
MONOCYTES # BLD AUTO: 0.65 K/UL (ref 0–0.85)
MONOCYTES NFR BLD AUTO: 7.1 % (ref 0–13.4)
NEUTROPHILS # BLD AUTO: 6.29 K/UL (ref 1.82–7.42)
NEUTROPHILS NFR BLD: 68.4 % (ref 44–72)
NRBC # BLD AUTO: 0 K/UL
NRBC BLD-RTO: 0 /100 WBC
NT-PROBNP SERPL IA-MCNC: 94 PG/ML (ref 0–125)
PLATELET # BLD AUTO: 166 K/UL (ref 164–446)
PMV BLD AUTO: 9.5 FL (ref 9–12.9)
POTASSIUM SERPL-SCNC: 4.6 MMOL/L (ref 3.6–5.5)
PROT SERPL-MCNC: 7.3 G/DL (ref 6–8.2)
RBC # BLD AUTO: 4.91 M/UL (ref 4.7–6.1)
SODIUM SERPL-SCNC: 136 MMOL/L (ref 135–145)
WBC # BLD AUTO: 9.2 K/UL (ref 4.8–10.8)

## 2021-02-16 PROCEDURE — 36415 COLL VENOUS BLD VENIPUNCTURE: CPT

## 2021-02-16 PROCEDURE — 85025 COMPLETE CBC W/AUTO DIFF WBC: CPT

## 2021-02-16 PROCEDURE — 85379 FIBRIN DEGRADATION QUANT: CPT

## 2021-02-16 PROCEDURE — 99204 OFFICE O/P NEW MOD 45 MIN: CPT | Performed by: INTERNAL MEDICINE

## 2021-02-16 PROCEDURE — 83880 ASSAY OF NATRIURETIC PEPTIDE: CPT

## 2021-02-16 PROCEDURE — 80053 COMPREHEN METABOLIC PANEL: CPT

## 2021-02-16 RX ORDER — MONTELUKAST SODIUM 10 MG/1
10 TABLET ORAL
COMMUNITY
Start: 2021-02-02

## 2021-02-16 RX ORDER — PROPAFENONE HYDROCHLORIDE 300 MG/1
300 TABLET, COATED ORAL EVERY 8 HOURS
COMMUNITY
End: 2021-05-12

## 2021-02-16 RX ORDER — MECLIZINE HYDROCHLORIDE 25 MG/1
25 TABLET ORAL 3 TIMES DAILY PRN
COMMUNITY
End: 2021-05-12

## 2021-02-16 RX ORDER — FLUTICASONE PROPIONATE 50 MCG
1 SPRAY, SUSPENSION (ML) NASAL
COMMUNITY

## 2021-02-16 ASSESSMENT — ENCOUNTER SYMPTOMS
SHORTNESS OF BREATH: 1
WHEEZING: 0
DEPRESSION: 0
BLURRED VISION: 0
ABDOMINAL PAIN: 0
PALPITATIONS: 0
NECK PAIN: 0
TREMORS: 0
BACK PAIN: 0
HEARTBURN: 0
SINUS PAIN: 0
EYE REDNESS: 0
WEAKNESS: 0
HEMOPTYSIS: 0
NAUSEA: 0
DOUBLE VISION: 0
STRIDOR: 0
WEIGHT LOSS: 0
PND: 0
CLAUDICATION: 0
CHILLS: 0
FALLS: 0
DIZZINESS: 0
DIARRHEA: 0
SORE THROAT: 0
MYALGIAS: 0
PHOTOPHOBIA: 0
HEADACHES: 0
VOMITING: 0
COUGH: 0
DIAPHORESIS: 0
EYE PAIN: 0
SPEECH CHANGE: 0
SPUTUM PRODUCTION: 0
CONSTIPATION: 0
FOCAL WEAKNESS: 0
FEVER: 0
ORTHOPNEA: 0
EYE DISCHARGE: 0

## 2021-02-16 ASSESSMENT — FIBROSIS 4 INDEX: FIB4 SCORE: 2.18

## 2021-02-16 NOTE — PROGRESS NOTES
Chief Complaint   Patient presents with   • Establish Care     referral 21 SOILA Estrada DO DX NASEEM        HPI: This patient is a 71 y.o. male presenting for evaluation of dyspnea on exertion.  The patient's past medical history is significant for atrial fibrillation status post ablation in  on chronic anticoagulation also due to history of DVT and PE in , ulcerative colitis status post colectomy with ileostomy, hypertension.  He has an insignificant tobacco history with roughly 10 pack years and quit 40 years ago.  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  No known asbestos exposure or other occupational exposures.  Family history is notable for mother with hypertension.  Father  from ingestive heart failure although patient does not know the details around this.  He presents today for evaluation of dyspnea on exertion that began roughly around [place].  He previously walks roughly 1 mile every other day with his dog but since onset of winter has had difficulty completing half this distance due to dyspnea.  He is also short of breath when bending over.  No associated chest pain.  No cough.  No wheezing.  He does have some chronic bilateral lower extremity edema that is unchanged and mild.  He has some postnasal drip with sinus drainage at night for which he uses intranasal steroids with significant relief.  He does note a significant weight gain in the past year due to decreased activity and increased p.o. intake during coronavirus quarantine.  He apparently had a sleep study in the past which showed borderline obstructive sleep apnea although weight was significantly less than now.  He does report snoring but denies witnessed apneas or excessive daytime sleepiness.  Chest x-ray from  showed clear lung fields with mild hypoventilation.  CT chest from 2018 showed stable subcentimeter pulmonary nodules present  since  with no significant parenchymal lung disease.  Echocardiogram from 2018 showed normal LVEF with normal diastolic function, RVSP estimated at 47 mmHg.    Past Medical History:   Diagnosis Date   • Cancer (HCC)     melanoma   • GERD (gastroesophageal reflux disease)    • Hemorrhagic disorder     on coumadin   • Obesity    • Paroxysmal atrial fibrillation (HCC) 2012   • Skin cancer     basil cell   • ULCERATIVE COLITIS 2012       Social History     Socioeconomic History   • Marital status:      Spouse name: Not on file   • Number of children: Not on file   • Years of education: Not on file   • Highest education level: Not on file   Occupational History   • Not on file   Tobacco Use   • Smoking status: Former Smoker     Packs/day: 1.00     Years: 10.00     Pack years: 10.00     Types: Cigarettes     Quit date: 1984     Years since quittin.2   • Smokeless tobacco: Never Used   Substance and Sexual Activity   • Alcohol use: Yes     Alcohol/week: 12.6 oz     Types: 21 Shots of liquor per week     Comment: 1 beer and 1 scotch daily   • Drug use: Not Currently     Types: Marijuana   • Sexual activity: Not on file   Other Topics Concern   • Not on file   Social History Narrative   • Not on file     Social Determinants of Health     Financial Resource Strain:    • Difficulty of Paying Living Expenses:    Food Insecurity:    • Worried About Running Out of Food in the Last Year:    • Ran Out of Food in the Last Year:    Transportation Needs:    • Lack of Transportation (Medical):    • Lack of Transportation (Non-Medical):    Physical Activity:    • Days of Exercise per Week:    • Minutes of Exercise per Session:    Stress:    • Feeling of Stress :    Social Connections:    • Frequency of Communication with Friends and Family:    • Frequency of Social Gatherings with Friends and Family:    • Attends [employer] Services:    • Active Member of Clubs or Organizations:    • Attends Club  or Organization Meetings:    • Marital Status:    Intimate Partner Violence:    • Fear of Current or Ex-Partner:    • Emotionally Abused:    • Physically Abused:    • Sexually Abused:        Family History   Problem Relation Age of Onset   • Hypertension Mother    • Heart Failure Father    • Heart Attack Maternal Uncle        Current Outpatient Medications on File Prior to Visit   Medication Sig Dispense Refill   • montelukast (SINGULAIR) 10 MG Tab Take 10 mg by mouth every day. N THE EVENING     • meclizine (ANTIVERT) 25 MG Tab Take 25 mg by mouth 3 times a day as needed.     • propafenone (RYTHMOL) 300 MG tablet Take 300 mg by mouth every 8 hours.     • fluticasone (FLONASE) 50 MCG/ACT nasal spray Administer 1 Spray into affected nostril(S) every day.     • metoprolol (LOPRESSOR) 25 MG Tab TAKE 1 TABLET BY MOUTH TWICE A  Tab 2   • Multiple Vitamins-Minerals (PRESERVISION AREDS 2 PO) Take  by mouth 2 Times a Day.     • CALCIUM-VITAMIN D PO Take  by mouth.     • Lansoprazole (PREVACID PO) Take 15 mg by mouth every day.     • warfarin (COUMADIN) 5 MG Tab Take 1 Tab by mouth every Friday. As directed per Protimes. 30 Tab 0   • warfarin (COUMADIN) 2.5 MG Tab Take 1 Tab by mouth every day. 30 Tab 0   • cetirizine (ZYRTEC) 10 MG TABS Take 10 mg by mouth every morning. Indications: **OTC**     • diazePAM (VALIUM) 2 MG Tab Take 2 mg by mouth every 6 hours as needed for Anxiety.     • ciclopirox (LOPROX) 0.77 % cream 1 APPLICATION APPLY ON THE SKIN TWICE A DAY  5     No current facility-administered medications on file prior to visit.       Allergies: Other misc and Omnicef [cefdinir]    ROS:   Review of Systems   Constitutional: Negative for chills, diaphoresis, fever, malaise/fatigue and weight loss.   HENT: Negative for congestion, ear discharge, ear pain, hearing loss, nosebleeds, sinus pain, sore throat and tinnitus.    Eyes: Negative for blurred vision, double vision, photophobia, pain, discharge and redness.  "  Respiratory: Positive for shortness of breath. Negative for cough, hemoptysis, sputum production, wheezing and stridor.    Cardiovascular: Positive for leg swelling. Negative for chest pain, palpitations, orthopnea, claudication and PND.   Gastrointestinal: Negative for abdominal pain, constipation, diarrhea, heartburn, nausea and vomiting.   Genitourinary: Negative for dysuria and urgency.   Musculoskeletal: Negative for back pain, falls, joint pain, myalgias and neck pain.   Skin: Negative for itching and rash.   Neurological: Negative for dizziness, tremors, speech change, focal weakness, weakness and headaches.   Endo/Heme/Allergies: Negative for environmental allergies.   Psychiatric/Behavioral: Negative for depression.       /74 (BP Location: Right arm, Patient Position: Sitting, BP Cuff Size: Large adult)   Pulse 94   Temp 36.4 °C (97.5 °F) (Temporal)   Resp 18   Ht 1.905 m (6' 3\")   Wt (!) 147 kg (323 lb)   SpO2 95%     Physical Exam:  Physical Exam  Vitals reviewed.   Constitutional:       General: He is not in acute distress.     Appearance: Normal appearance. He is obese.   HENT:      Head: Normocephalic and atraumatic.      Right Ear: External ear normal.      Left Ear: External ear normal.      Nose: Nose normal. No congestion.      Mouth/Throat:      Mouth: Mucous membranes are moist.      Pharynx: Oropharynx is clear. No oropharyngeal exudate.   Eyes:      General: No scleral icterus.     Extraocular Movements: Extraocular movements intact.      Conjunctiva/sclera: Conjunctivae normal.      Pupils: Pupils are equal, round, and reactive to light.   Cardiovascular:      Rate and Rhythm: Normal rate and regular rhythm.      Heart sounds: Normal heart sounds. No murmur. No gallop.    Pulmonary:      Effort: Pulmonary effort is normal. No respiratory distress.      Breath sounds: Normal breath sounds. No wheezing or rales.   Abdominal:      Palpations: Abdomen is soft.      Comments: obese "   Musculoskeletal:         General: Normal range of motion.      Cervical back: Normal range of motion and neck supple.      Right lower leg: Edema present.      Left lower leg: Edema present.      Comments: Trace b/l edema   Skin:     General: Skin is warm and dry.      Findings: No rash.   Neurological:      Mental Status: He is alert and oriented to person, place, and time.      Cranial Nerves: No cranial nerve deficit.   Psychiatric:         Mood and Affect: Mood normal.         Behavior: Behavior normal.         PFTs as reviewed by me personally: none     Imaging as reviewed by me personally: as per HPI    Assessment:  1. SOB (shortness of breath)  EC-ECHOCARDIOGRAM COMPLETE W/O CONT    PULMONARY FUNCTION TESTS -Test requested: Complete Pulmonary Function Test   2. NASEEM (obstructive sleep apnea)  Overnight Oximetry   3. Paroxysmal atrial fibrillation (HCC)  EC-ECHOCARDIOGRAM COMPLETE W/O CONT   4. Class 3 severe obesity with body mass index (BMI) of 40.0 to 44.9 in adult, unspecified obesity type, unspecified whether serious comorbidity present (HCC)     5. Ulcerative colitis with complication, unspecified location (HCC)         Plan:  1.  This is chronic but fairly new for the patient over the past 3 months.  Differential diagnosis includes diastolic dysfunction due to left-sided heart disease or untreated obstructive sleep apnea, deconditioning in the setting of weight gain and obesity.  Lower suspicion for coronary artery disease or reactive airways disease.  I am recommending full pulmonary function testing and updated echocardiogram and follow-up with me in the next 8 weeks.  2.  Suspected although patient denies symptoms of excessive daytime sleepiness.  We will start with overnight oximetry on room air to screen.  3.  Followed by cardiology and status post ablation currently in sinus rhythm on chronic anticoagulation.  4.  This does put patient at increased risk for obesity related pulmonary complications  particularly given recent weight gain.  Pending results of echocardiogram and pulmonary function testing he may benefit from formal exercise program and dietary eval.  5.  Status post colectomy with ileostomy.  Overall this has been stable.  Return in about 8 weeks (around 4/13/2021) for OPO, PFTs, echo.

## 2021-02-21 LAB — INR PPP: 3.1 (ref 2–3.5)

## 2021-02-22 ENCOUNTER — ANTICOAGULATION MONITORING (OUTPATIENT)
Dept: VASCULAR LAB | Facility: MEDICAL CENTER | Age: 71
End: 2021-02-22

## 2021-02-22 DIAGNOSIS — Z86.711 HISTORY OF PULMONARY EMBOLISM: ICD-10-CM

## 2021-02-23 ENCOUNTER — HOME STUDY (OUTPATIENT)
Dept: SLEEP MEDICINE | Facility: MEDICAL CENTER | Age: 71
End: 2021-02-23
Attending: INTERNAL MEDICINE
Payer: MEDICARE

## 2021-02-23 VITALS — WEIGHT: 315 LBS | HEIGHT: 75 IN | BODY MASS INDEX: 39.17 KG/M2

## 2021-02-23 DIAGNOSIS — R06.02 SOB (SHORTNESS OF BREATH): ICD-10-CM

## 2021-02-23 DIAGNOSIS — G47.33 OSA (OBSTRUCTIVE SLEEP APNEA): ICD-10-CM

## 2021-02-23 PROCEDURE — 94060 EVALUATION OF WHEEZING: CPT | Performed by: INTERNAL MEDICINE

## 2021-02-23 PROCEDURE — 94726 PLETHYSMOGRAPHY LUNG VOLUMES: CPT | Performed by: INTERNAL MEDICINE

## 2021-02-23 PROCEDURE — 94762 N-INVAS EAR/PLS OXIMTRY CONT: CPT | Performed by: INTERNAL MEDICINE

## 2021-02-23 PROCEDURE — 94729 DIFFUSING CAPACITY: CPT | Performed by: INTERNAL MEDICINE

## 2021-02-23 ASSESSMENT — PULMONARY FUNCTION TESTS
FVC_LLN: 4.15
FEV1: 2.66
FEV1_PERCENT_CHANGE: 0
FEV1/FVC_PERCENT_PREDICTED: 101
FEV1/FVC: 80
FEV1/FVC_PERCENT_PREDICTED: 74
FEV1: 2.81
FEV1_PERCENT_CHANGE: 5
FEV1/FVC_PERCENT_PREDICTED: 106
FEV1/FVC_PERCENT_PREDICTED: 101
FEV1/FVC_PERCENT_LLN: 63
FEV1_PERCENT_PREDICTED: 75
FVC_PREDICTED: 4.98
FVC: 3.52
FVC_PERCENT_PREDICTED: 70
FEV1/FVC_PERCENT_PREDICTED: 107
FEV1/FVC: 79.83
FVC_PERCENT_PREDICTED: 70
FEV1/FVC: 76
FEV1_PREDICTED: 3.71
FEV1_LLN: 3.09
FEV1/FVC_PERCENT_CHANGE: 4
FEV1/FVC_PREDICTED: 75
FEV1_PERCENT_PREDICTED: 71
FEV1/FVC: 76
FVC: 3.5

## 2021-02-23 ASSESSMENT — FIBROSIS 4 INDEX: FIB4 SCORE: 2.18

## 2021-02-23 NOTE — PROCEDURES
Technician: Abby Moyer RRT   Good patient effort & cooperation. Patient needs extra coaching for FVC. The results of this test meet the ATS/ERS standards for acceptability & reproducibility.  Test was performed on the [employer] Body Plethysmograph-Elite DX system.  Predicted equations for Spirometry are GLI-2012, ITS for lung volumes, and GLI-2017 for DLCO.  The DLCO was uncorrected for Hgb.  A bronchodilator of Xopenex HFA -2puffs via spacer administered.  DLCO performed during dilation period. IVC is less than 90%.    Interpretation;   1.  Baseline spirometry shows proportionate reduction in both FEV1 at 2.66 L or 71% predicted and FVC at 3.5 L or 70% predicted.  FEV1/FVC ratio is normal at 76.  2.  There is no significant bronchodilator response.  3.  Total capacity is reduced at 5.35 L or 66% predicted.  4.  Diffusion capacity is low normal at 81% predicted.  Pulmonary function testing shows a mixed restrictive obstructive pattern with low normal DLCO.  This may be related to COPD in the setting of obesity.  Suggest clinical correlation.

## 2021-02-24 ENCOUNTER — OFFICE VISIT (OUTPATIENT)
Dept: CARDIOLOGY | Facility: MEDICAL CENTER | Age: 71
End: 2021-02-24
Payer: MEDICARE

## 2021-02-24 VITALS
WEIGHT: 315 LBS | OXYGEN SATURATION: 96 % | HEART RATE: 82 BPM | DIASTOLIC BLOOD PRESSURE: 88 MMHG | BODY MASS INDEX: 39.17 KG/M2 | HEIGHT: 75 IN | SYSTOLIC BLOOD PRESSURE: 142 MMHG

## 2021-02-24 DIAGNOSIS — I48.0 PAROXYSMAL ATRIAL FIBRILLATION (HCC): ICD-10-CM

## 2021-02-24 DIAGNOSIS — R60.0 BILATERAL LOWER EXTREMITY EDEMA: ICD-10-CM

## 2021-02-24 DIAGNOSIS — Z86.711 HISTORY OF PULMONARY EMBOLISM: ICD-10-CM

## 2021-02-24 DIAGNOSIS — I51.7 LEFT VENTRICULAR HYPERTROPHY: ICD-10-CM

## 2021-02-24 DIAGNOSIS — I10 ESSENTIAL HYPERTENSION, BENIGN: ICD-10-CM

## 2021-02-24 DIAGNOSIS — Z98.890 S/P ABLATION OF ATRIAL FIBRILLATION: ICD-10-CM

## 2021-02-24 DIAGNOSIS — R06.09 DOE (DYSPNEA ON EXERTION): ICD-10-CM

## 2021-02-24 DIAGNOSIS — E66.09 OBESITY DUE TO EXCESS CALORIES WITHOUT SERIOUS COMORBIDITY, UNSPECIFIED CLASSIFICATION: ICD-10-CM

## 2021-02-24 DIAGNOSIS — Z86.79 S/P ABLATION OF ATRIAL FIBRILLATION: ICD-10-CM

## 2021-02-24 DIAGNOSIS — Z79.01 CHRONIC ANTICOAGULATION: ICD-10-CM

## 2021-02-24 LAB — EKG IMPRESSION: NORMAL

## 2021-02-24 PROCEDURE — 93000 ELECTROCARDIOGRAM COMPLETE: CPT | Performed by: INTERNAL MEDICINE

## 2021-02-24 PROCEDURE — 99214 OFFICE O/P EST MOD 30 MIN: CPT | Performed by: NURSE PRACTITIONER

## 2021-02-24 RX ORDER — METOPROLOL SUCCINATE 25 MG/1
25 TABLET, EXTENDED RELEASE ORAL DAILY
Qty: 30 TABLET | Refills: 2 | Status: SHIPPED | OUTPATIENT
Start: 2021-02-24 | End: 2021-03-18 | Stop reason: SDUPTHER

## 2021-02-24 ASSESSMENT — ENCOUNTER SYMPTOMS
DIAPHORESIS: 0
LOSS OF CONSCIOUSNESS: 0
CLAUDICATION: 0
ABDOMINAL PAIN: 0
DIZZINESS: 0
FEVER: 0
COUGH: 0
WHEEZING: 0
SHORTNESS OF BREATH: 0
ORTHOPNEA: 0
PND: 0
BLOOD IN STOOL: 0
CHILLS: 0
PALPITATIONS: 0

## 2021-02-24 ASSESSMENT — FIBROSIS 4 INDEX: FIB4 SCORE: 2.18

## 2021-02-24 NOTE — PROCEDURES
This is an overnight oximetry study done on February 23, 2021 on room air for duration of 7 hours and 16 minutes.  Basal SPO2 was 90%.  The patient did spend 78 minutes with a saturation below 88% and there is evidence of clustered desaturations suggesting sleep disordered breathing.  Consider formal sleep study.

## 2021-02-24 NOTE — PROGRESS NOTES
Cardiology/Electrophysiology Follow-up Note    Subjective:   Chief Complaint:   Chief Complaint   Patient presents with   • Atrial Fibrillation     Abel Day III is a 71 y.o. male who presents today for follow up for Paroxysmal atrial fibrillation, on metoprolol.    He is followed by Dr. Yeboah and was last seen on 06/29/20 for history of PAF s/p ablation by Dr. Gonsales x2 with PVI and PAC ablation from other location. Occasional AF, stable on metoprolol.  Rythmol as needed. On chronic anticoagulation with Coumadin.    He reports recent increased shortness of breath with activity for past 2-3 months, resolves at rest. Denies CP. Mild intermittent lower extremity edema, continue compressions. Could consider addition of low diuretic PRN.     Has Kardia.  On metoprolol.  Denies excess alcohol.  Reports recent increased shortness of breath. CXR negative. Followed by Pulmonology. Could could chest CT if continued.     Today in follow up,   Denies chest pain, dizziness, palpitations, pre syncope or syncope, dyspnea, PND, orthopnea, or lower extremity edema.  Denies any signs or symptoms of bleeding or bleeding issues. Patient endorses medication compliance.     Past Medical History:   Diagnosis Date   • Cancer (HCC)     melanoma   • GERD (gastroesophageal reflux disease)    • Hemorrhagic disorder     on coumadin   • Obesity    • Paroxysmal atrial fibrillation (HCC) 11/26/2012   • Skin cancer     basil cell   • ULCERATIVE COLITIS 11/26/2012     Past Surgical History:   Procedure Laterality Date   • RECOVERY  5/4/2016    Procedure: CATH LAB-[facility]-EPS ABLATION/AFIB [id number] I48.0;  Surgeon: Jacquie Surgery;  Location: [place] PRE-POST PROC UNIT [facility] [place];  Service:    • MASS EXCISION GENERAL Left 4/7/2016    Procedure: MASS EXCISION GENERAL FOR TEMPORAL MELANOMA;  Surgeon: Feng Sharpe M.D.;  Location: SURGERY SAME DAY [facility];  Service:    • FLAP GRAFT Left 4/7/2016    Procedure:  FLAP GRAFT FOR CLOSURE WITH LOCAL FLAPS;  Surgeon: Feng Sharpe M.D.;  Location: SURGERY SAME DAY [facility];  Service:    • COLON RESECTION          • ILEOSTOMY       Family History   Problem Relation Age of Onset   • Hypertension Mother    • Heart Failure Father    • Heart Attack Maternal Uncle      Social History     Socioeconomic History   • Marital status:      Spouse name: Not on file   • Number of children: Not on file   • Years of education: Not on file   • Highest education level: Not on file   Occupational History   • Not on file   Tobacco Use   • Smoking status: Former Smoker     Packs/day: 1.00     Years: 10.00     Pack years: 10.00     Types: Cigarettes     Quit date: 1984     Years since quittin.2   • Smokeless tobacco: Never Used   Substance and Sexual Activity   • Alcohol use: Yes     Alcohol/week: 12.6 oz     Types: 21 Shots of liquor per week     Comment: 1 beer and 1 scotch daily   • Drug use: Not Currently     Types: Marijuana   • Sexual activity: Not on file   Other Topics Concern   • Not on file   Social History Narrative   • Not on file     Social Determinants of Health     Financial Resource Strain:    • Difficulty of Paying Living Expenses:    Food Insecurity:    • Worried About Running Out of Food in the Last Year:    • Ran Out of Food in the Last Year:    Transportation Needs:    • Lack of Transportation (Medical):    • Lack of Transportation (Non-Medical):    Physical Activity:    • Days of Exercise per Week:    • Minutes of Exercise per Session:    Stress:    • Feeling of Stress :    Social Connections:    • Frequency of Communication with Friends and Family:    • Frequency of Social Gatherings with Friends and Family:    • Attends [employer] Services:    • Active Member of Clubs or Organizations:    • Attends Club or Organization Meetings:    • Marital Status:    Intimate Partner Violence:    • Fear of Current or Ex-Partner:    • Emotionally Abused:    •  "Physically Abused:    • Sexually Abused:      Allergies   Allergen Reactions   • Other Misc Unspecified     Silk sutures (body rejected them)   • Omnicef [Cefdinir]      Internal bleeding stated by patient       Current Outpatient Medications   Medication Sig Dispense Refill   • metoprolol SR (TOPROL XL) 25 MG TABLET SR 24 HR Take 1 tablet by mouth every day. 30 tablet 2   • montelukast (SINGULAIR) 10 MG Tab Take 10 mg by mouth every day. N THE EVENING     • meclizine (ANTIVERT) 25 MG Tab Take 25 mg by mouth 3 times a day as needed.     • propafenone (RYTHMOL) 300 MG tablet Take 300 mg by mouth every 8 hours.     • fluticasone (FLONASE) 50 MCG/ACT nasal spray Administer 1 Spray into affected nostril(S) every day.     • Multiple Vitamins-Minerals (PRESERVISION AREDS 2 PO) Take  by mouth 2 Times a Day.     • CALCIUM-VITAMIN D PO Take  by mouth.     • Lansoprazole (PREVACID PO) Take 15 mg by mouth every day.     • warfarin (COUMADIN) 5 MG Tab Take 1 Tab by mouth every Friday. As directed per Protimes. 30 Tab 0   • cetirizine (ZYRTEC) 10 MG TABS Take 10 mg by mouth every morning. Indications: **OTC**       No current facility-administered medications for this visit.     Review of Systems   Constitutional: Negative for chills, diaphoresis and fever.   Respiratory: Negative for cough, shortness of breath and wheezing.    Cardiovascular: Negative for chest pain, palpitations, orthopnea, claudication, leg swelling and PND.   Gastrointestinal: Negative for abdominal pain and blood in stool.   Genitourinary: Negative for hematuria.   Neurological: Negative for dizziness and loss of consciousness.     All others systems reviewed and negative.   Objective:   /88 (BP Location: Left arm, Patient Position: Sitting, BP Cuff Size: Adult)   Pulse 82   Ht 1.905 m (6' 3\")   Wt (!) 147 kg (323 lb)   SpO2 96%  Body mass index is 40.37 kg/m².  Physical Exam   Constitutional: He is oriented to person, place, and time. No " distress.   Neck: No JVD present.   Cardiovascular: Normal rate, regular rhythm and intact distal pulses.   No murmur heard.  Pulmonary/Chest: Effort normal and breath sounds normal. No respiratory distress. He has no wheezes. He has no rales. He exhibits no tenderness.   Musculoskeletal:         General: No edema.   Neurological: He is alert and oriented to person, place, and time.   Skin: Skin is warm and dry. He is not diaphoretic. No erythema.   Psychiatric: Mood, memory, affect and judgment normal.   Nursing note and vitals reviewed.    Cardiac Imaging and Procedures Review:    EKG 02/24/2021: Sinus rhythm    Echocardiogram (03/14/2018):   Mild concentric left ventricular hypertrophy.  Normal left ventricular size and systolic function.  Left ventricular ejection fraction is visually estimated to be 60%.  Normal diastolic function.  Normal inferior vena cava size and inspiratory collapse.  Estimated right ventricular systolic pressure  is 47 mmHg.    Chest X-Ray (02/11/2021):  FINDINGS:  The heart is normal in size.  No pulmonary infiltrates or consolidations are noted.  No pleural effusions are appreciated.    Labs (personally reviewed and notable for):   Lab Results   Component Value Date/Time    SODIUM 136 02/16/2021 11:22 AM    POTASSIUM 4.6 02/16/2021 11:22 AM    CHLORIDE 101 02/16/2021 11:22 AM    CO2 24 02/16/2021 11:22 AM    GLUCOSE 105 (H) 02/16/2021 11:22 AM    BUN 17 02/16/2021 11:22 AM    CREATININE 0.90 02/16/2021 11:22 AM    BUNCREATRAT 19 06/03/2013 04:03 PM      Lab Results   Component Value Date/Time    WBC 9.2 02/16/2021 11:22 AM    RBC 4.91 02/16/2021 11:22 AM    HEMOGLOBIN 16.4 02/16/2021 11:22 AM    HEMATOCRIT 47.8 02/16/2021 11:22 AM    MCV 97.4 02/16/2021 11:22 AM    MCH 33.4 (H) 02/16/2021 11:22 AM    MCHC 34.3 02/16/2021 11:22 AM    MPV 9.5 02/16/2021 11:22 AM    NEUTSPOLYS 68.40 02/16/2021 11:22 AM    LYMPHOCYTES 23.20 02/16/2021 11:22 AM    MONOCYTES 7.10 02/16/2021 11:22 AM     EOSINOPHILS 0.80 02/16/2021 11:22 AM    BASOPHILS 0.20 02/16/2021 11:22 AM      PT/INR:   Lab Results   Component Value Date/Time    PROTHROMBTM 27.8 (H) 12/05/2016 07:10 AM    INR 3.10 02/21/2021 12:00 AM   ]  Assessment:     1. Paroxysmal atrial fibrillation (HCC)  EKG    TSH WITH REFLEX TO FT4    CANCELED: EC-ECHOCARDIOGRAM COMPLETE W/O CONT   2. S/P ablation of atrial fibrillation     3. COLLAZO (dyspnea on exertion)  TSH WITH REFLEX TO FT4    Lipid Profile    CANCELED: EC-ECHOCARDIOGRAM COMPLETE W/O CONT    CANCELED: EC-ECHOCARDIOGRAM COMPLETE W/O CONT   4. Left ventricular hypertrophy     5. Essential hypertension, benign     6. History of pulmonary embolism and DVT, unprovoked.  CANCELED: EC-ECHOCARDIOGRAM COMPLETE W/O CONT   7. Obesity due to excess calories without serious comorbidity, unspecified classification  CANCELED: EC-ECHOCARDIOGRAM COMPLETE W/O CONT    CANCELED: EC-ECHOCARDIOGRAM COMPLETE W/O CONT   8. Bilateral lower extremity edema     9. Chronic anticoagulation       Medical Decision Making:  Today's Assessment / Status / Plan:   1. Paroxysmal Atrial Fibrillation  - Remains in sinus rhythm on metoprolol. Rare palpitations, denies sustained arrhythmias.   - Continued s/sx dyspnea, prior Hx PE remote past.  Prior CT with pulmonary nodules. Agree per Pulmonology, echo, PFTs, OPO. No prior hx CAD. F/U with pulmonology.   - Per prior echo (3/14/18) preserved LV function EF 60% with LVH and RVSP 47 mmHg with mild TR.  - On OAC with Coumadin, no s/sx bleeding, INR therapeutic, continue.  - Continue betablocker. Previously on Rythmol PRN.    - If continued s/sx and/or abnormal echo, could consideration for cardiac stress test.   - Encouraged to monitor BP/HRs, and call office if abnormal. Encouraged weight loss.   - Has Sandra, monitor. If increased s/sx, consider ZIO.   - Will recheck echo. Check lipids.    2. Hypertension  - Elevated BP, will switch to Toprol-XL 25 mg at bedtime. Recheck BP x 2 weeks.   -  Patient to monitor, will adjust accordingly. Consider addition of ACE or ARB.     3. Lower Extremity Edema  - Mild intermittent lower extremity edema, continue  compressions. Could consider addition of low diuretic PRN.    4. Chronic Anticoagulation: Couamdin    Plan reviewed in detail with the patient and he verbalizes understanding and is in agreement. RTC 4-6 weeks, sooner if clinical condition changes.     Thank you for allowing me to participate in this patients care.  Please contact me with any questions or concerns.     VARSHA Guerrero.   [facility]  [phone]

## 2021-02-24 NOTE — PATIENT INSTRUCTIONS
- Switch to Toprol XL 25 mg daily.   - Monitor Blood pressures, call if abnormal.   - Complete Echocardiogram, PFTs, and overnight pulse oxygen.   - Follow up with Pulmonology  - Complete fasting labs, will check thyroid and cholesterol levels.

## 2021-02-25 DIAGNOSIS — R06.09 DOE (DYSPNEA ON EXERTION): ICD-10-CM

## 2021-02-25 DIAGNOSIS — I48.0 PAROXYSMAL ATRIAL FIBRILLATION (HCC): ICD-10-CM

## 2021-03-02 ENCOUNTER — HOSPITAL ENCOUNTER (OUTPATIENT)
Dept: CARDIOLOGY | Facility: MEDICAL CENTER | Age: 71
End: 2021-03-02
Attending: NURSE PRACTITIONER
Payer: MEDICARE

## 2021-03-02 ENCOUNTER — HOSPITAL ENCOUNTER (OUTPATIENT)
Dept: LAB | Facility: MEDICAL CENTER | Age: 71
End: 2021-03-02
Attending: NURSE PRACTITIONER
Payer: MEDICARE

## 2021-03-02 DIAGNOSIS — R06.09 DOE (DYSPNEA ON EXERTION): ICD-10-CM

## 2021-03-02 DIAGNOSIS — I48.0 PAROXYSMAL ATRIAL FIBRILLATION (HCC): ICD-10-CM

## 2021-03-02 LAB
CHOLEST SERPL-MCNC: 188 MG/DL (ref 100–199)
FASTING STATUS PATIENT QL REPORTED: NORMAL
HDLC SERPL-MCNC: 48 MG/DL
LDLC SERPL CALC-MCNC: 115 MG/DL
LV EJECT FRACT  99904: 55
LV EJECT FRACT MOD 2C 99903: 51.14
LV EJECT FRACT MOD 4C 99902: 53
LV EJECT FRACT MOD BP 99901: 45.6
TRIGL SERPL-MCNC: 125 MG/DL (ref 0–149)
TSH SERPL DL<=0.005 MIU/L-ACNC: 1.09 UIU/ML (ref 0.38–5.33)

## 2021-03-02 PROCEDURE — 80061 LIPID PANEL: CPT | Mod: GA

## 2021-03-02 PROCEDURE — 93306 TTE W/DOPPLER COMPLETE: CPT | Mod: 26 | Performed by: INTERNAL MEDICINE

## 2021-03-02 PROCEDURE — 84443 ASSAY THYROID STIM HORMONE: CPT

## 2021-03-02 PROCEDURE — 93306 TTE W/DOPPLER COMPLETE: CPT

## 2021-03-02 PROCEDURE — 700117 HCHG RX CONTRAST REV CODE 255: Performed by: NURSE PRACTITIONER

## 2021-03-02 PROCEDURE — 36415 COLL VENOUS BLD VENIPUNCTURE: CPT | Mod: GA

## 2021-03-02 RX ADMIN — HUMAN ALBUMIN MICROSPHERES AND PERFLUTREN 3 ML: 10; .22 INJECTION, SOLUTION INTRAVENOUS at 15:13

## 2021-03-18 ENCOUNTER — ANTICOAGULATION MONITORING (OUTPATIENT)
Dept: CARDIOLOGY | Facility: MEDICAL CENTER | Age: 71
End: 2021-03-18

## 2021-03-18 ENCOUNTER — PATIENT MESSAGE (OUTPATIENT)
Dept: CARDIOLOGY | Facility: MEDICAL CENTER | Age: 71
End: 2021-03-18

## 2021-03-18 DIAGNOSIS — Z86.711 HISTORY OF PULMONARY EMBOLISM: ICD-10-CM

## 2021-03-18 LAB — INR PPP: 2.7 (ref 2–3.5)

## 2021-03-18 RX ORDER — METOPROLOL SUCCINATE 25 MG/1
25 TABLET, EXTENDED RELEASE ORAL DAILY
Qty: 100 TABLET | Refills: 0 | Status: SHIPPED | OUTPATIENT
Start: 2021-03-18 | End: 2021-05-11

## 2021-03-18 NOTE — PROGRESS NOTES
OP Telephone Anticoagulation Service Note    Date: 3/18/2021      Anticoagulation Summary  As of 3/18/2021    INR goal:  2.0-3.0   TTR:  62.5 % (5.8 y)   INR used for dosin.70 (3/18/2021)   Warfarin maintenance plan:  5 mg (5 mg x 1) every Mon, Fri; 2.5 mg (5 mg x 0.5) all other days   Weekly warfarin total:  22.5 mg   Plan last modified:  Kyree Al, PharmD (2020)   Next INR check:  2021   Target end date:  Indefinite    Indications    History of pulmonary embolism and DVT  unprovoked. [Z86.711]  Atrial fibrillation with RVR (HCC) (Resolved) [I48.91]             Anticoagulation Episode Summary     INR check location:      Preferred lab:      Send INR reminders to:      Comments:  Alverto      Anticoagulation Care Providers     Provider Role Specialty Phone number    [facility] Responsible  [phone]        Anticoagulation Patient Findings      INR therapeutic at 2.7.  Spoke w/ pt on phone.  Verified regimen w/ pt.  Instructed pt to continue on with current regimen.  NO s/s bleeding reported per pt.  NO changes in diet reported per pt.  NO changes in medications reported per pt.  Check INR in 2 week(s).  Instructed pt to call clinic at [phone] if there are any questions.  Pt stated understanding.    Pt is on antiplatelet therapy.    Rodney Kumar, YoniD

## 2021-04-07 ENCOUNTER — OFFICE VISIT (OUTPATIENT)
Dept: CARDIOLOGY | Facility: MEDICAL CENTER | Age: 71
End: 2021-04-07
Payer: MEDICARE

## 2021-04-07 VITALS
OXYGEN SATURATION: 95 % | DIASTOLIC BLOOD PRESSURE: 72 MMHG | SYSTOLIC BLOOD PRESSURE: 134 MMHG | HEART RATE: 74 BPM | HEIGHT: 75 IN | WEIGHT: 315 LBS | BODY MASS INDEX: 39.17 KG/M2

## 2021-04-07 DIAGNOSIS — E78.5 DYSLIPIDEMIA: ICD-10-CM

## 2021-04-07 DIAGNOSIS — R06.09 DOE (DYSPNEA ON EXERTION): ICD-10-CM

## 2021-04-07 DIAGNOSIS — Z86.711 HISTORY OF PULMONARY EMBOLISM: ICD-10-CM

## 2021-04-07 DIAGNOSIS — I51.7 LEFT VENTRICULAR HYPERTROPHY: ICD-10-CM

## 2021-04-07 DIAGNOSIS — Z86.79 S/P ABLATION OF ATRIAL FIBRILLATION: ICD-10-CM

## 2021-04-07 DIAGNOSIS — Z79.01 CHRONIC ANTICOAGULATION: ICD-10-CM

## 2021-04-07 DIAGNOSIS — I48.0 PAROXYSMAL ATRIAL FIBRILLATION (HCC): ICD-10-CM

## 2021-04-07 DIAGNOSIS — R60.0 BILATERAL LOWER EXTREMITY EDEMA: ICD-10-CM

## 2021-04-07 DIAGNOSIS — E66.09 OBESITY DUE TO EXCESS CALORIES WITHOUT SERIOUS COMORBIDITY, UNSPECIFIED CLASSIFICATION: ICD-10-CM

## 2021-04-07 DIAGNOSIS — I10 ESSENTIAL HYPERTENSION, BENIGN: ICD-10-CM

## 2021-04-07 DIAGNOSIS — Z79.899 HIGH RISK MEDICATION USE: ICD-10-CM

## 2021-04-07 DIAGNOSIS — Z98.890 S/P ABLATION OF ATRIAL FIBRILLATION: ICD-10-CM

## 2021-04-07 DIAGNOSIS — E66.01 CLASS 3 SEVERE OBESITY WITHOUT SERIOUS COMORBIDITY IN ADULT, UNSPECIFIED BMI, UNSPECIFIED OBESITY TYPE (HCC): ICD-10-CM

## 2021-04-07 DIAGNOSIS — I10 ESSENTIAL HYPERTENSION: ICD-10-CM

## 2021-04-07 PROCEDURE — 93000 ELECTROCARDIOGRAM COMPLETE: CPT | Performed by: INTERNAL MEDICINE

## 2021-04-07 PROCEDURE — 99214 OFFICE O/P EST MOD 30 MIN: CPT | Performed by: NURSE PRACTITIONER

## 2021-04-07 ASSESSMENT — ENCOUNTER SYMPTOMS
BLOOD IN STOOL: 0
CLAUDICATION: 0
ABDOMINAL PAIN: 0
ORTHOPNEA: 0
WHEEZING: 0
DIAPHORESIS: 0
DIZZINESS: 0
SHORTNESS OF BREATH: 1
PND: 0
LOSS OF CONSCIOUSNESS: 0
FEVER: 0
COUGH: 0
PALPITATIONS: 0
CHILLS: 0

## 2021-04-07 ASSESSMENT — FIBROSIS 4 INDEX: FIB4 SCORE: 2.18

## 2021-04-07 NOTE — LETTER
[facility]   [address], [facility] [address]  [place] [address]  Phone: [phone]  Fax: [phone]              Abel Day III  1950    Encounter Date: 4/7/2021    SHON Guerrero          PROGRESS NOTE:  Cardiology/Electrophysiology Follow-up Note    Subjective:   Chief Complaint:   Chief Complaint   Patient presents with   • Atrial Fibrillation     F/V Dx: Paroxysmal atrial fibrillation (HCC)     Abel Day III is a pleasant 71 y.o. male who presents today for follow up for Paroxysmal atrial fibrillation, on metoprolol.    He is followed by Dr. Yeboah and was last seen myself on 02/24/21. History of PAF s/p ablations x 2 by Dr. Gonsales with PVI and PAC ablation from other location. Occasional AF, stable on metoprolol. Rythmol as needed. Has Kardia. On chronic anticoagulation with Coumadin.     Recent ongoing shortness of breath over past few months. No prior hx of CAD. History of PE and CT with pulmonary nodules in remote past. CXR negative. He was referred to pulmonology/sleep medicine for further evaluation. Echo, OPO, and PFTs recommended. His repeat echocardiogram shows preserved LV function, EF 55% and mild pulmonary edema, RSVP slightly improved from prior.     Today in follow up, he reports mild improvement in shortness of breath. Denies arrhythmias. On metoprolol. He denies chest pain, dizziness, palpitations, pre syncope or syncope, or orthopnea. Continued mild intermittent lower extremity edema, wears compression stockings, stable. Denies any signs or symptoms of bleeding or bleeding issues. Patient endorses medication compliance.     Recent weight loss through improved diet and physical activity.  He was been walking, tolerating well with some improvement in exertional dyspnea.   Scheduled to follow up with Pulmonology later this month.      Past Medical History:   Diagnosis Date   • Cancer (HCC)     melanoma   • GERD  (gastroesophageal reflux disease)    • Hemorrhagic disorder     on coumadin   • Obesity    • Paroxysmal atrial fibrillation (HCC) 2012   • Skin cancer     basil cell   • ULCERATIVE COLITIS 2012     Past Surgical History:   Procedure Laterality Date   • RECOVERY  2016    Procedure: [facility]-EPS ABLATION/AFIB [id number] I48.0;  Surgeon: Recoveryonly Surgery;  Location: SURGERY PRE-POST PROC UNIT [facility];  Service:    • MASS EXCISION GENERAL Left 2016    Procedure: MASS EXCISION GENERAL FOR TEMPORAL MELANOMA;  Surgeon: Feng Sharpe M.D.;  Location: SURGERY SAME DAY [facility];  Service:    • FLAP GRAFT Left 2016    Procedure: FLAP GRAFT FOR CLOSURE WITH LOCAL FLAPS;  Surgeon: Feng Sharpe M.D.;  Location: SURGERY SAME DAY [facility];  Service:    • COLON RESECTION          • ILEOSTOMY       Family History   Problem Relation Age of Onset   • Hypertension Mother    • Heart Failure Father    • Heart Attack Maternal Uncle      Social History     Socioeconomic History   • Marital status:      Spouse name: Not on file   • Number of children: Not on file   • Years of education: Not on file   • Highest education level: Not on file   Occupational History   • Not on file   Tobacco Use   • Smoking status: Former Smoker     Packs/day: 1.00     Years: 10.00     Pack years: 10.00     Types: Cigarettes     Quit date: 1984     Years since quittin.3   • Smokeless tobacco: Never Used   Substance and Sexual Activity   • Alcohol use: Yes     Alcohol/week: 12.6 oz     Types: 21 Shots of liquor per week     Comment: 1 beer and 1 scotch daily   • Drug use: Not Currently     Types: Marijuana   • Sexual activity: Not on file   Other Topics Concern   • Not on file   Social History Narrative   • Not on file     Social Determinants of Health     Financial Resource Strain:    • Difficulty of Paying Living Expenses:    Food Insecurity:    • Worried About Running Out  of Food in the Last Year:    • Ran Out of Food in the Last Year:    Transportation Needs:    • Lack of Transportation (Medical):    • Lack of Transportation (Non-Medical):    Physical Activity:    • Days of Exercise per Week:    • Minutes of Exercise per Session:    Stress:    • Feeling of Stress :    Social Connections:    • Frequency of Communication with Friends and Family:    • Frequency of Social Gatherings with Friends and Family:    • Attends Jain Services:    • Active Member of Clubs or Organizations:    • Attends Club or Organization Meetings:    • Marital Status:    Intimate Partner Violence:    • Fear of Current or Ex-Partner:    • Emotionally Abused:    • Physically Abused:    • Sexually Abused:      Allergies   Allergen Reactions   • Other Misc Unspecified     Silk sutures (body rejected them)   • Omnicef [Cefdinir]      Internal bleeding stated by patient       Current Outpatient Medications   Medication Sig Dispense Refill   • metoprolol SR (TOPROL XL) 25 MG TABLET SR 24 HR Take 1 tablet by mouth every day. 100 tablet 0   • montelukast (SINGULAIR) 10 MG Tab Take 10 mg by mouth every day. N THE EVENING     • meclizine (ANTIVERT) 25 MG Tab Take 25 mg by mouth 3 times a day as needed.     • propafenone (RYTHMOL) 300 MG tablet Take 300 mg by mouth every 8 hours.     • fluticasone (FLONASE) 50 MCG/ACT nasal spray Administer 1 Spray into affected nostril(S) every day.     • Multiple Vitamins-Minerals (PRESERVISION AREDS 2 PO) Take  by mouth every day.     • CALCIUM-VITAMIN D PO Take  by mouth.     • Lansoprazole (PREVACID PO) Take 15 mg by mouth every day.     • warfarin (COUMADIN) 5 MG Tab Take 1 Tab by mouth every Friday. As directed per Protimes. 30 Tab 0   • cetirizine (ZYRTEC) 10 MG TABS Take 10 mg by mouth every morning. Indications: **OTC**       No current facility-administered medications for this visit.     Review of Systems   Constitutional: Negative for chills, diaphoresis and fever.    "  Respiratory: Positive for shortness of breath. Negative for cough and wheezing.    Cardiovascular: Positive for leg swelling. Negative for chest pain, palpitations, orthopnea, claudication and PND.   Gastrointestinal: Negative for abdominal pain and blood in stool.   Genitourinary: Negative for hematuria.   Neurological: Negative for dizziness and loss of consciousness.     All others systems reviewed and negative.   Objective:   /72 (BP Location: Left arm, Patient Position: Sitting, BP Cuff Size: Adult)   Pulse 74   Ht 1.905 m (6' 3\")   Wt (!) 144 kg (317 lb)   SpO2 95%  Body mass index is 39.62 kg/m².     Physical Exam   Constitutional: He is oriented to person, place, and time. No distress.   Neck: No JVD present.   Cardiovascular: Normal rate, regular rhythm and intact distal pulses.   No murmur heard.  Pulmonary/Chest: Effort normal and breath sounds normal. No respiratory distress. He has no wheezes. He has no rales. He exhibits no tenderness.   Musculoskeletal:         General: Edema present.   Neurological: He is alert and oriented to person, place, and time.   Skin: Skin is warm and dry. He is not diaphoretic. No erythema.   Psychiatric: Mood, memory, affect and judgment normal.   Vitals reviewed.    Cardiac Imaging and Procedures Review:    EKG 04/07/2021: Sinus rhythm    Echocardiogram (03/02/2021):   Technically difficult study - adequate information is obtained. Grossly normal left ventricular systolic function. Left ventricular ejection fraction is visually estimated to be 55%. Wall motion not well visualized even with contrast, however appeared grossly normal.  Indeterminate diastolic function.  Mild concentric left ventricular hypertrophy.  Normal right ventricular size and systolic function.  No significant valvular disease noted.   Normal estimated right atrial pressure.   Mild pulmonary hypertension, estimated PASP 40mmHg.   Compared to the images of the prior study done on 03/14/18: " the estimated PASP has decreased from 47mmHg to 40mmHg.    Echocardiogram (03/14/2018):   Mild concentric left ventricular hypertrophy.  Normal left ventricular size and systolic function.  Left ventricular ejection fraction is visually estimated to be 60%.  Normal diastolic function.  Normal inferior vena cava size and inspiratory collapse.  Estimated right ventricular systolic pressure  is 47 mmHg.    Chest X-Ray (02/11/2021):  FINDINGS:  The heart is normal in size.  No pulmonary infiltrates or consolidations are noted.  No pleural effusions are appreciated.    Labs (personally reviewed and notable for):   Lab Results   Component Value Date/Time    SODIUM 136 02/16/2021 11:22 AM    POTASSIUM 4.6 02/16/2021 11:22 AM    CHLORIDE 101 02/16/2021 11:22 AM    CO2 24 02/16/2021 11:22 AM    GLUCOSE 105 (H) 02/16/2021 11:22 AM    BUN 17 02/16/2021 11:22 AM    CREATININE 0.90 02/16/2021 11:22 AM    BUNCREATRAT 19 06/03/2013 04:03 PM      Lab Results   Component Value Date/Time    WBC 9.2 02/16/2021 11:22 AM    RBC 4.91 02/16/2021 11:22 AM    HEMOGLOBIN 16.4 02/16/2021 11:22 AM    HEMATOCRIT 47.8 02/16/2021 11:22 AM    MCV 97.4 02/16/2021 11:22 AM    MCH 33.4 (H) 02/16/2021 11:22 AM    MCHC 34.3 02/16/2021 11:22 AM    MPV 9.5 02/16/2021 11:22 AM    NEUTSPOLYS 68.40 02/16/2021 11:22 AM    LYMPHOCYTES 23.20 02/16/2021 11:22 AM    MONOCYTES 7.10 02/16/2021 11:22 AM    EOSINOPHILS 0.80 02/16/2021 11:22 AM    BASOPHILS 0.20 02/16/2021 11:22 AM      PT/INR:   Lab Results   Component Value Date/Time    PROTHROMBTM 27.8 (H) 12/05/2016 07:10 AM    INR 2.70 03/18/2021 12:00 AM   ]  Assessment:     1. Paroxysmal atrial fibrillation (HCC)  EKG   2. S/P ablation of atrial fibrillation     3. Left ventricular hypertrophy     4. Essential hypertension     5. COLLAZO (dyspnea on exertion)  CANCELED: NM-CARDIAC STRESS TEST   6. History of pulmonary embolism and DVT, unprovoked.     7. High risk medication use- Propafenone     8. Dyslipidemia   Lipid Profile   9. Bilateral lower extremity edema     10. Class 3 severe obesity without serious comorbidity in adult, unspecified BMI, unspecified obesity type (HCC)     11. Chronic anticoagulation       Medical Decision Making:  Today's Assessment / Status / Plan:   1. Paroxysmal Atrial Fibrillation  - Remains in sinus rhythm without evidence of arrhythmia recurrence on Toprol XL 25 mg daily, continue. Previously on Rythmol PRN.    - BP/weight improved/stable. Euthyroid. TSH 1.09 (3/2/21).  - On OAC with Coumadin, no s/sx bleeding, followed by AC clinic, continue.   - Encouraged healthy diet and exercise, monitor BP/HRs, and call office if abnormal. Has Kardia.     2. Shortness of Breath  - Continued s/sx dyspnea, mild improvement.   - Hx PE remote past. Prior CT with pulmonary nodules.  - No prior hx CAD. Denies CP.   - Repeat Echo (03/02/21) shows preserved LV function, EF 55%. Mild LVH, RSVP improved 40 mmHg. Mild TR. No significant valvular abnormalities.  - PFTs with evidence of decreased lung capacity.   - Abnormal OPO, will need sleep study.   - Given continued s/sx with elevated risk factors, will check cardiac PET stress test.   - F/U with Pulmonology as scheduled.     3. Dyslipidemia  - , not on statin. ASCVD risk 23.6%. Recommendation for life style modifications and moderate-high intensity statin.   - Discussed risks/benefits or medical therapy/statin. Patient wishes to pursue LSM at this time. Recheck lipids 3-4 months. F/U PCP for continued management. .     2. Hypertension  - BP improved/stable on long-acting Toprol-XL 25 mg at bedtime.    - Patient to monitor. Consider addition of ACE or ARB in future.     3. Lower Extremity Edema  - Mild intermittent lower extremity edema, stable. Continue  compressions.   - Could consider addition of low diuretic PRN.    4. Chronic Anticoagulation: Julio Cesar    Plan reviewed in detail with the patient and he verbalizes understanding and is in agreement.  RTC 3 months with Dr. Yeboah, sooner if clinical condition changes.     Thank you for allowing me to participate in this patients care.  Please contact me with any questions or concerns.     VARSHA Guerrero.   [facility]  [phone] - [phone]        Robb Yeboah M.D.  [address]  [address]  [facility] [address]  Via In Basket

## 2021-04-07 NOTE — PROGRESS NOTES
Cardiology/Electrophysiology Follow-up Note    Subjective:   Chief Complaint:   Chief Complaint   Patient presents with   • Atrial Fibrillation     F/V Dx: Paroxysmal atrial fibrillation (HCC)     Abel Day III is a pleasant 71 y.o. male who presents today for follow up for Paroxysmal atrial fibrillation, on metoprolol.    He is followed by Dr. Yeboah and was last seen myself on 02/24/21. History of PAF s/p ablations x 2 by Dr. Gonsales with PVI and PAC ablation from other location. Occasional AF, stable on metoprolol. Rythmol as needed. Has Kardia. On chronic anticoagulation with Coumadin.     Recent ongoing shortness of breath over past few months. No prior hx of CAD. History of PE and CT with pulmonary nodules in remote past. CXR negative. He was referred to pulmonology/sleep medicine for further evaluation. Echo, OPO, and PFTs recommended. His repeat echocardiogram shows preserved LV function, EF 55% and mild pulmonary edema, RSVP slightly improved from prior.     Today in follow up, he reports mild improvement in shortness of breath. Denies arrhythmias. On metoprolol. He denies chest pain, dizziness, palpitations, pre syncope or syncope, or orthopnea. Continued mild intermittent lower extremity edema, wears compression stockings, stable. Denies any signs or symptoms of bleeding or bleeding issues. Patient endorses medication compliance.     Recent weight loss through improved diet and physical activity.  He was been walking, tolerating well with some improvement in exertional dyspnea.   Scheduled to follow up with Pulmonology later this month.      Past Medical History:   Diagnosis Date   • Cancer (HCC)     melanoma   • GERD (gastroesophageal reflux disease)    • Hemorrhagic disorder     on coumadin   • Obesity    • Paroxysmal atrial fibrillation (HCC) 11/26/2012   • Skin cancer     basil cell   • ULCERATIVE COLITIS 11/26/2012     Past Surgical History:   Procedure Laterality Date   • RECOVERY  5/4/2016     Procedure: [facility]-EPS ABLATION/AFIB [id number] I48.0;  Surgeon: Recoveryonly Surgery;  [place] [facility] [place]  Service:    • MASS EXCISION GENERAL Left 2016    Procedure: MASS EXCISION GENERAL FOR TEMPORAL MELANOMA;  Surgeon: Feng Sharpe M.D.;  Location: SURGERY SAME DAY [facility];  Service:    • FLAP GRAFT Left 2016    Procedure: FLAP GRAFT FOR CLOSURE WITH LOCAL FLAPS;  Surgeon: Feng Sharpe M.D.;  Location: SURGERY SAME DAY [facility];  Service:    • COLON RESECTION          • ILEOSTOMY       Family History   Problem Relation Age of Onset   • Hypertension Mother    • Heart Failure Father    • Heart Attack Maternal Uncle      Social History     Socioeconomic History   • Marital status:      Spouse name: Not on file   • Number of children: Not on file   • Years of education: Not on file   • Highest education level: Not on file   Occupational History   • Not on file   Tobacco Use   • Smoking status: Former Smoker     Packs/day: 1.00     Years: 10.00     Pack years: 10.00     Types: Cigarettes     Quit date: 1984     Years since quittin.3   • Smokeless tobacco: Never Used   Substance and Sexual Activity   • Alcohol use: Yes     Alcohol/week: 12.6 oz     Types: 21 Shots of liquor per week     Comment: 1 beer and 1 scotch daily   • Drug use: Not Currently     Types: Marijuana   • Sexual activity: Not on file   Other Topics Concern   • Not on file   Social History Narrative   • Not on file     Social Determinants of Health     Financial Resource Strain:    • Difficulty of Paying Living Expenses:    Food Insecurity:    • Worried About Running Out of Food in the Last Year:    • Ran Out of Food in the Last Year:    Transportation Needs:    • Lack of Transportation (Medical):    • Lack of Transportation (Non-Medical):    Physical Activity:    • Days of Exercise per Week:    • Minutes of Exercise per Session:    Stress:    •  Feeling of Stress :    Social Connections:    • Frequency of Communication with Friends and Family:    • Frequency of Social Gatherings with Friends and Family:    • Attends Temple Services:    • Active Member of Clubs or Organizations:    • Attends Club or Organization Meetings:    • Marital Status:    Intimate Partner Violence:    • Fear of Current or Ex-Partner:    • Emotionally Abused:    • Physically Abused:    • Sexually Abused:      Allergies   Allergen Reactions   • Other Misc Unspecified     Silk sutures (body rejected them)   • Omnicef [Cefdinir]      Internal bleeding stated by patient       Current Outpatient Medications   Medication Sig Dispense Refill   • metoprolol SR (TOPROL XL) 25 MG TABLET SR 24 HR Take 1 tablet by mouth every day. 100 tablet 0   • montelukast (SINGULAIR) 10 MG Tab Take 10 mg by mouth every day. N THE EVENING     • meclizine (ANTIVERT) 25 MG Tab Take 25 mg by mouth 3 times a day as needed.     • propafenone (RYTHMOL) 300 MG tablet Take 300 mg by mouth every 8 hours.     • fluticasone (FLONASE) 50 MCG/ACT nasal spray Administer 1 Spray into affected nostril(S) every day.     • Multiple Vitamins-Minerals (PRESERVISION AREDS 2 PO) Take  by mouth every day.     • CALCIUM-VITAMIN D PO Take  by mouth.     • Lansoprazole (PREVACID PO) Take 15 mg by mouth every day.     • warfarin (COUMADIN) 5 MG Tab Take 1 Tab by mouth every Friday. As directed per Protimes. 30 Tab 0   • cetirizine (ZYRTEC) 10 MG TABS Take 10 mg by mouth every morning. Indications: **OTC**       No current facility-administered medications for this visit.     Review of Systems   Constitutional: Negative for chills, diaphoresis and fever.   Respiratory: Positive for shortness of breath. Negative for cough and wheezing.    Cardiovascular: Positive for leg swelling. Negative for chest pain, palpitations, orthopnea, claudication and PND.   Gastrointestinal: Negative for abdominal pain and blood in stool.   Genitourinary:  "Negative for hematuria.   Neurological: Negative for dizziness and loss of consciousness.     All others systems reviewed and negative.   Objective:   /72 (BP Location: Left arm, Patient Position: Sitting, BP Cuff Size: Adult)   Pulse 74   Ht 1.905 m (6' 3\")   Wt (!) 144 kg (317 lb)   SpO2 95%  Body mass index is 39.62 kg/m².     Physical Exam   Constitutional: He is oriented to person, place, and time. No distress.   Neck: No JVD present.   Cardiovascular: Normal rate, regular rhythm and intact distal pulses.   No murmur heard.  Pulmonary/Chest: Effort normal and breath sounds normal. No respiratory distress. He has no wheezes. He has no rales. He exhibits no tenderness.   Musculoskeletal:         General: Edema present.   Neurological: He is alert and oriented to person, place, and time.   Skin: Skin is warm and dry. He is not diaphoretic. No erythema.   Psychiatric: Mood, memory, affect and judgment normal.   Vitals reviewed.    Cardiac Imaging and Procedures Review:    EKG 04/07/2021: Sinus rhythm    Echocardiogram (03/02/2021):   Technically difficult study - adequate information is obtained. Grossly normal left ventricular systolic function. Left ventricular ejection fraction is visually estimated to be 55%. Wall motion not well visualized even with contrast, however appeared grossly normal.  Indeterminate diastolic function.  Mild concentric left ventricular hypertrophy.  Normal right ventricular size and systolic function.  No significant valvular disease noted.   Normal estimated right atrial pressure.   Mild pulmonary hypertension, estimated PASP 40mmHg.   Compared to the images of the prior study done on 03/14/18: the estimated PASP has decreased from 47mmHg to 40mmHg.    Echocardiogram (03/14/2018):   Mild concentric left ventricular hypertrophy.  Normal left ventricular size and systolic function.  Left ventricular ejection fraction is visually estimated to be 60%.  Normal diastolic " function.  Normal inferior vena cava size and inspiratory collapse.  Estimated right ventricular systolic pressure  is 47 mmHg.    Chest X-Ray (02/11/2021):  FINDINGS:  The heart is normal in size.  No pulmonary infiltrates or consolidations are noted.  No pleural effusions are appreciated.    Labs (personally reviewed and notable for):   Lab Results   Component Value Date/Time    SODIUM 136 02/16/2021 11:22 AM    POTASSIUM 4.6 02/16/2021 11:22 AM    CHLORIDE 101 02/16/2021 11:22 AM    CO2 24 02/16/2021 11:22 AM    GLUCOSE 105 (H) 02/16/2021 11:22 AM    BUN 17 02/16/2021 11:22 AM    CREATININE 0.90 02/16/2021 11:22 AM    BUNCREATRAT 19 06/03/2013 04:03 PM      Lab Results   Component Value Date/Time    WBC 9.2 02/16/2021 11:22 AM    RBC 4.91 02/16/2021 11:22 AM    HEMOGLOBIN 16.4 02/16/2021 11:22 AM    HEMATOCRIT 47.8 02/16/2021 11:22 AM    MCV 97.4 02/16/2021 11:22 AM    MCH 33.4 (H) 02/16/2021 11:22 AM    MCHC 34.3 02/16/2021 11:22 AM    MPV 9.5 02/16/2021 11:22 AM    NEUTSPOLYS 68.40 02/16/2021 11:22 AM    LYMPHOCYTES 23.20 02/16/2021 11:22 AM    MONOCYTES 7.10 02/16/2021 11:22 AM    EOSINOPHILS 0.80 02/16/2021 11:22 AM    BASOPHILS 0.20 02/16/2021 11:22 AM      PT/INR:   Lab Results   Component Value Date/Time    PROTHROMBTM 27.8 (H) 12/05/2016 07:10 AM    INR 2.70 03/18/2021 12:00 AM   ]  Assessment:     1. Paroxysmal atrial fibrillation (HCC)  EKG   2. S/P ablation of atrial fibrillation     3. Left ventricular hypertrophy     4. Essential hypertension     5. COLLAZO (dyspnea on exertion)  CANCELED: NM-CARDIAC STRESS TEST   6. History of pulmonary embolism and DVT, unprovoked.     7. High risk medication use- Propafenone     8. Dyslipidemia  Lipid Profile   9. Bilateral lower extremity edema     10. Class 3 severe obesity without serious comorbidity in adult, unspecified BMI, unspecified obesity type (HCC)     11. Chronic anticoagulation       Medical Decision Making:  Today's Assessment / Status / Plan:   1.  Paroxysmal Atrial Fibrillation  - Remains in sinus rhythm without evidence of arrhythmia recurrence on Toprol XL 25 mg daily, continue. Previously on Rythmol PRN.    - BP/weight improved/stable. Euthyroid. TSH 1.09 (3/2/21).  - On OAC with Coumadin, no s/sx bleeding, followed by AC clinic, continue.   - Encouraged healthy diet and exercise, monitor BP/HRs, and call office if abnormal. Has Kardia.     2. Shortness of Breath  - Continued s/sx dyspnea, mild improvement.   - Hx PE remote past. Prior CT with pulmonary nodules.  - No prior hx CAD. Denies CP.   - Repeat Echo (03/02/21) shows preserved LV function, EF 55%. Mild LVH, RSVP improved 40 mmHg. Mild TR. No significant valvular abnormalities.  - PFTs with evidence of decreased lung capacity.   - Abnormal OPO, will need sleep study.   - Given continued s/sx with elevated risk factors, will check cardiac PET stress test.   - F/U with Pulmonology as scheduled.     3. Dyslipidemia  - , not on statin. ASCVD risk 23.6%. Recommendation for life style modifications and moderate-high intensity statin.   - Discussed risks/benefits or medical therapy/statin. Patient wishes to pursue LSM at this time. Recheck lipids 3-4 months. F/U PCP for continued management. .     2. Hypertension  - BP improved/stable on long-acting Toprol-XL 25 mg at bedtime.    - Patient to monitor. Consider addition of ACE or ARB in future.     3. Lower Extremity Edema  - Mild intermittent lower extremity edema, stable. Continue  compressions.   - Could consider addition of low diuretic PRN.    4. Chronic Anticoagulation: Couamdin    Plan reviewed in detail with the patient and he verbalizes understanding and is in agreement. RTC 3 months with Dr. Yeboah, sooner if clinical condition changes.     Thank you for allowing me to participate in this patients care.  Please contact me with any questions or concerns.     VARSHA Guerrero.   [facility]  [phone] -  [phone]

## 2021-04-08 LAB — EKG IMPRESSION: NORMAL

## 2021-04-09 ENCOUNTER — TELEPHONE (OUTPATIENT)
Dept: CARDIOLOGY | Facility: MEDICAL CENTER | Age: 71
End: 2021-04-09

## 2021-04-09 DIAGNOSIS — E78.5 DYSLIPIDEMIA: ICD-10-CM

## 2021-04-09 DIAGNOSIS — R06.02 SOB (SHORTNESS OF BREATH): ICD-10-CM

## 2021-04-09 DIAGNOSIS — Z79.899 HIGH RISK MEDICATION USE: ICD-10-CM

## 2021-04-09 DIAGNOSIS — Z91.89 AT INCREASED RISK FOR CARDIOVASCULAR DISEASE: ICD-10-CM

## 2021-04-09 NOTE — TELEPHONE ENCOUNTER
To Homa Elias APRN: NM Cardiac PET not covered with diagnosis ordered under, do you want an alternate test?

## 2021-04-10 NOTE — TELEPHONE ENCOUNTER
For future, switch to Nuclear cardiac stress test. Order placed.    Thanks,     SHON Guerrero   [facility]  [phone]

## 2021-04-16 ENCOUNTER — HOSPITAL ENCOUNTER (OUTPATIENT)
Dept: RADIOLOGY | Facility: MEDICAL CENTER | Age: 71
End: 2021-04-16
Attending: NURSE PRACTITIONER
Payer: MEDICARE

## 2021-04-16 ENCOUNTER — ANTICOAGULATION MONITORING (OUTPATIENT)
Dept: VASCULAR LAB | Facility: MEDICAL CENTER | Age: 71
End: 2021-04-16

## 2021-04-16 DIAGNOSIS — R06.02 SOB (SHORTNESS OF BREATH): ICD-10-CM

## 2021-04-16 DIAGNOSIS — Z79.899 HIGH RISK MEDICATION USE: ICD-10-CM

## 2021-04-16 DIAGNOSIS — Z91.89 AT INCREASED RISK FOR CARDIOVASCULAR DISEASE: ICD-10-CM

## 2021-04-16 DIAGNOSIS — E78.5 DYSLIPIDEMIA: ICD-10-CM

## 2021-04-16 DIAGNOSIS — Z86.711 HISTORY OF PULMONARY EMBOLISM: ICD-10-CM

## 2021-04-16 LAB — INR PPP: 2.5 (ref 2–3.5)

## 2021-04-16 PROCEDURE — A9502 TC99M TETROFOSMIN: HCPCS

## 2021-04-16 PROCEDURE — 93018 CV STRESS TEST I&R ONLY: CPT | Performed by: INTERNAL MEDICINE

## 2021-04-16 PROCEDURE — 700111 HCHG RX REV CODE 636 W/ 250 OVERRIDE (IP)

## 2021-04-16 PROCEDURE — 78452 HT MUSCLE IMAGE SPECT MULT: CPT | Mod: 26 | Performed by: INTERNAL MEDICINE

## 2021-04-16 RX ORDER — REGADENOSON 0.08 MG/ML
INJECTION, SOLUTION INTRAVENOUS
Status: COMPLETED
Start: 2021-04-16 | End: 2021-04-16

## 2021-04-16 RX ADMIN — REGADENOSON 0.4 MG: 0.08 INJECTION, SOLUTION INTRAVENOUS at 09:39

## 2021-04-16 NOTE — PROCEDURES
Nursing care plan includes knowledge deficit, potential for discomfort, potential for compromised cardiac output. POC includes teaching, comfort measures and reassurance, and access to code cart, cardiology stand by and availability of rapid response team. Pt verbalizes good understanding of benefits and risks of pharmacological cardiac stress test. Informed consent obtained. Lexiscan given, pt developed the following symptoms SOB, flushing and hypertension. Baseline EKG shows NSR with PVC's present. NO significant changes noted throughout exam.  VS stable,  symptoms resolved. To waiting room, caffeinated fluids and/or snacks given. Nursing goals met.

## 2021-04-16 NOTE — PROGRESS NOTES
Anticoagulation Summary  As of 2021    INR goal:  2.0-3.0   TTR:  63.1 % (5.9 y)   INR used for dosin.50 (2021)   Warfarin maintenance plan:  5 mg (5 mg x 1) every Mon, Fri; 2.5 mg (5 mg x 0.5) all other days   Weekly warfarin total:  22.5 mg   No change documented:  [facility]   Plan last modified:  Yoni GoreD (2020)   Next INR check:  2021   Target end date:  Indefinite    Indications    History of pulmonary embolism and DVT  unprovoked. [Z86.711]  Atrial fibrillation with RVR (HCC) (Resolved) [I48.91]             Anticoagulation Episode Summary     INR check location:      Preferred lab:      Send INR reminders to:      Comments:  Alverto      Anticoagulation Care Providers     Provider Role Specialty Phone number    [facility] Responsible  [phone]        Anticoagulation Patient Findings  Patient Findings     Negatives:  Signs/symptoms of thrombosis, Signs/symptoms of bleeding, Laboratory test error suspected, Change in health, Change in alcohol use, Change in activity, Upcoming invasive procedure, Emergency department visit, Upcoming dental procedure, Missed doses, Extra doses, Change in medications, Change in diet/appetite, Hospital admission, Bruising, Other complaints        Spoke with patient to report a therapeutic INR.  Pt instructed to continue with current warfarin dosing regimen. Pt denies any s/s of bleeding, bruising, clotting or any changes to diet or medication.  Will follow up in 2 weeks.    Jen Bhatt Med Ass't     I have reviewed and am in agreement with the above stated plan on 21.  Daniel Rutledge, PharmD, BCACP

## 2021-04-28 ENCOUNTER — OFFICE VISIT (OUTPATIENT)
Dept: SLEEP MEDICINE | Facility: MEDICAL CENTER | Age: 71
End: 2021-04-28
Payer: MEDICARE

## 2021-04-28 VITALS
BODY MASS INDEX: 39.62 KG/M2 | SYSTOLIC BLOOD PRESSURE: 142 MMHG | WEIGHT: 315 LBS | RESPIRATION RATE: 16 BRPM | DIASTOLIC BLOOD PRESSURE: 78 MMHG | OXYGEN SATURATION: 96 % | HEART RATE: 77 BPM | TEMPERATURE: 97.3 F

## 2021-04-28 DIAGNOSIS — G47.34 NOCTURNAL HYPOXIA: ICD-10-CM

## 2021-04-28 DIAGNOSIS — R06.02 SOB (SHORTNESS OF BREATH): ICD-10-CM

## 2021-04-28 DIAGNOSIS — E66.01 CLASS 3 SEVERE OBESITY WITH BODY MASS INDEX (BMI) OF 40.0 TO 44.9 IN ADULT, UNSPECIFIED OBESITY TYPE, UNSPECIFIED WHETHER SERIOUS COMORBIDITY PRESENT (HCC): ICD-10-CM

## 2021-04-28 PROCEDURE — 99214 OFFICE O/P EST MOD 30 MIN: CPT | Performed by: INTERNAL MEDICINE

## 2021-04-28 ASSESSMENT — ENCOUNTER SYMPTOMS
ABDOMINAL PAIN: 0
WHEEZING: 0
WEAKNESS: 0
FOCAL WEAKNESS: 0
COUGH: 0
CONSTIPATION: 0
ORTHOPNEA: 0
NAUSEA: 0
HEARTBURN: 0
PND: 0
PALPITATIONS: 0
HEADACHES: 0
SINUS PAIN: 0
WEIGHT LOSS: 0
SPUTUM PRODUCTION: 0
VOMITING: 0
EYE PAIN: 0
DIZZINESS: 0
DOUBLE VISION: 0
SHORTNESS OF BREATH: 1
DIAPHORESIS: 0
HEMOPTYSIS: 0
MYALGIAS: 0
PHOTOPHOBIA: 0
TREMORS: 0
DIARRHEA: 0
FALLS: 0
EYE REDNESS: 0
FEVER: 0
BACK PAIN: 0
CHILLS: 0
CLAUDICATION: 0
SORE THROAT: 0
EYE DISCHARGE: 0
BLURRED VISION: 0
DEPRESSION: 0
NECK PAIN: 0
SPEECH CHANGE: 0
STRIDOR: 0

## 2021-04-28 ASSESSMENT — FIBROSIS 4 INDEX: FIB4 SCORE: 2.18

## 2021-04-28 NOTE — PROGRESS NOTES
Chief Complaint   Patient presents with   • Shortness of Breath     last seen 2/16/21   • Results     pft 2/23/21, OPO 2/23/21, echo 3/2/21, Cardiac Stress test 4/16/21          HPI: This patient is a 71 y.o. male whom is followed in our clinic for dyspnea on exertion last seen by me on 2/16/2021.  PMH is significant for AF status post ablation in 2016 on chronic anticoagulation also due to history of DVT and PE in 2003, UC status post colectomy and ileostomy, hypertension currently well controlled.  He has 10-pack-year tobacco history but quit 40 years ago.  He presented to me for evaluation of shortness of breath or specifically dyspnea on exertion.  He reported a decrease in exercise tolerance over a period of 2 months or since Jen.  Prior to that he was walking a mile every other day with his dog but during winter was walking less and when he tried to walk began experiencing more dyspnea.  He also had shortness of breath when bending over.  No chest pain, cough or wheezing.  He did have some chronic bilateral lower extremity edema.  He also reported some significant postnasal drip with sinus dryness at night for which she is using nasal steroids.  An echocardiogram was ordered and showed normal LV and RV function.  RVSP was estimated at 40 mmHg which was mildly improved compared to echo from March 2018.  He subsequently underwent nuclear medicine stress test ordered by cardiology which showed fixed defect in the inferolateral wall suggestive of artifact but no reversible ischemia.  Pulmonary function testing showed restrictive airflows with mild reduction in total lung capacity, low normal DLCO 81% predicted with over correction for alveolar volume suggestive of obesity.  Patient also has mild kyphosis.  He has a CT chest from 2018 with subcentimeter pulmonary nodules that were stable since 2016 and no evidence of parenchymal lung disease.  Furthermore no crackles on exam today.  He has no new concerns  today.  He did have an overnight oximetry study performed that showed some clustered desaturation and basal SPO2 right around 89% for most of the night.    Past Medical History:   Diagnosis Date   • Cancer (HCC)     melanoma   • GERD (gastroesophageal reflux disease)    • Hemorrhagic disorder     on coumadin   • Obesity    • Paroxysmal atrial fibrillation (HCC) 2012   • Skin cancer     basil cell   • ULCERATIVE COLITIS 2012       Social History     Socioeconomic History   • Marital status:      Spouse name: Not on file   • Number of children: Not on file   • Years of education: Not on file   • Highest education level: Not on file   Occupational History   • Not on file   Tobacco Use   • Smoking status: Former Smoker     Packs/day: 1.00     Years: 10.00     Pack years: 10.00     Types: Cigarettes     Quit date: 1984     Years since quittin.4   • Smokeless tobacco: Never Used   Substance and Sexual Activity   • Alcohol use: Yes     Alcohol/week: 12.6 oz     Types: 21 Shots of liquor per week     Comment: 1 beer and 1 scotch daily   • Drug use: Not Currently     Types: Marijuana   • Sexual activity: Not on file   Other Topics Concern   • Not on file   Social History Narrative   • Not on file     Social Determinants of Health     Financial Resource Strain:    • Difficulty of Paying Living Expenses:    Food Insecurity:    • Worried About Running Out of Food in the Last Year:    • Ran Out of Food in the Last Year:    Transportation Needs:    • Lack of Transportation (Medical):    • Lack of Transportation (Non-Medical):    Physical Activity:    • Days of Exercise per Week:    • Minutes of Exercise per Session:    Stress:    • Feeling of Stress :    Social Connections:    • Frequency of Communication with Friends and Family:    • Frequency of Social Gatherings with Friends and Family:    • Attends [employer] Services:    • Active Member of Clubs or Organizations:    • Attends Club or Organization  Meetings:    • Marital Status:    Intimate Partner Violence:    • Fear of Current or Ex-Partner:    • Emotionally Abused:    • Physically Abused:    • Sexually Abused:        Family History   Problem Relation Age of Onset   • Hypertension Mother    • Heart Failure Father    • Heart Attack Maternal Uncle        Current Outpatient Medications on File Prior to Visit   Medication Sig Dispense Refill   • Multiple Vitamin (MULTI-VITAMIN PO) Take  by mouth.     • metoprolol SR (TOPROL XL) 25 MG TABLET SR 24 HR Take 1 tablet by mouth every day. 100 tablet 0   • montelukast (SINGULAIR) 10 MG Tab Take 10 mg by mouth every day. N THE EVENING     • meclizine (ANTIVERT) 25 MG Tab Take 25 mg by mouth 3 times a day as needed.     • fluticasone (FLONASE) 50 MCG/ACT nasal spray Administer 1 Spray into affected nostril(S) every day.     • Multiple Vitamins-Minerals (PRESERVISION AREDS 2 PO) Take  by mouth every day.     • CALCIUM-VITAMIN D PO Take  by mouth.     • Lansoprazole (PREVACID PO) Take 15 mg by mouth every day.     • warfarin (COUMADIN) 5 MG Tab Take 1 Tab by mouth every Friday. As directed per [employer]. 30 Tab 0   • cetirizine (ZYRTEC) 10 MG TABS Take 10 mg by mouth every morning. Indications: **OTC**     • propafenone (RYTHMOL) 300 MG tablet Take 300 mg by mouth every 8 hours.       No current facility-administered medications on file prior to visit.       Other misc and Omnicef [cefdinir]      ROS:   Review of Systems   Constitutional: Negative for chills, diaphoresis, fever, malaise/fatigue and weight loss.   HENT: Negative for congestion, ear discharge, ear pain, hearing loss, nosebleeds, sinus pain, sore throat and tinnitus.    Eyes: Negative for blurred vision, double vision, photophobia, pain, discharge and redness.   Respiratory: Positive for shortness of breath. Negative for cough, hemoptysis, sputum production, wheezing and stridor.    Cardiovascular: Negative for chest pain, palpitations, orthopnea,  claudication, leg swelling and PND.   Gastrointestinal: Negative for abdominal pain, constipation, diarrhea, heartburn, nausea and vomiting.   Genitourinary: Negative for dysuria and urgency.   Musculoskeletal: Negative for back pain, falls, joint pain, myalgias and neck pain.   Skin: Negative for itching and rash.   Neurological: Negative for dizziness, tremors, speech change, focal weakness, weakness and headaches.   Endo/Heme/Allergies: Negative for environmental allergies.   Psychiatric/Behavioral: Negative for depression.       /78 (BP Location: Right arm, Patient Position: Sitting, BP Cuff Size: Large adult)   Pulse 77   Temp 36.3 °C (97.3 °F) (Temporal)   Resp 16   Wt (!) 144 kg (317 lb)   SpO2 96%   Physical Exam  Vitals reviewed.   Constitutional:       General: He is not in acute distress.     Appearance: Normal appearance. He is obese.   HENT:      Head: Normocephalic and atraumatic.      Right Ear: External ear normal.      Left Ear: External ear normal.      Nose: Nose normal. No congestion.      Mouth/Throat:      Mouth: Mucous membranes are moist.      Pharynx: Oropharynx is clear. No oropharyngeal exudate.   Eyes:      General: No scleral icterus.     Extraocular Movements: Extraocular movements intact.      Conjunctiva/sclera: Conjunctivae normal.      Pupils: Pupils are equal, round, and reactive to light.   Abdominal:      Palpations: Abdomen is soft.      Comments: Obese   Musculoskeletal:         General: Normal range of motion.      Cervical back: Normal range of motion.      Right lower leg: No edema.      Left lower leg: No edema.   Skin:     General: Skin is warm and dry.      Findings: No rash.   Neurological:      Mental Status: He is alert and oriented to person, place, and time.      Cranial Nerves: No cranial nerve deficit.   Psychiatric:         Mood and Affect: Mood normal.         Behavior: Behavior normal.         PFTs as reviewed by me personally: As per HPI    Imagaing  as reviewed by me personally: As per HPI    Assessment:  1. SOB (shortness of breath)  PULMONARY FUNCTION TESTS -Test requested: Complete Pulmonary Function Test   2. Nocturnal hypoxia     3. Class 3 severe obesity with body mass index (BMI) of 40.0 to 44.9 in adult, unspecified obesity type, unspecified whether serious comorbidity present ([facility])         Plan:  1.  This is fairly new and likely related to deconditioning and obesity.  PFTs show restrictive defect but no evidence of ILD on CT from 2018 and DLCO over corrects for alveolar volume consistent with obesity.  We did discuss how he may have untreated obstructive sleep apnea which could be contributing to some shortness of breath.  No evidence for ischemic heart disease or cardiac dysfunction.  At this point patient is not interested in pursuing evaluation for obstructive sleep apnea as he has no symptoms of daytime sleepiness and does wake frequently at night to empty ileostomy.  He would like to proceed with lifestyle modifications including increased exercise and dietary habit changes and follow-up with me in 6 months to repeat PFTs at that time.  If any change in PFTs or worsening shortness of breath we can repeat CT chest.  2.  This was fairly mild and patient did have discrete episodes of clustered desaturations but is not clear if that is when he was waking to empty his ostomy bag.  O p.o. was worse when compared to 2016.  Echo is no worse than 2018.  We discussed attempts of lifestyle modifications with repeat PFTs in 6 months as patient is not sure he would be able to tolerate a CPAP mask at this time.  See discussion above.  3.  This does put patient at increased risk for obesity related pulmonary complications.  Encouraged healthy vessel habits.  Return in about 6 months (around 10/28/2021) for pfts.

## 2021-05-10 DIAGNOSIS — I10 ESSENTIAL HYPERTENSION: ICD-10-CM

## 2021-05-10 DIAGNOSIS — I48.0 PAROXYSMAL ATRIAL FIBRILLATION (HCC): ICD-10-CM

## 2021-05-11 RX ORDER — METOPROLOL SUCCINATE 25 MG/1
TABLET, EXTENDED RELEASE ORAL
Qty: 90 TABLET | Refills: 3 | Status: SHIPPED | OUTPATIENT
Start: 2021-05-11

## 2021-05-12 ENCOUNTER — APPOINTMENT (OUTPATIENT)
Dept: RADIOLOGY | Facility: MEDICAL CENTER | Age: 71
DRG: 418 | End: 2021-05-12
Attending: EMERGENCY MEDICINE
Payer: MEDICARE

## 2021-05-12 ENCOUNTER — HOSPITAL ENCOUNTER (INPATIENT)
Facility: MEDICAL CENTER | Age: 71
LOS: 5 days | DRG: 418 | End: 2021-05-20
Attending: EMERGENCY MEDICINE | Admitting: HOSPITALIST
Payer: MEDICARE

## 2021-05-12 DIAGNOSIS — K81.9 CHOLECYSTITIS: ICD-10-CM

## 2021-05-12 DIAGNOSIS — I48.0 PAROXYSMAL ATRIAL FIBRILLATION (HCC): ICD-10-CM

## 2021-05-12 DIAGNOSIS — R06.02 SHORTNESS OF BREATH: ICD-10-CM

## 2021-05-12 DIAGNOSIS — R09.02 HYPOXIA: ICD-10-CM

## 2021-05-12 DIAGNOSIS — M54.9 PAIN, UPPER BACK: ICD-10-CM

## 2021-05-12 DIAGNOSIS — R10.9 ABDOMINAL PAIN, UNSPECIFIED ABDOMINAL LOCATION: ICD-10-CM

## 2021-05-12 DIAGNOSIS — R00.2 PALPITATIONS: ICD-10-CM

## 2021-05-12 DIAGNOSIS — R53.1 WEAKNESS: ICD-10-CM

## 2021-05-12 DIAGNOSIS — N30.01 ACUTE CYSTITIS WITH HEMATURIA: ICD-10-CM

## 2021-05-12 DIAGNOSIS — R10.84 GENERALIZED ABDOMINAL PAIN: ICD-10-CM

## 2021-05-12 LAB
ALBUMIN SERPL BCP-MCNC: 4 G/DL (ref 3.2–4.9)
ALBUMIN/GLOB SERPL: 1.1 G/DL
ALP SERPL-CCNC: 77 U/L (ref 30–99)
ALT SERPL-CCNC: 28 U/L (ref 2–50)
ANION GAP SERPL CALC-SCNC: 15 MMOL/L (ref 7–16)
APPEARANCE UR: CLEAR
AST SERPL-CCNC: 24 U/L (ref 12–45)
BASOPHILS # BLD AUTO: 0.1 % (ref 0–1.8)
BASOPHILS # BLD: 0.02 K/UL (ref 0–0.12)
BILIRUB SERPL-MCNC: 0.8 MG/DL (ref 0.1–1.5)
BILIRUB UR QL STRIP.AUTO: NEGATIVE
BUN SERPL-MCNC: 14 MG/DL (ref 8–22)
CALCIUM SERPL-MCNC: 9.8 MG/DL (ref 8.4–10.2)
CHLORIDE SERPL-SCNC: 97 MMOL/L (ref 96–112)
CO2 SERPL-SCNC: 22 MMOL/L (ref 20–33)
COLOR UR: YELLOW
CREAT SERPL-MCNC: 0.79 MG/DL (ref 0.5–1.4)
EKG IMPRESSION: NORMAL
EOSINOPHIL # BLD AUTO: 0 K/UL (ref 0–0.51)
EOSINOPHIL NFR BLD: 0 % (ref 0–6.9)
ERYTHROCYTE [DISTWIDTH] IN BLOOD BY AUTOMATED COUNT: 45 FL (ref 35.9–50)
FLUAV RNA SPEC QL NAA+PROBE: NEGATIVE
FLUBV RNA SPEC QL NAA+PROBE: NEGATIVE
GLOBULIN SER CALC-MCNC: 3.7 G/DL (ref 1.9–3.5)
GLUCOSE SERPL-MCNC: 152 MG/DL (ref 65–99)
GLUCOSE UR STRIP.AUTO-MCNC: NEGATIVE MG/DL
HCT VFR BLD AUTO: 49 % (ref 42–52)
HGB BLD-MCNC: 17.2 G/DL (ref 14–18)
IMM GRANULOCYTES # BLD AUTO: 0.09 K/UL (ref 0–0.11)
IMM GRANULOCYTES NFR BLD AUTO: 0.6 % (ref 0–0.9)
INR PPP: 2.17 (ref 0.87–1.13)
KETONES UR STRIP.AUTO-MCNC: NEGATIVE MG/DL
LACTATE BLD-SCNC: 2.2 MMOL/L (ref 0.5–2)
LACTATE BLD-SCNC: 2.4 MMOL/L (ref 0.5–2)
LEUKOCYTE ESTERASE UR QL STRIP.AUTO: NEGATIVE
LIPASE SERPL-CCNC: 24 U/L (ref 7–58)
LYMPHOCYTES # BLD AUTO: 0.78 K/UL (ref 1–4.8)
LYMPHOCYTES NFR BLD: 5.1 % (ref 22–41)
MCH RBC QN AUTO: 33 PG (ref 27–33)
MCHC RBC AUTO-ENTMCNC: 35.1 G/DL (ref 33.7–35.3)
MCV RBC AUTO: 93.9 FL (ref 81.4–97.8)
MICRO URNS: NORMAL
MONOCYTES # BLD AUTO: 0.59 K/UL (ref 0–0.85)
MONOCYTES NFR BLD AUTO: 3.8 % (ref 0–13.4)
NEUTROPHILS # BLD AUTO: 13.9 K/UL (ref 1.82–7.42)
NEUTROPHILS NFR BLD: 90.4 % (ref 44–72)
NITRITE UR QL STRIP.AUTO: NEGATIVE
NRBC # BLD AUTO: 0 K/UL
NRBC BLD-RTO: 0 /100 WBC
NT-PROBNP SERPL IA-MCNC: 1429 PG/ML (ref 0–125)
PH UR STRIP.AUTO: 5.5 [PH] (ref 5–8)
PLATELET # BLD AUTO: 180 K/UL (ref 164–446)
PMV BLD AUTO: 9.9 FL (ref 9–12.9)
POTASSIUM SERPL-SCNC: 3.6 MMOL/L (ref 3.6–5.5)
PROT SERPL-MCNC: 7.7 G/DL (ref 6–8.2)
PROT UR QL STRIP: NEGATIVE MG/DL
PROTHROMBIN TIME: 23.7 SEC (ref 12–14.6)
RBC # BLD AUTO: 5.22 M/UL (ref 4.7–6.1)
RBC UR QL AUTO: NEGATIVE
RSV RNA SPEC QL NAA+PROBE: NEGATIVE
SARS-COV-2 RNA RESP QL NAA+PROBE: NOTDETECTED
SODIUM SERPL-SCNC: 134 MMOL/L (ref 135–145)
SP GR UR STRIP.AUTO: 1.01
SPECIMEN SOURCE: NORMAL
TROPONIN T SERPL-MCNC: 12 NG/L (ref 6–19)
WBC # BLD AUTO: 15.4 K/UL (ref 4.8–10.8)

## 2021-05-12 PROCEDURE — 76705 ECHO EXAM OF ABDOMEN: CPT

## 2021-05-12 PROCEDURE — 0241U HCHG SARS-COV-2 COVID-19 NFCT DS RESP RNA 4 TRGT MIC: CPT

## 2021-05-12 PROCEDURE — 74175 CTA ABDOMEN W/CONTRAST: CPT | Mod: ME

## 2021-05-12 PROCEDURE — 85610 PROTHROMBIN TIME: CPT

## 2021-05-12 PROCEDURE — 99285 EMERGENCY DEPT VISIT HI MDM: CPT

## 2021-05-12 PROCEDURE — 87040 BLOOD CULTURE FOR BACTERIA: CPT | Mod: 91

## 2021-05-12 PROCEDURE — 96366 THER/PROPH/DIAG IV INF ADDON: CPT

## 2021-05-12 PROCEDURE — 700102 HCHG RX REV CODE 250 W/ 637 OVERRIDE(OP): Performed by: EMERGENCY MEDICINE

## 2021-05-12 PROCEDURE — C9803 HOPD COVID-19 SPEC COLLECT: HCPCS | Performed by: EMERGENCY MEDICINE

## 2021-05-12 PROCEDURE — 83880 ASSAY OF NATRIURETIC PEPTIDE: CPT

## 2021-05-12 PROCEDURE — 96365 THER/PROPH/DIAG IV INF INIT: CPT

## 2021-05-12 PROCEDURE — 700105 HCHG RX REV CODE 258: Performed by: EMERGENCY MEDICINE

## 2021-05-12 PROCEDURE — 83605 ASSAY OF LACTIC ACID: CPT | Mod: 91

## 2021-05-12 PROCEDURE — 84484 ASSAY OF TROPONIN QUANT: CPT

## 2021-05-12 PROCEDURE — 83690 ASSAY OF LIPASE: CPT

## 2021-05-12 PROCEDURE — 80053 COMPREHEN METABOLIC PANEL: CPT

## 2021-05-12 PROCEDURE — 99220 PR INITIAL OBSERVATION CARE,LEVL III: CPT | Performed by: HOSPITALIST

## 2021-05-12 PROCEDURE — 700111 HCHG RX REV CODE 636 W/ 250 OVERRIDE (IP): Performed by: HOSPITALIST

## 2021-05-12 PROCEDURE — 93005 ELECTROCARDIOGRAM TRACING: CPT | Performed by: EMERGENCY MEDICINE

## 2021-05-12 PROCEDURE — A9270 NON-COVERED ITEM OR SERVICE: HCPCS | Performed by: EMERGENCY MEDICINE

## 2021-05-12 PROCEDURE — 700117 HCHG RX CONTRAST REV CODE 255: Performed by: EMERGENCY MEDICINE

## 2021-05-12 PROCEDURE — 96375 TX/PRO/DX INJ NEW DRUG ADDON: CPT

## 2021-05-12 PROCEDURE — G0378 HOSPITAL OBSERVATION PER HR: HCPCS

## 2021-05-12 PROCEDURE — 81003 URINALYSIS AUTO W/O SCOPE: CPT

## 2021-05-12 PROCEDURE — 85025 COMPLETE CBC W/AUTO DIFF WBC: CPT

## 2021-05-12 PROCEDURE — 700105 HCHG RX REV CODE 258: Performed by: HOSPITALIST

## 2021-05-12 PROCEDURE — 700111 HCHG RX REV CODE 636 W/ 250 OVERRIDE (IP): Performed by: EMERGENCY MEDICINE

## 2021-05-12 PROCEDURE — 36415 COLL VENOUS BLD VENIPUNCTURE: CPT

## 2021-05-12 RX ORDER — POLYETHYLENE GLYCOL 3350 17 G/17G
1 POWDER, FOR SOLUTION ORAL
Status: DISCONTINUED | OUTPATIENT
Start: 2021-05-12 | End: 2021-05-20 | Stop reason: HOSPADM

## 2021-05-12 RX ORDER — ONDANSETRON 2 MG/ML
4 INJECTION INTRAMUSCULAR; INTRAVENOUS EVERY 4 HOURS PRN
Status: DISCONTINUED | OUTPATIENT
Start: 2021-05-12 | End: 2021-05-20 | Stop reason: HOSPADM

## 2021-05-12 RX ORDER — METOPROLOL SUCCINATE 25 MG/1
25 TABLET, EXTENDED RELEASE ORAL
Status: DISCONTINUED | OUTPATIENT
Start: 2021-05-13 | End: 2021-05-20 | Stop reason: HOSPADM

## 2021-05-12 RX ORDER — SODIUM CHLORIDE, SODIUM LACTATE, POTASSIUM CHLORIDE, AND CALCIUM CHLORIDE .6; .31; .03; .02 G/100ML; G/100ML; G/100ML; G/100ML
500 INJECTION, SOLUTION INTRAVENOUS
Status: DISCONTINUED | OUTPATIENT
Start: 2021-05-12 | End: 2021-05-20 | Stop reason: HOSPADM

## 2021-05-12 RX ORDER — OXYCODONE HYDROCHLORIDE 5 MG/1
5 TABLET ORAL
Status: DISCONTINUED | OUTPATIENT
Start: 2021-05-12 | End: 2021-05-20 | Stop reason: HOSPADM

## 2021-05-12 RX ORDER — METOPROLOL TARTRATE 1 MG/ML
5 INJECTION, SOLUTION INTRAVENOUS
Status: DISCONTINUED | OUTPATIENT
Start: 2021-05-12 | End: 2021-05-20 | Stop reason: HOSPADM

## 2021-05-12 RX ORDER — ACETAMINOPHEN 500 MG
1000 TABLET ORAL ONCE
Status: COMPLETED | OUTPATIENT
Start: 2021-05-12 | End: 2021-05-12

## 2021-05-12 RX ORDER — FLUTICASONE PROPIONATE 50 MCG
1 SPRAY, SUSPENSION (ML) NASAL
Status: DISCONTINUED | OUTPATIENT
Start: 2021-05-12 | End: 2021-05-20 | Stop reason: HOSPADM

## 2021-05-12 RX ORDER — OXYCODONE HYDROCHLORIDE 5 MG/1
2.5 TABLET ORAL
Status: DISCONTINUED | OUTPATIENT
Start: 2021-05-12 | End: 2021-05-20 | Stop reason: HOSPADM

## 2021-05-12 RX ORDER — ONDANSETRON 2 MG/ML
4 INJECTION INTRAMUSCULAR; INTRAVENOUS ONCE
Status: COMPLETED | OUTPATIENT
Start: 2021-05-12 | End: 2021-05-12

## 2021-05-12 RX ORDER — ACETAMINOPHEN 325 MG/1
650 TABLET ORAL EVERY 6 HOURS PRN
Status: DISCONTINUED | OUTPATIENT
Start: 2021-05-12 | End: 2021-05-20 | Stop reason: HOSPADM

## 2021-05-12 RX ORDER — HYDROMORPHONE HYDROCHLORIDE 1 MG/ML
0.25 INJECTION, SOLUTION INTRAMUSCULAR; INTRAVENOUS; SUBCUTANEOUS
Status: DISCONTINUED | OUTPATIENT
Start: 2021-05-12 | End: 2021-05-20 | Stop reason: HOSPADM

## 2021-05-12 RX ORDER — DIAZEPAM 5 MG/1
2.5 TABLET ORAL PRN
COMMUNITY
End: 2021-06-14

## 2021-05-12 RX ORDER — SODIUM CHLORIDE, SODIUM LACTATE, POTASSIUM CHLORIDE, AND CALCIUM CHLORIDE .6; .31; .03; .02 G/100ML; G/100ML; G/100ML; G/100ML
30 INJECTION, SOLUTION INTRAVENOUS
Status: DISCONTINUED | OUTPATIENT
Start: 2021-05-12 | End: 2021-05-20 | Stop reason: HOSPADM

## 2021-05-12 RX ORDER — WARFARIN SODIUM 2.5 MG/1
2.5 TABLET ORAL SEE ADMIN INSTRUCTIONS
COMMUNITY

## 2021-05-12 RX ORDER — DIAZEPAM 5 MG/1
2.5 TABLET ORAL PRN
Status: DISCONTINUED | OUTPATIENT
Start: 2021-05-12 | End: 2021-05-12

## 2021-05-12 RX ORDER — BISACODYL 10 MG
10 SUPPOSITORY, RECTAL RECTAL
Status: DISCONTINUED | OUTPATIENT
Start: 2021-05-12 | End: 2021-05-20 | Stop reason: HOSPADM

## 2021-05-12 RX ORDER — AMOXICILLIN 250 MG
2 CAPSULE ORAL 2 TIMES DAILY
Status: DISCONTINUED | OUTPATIENT
Start: 2021-05-12 | End: 2021-05-20 | Stop reason: HOSPADM

## 2021-05-12 RX ORDER — SODIUM CHLORIDE, SODIUM LACTATE, POTASSIUM CHLORIDE, CALCIUM CHLORIDE 600; 310; 30; 20 MG/100ML; MG/100ML; MG/100ML; MG/100ML
INJECTION, SOLUTION INTRAVENOUS CONTINUOUS
Status: DISCONTINUED | OUTPATIENT
Start: 2021-05-12 | End: 2021-05-12

## 2021-05-12 RX ORDER — OMEPRAZOLE 20 MG/1
20 CAPSULE, DELAYED RELEASE ORAL EVERY MORNING
Status: DISCONTINUED | OUTPATIENT
Start: 2021-05-13 | End: 2021-05-20 | Stop reason: HOSPADM

## 2021-05-12 RX ORDER — SODIUM CHLORIDE, SODIUM LACTATE, POTASSIUM CHLORIDE, CALCIUM CHLORIDE 600; 310; 30; 20 MG/100ML; MG/100ML; MG/100ML; MG/100ML
500 INJECTION, SOLUTION INTRAVENOUS CONTINUOUS
Status: DISCONTINUED | OUTPATIENT
Start: 2021-05-12 | End: 2021-05-12

## 2021-05-12 RX ORDER — ONDANSETRON 4 MG/1
4 TABLET, ORALLY DISINTEGRATING ORAL EVERY 4 HOURS PRN
Status: DISCONTINUED | OUTPATIENT
Start: 2021-05-12 | End: 2021-05-20 | Stop reason: HOSPADM

## 2021-05-12 RX ADMIN — PIPERACILLIN AND TAZOBACTAM 3.38 G: 3; .375 INJECTION, POWDER, LYOPHILIZED, FOR SOLUTION INTRAVENOUS; PARENTERAL at 22:49

## 2021-05-12 RX ADMIN — ONDANSETRON 4 MG: 2 INJECTION INTRAMUSCULAR; INTRAVENOUS at 14:35

## 2021-05-12 RX ADMIN — PIPERACILLIN AND TAZOBACTAM 3.38 G: 3; .375 INJECTION, POWDER, LYOPHILIZED, FOR SOLUTION INTRAVENOUS; PARENTERAL at 18:56

## 2021-05-12 RX ADMIN — ACETAMINOPHEN 1000 MG: 500 TABLET ORAL at 18:00

## 2021-05-12 RX ADMIN — IOHEXOL 80 ML: 350 INJECTION, SOLUTION INTRAVENOUS at 16:13

## 2021-05-12 ASSESSMENT — COGNITIVE AND FUNCTIONAL STATUS - GENERAL
SUGGESTED CMS G CODE MODIFIER DAILY ACTIVITY: CH
DAILY ACTIVITIY SCORE: 24
SUGGESTED CMS G CODE MODIFIER MOBILITY: CH
MOBILITY SCORE: 24

## 2021-05-12 ASSESSMENT — LIFESTYLE VARIABLES
TOTAL SCORE: 0
HAVE PEOPLE ANNOYED YOU BY CRITICIZING YOUR DRINKING: NO
ON A TYPICAL DAY WHEN YOU DRINK ALCOHOL HOW MANY DRINKS DO YOU HAVE: 2
CONSUMPTION TOTAL: NEGATIVE
EVER FELT BAD OR GUILTY ABOUT YOUR DRINKING: NO
TOTAL SCORE: 0
AVERAGE NUMBER OF DAYS PER WEEK YOU HAVE A DRINK CONTAINING ALCOHOL: 7
HAVE YOU EVER FELT YOU SHOULD CUT DOWN ON YOUR DRINKING: NO
TOTAL SCORE: 0
EVER HAD A DRINK FIRST THING IN THE MORNING TO STEADY YOUR NERVES TO GET RID OF A HANGOVER: NO
HOW MANY TIMES IN THE PAST YEAR HAVE YOU HAD 5 OR MORE DRINKS IN A DAY: 0
ALCOHOL_USE: YES

## 2021-05-12 ASSESSMENT — FIBROSIS 4 INDEX
FIB4 SCORE: 1.79
FIB4 SCORE: 2.18

## 2021-05-12 ASSESSMENT — ENCOUNTER SYMPTOMS
COUGH: 0
FOCAL WEAKNESS: 0
CHILLS: 0
MYALGIAS: 0
EYE DISCHARGE: 0
STRIDOR: 0
FLANK PAIN: 0
BRUISES/BLEEDS EASILY: 0
ABDOMINAL PAIN: 1
EYE REDNESS: 0
NERVOUS/ANXIOUS: 0
FEVER: 0
NAUSEA: 1
SHORTNESS OF BREATH: 0
VOMITING: 1

## 2021-05-12 ASSESSMENT — PATIENT HEALTH QUESTIONNAIRE - PHQ9
SUM OF ALL RESPONSES TO PHQ9 QUESTIONS 1 AND 2: 0
2. FEELING DOWN, DEPRESSED, IRRITABLE, OR HOPELESS: NOT AT ALL
1. LITTLE INTEREST OR PLEASURE IN DOING THINGS: NOT AT ALL

## 2021-05-12 NOTE — ED PROVIDER NOTES
ED Provider Note    Chief Complaint:   Abdominal pain, thoracic back pain    HPI:  Abel Day III is a very pleasant 71-year-old gentleman who presents to the emergency department for evaluation of thoracic back pain, palpitations, and decreased ileostomy output.  His symptoms all began last night, over 12 hours prior to arrival.  He describes 3-4 episodes of palpitations that feel as though he went into an atrial fibrillation rhythm, the symptoms did not persist and resolved without intervention each time.  He is currently anticoagulated due to a remote history of DVT.  He also reports that he developed acute onset thoracic back pain while vomiting last night, that pain has somewhat improved since time of onset, however is not resolved.  Additionally, he has an ileostomy, and intermittently does get transient partial small bowel obstructions.  He has not had any output from his ileostomy since last night.  Typically this resolves if he takes some Valium and rest, however his symptoms have not improved.  He does have some associated abdominal pain and a sensation of abdominal distention.  He is not had any associated fevers.  He has had nausea and vomiting, that also began last night.  However those symptoms are somewhat improved on arrival.  He is not had any associated chest pain, no neck or back pain, no severe headaches.  He does have some intermittent abdominal pain, this is described as diffuse, or possibly localized to the left side of the abdomen.  Pains are described as dull and cramping.  He has not had any significant leg pain or leg swelling.  He has had progressively worsening shortness of breath over the past several weeks to months.  This was followed by his pulmonologist who recommended weight loss, and some lifestyle changes.  He is followed by cardiology as well, and had a recent echocardiogram 2 months ago.  No acute findings identified on echo.    Review of Systems:  See HPI for  pertinent positives and negatives. All other systems negative.    Past Medical History:   has a past medical history of Cancer (HCC), GERD (gastroesophageal reflux disease), Hemorrhagic disorder, Obesity, Paroxysmal atrial fibrillation (HCC) (2012), Skin cancer, and ULCERATIVE COLITIS (2012).    Social History:  Social History     Tobacco Use   • Smoking status: Former Smoker     Packs/day: 1.00     Years: 10.00     Pack years: 10.00     Types: Cigarettes     Quit date: 1984     Years since quittin.4   • Smokeless tobacco: Never Used   Vaping Use   • Vaping Use: Never used   Substance and Sexual Activity   • Alcohol use: Yes     Alcohol/week: 12.6 oz     Types: 21 Shots of liquor per week     Comment: 1 beer and 1 scotch daily   • Drug use: Not Currently     Types: Marijuana   • Sexual activity: Not on file       Surgical History:   has a past surgical history that includes colon resection; ileostomy; mass excision general (Left, 2016); flap graft (Left, 2016); and recovery (2016).    Current Medications:  Home Medications     Reviewed by Primitivo Cleary (Pharmacy Tech) on 21 at 1350  Med List Status: Complete   Medication Last Dose Status   CALCIUM-VITAMIN D PO 2021 Active   cetirizine (ZYRTEC) 10 MG TABS 2021 Active   diazePAM (VALIUM) 5 MG Tab 2021 Active   fluticasone (FLONASE) 50 MCG/ACT nasal spray 2021 Active   Lansoprazole (PREVACID PO) 2021 Active   metoprolol SR (TOPROL XL) 25 MG TABLET SR 24 HR 2021 Active   montelukast (SINGULAIR) 10 MG Tab 2021 Active   Multiple Vitamin (MULTI-VITAMIN PO) 2021 Active   Multiple Vitamins-Minerals (PRESERVISION AREDS 2 PO) 2021 Active   warfarin (COUMADIN) 2.5 MG Tab 2021 Active   warfarin (COUMADIN) 5 MG Tab 5/10/2021 Active                Allergies:  Allergies   Allergen Reactions   • Other Misc Unspecified     Silk sutures (body rejected them)   • Omnicef [Cefdinir]       "Internal bleeding stated by patient       Physical Exam:   Vital Signs: /75   Pulse 97   Temp 37.2 °C (99 °F) (Oral)   Resp 19   Ht 1.905 m (6' 3\")   Wt (!) 141 kg (311 lb 11.7 oz)   SpO2 93%   BMI 38.96 kg/m²   Constitutional: Alert, no acute distress, elevated BMI  HENT: Normocephalic, mask in place  Eyes: Pupils equal and reactive, normal conjunctiva  Neck: Supple, normal range of motion, no stridor  Cardiovascular: Extremities are warm and well perfused, no murmur appreciated, normal cardiac auscultation  Pulmonary: No respiratory distress, normal work of breathing, no accessory muscule usage, breath sounds clear and equal bilaterally  Abdomen: Soft, non-distended, non-tender to palpation, no peritoneal signs, exam limited by BMI, no right upper quadrant tenderness to palpation, negative Miller sign, no CVA tenderness to palpation bilaterally, no epigastric tenderness to palpation  Skin: Warm, dry, no rashes or lesions  Musculoskeletal: Normal range of motion in all extremities, no swelling or deformity noted, no unilateral edema  Neurologic: Alert, oriented, normal speech, normal motor function  Psychiatric: Normal and appropriate mood and affect    Medical records reviewed for continuity of care.  Pulmonology clinic note reviewed from 4/20/2021.  He has history significant for atrial fibrillation status post ablation in 2016 on chronic anticoagulation due to history of DVT and pulmonary embolism.  He also has a history of ulcerative colitis status post colectomy and ileostomy, hypertension, and history of tobacco use, but no current tobacco use.    Echocardiogram results reviewed from 3/2/2021.  Grossly normal left ventricular systolic function noted.  LVEF estimated be 55%.  Indeterminate diastolic function noted.    EKG: Rate 99, sinus rhythm, no ST elevation or depression, T wave inversion present in lead III with diffuse T wave flattening in limb leads, as well as V3 through V4, no ectopy, " regular rhythm.    Labs:  Labs Reviewed   CBC WITH DIFFERENTIAL - Abnormal; Notable for the following components:       Result Value    WBC 15.4 (*)     Neutrophils-Polys 90.40 (*)     Lymphocytes 5.10 (*)     Neutrophils (Absolute) 13.90 (*)     Lymphs (Absolute) 0.78 (*)     All other components within normal limits   COMP METABOLIC PANEL - Abnormal; Notable for the following components:    Sodium 134 (*)     Glucose 152 (*)     Globulin 3.7 (*)     All other components within normal limits   PROBRAIN NATRIURETIC PEPTIDE, NT - Abnormal; Notable for the following components:    NT-proBNP 1429 (*)     All other components within normal limits   PROTHROMBIN TIME - Abnormal; Notable for the following components:    PT 23.7 (*)     INR 2.17 (*)     All other components within normal limits    Narrative:     Indicate which anticoagulants the patient is on:->COUMADIN   TROPONIN   LIPASE   URINALYSIS   ESTIMATED GFR   BLOOD CULTURE   BLOOD CULTURE   LACTIC ACID   LACTIC ACID   LACTIC ACID       Radiology:  US-RUQ   Final Result      1.  Sludge and stones present in the gallbladder.   2.  Gallbladder wall thickening raises concern for cholecystitis.   3.  No gross biliary dilation.   4.  Very limited exam due to body habitus.      CT-CTA COMPLETE THORACOABDOMINAL AORTA   Final Result      1.  No aortic aneurysm or dissection.   2.  Atherosclerosis including prominent coronary calcification.   3.  Cholelithiasis and distended gallbladder. If there is concern for acute cholecystitis, further evaluation with ultrasound and nuclear medicine HIDA scan may be performed.   4.  Hepatic steatosis.   5.  Postoperative changes of colectomy with right lower quadrant ostomy.                 ED Medications Administered:  Medications   lactated ringers infusion (BOLUS): BMI greater than 30 (has no administration in time range)   piperacillin-tazobactam (ZOSYN) 3.375 g in  mL IVPB (has no administration in time range)   ondansetron  (ZOFRAN) syringe/vial injection 4 mg (4 mg Intravenous Given 5/12/21 1435)   iohexol (OMNIPAQUE) 350 mg/mL (80 mL Intravenous Given 5/12/21 1613)   acetaminophen (TYLENOL) tablet 1,000 mg (1,000 mg Oral Given 5/12/21 1800)       Differential diagnosis:  Bowel obstruction, aortic dissection, peptic ulcer disease, fluid overload, heart failure, pneumonia, cardiac arrhythmia, less likely pulmonary embolism    MDM:  Mr. Day presents to the emergency department for evaluation of abdominal pain and cramping, as well as nausea and vomiting and thoracic back pain.  As he developed acute thoracic back pain while vomiting yesterday, I am concerned for aortic dissection.  His thoracic back pain has persisted.  He does not have any significant abdominal tenderness to palpation, however physical exam is limited.  On arrival to the emergency department heart rate is 97, he has no hypotension, he is afebrile, vital signs are less concerning for Sirs or sepsis.  Throughout his stay in the emergency department, room air oxygen range from 88% to 93%.  He states this has recently been normal for him, and that he recently had normal pulmonary function testing performed.  His most recent pulmonology visit late last month, this was thought to be related to deconditioning and obesity.  He is no evidence of respiratory distress in the emergency department, breath sounds are quiet bilaterally without wheezing.    On laboratory evaluation urinalysis is entirely negative.  INR is therapeutic at 2.17.  He has no significant electrolyte abnormalities.  Total bilirubin, and liver enzymes are within normal limits, no evidence of obstructive biliary pathology.  proBNP is elevated to 1429, increased over normal value in February.  High-sensitivity troponin is negative.  EKG does not show acute ischemic changes.  His white blood count elevated to 15.4, he has a normal hemoglobin, no bands resulted at this time.    Out of concern for aortic  dissection, CTA of the aorta was obtained.  This demonstrates no aortic aneurysm or dissection.  However, finding is noted of cholelithiasis and distended gallbladder.  This was followed up with a right upper quadrant ultrasound that shows sludge and stones in the gallbladder, as well as gallbladder wall thickening concerning for cholecystitis.    With regard to abnormal gallbladder imaging, on my reassessment he does not have any right upper quadrant tenderness to palpation, he does have a negative Miller sign.  He states his pain is significantly improved after receiving a small amount of IV morphine.  He remains afebrile, though he does have high normal heart rate and mild tachycardia, though this may be related to a cardiac etiology.  His white blood count is elevated to 15, it is possible that the gallbladder may be the cause of his nausea and vomiting last night.  I did obtain blood cultures, and started him on Zosyn.  I discussed his presentation with Dr. Bird, General surgeon on-call.  At this time, he has a very benign abdominal exam, additionally has some comorbidities that should be addressed before considering surgical intervention.  Dr. Bird agrees with a trial of antibiotics at this time, as well as cardiology and medicine assessment during admission.  He kindly agrees to consult on the patient while hospitalized.    With regard to his shortness of breath, he is mildly hypoxic in the emergency department with oxygen saturations in the high 80s and low 90s.  It sounds that the symptoms been ongoing for quite some time, and has already been assessed by pulmonology.  He does have a new elevated proBNP, though he had a reassuring echocardiogram performed 2 months ago.  He also reports increasing frequency of palpitations, which she has previously correlated with runs of atrial fibrillation. I discussed his presentation with Dr. Marquez, Cardiologist on-call.  She agrees with telemetry admission, kindly  agrees to have someone from her service evaluate the patient tomorrow morning.    Plan at this time is for admission to hospitalist service for antibiotics, and cardiology evaluation, as well as supplemental oxygen given the room air oxygen saturations in the 80s.  Case discussed with hospitalist who kindly agrees to admit the patient.    Personal protective equipment including N95 surgical respirator, goggles, and gloves were used during this encounter.       Disposition:  Admit to hospitalist in guarded condition    Final Impression:  1. Palpitations    2. Shortness of breath    3. Hypoxia    4. Generalized abdominal pain    5. Pain, upper back        Electronically signed by: Naomy Cadena MD, 5/12/2021 7:53 PM

## 2021-05-12 NOTE — ED TRIAGE NOTES
"Chief Complaint   Patient presents with   • Atrial Fibrillation     \" In and out last night and today\" per pt.    • Abdominal Pain     No bm into ostomy since 2130 last night . Low abd pain.    • N/V     x 5 last evening    PMH: bowel obstructions, ulcerative colitis     "

## 2021-05-12 NOTE — ED NOTES
1428 Iv placed , labs drawn and taken to lab. poc update given to pt, results pending at this time  1435 Pt medicated as directed by EUSEBIO ruiz, poc update given to pt. Further orders and dispo pending at this time. No further questions from pt at this time  1615 pt to ct at this time

## 2021-05-12 NOTE — ED NOTES
Med Rec completed: per patient at bedside    Warfarin Dosinmg on Monday, Friday  2.5mg on Tuesday, Wednesday, Thursday, Saturday and     Pt reported using an old prescription of Valium  in , taking 2.5 mg 21 at midnight (0000) and repeated the dose at 1100.    Preferred Pharmacy: [facility] - P: [phone]    Pt confirmed following allergies:  Allergies   Allergen Reactions   • Other Misc Unspecified     Silk sutures (body rejected them)   • Omnicef [Cefdinir]      Internal bleeding stated by patient      Pt's home medications:   No current facility-administered medications on file prior to encounter.     Current Outpatient Medications on File Prior to Encounter   Medication Sig Dispense Refill   • warfarin (COUMADIN) 2.5 MG Tab Take 2.5 mg by mouth see administration instructions. Pt takes:  Warfarin 2.5mg on:   Tuesday, Wednesday, Thursday, Saturday,     Pt also has Rx for:  Warfarin 5mg on:   Monday, Friday     • diazePAM (VALIUM) 5 MG Tab Take 2.5 mg by mouth as needed for Anxiety.     • metoprolol SR (TOPROL XL) 25 MG TABLET SR 24 HR TAKE 1 TABLET DAILY (Patient taking differently: Take 25 mg by mouth every day.) 90 tablet 3   • Multiple Vitamin (MULTI-VITAMIN PO) Take 1 tablet by mouth every day.     • montelukast (SINGULAIR) 10 MG Tab Take 10 mg by mouth every day. IN THE EVENING     • fluticasone (FLONASE) 50 MCG/ACT nasal spray Administer 1 Spray into affected nostril(S) 1 time a day as needed.     • Multiple Vitamins-Minerals (PRESERVISION AREDS 2 PO) Take 1 tablet by mouth 2 times a day.     • CALCIUM-VITAMIN D PO Take 1 tablet by mouth 2 times a day.     • Lansoprazole (PREVACID PO) Take 15 mg by mouth every morning.     • warfarin (COUMADIN) 5 MG Tab Take 1 Tab by mouth every Friday. As directed per [employer]. (Patient taking differently: Take 5 mg by mouth see administration instructions. Pt takes:  Warfarin 5mg on:   Monday, Friday.    Pt also has Rx  for:  Warfarin 2.5mg on:   Tuesday, Wednesday, Thursday, Saturday, Sunday) 30 Tab 0   • cetirizine (ZYRTEC) 10 MG TABS Take 10 mg by mouth every morning. Indications: **OTC**       No ORAL antibiotics in last 14 days

## 2021-05-13 ENCOUNTER — APPOINTMENT (OUTPATIENT)
Dept: RADIOLOGY | Facility: MEDICAL CENTER | Age: 71
DRG: 418 | End: 2021-05-13
Attending: HOSPITALIST
Payer: MEDICARE

## 2021-05-13 LAB
ALBUMIN SERPL BCP-MCNC: 3.5 G/DL (ref 3.2–4.9)
ALBUMIN/GLOB SERPL: 1 G/DL
ALP SERPL-CCNC: 68 U/L (ref 30–99)
ALT SERPL-CCNC: 36 U/L (ref 2–50)
ANION GAP SERPL CALC-SCNC: 14 MMOL/L (ref 7–16)
APTT PPP: 49 SEC (ref 24.7–36)
AST SERPL-CCNC: 43 U/L (ref 12–45)
BASOPHILS # BLD AUTO: 0.2 % (ref 0–1.8)
BASOPHILS # BLD: 0.03 K/UL (ref 0–0.12)
BILIRUB SERPL-MCNC: 1.2 MG/DL (ref 0.1–1.5)
BUN SERPL-MCNC: 13 MG/DL (ref 8–22)
CALCIUM SERPL-MCNC: 9.2 MG/DL (ref 8.4–10.2)
CHLORIDE SERPL-SCNC: 97 MMOL/L (ref 96–112)
CO2 SERPL-SCNC: 23 MMOL/L (ref 20–33)
CREAT SERPL-MCNC: 0.82 MG/DL (ref 0.5–1.4)
EOSINOPHIL # BLD AUTO: 0.02 K/UL (ref 0–0.51)
EOSINOPHIL NFR BLD: 0.1 % (ref 0–6.9)
ERYTHROCYTE [DISTWIDTH] IN BLOOD BY AUTOMATED COUNT: 46.7 FL (ref 35.9–50)
GLOBULIN SER CALC-MCNC: 3.4 G/DL (ref 1.9–3.5)
GLUCOSE SERPL-MCNC: 142 MG/DL (ref 65–99)
HCT VFR BLD AUTO: 46.4 % (ref 42–52)
HGB BLD-MCNC: 16.1 G/DL (ref 14–18)
IMM GRANULOCYTES # BLD AUTO: 0.07 K/UL (ref 0–0.11)
IMM GRANULOCYTES NFR BLD AUTO: 0.4 % (ref 0–0.9)
INR PPP: 2.15 (ref 0.87–1.13)
LACTATE BLD-SCNC: 2.2 MMOL/L (ref 0.5–2)
LACTATE BLD-SCNC: 2.6 MMOL/L (ref 0.5–2)
LYMPHOCYTES # BLD AUTO: 1.18 K/UL (ref 1–4.8)
LYMPHOCYTES NFR BLD: 7.5 % (ref 22–41)
MCH RBC QN AUTO: 33.5 PG (ref 27–33)
MCHC RBC AUTO-ENTMCNC: 34.7 G/DL (ref 33.7–35.3)
MCV RBC AUTO: 96.5 FL (ref 81.4–97.8)
MONOCYTES # BLD AUTO: 1.42 K/UL (ref 0–0.85)
MONOCYTES NFR BLD AUTO: 9 % (ref 0–13.4)
NEUTROPHILS # BLD AUTO: 12.99 K/UL (ref 1.82–7.42)
NEUTROPHILS NFR BLD: 82.8 % (ref 44–72)
NRBC # BLD AUTO: 0 K/UL
NRBC BLD-RTO: 0 /100 WBC
PLATELET # BLD AUTO: 156 K/UL (ref 164–446)
PMV BLD AUTO: 9.8 FL (ref 9–12.9)
POTASSIUM SERPL-SCNC: 4 MMOL/L (ref 3.6–5.5)
PROT SERPL-MCNC: 6.9 G/DL (ref 6–8.2)
PROTHROMBIN TIME: 23.6 SEC (ref 12–14.6)
RBC # BLD AUTO: 4.81 M/UL (ref 4.7–6.1)
SODIUM SERPL-SCNC: 134 MMOL/L (ref 135–145)
WBC # BLD AUTO: 15.7 K/UL (ref 4.8–10.8)

## 2021-05-13 PROCEDURE — 700111 HCHG RX REV CODE 636 W/ 250 OVERRIDE (IP): Performed by: HOSPITALIST

## 2021-05-13 PROCEDURE — 700105 HCHG RX REV CODE 258: Performed by: INTERNAL MEDICINE

## 2021-05-13 PROCEDURE — 78226 HEPATOBILIARY SYSTEM IMAGING: CPT | Mod: MF

## 2021-05-13 PROCEDURE — G0378 HOSPITAL OBSERVATION PER HR: HCPCS

## 2021-05-13 PROCEDURE — A9270 NON-COVERED ITEM OR SERVICE: HCPCS | Performed by: INTERNAL MEDICINE

## 2021-05-13 PROCEDURE — 80053 COMPREHEN METABOLIC PANEL: CPT

## 2021-05-13 PROCEDURE — 85730 THROMBOPLASTIN TIME PARTIAL: CPT

## 2021-05-13 PROCEDURE — 700105 HCHG RX REV CODE 258: Performed by: HOSPITALIST

## 2021-05-13 PROCEDURE — 96372 THER/PROPH/DIAG INJ SC/IM: CPT

## 2021-05-13 PROCEDURE — 96367 TX/PROPH/DG ADDL SEQ IV INF: CPT

## 2021-05-13 PROCEDURE — 85025 COMPLETE CBC W/AUTO DIFF WBC: CPT

## 2021-05-13 PROCEDURE — 99226 PR SUBSEQUENT OBSERVATION CARE,LEVEL III: CPT | Performed by: INTERNAL MEDICINE

## 2021-05-13 PROCEDURE — 700102 HCHG RX REV CODE 250 W/ 637 OVERRIDE(OP): Performed by: INTERNAL MEDICINE

## 2021-05-13 PROCEDURE — 83605 ASSAY OF LACTIC ACID: CPT

## 2021-05-13 PROCEDURE — 96366 THER/PROPH/DIAG IV INF ADDON: CPT

## 2021-05-13 PROCEDURE — 700111 HCHG RX REV CODE 636 W/ 250 OVERRIDE (IP): Performed by: INTERNAL MEDICINE

## 2021-05-13 PROCEDURE — A9270 NON-COVERED ITEM OR SERVICE: HCPCS | Performed by: HOSPITALIST

## 2021-05-13 PROCEDURE — 85610 PROTHROMBIN TIME: CPT

## 2021-05-13 PROCEDURE — 700102 HCHG RX REV CODE 250 W/ 637 OVERRIDE(OP): Performed by: HOSPITALIST

## 2021-05-13 RX ORDER — MORPHINE SULFATE 4 MG/ML
INJECTION, SOLUTION INTRAMUSCULAR; INTRAVENOUS
Status: ACTIVE
Start: 2021-05-13 | End: 2021-05-14

## 2021-05-13 RX ORDER — METRONIDAZOLE 500 MG/1
500 TABLET ORAL EVERY 8 HOURS
Status: DISPENSED | OUTPATIENT
Start: 2021-05-13 | End: 2021-05-18

## 2021-05-13 RX ADMIN — OMEPRAZOLE 20 MG: 20 CAPSULE, DELAYED RELEASE ORAL at 05:51

## 2021-05-13 RX ADMIN — ENOXAPARIN SODIUM 150 MG: 150 INJECTION SUBCUTANEOUS at 18:02

## 2021-05-13 RX ADMIN — METOPROLOL SUCCINATE 25 MG: 25 TABLET, EXTENDED RELEASE ORAL at 05:50

## 2021-05-13 RX ADMIN — METRONIDAZOLE 500 MG: 500 TABLET ORAL at 16:58

## 2021-05-13 RX ADMIN — METRONIDAZOLE 500 MG: 500 TABLET ORAL at 21:16

## 2021-05-13 RX ADMIN — CEFTRIAXONE SODIUM 2 G: 2 INJECTION, POWDER, FOR SOLUTION INTRAMUSCULAR; INTRAVENOUS at 17:04

## 2021-05-13 RX ADMIN — ENOXAPARIN SODIUM 150 MG: 150 INJECTION SUBCUTANEOUS at 05:51

## 2021-05-13 RX ADMIN — PIPERACILLIN AND TAZOBACTAM 3.38 G: 3; .375 INJECTION, POWDER, LYOPHILIZED, FOR SOLUTION INTRAVENOUS; PARENTERAL at 06:01

## 2021-05-13 ASSESSMENT — ENCOUNTER SYMPTOMS
ABDOMINAL PAIN: 1
NAUSEA: 1
CONSTIPATION: 1

## 2021-05-13 ASSESSMENT — PAIN DESCRIPTION - PAIN TYPE: TYPE: ACUTE PAIN;CHRONIC PAIN

## 2021-05-13 ASSESSMENT — PATIENT HEALTH QUESTIONNAIRE - PHQ9
2. FEELING DOWN, DEPRESSED, IRRITABLE, OR HOPELESS: NOT AT ALL
1. LITTLE INTEREST OR PLEASURE IN DOING THINGS: NOT AT ALL
SUM OF ALL RESPONSES TO PHQ9 QUESTIONS 1 AND 2: 0

## 2021-05-13 NOTE — PROGRESS NOTES
Assumed care of pt.  Received report from PENNY Keyes.  Pt sleeping in bed.  Safety precautions in place. Bed locked and in lowest position, call light within reach.

## 2021-05-13 NOTE — CONSULTS
CONSULT NOTE  05/12/21  Consulting Physician: Delvis Antunez MD    PATIENT ID  Name:             Abel Day   YOB: 1950  Age:                 71 y.o.  male   MRN:               [id number]    CHIEF COMPLAINT:        Abdominal pain with decreased ostomy output    HISTORY OF PRESENT ILLNESS:  This is a 71-year-old gentleman with a complex past medical history including Crohn's disease ileostomy A. fib a VTE and pulmonary embolism on chronic anticoagulation who presented with multiple episodes of palpitations and abdominal pain associate with nausea and vomiting.  The abdominal pain is described in the left lower quadrants and radiates diffusely low across his abdominal cavity.  He states that after presentation to the emergency room his abdominal pain has resolved.    REVIEW OF SYSTEMS:   Constitutional: Denies unintended weight change, night sweats, fatigue  Eyes:   Denies eye pain, redness, discharge, vision changes  Ears/Nose/Throat/Mouth: Denies hearing changes, ear pain, nasal congestion, sore throat, dysphagia  Cardiovascular:  Denies Chest pain, shortness of breath, orthopnea, palpitations, claudication  Respiratory:  Denies cough, sputum, wheezing, dyspnea  Gastrointestinal/Hepatic:  Denies dysphagia, melena, jaundice, hematochezia, changing heartburn  Genitourinary:  Denies dysuria, increased frequency, hematuria, urgency  Musculoskeletal/Rheum: Denies changing arthralgias, myalgias, joint swelling, joint stiffness  Skin/Breast: Denies changing skin lesions, pruritis, nipple discharge, hair changes  Neurological: Denies weakness, numbness, paresthesia, syncope, dizziness  Pyschiatric: Denies acute depression, anxiety, insomnia, personality changes, delusions  Endocrine: Denies temperature intolerance, polydipsia, polyuria  Heme/Oncology/Lymph Nodes: Denies easy bruising, bleeding, lymphadenopathy  All other systems were reviewed and are negative                 Past Medical History:    Past Medical History:   Diagnosis Date   • Cancer (HCC)     melanoma   • GERD (gastroesophageal reflux disease)    • Hemorrhagic disorder     on coumadin   • Obesity    • Paroxysmal atrial fibrillation (HCC) 11/26/2012   • Skin cancer     basil cell   • ULCERATIVE COLITIS 11/26/2012       Past Surgical History:  Past Surgical History:   Procedure Laterality Date   • RECOVERY  5/4/2016    Procedure: CATH LAB-[place] GROUP-EPS ABLATION/AFIB 82604/75117/85629-THPJ I48.0;  Surgeon: Recoveryonly Surgery;  Location: SURGERY PRE-POST PROC UNIT [facility];  Service:    • MASS EXCISION GENERAL Left 4/7/2016    Procedure: MASS EXCISION GENERAL FOR TEMPORAL MELANOMA;  Surgeon: Feng Sharpe M.D.;  Location: SURGERY SAME DAY [facility];  Service:    • FLAP GRAFT Left 4/7/2016    Procedure: FLAP GRAFT FOR CLOSURE WITH LOCAL FLAPS;  Surgeon: Feng Sharpe M.D.;  Location: SURGERY SAME DAY [facility];  Service:    • COLON RESECTION          • ILEOSTOMY         Current Outpatient Medications:  No current facility-administered medications on file prior to encounter.     Current Outpatient Medications on File Prior to Encounter   Medication Sig Dispense Refill   • warfarin (COUMADIN) 2.5 MG Tab Take 2.5 mg by mouth see administration instructions. Pt takes:  Warfarin 2.5mg on:   Tuesday, Wednesday, Thursday, Saturday, Sunday    Pt also has Rx for:  Warfarin 5mg on:   Monday, Friday     • diazePAM (VALIUM) 5 MG Tab Take 2.5 mg by mouth as needed for Anxiety.     • metoprolol SR (TOPROL XL) 25 MG TABLET SR 24 HR TAKE 1 TABLET DAILY (Patient taking differently: Take 25 mg by mouth every day.) 90 tablet 3   • Multiple Vitamin (MULTI-VITAMIN PO) Take 1 tablet by mouth every day.     • montelukast (SINGULAIR) 10 MG Tab Take 10 mg by mouth every day. IN THE EVENING     • fluticasone (FLONASE) 50 MCG/ACT nasal spray Administer 1 Spray into affected nostril(S) 1 time a day as needed.     • Multiple Vitamins-Minerals (PRESERVISION  AREDS 2 PO) Take 1 tablet by mouth 2 times a day.     • CALCIUM-VITAMIN D PO Take 1 tablet by mouth 2 times a day.     • Lansoprazole (PREVACID PO) Take 15 mg by mouth every morning.     • warfarin (COUMADIN) 5 MG Tab Take 1 Tab by mouth every Friday. As directed per [employer]times. (Patient taking differently: Take 5 mg by mouth see administration instructions. Pt takes:  Warfarin 5mg on:   Monday, Friday.    Pt also has Rx for:  Warfarin 2.5mg on:   Tuesday, Wednesday, Thursday, Saturday, Sunday) 30 Tab 0   • cetirizine (ZYRTEC) 10 MG TABS Take 10 mg by mouth every morning. Indications: **OTC**         Current Inpatient Medications:   Current Facility-Administered Medications   Medication Last Admin   • lactated ringers infusion (BOLUS): BMI greater than 30     • fluticasone (FLONASE) nasal spray 50 mcg     • [START ON 5/13/2021] omeprazole (PRILOSEC) capsule 20 mg     • [START ON 5/13/2021] metoprolol SR (TOPROL XL) tablet 25 mg     • senna-docusate (PERICOLACE or SENOKOT S) 8.6-50 MG per tablet 2 tablet      And   • polyethylene glycol/lytes (MIRALAX) PACKET 1 Packet      And   • magnesium hydroxide (MILK OF MAGNESIA) suspension 30 mL      And   • bisacodyl (DULCOLAX) suppository 10 mg     • lactated ringers infusion (BOLUS)     • acetaminophen (Tylenol) tablet 650 mg     • Pharmacy Consult Request ...Pain Management Review 1 Each     • oxyCODONE immediate-release (ROXICODONE) tablet 2.5 mg      Or   • oxyCODONE immediate-release (ROXICODONE) tablet 5 mg      Or   • HYDROmorphone (Dilaudid) injection 0.25 mg     • ondansetron (ZOFRAN) syringe/vial injection 4 mg     • ondansetron (ZOFRAN ODT) dispertab 4 mg     • piperacillin-tazobactam (ZOSYN) 3.375 g in  mL IVPB     • Metoprolol Tartrate (LOPRESSOR) injection 5 mg     • [START ON 5/13/2021] enoxaparin (LOVENOX) injection 150 mg         Medication Allergy/Sensitivities:  Allergies   Allergen Reactions   • Other Misc Unspecified     Silk sutures (body rejected  them)   • Omnicef [Cefdinir]      Internal bleeding stated by patient       Family History:  Family History   Problem Relation Age of Onset   • Hypertension Mother    • Heart Failure Father    • Heart Attack Maternal Uncle      Denies family history of bleeding disorders or reactions to anesthesia    Social History:  PCP: Andreina Iniguez D.O.  Social History     Tobacco Use   Smoking Status Former Smoker   • Packs/day: 1.00   • Years: 10.00   • Pack years: 10.00   • Types: Cigarettes   • Quit date: 1984   • Years since quittin.4   Smokeless Tobacco Never Used     Social History     Substance and Sexual Activity   Alcohol Use Yes   • Alcohol/week: 12.6 oz   • Types: 21 Shots of liquor per week    Comment: 1 beer and 1 scotch daily     Social History     Substance and Sexual Activity   Drug Use Not Currently   • Types: Marijuana       PHYSICAL EXAM:  Weight/BMI: Body mass index is 38.96 kg/m².  Vitals:    21 1803 21 1804 21 1826 21   BP: 141/75   (!) 161/82   Pulse: (!) 103 (!) 109 97 92   Resp:       Temp:       TempSrc:       SpO2: 88% 90% 93% 96%   Weight:       Height:         Oxygen Therapy:  Pulse Oximetry: 96 %, O2 (LPM): 3, O2 Delivery Device: Nasal Cannula    Constitutional: Well developed, Well nourished, No acute distress, Non-toxic appearance.    HENMT: Normocephalic, Atraumatic, Bilateral external ears normal, Oropharynx moist mucous membranes, No oral exudates, Nose normal.  No thyromegaly.   Eyes: PERRLA, EOMI, Conjunctiva normal, No discharge.   Neck: Normal range of motion, No cervical tenderness, Supple, No stridor, no JVD.  Cardiovascular: Normal heart rate, Normal rhythm, No murmurs, No rubs, No gallops.   Extremites with intact distal pulses, no cyanosis, clubbing or edema.   Lungs:  Respiratory effort is normal. Normal breath sounds, breath sounds clear to auscultation bilaterally,  no rales, no rhonchi, no wheezing.   Abdomen: Bowel sounds normal, Soft,  No tenderness, No guarding, No rebound, No masses, No hepatosplenomegaly.    Skin: Warm, Dry, No erythema, No rash, no induration or crepitus.        Neurologic: Alert & oriented x 3, Normal motor function, Normal sensory function, No focal deficits noted, cranial nerves II through XII are normal.  Psychiatric: Affect normal, Judgment normal, Mood normal.     LAB DATA REVIEWED:  Recent Results (from the past 24 hour(s))   CBC w/ Differential    Collection Time: 05/12/21  1:20 PM   Result Value Ref Range    WBC 15.4 (H) 4.8 - 10.8 K/uL    RBC 5.22 4.70 - 6.10 M/uL    Hemoglobin 17.2 14.0 - 18.0 g/dL    Hematocrit 49.0 42.0 - 52.0 %    MCV 93.9 81.4 - 97.8 fL    MCH 33.0 27.0 - 33.0 pg    MCHC 35.1 33.7 - 35.3 g/dL    RDW 45.0 35.9 - 50.0 fL    Platelet Count 180 164 - 446 K/uL    MPV 9.9 9.0 - 12.9 fL    Neutrophils-Polys 90.40 (H) 44.00 - 72.00 %    Lymphocytes 5.10 (L) 22.00 - 41.00 %    Monocytes 3.80 0.00 - 13.40 %    Eosinophils 0.00 0.00 - 6.90 %    Basophils 0.10 0.00 - 1.80 %    Immature Granulocytes 0.60 0.00 - 0.90 %    Nucleated RBC 0.00 /100 WBC    Neutrophils (Absolute) 13.90 (H) 1.82 - 7.42 K/uL    Lymphs (Absolute) 0.78 (L) 1.00 - 4.80 K/uL    Monos (Absolute) 0.59 0.00 - 0.85 K/uL    Eos (Absolute) 0.00 0.00 - 0.51 K/uL    Baso (Absolute) 0.02 0.00 - 0.12 K/uL    Immature Granulocytes (abs) 0.09 0.00 - 0.11 K/uL    NRBC (Absolute) 0.00 K/uL   Complete Metabolic Panel (CMP)    Collection Time: 05/12/21  1:20 PM   Result Value Ref Range    Sodium 134 (L) 135 - 145 mmol/L    Potassium 3.6 3.6 - 5.5 mmol/L    Chloride 97 96 - 112 mmol/L    Co2 22 20 - 33 mmol/L    Anion Gap 15.0 7.0 - 16.0    Glucose 152 (H) 65 - 99 mg/dL    Bun 14 8 - 22 mg/dL    Creatinine 0.79 0.50 - 1.40 mg/dL    Calcium 9.8 8.4 - 10.2 mg/dL    AST(SGOT) 24 12 - 45 U/L    ALT(SGPT) 28 2 - 50 U/L    Alkaline Phosphatase 77 30 - 99 U/L    Total Bilirubin 0.8 0.1 - 1.5 mg/dL    Albumin 4.0 3.2 - 4.9 g/dL    Total Protein 7.7 6.0 - 8.2  g/dL    Globulin 3.7 (H) 1.9 - 3.5 g/dL    A-G Ratio 1.1 g/dL   proBrain Natriuretic Peptide, NT    Collection Time: 21  1:20 PM   Result Value Ref Range    NT-proBNP 1429 (H) 0 - 125 pg/mL   Troponin STAT    Collection Time: 21  1:20 PM   Result Value Ref Range    Troponin T 12 6 - 19 ng/L   Lipase    Collection Time: 21  1:20 PM   Result Value Ref Range    Lipase 24 7 - 58 U/L   ESTIMATED GFR    Collection Time: 21  1:20 PM   Result Value Ref Range    GFR If African American >60 >60 mL/min/1.73 m 2    GFR If Non African American >60 >60 mL/min/1.73 m 2   EKG    Collection Time: 21  1:51 PM   Result Value Ref Range    Report       [facility] Emergency Dept.    Test Date:  2021  Pt Name:    SANDRITA RAPHAEL                 Department: [facility]  MRN:        [id number]                      Room:       [facility] 8  Gender:     Male                         Technician: [id number]  :        1950                   Requested By:QUINTEN JAIN  Order #:    [id number]                    Reading MD: QUINTEN JAIN MD    Measurements  Intervals                                Axis  Rate:       99                           P:          48  GA:         168                          QRS:        -24  QRSD:       88                           T:          -41  QT:         372  QTc:        478    Interpretive Statements  SINUS RHYTHM  BORDERLINE LEFT AXIS DEVIATION  EARLY PRECORDIAL R/S TRANSITION  BORDERLINE T ABNORMALITIES, INFERIOR LEADS  BORDERLINE PROLONGED QT INTERVAL  Compared to ECG 2021 09:25:01  T-wave abnormality now present  Electronically Signed On 2021 18:16:53 PDT by QUINTEN JAIN MD     PT/INR    Collection Time: 21  2:28 PM   Result Value Ref Range    PT 23.7 (H) 12.0 - 14.6 sec    INR 2.17 (H) 0.87 - 1.13   URINALYSIS    Collection Time: 21  4:41 PM    Specimen: Urine   Result Value Ref Range    Color Yellow     Character Clear     Specific  Gravity 1.010 <1.035    Ph 5.5 5.0 - 8.0    Glucose Negative Negative mg/dL    Ketones Negative Negative mg/dL    Protein Negative Negative mg/dL    Bilirubin Negative Negative    Nitrite Negative Negative    Leukocyte Esterase Negative Negative    Occult Blood Negative Negative    Micro Urine Req see below    LACTIC ACID    Collection Time: 05/12/21  6:35 PM   Result Value Ref Range    Lactic Acid 2.2 (H) 0.5 - 2.0 mmol/L   COV-2, FLU A/B, AND RSV BY PCR (2-4 HOURS CEPHEID): Collect NP swab in VTM    Collection Time: 05/12/21  7:08 PM    Specimen: Respirate   Result Value Ref Range    Influenza virus A RNA Negative Negative    Influenza virus B, PCR Negative Negative    RSV, PCR Negative Negative    SARS-CoV-2 by PCR NotDetected     SARS-CoV-2 Source NP Swab        IMAGING:   Images Independently Reviewed   US-RUQ   Final Result      1.  Sludge and stones present in the gallbladder.   2.  Gallbladder wall thickening raises concern for cholecystitis.   3.  No gross biliary dilation.   4.  Very limited exam due to body habitus.      CT-CTA COMPLETE THORACOABDOMINAL AORTA   Final Result      1.  No aortic aneurysm or dissection.   2.  Atherosclerosis including prominent coronary calcification.   3.  Cholelithiasis and distended gallbladder. If there is concern for acute cholecystitis, further evaluation with ultrasound and nuclear medicine HIDA scan may be performed.   4.  Hepatic steatosis.   5.  Postoperative changes of colectomy with right lower quadrant ostomy.            NM-BILIARY (HIDA) SCAN WITH CCK    (Results Pending)       ASSESSMENT/PLAN     Given presentation and other medical comorbid conditions low suspicion for acute cholecystitis at this time.  Recommend a HIDA scan in a.m. to rule this out completely.  We will follow up on the results of this.    Panda Bird MD  [facility]

## 2021-05-13 NOTE — CARE PLAN
Problem: Respiratory  Goal: Patient will achieve/maintain optimum respiratory ventilation and gas exchange  Outcome: Not Progressing  Flowsheets (Taken 5/13/2021 0934)  O2 Delivery Device: Silicone Nasal Cannula  Note: Pt is on 2 L NC.  Baseline is room air.      Problem: Knowledge Deficit - Standard  Goal: Patient and family/care givers will demonstrate understanding of plan of care, disease process/condition, diagnostic tests and medications  Outcome: Progressing  Note: RN discussed with pt need for NPO status due to hidascan scheduled.

## 2021-05-13 NOTE — PROGRESS NOTES
Report received by Joanne FRANCIS. Plan of care discussed. Patient not on floor, awaiting transport from E..    Patient on floor.  Pt arrived to unit via [place]. Ambulated from [facility] to bed, standby assist. Tele monitor applied, vitals taken. Pt assessed. A&O x4. Admit profile and med rec complete. Discussed POC with pt, including NPO at midnight, medication, and plan. Welcome folder provided and discussed. Communication board filled out. Questions and concerns addressed, verbalized understanding. Fall precautions in place. Pt demonstrates ability to use call light appropriately. Pt left in lowest position. Bed locked and low.    Assessment complete. No complaints of pain at this time.

## 2021-05-13 NOTE — ASSESSMENT & PLAN NOTE
History of atrial fibrillation s/p ablation  Patient reports that he has been having palpitations and that he has self-monitoring device at home that captured several episodes of RVR.    EKG shows sinus tachycardia with a rate of 99, no ST elevation there is T wave flattening in leads V3-V6, .  Resume home metoprolol with hold parameters.  Recent echo reviewed, Mild concentric left ventricular hypertrophy. Normal estimated right atrial pressure. Mild pulmonary hypertension, estimated PASP 40mmHg.   Continue with telemetry

## 2021-05-13 NOTE — ASSESSMENT & PLAN NOTE
History of remote venous thromboses and pulmonary embolism, on Coumadin    on lovenox bridge and coumadin

## 2021-05-13 NOTE — ASSESSMENT & PLAN NOTE
Patient reports that he has been having palpitations and that he has self-monitoring device at home that captured several episodes of RVR.  I  cardiac monitoring    Continue home metoprolol with hold parameters.  Evaluated by cardiology, no acute findings,

## 2021-05-13 NOTE — PROGRESS NOTES
"S: Abel Day  is a 71 y.o.  male admitted for multiple medical issues including possible acute heart failure General surgery consulted for possible cholecystitis      O:  /72   Pulse 76   Temp 36.9 °C (98.5 °F) (Oral)   Resp 18   Ht 1.905 m (6' 3\")   Wt (!) 143 kg (314 lb 2.5 oz)   SpO2 93%   Intake/Output                             05/11/21 0700 - 05/12/21 0659 (Not Admitted) 05/12/21 0700 - 05/13/21 0659 05/13/21 0700 - 05/14/21 0659     [id number] 8963-3622 Total 9191-9381 2137-8754 Total 5118-6396 0352-2332 Total                    Intake    IV Piggyback  --  -- --  --  100 100  170.4  -- 170.4    Volume (mL) (piperacillin-tazobactam (ZOSYN) 3.375 g in  mL IVPB) -- -- -- -- 100 100 -- -- --    Volume (mL) (piperacillin-tazobactam (ZOSYN) 3.375 g in  mL IVPB) -- -- -- -- -- -- 170.4 -- 170.4    Total Intake -- -- -- -- 100 100 170.4 -- 170.4       Output    Total Output -- -- -- -- -- -- -- -- --       Net I/O     -- -- -- -- 100 100 170.4 -- 170.4        Recent Labs     05/12/21 1320 05/13/21  0231   SODIUM 134* 134*   POTASSIUM 3.6 4.0   CHLORIDE 97 97   CO2 22 23   GLUCOSE 152* 142*   BUN 14 13   CREATININE 0.79 0.82   CALCIUM 9.8 9.2     Recent Labs     05/12/21 1320 05/13/21  0231   WBC 15.4* 15.7*   RBC 5.22 4.81   HEMOGLOBIN 17.2 16.1   HEMATOCRIT 49.0 46.4   MCV 93.9 96.5   MCH 33.0 33.5*   MCHC 35.1 34.7   RDW 45.0 46.7   PLATELETCT 180 156*   MPV 9.9 9.8       Alert and Oriented x3, No Acute Distress  Normal Respiratory Effort  Abdomen soft, appropriately tender  Incisions/Bandages clean/dry/intact  Extremities warm and well perfused    A/P:  Still awaiting HIDA scan.  We will follow-up on the results of this.    Panda Bird MD  [facility]    "

## 2021-05-13 NOTE — CARE PLAN
Problem: Pain - Standard  Goal: Alleviation of pain or a reduction in pain to the patient’s comfort goal  Outcome: Progressing     Problem: Knowledge Deficit - Standard  Goal: Patient and family/care givers will demonstrate understanding of plan of care, disease process/condition, diagnostic tests and medications  Outcome: Progressing   The patient is Watcher - Medium risk of patient condition declining or worsening    Shift Goals  Clinical Goals: Complete admission navigator    Summary of progress made towards problems/goals:  Admission navigator complete. Updated patient on current plan of care and treatment plan. All questions answered.

## 2021-05-13 NOTE — PROGRESS NOTES
Anticoagulation Summary  As of 2020    INR goal:   2.0-3.0   TTR:   57.0 % (4.6 y)   INR used for dosin.40 (2020)   Warfarin maintenance plan:   5 mg (5 mg x 1) every Mon, Fri; 2.5 mg (5 mg x 0.5) all other days   Weekly warfarin total:   22.5 mg   No change documented:   Britney Styles   Plan last modified:   Shana Barragan, PharmD (6/3/2019)   Next INR check:   2020   Target end date:   Indefinite    Indications    History of pulmonary embolism and DVT  unprovoked. [Z86.711]  Atrial fibrillation with RVR (HCC) (Resolved) [I48.91]  Long term (current) use of anticoagulants [Z79.01] [Z79.01]             Anticoagulation Episode Summary     INR check location:       Preferred lab:       Send INR reminders to:       Comments:   [place]      Anticoagulation Care Providers     Provider Role Specialty Phone number    [facility] Responsible  [phone]        Anticoagulation Patient Findings  Patient Findings     Negatives:   Signs/symptoms of thrombosis, Signs/symptoms of bleeding, Laboratory test error suspected, Change in health, Change in alcohol use, Change in activity, Upcoming invasive procedure, Emergency department visit, Upcoming dental procedure, Missed doses, Extra doses, Change in medications, Change in diet/appetite, Hospital admission, Bruising, Other complaints        Spoke with the patient on the phone today, reporting a therapeutic INR of 2.4. Confirmed the current warfarin dosing regimen and patient compliance. Patient denies any interval changes to diet and/or medications. Patient denies any signs/symptoms of bleeding or clotting.  Patient instructed to continue with the current warfarin dosing regimen, and asked to follow up again in 4 weeks.     Jay VallejoD     58.9

## 2021-05-13 NOTE — H&P
"Hospital Medicine History & Physical Note    Date of Service  5/12/2021    Primary Care Physician  Andreina Iniguez D.O.    Consultants  General surgery. Dr Bird   Cardiology,  Dr. Marquez     Code Status  Full Code    Chief Complaint  Chief Complaint   Patient presents with   • Atrial Fibrillation     \" In and out last night and today\" per pt.    • Abdominal Pain     No bm into ostomy since 2130 last night . Low abd pain.    • N/V     x 5 last evening        History of Presenting Illness  71 y.o. male with a past medical history of atrial fibrillation s/p ablation, venous thrombosis and pulmonary embolism on anticoagulation with Coumadin with a past medical history of atrial fibrillation status post ablation, chronic anticoagulation who presented 5/12/2021 with episodes of palpitations, abdominal pain nausea and vomiting.  Patient reports having episodes of palpitations over the past 2 days and reports that he was able to catch rapid heart rate on home monitoring device on several occasions.  He also has noticed left lower quadrant abdominal pain, that is described as crampy/dull, and rated moderate in severity, associated with reduced ostomy output and he was concerned about possible ostomy stenosis.  He reports anorexia and reduced oral intake.  He reports that his abdominal pain has been resolved.    Review of Systems  Review of Systems   Constitutional: Positive for malaise/fatigue. Negative for chills and fever.   Eyes: Negative for discharge and redness.   Respiratory: Negative for cough, shortness of breath and stridor.    Cardiovascular: Negative for chest pain and leg swelling.   Gastrointestinal: Positive for abdominal pain, nausea and vomiting.   Genitourinary: Negative for flank pain.   Musculoskeletal: Negative for myalgias.   Skin: Negative.    Neurological: Negative for focal weakness.   Endo/Heme/Allergies: Does not bruise/bleed easily.   Psychiatric/Behavioral: The patient is not nervous/anxious.  "     Past Medical History   has a past medical history of Cancer (HCC), GERD (gastroesophageal reflux disease), Hemorrhagic disorder, Obesity, Paroxysmal atrial fibrillation (HCC) (11/26/2012), Skin cancer, and ULCERATIVE COLITIS (11/26/2012).    Surgical History   has a past surgical history that includes colon resection; ileostomy; mass excision general (Left, 4/7/2016); flap graft (Left, 4/7/2016); and recovery (5/4/2016).     Family History  family history includes Heart Attack in his maternal uncle; Heart Failure in his father; Hypertension in his mother.     Social History   reports that he quit smoking about 36 years ago. His smoking use included cigarettes. He has a 10.00 pack-year smoking history. He has never used smokeless tobacco. He reports current alcohol use of about 12.6 oz of alcohol per week. He reports previous drug use. Drug: Marijuana.    Allergies  Allergies   Allergen Reactions   • Other Misc Unspecified     Silk sutures (body rejected them)   • Omnicef [Cefdinir]      Internal bleeding stated by patient       Medications  Prior to Admission Medications   Prescriptions Last Dose Informant Patient Reported? Taking?   CALCIUM-VITAMIN D PO 5/11/2021 at Unknown time Patient Yes No   Sig: Take 1 tablet by mouth 2 times a day.   Lansoprazole (PREVACID PO) 5/12/2021 at am Patient Yes No   Sig: Take 15 mg by mouth every morning.   Multiple Vitamin (MULTI-VITAMIN PO) 5/11/2021 at Unknown time Patient Yes No   Sig: Take 1 tablet by mouth every day.   Multiple Vitamins-Minerals (PRESERVISION AREDS 2 PO) 5/11/2021 at am Patient Yes No   Sig: Take 1 tablet by mouth 2 times a day.   cetirizine (ZYRTEC) 10 MG TABS 5/11/2021 at am Patient Yes No   Sig: Take 10 mg by mouth every morning. Indications: **OTC**   diazePAM (VALIUM) 5 MG Tab 5/12/2021 at 1100 Patient Yes Yes   Sig: Take 2.5 mg by mouth as needed for Anxiety.   fluticasone (FLONASE) 50 MCG/ACT nasal spray 5/4/2021 at Unknown time Patient Yes No    Sig: Administer 1 Spray into affected nostril(S) 1 time a day as needed.   metoprolol SR (TOPROL XL) 25 MG TABLET SR 24 HR 5/11/2021 at Unknown time Patient No No   Sig: TAKE 1 TABLET DAILY   Patient taking differently: Take 25 mg by mouth every day.   montelukast (SINGULAIR) 10 MG Tab 5/11/2021 at PM Patient Yes No   Sig: Take 10 mg by mouth every day. IN THE EVENING   warfarin (COUMADIN) 2.5 MG Tab 5/11/2021 at pm Patient Yes Yes   Sig: Take 2.5 mg by mouth see administration instructions. Pt takes:  Warfarin 2.5mg on:   Tuesday, Wednesday, Thursday, Saturday, Sunday    Pt also has Rx for:  Warfarin 5mg on:   Monday, Friday   warfarin (COUMADIN) 5 MG Tab 5/10/2021 at pm Patient No No   Sig: Take 1 Tab by mouth every Friday. As directed per [employer].   Patient taking differently: Take 5 mg by mouth see administration instructions. Pt takes:  Warfarin 5mg on:   Monday, Friday.    Pt also has Rx for:  Warfarin 2.5mg on:   Tuesday, Wednesday, Thursday, Saturday, Sunday      Facility-Administered Medications: None     Physical Exam  Temp:  [37.2 °C (99 °F)] 37.2 °C (99 °F)  Pulse:  [] 92  Resp:  [18-19] 19  BP: (141-161)/(75-91) 161/82  SpO2:  [88 %-96 %] 96 %    Physical Exam  Constitutional:       General: He is not in acute distress.     Appearance: He is not ill-appearing or diaphoretic.   HENT:      Head: Atraumatic.      Right Ear: External ear normal.      Left Ear: External ear normal.      Nose: No congestion or rhinorrhea.      Mouth/Throat:      Mouth: Mucous membranes are dry.   Eyes:      General: No scleral icterus.        Right eye: No discharge.         Left eye: No discharge.      Pupils: Pupils are equal, round, and reactive to light.   Cardiovascular:      Rate and Rhythm: Normal rate and regular rhythm.   Pulmonary:      Effort: Pulmonary effort is normal.   Abdominal:      Tenderness: There is no abdominal tenderness.      Comments: Protuberant abdomen.  Ileostomy in place    Musculoskeletal:      Cervical back: Neck supple. No rigidity. No muscular tenderness.      Right lower leg: No edema.      Left lower leg: No edema.   Skin:     General: Skin is dry.      Capillary Refill: Capillary refill takes 2 to 3 seconds.      Coloration: Skin is not jaundiced or pale.   Neurological:      Mental Status: He is alert and oriented to person, place, and time.      Coordination: Coordination normal.   Psychiatric:         Mood and Affect: Mood normal.         Behavior: Behavior normal.       Laboratory:  Recent Labs     05/12/21  1320   WBC 15.4*   RBC 5.22   HEMOGLOBIN 17.2   HEMATOCRIT 49.0   MCV 93.9   MCH 33.0   MCHC 35.1   RDW 45.0   PLATELETCT 180   MPV 9.9     Recent Labs     05/12/21  1320   SODIUM 134*   POTASSIUM 3.6   CHLORIDE 97   CO2 22   GLUCOSE 152*   BUN 14   CREATININE 0.79   CALCIUM 9.8     Recent Labs     05/12/21  1320   ALTSGPT 28   ASTSGOT 24   ALKPHOSPHAT 77   TBILIRUBIN 0.8   LIPASE 24   GLUCOSE 152*     Recent Labs     05/12/21  1428   INR 2.17*     Recent Labs     05/12/21  1320   NTPROBNP 1429*         Recent Labs     05/12/21  1320   TROPONINT 12       Imaging:  US-RUQ   Final Result      1.  Sludge and stones present in the gallbladder.   2.  Gallbladder wall thickening raises concern for cholecystitis.   3.  No gross biliary dilation.   4.  Very limited exam due to body habitus.      CT-CTA COMPLETE THORACOABDOMINAL AORTA   Final Result      1.  No aortic aneurysm or dissection.   2.  Atherosclerosis including prominent coronary calcification.   3.  Cholelithiasis and distended gallbladder. If there is concern for acute cholecystitis, further evaluation with ultrasound and nuclear medicine HIDA scan may be performed.   4.  Hepatic steatosis.   5.  Postoperative changes of colectomy with right lower quadrant ostomy.            NM-BILIARY (HIDA) SCAN WITH CCK    (Results Pending)     I personally reviewed patient EKG shows sinus tachycardia with a rate of 99, no ST  elevation there is T wave flattening in leads V3-V6, .    Assessment/Plan:  I anticipate this patient is appropriate for observation status at this time.    * Abdominal pain with imaging finding concerning for cholecystitis- (present on admission)  Assessment & Plan  Ultrasound shows evidence for gallbladder wall thickening raises concern for cholecystitis  Started empirically on Zosyn in the emergency department, continue for now follow on cultures and sensitivities.  General surgery, Dr Panchal has been consulted by the emergency department with no current plan for surgery, HIDA scan, and monitoring on antibiotics has been recommended.     Palpitations- (present on admission)  Assessment & Plan  History of atrial fibrillation s/p ablation  Patient reports that he has been having palpitations and that he has self-monitoring device at home that captured several episodes of RVR.    EKG shows sinus tachycardia with a rate of 99, no ST elevation there is T wave flattening in leads V3-V6, .  Resume home metoprolol with hold parameters.  Recent echo reviewed, Mild concentric left ventricular hypertrophy. Normal estimated right atrial pressure. Mild pulmonary hypertension, estimated PASP 40mmHg.   Cardiology Dr. Marquez, consulted by EDP, plan to see a.m.   I will start continuous cardiac monitoring    S/P ablation of atrial fibrillation- (present on admission)  Assessment & Plan  Patient reports that he has been having palpitations and that he has self-monitoring device at home that captured several episodes of RVR.  I will start continuous cardiac monitoring  Cardiology Dr. Marquez, consulted by EDP, plan to see a.m.   EKG shows sinus tachycardia with a rate of 99, no ST elevation there is T wave flattening in leads V3-V6, .  Resume home metoprolol with hold parameters.  INR is currently therapeutic. I will switch Coumadin to Lovenox in case patient needs surgery.    History of pulmonary embolism and DVT,  unprovoked.- (present on admission)  Assessment & Plan  History of remote venous thromboses and pulmonary embolism, on Coumadin  INR is currently therapeutic. I will switch Coumadin to Lovenox in case patient needs surgery.    Acid reflux disease- (present on admission)  Assessment & Plan  Resume home omeprazole.

## 2021-05-13 NOTE — PROGRESS NOTES
2 RN skin check complete w/ Joey RN  Devices in place: PIV, Telemetry monitor, Ostomy pouch  Skin assessed under devices: Yes  Confirmed pressure ulcers found on: N/a  New potential pressure ulcers noted on: N/a   Wound consult placed: No  The following interventions in place: Educated patient on repositioning often to reduce skin breakdown, will replace ostomy pouch when supplies are available.     All bony prominences and skin folds, if applicable, checked. Skin WNL.

## 2021-05-13 NOTE — PROGRESS NOTES
Telemetry Shift Summary    Rhythm SR  HR Range 76-96  Ectopy fPvc, oBig, rPac/Coup/Trig  Measurements 0.18/0.08/0.40    Normal Values  Rhythm SR  HR Range    Measurements 0.12-0.20 / 0.06-0.10  / 0.30-0.52

## 2021-05-13 NOTE — PROGRESS NOTES
Report received from ER RN Amparo. Pt arrived to floor, ambulated SBA to restroom and then bed, steady gait. Pt oriented to room, communication board updated with POC. Safety precautions in place. Call light in reach. Report then given to Wali FRANCIS.

## 2021-05-13 NOTE — PROGRESS NOTES
Melanie in [place] called and ordered pt to drink Boost or whole milk STAT for scheduled scan at 1430.  Pt needs to be NPO for 4 hours prior to scan.

## 2021-05-14 PROBLEM — E83.42 HYPOMAGNESEMIA: Status: ACTIVE | Noted: 2021-05-14

## 2021-05-14 PROBLEM — K81.9 CHOLECYSTITIS: Status: ACTIVE | Noted: 2021-05-14

## 2021-05-14 LAB
APTT PPP: 61.9 SEC (ref 24.7–36)
ERYTHROCYTE [DISTWIDTH] IN BLOOD BY AUTOMATED COUNT: 46.5 FL (ref 35.9–50)
HCT VFR BLD AUTO: 46.5 % (ref 42–52)
HGB BLD-MCNC: 16 G/DL (ref 14–18)
INR PPP: 2.07 (ref 0.87–1.13)
MAGNESIUM SERPL-MCNC: 1.2 MG/DL (ref 1.5–2.5)
MCH RBC QN AUTO: 32.8 PG (ref 27–33)
MCHC RBC AUTO-ENTMCNC: 34.4 G/DL (ref 33.7–35.3)
MCV RBC AUTO: 95.3 FL (ref 81.4–97.8)
PLATELET # BLD AUTO: 135 K/UL (ref 164–446)
PMV BLD AUTO: 10 FL (ref 9–12.9)
PROTHROMBIN TIME: 22.9 SEC (ref 12–14.6)
RBC # BLD AUTO: 4.88 M/UL (ref 4.7–6.1)
WBC # BLD AUTO: 14.3 K/UL (ref 4.8–10.8)

## 2021-05-14 PROCEDURE — 700111 HCHG RX REV CODE 636 W/ 250 OVERRIDE (IP): Performed by: INTERNAL MEDICINE

## 2021-05-14 PROCEDURE — 85730 THROMBOPLASTIN TIME PARTIAL: CPT

## 2021-05-14 PROCEDURE — A9270 NON-COVERED ITEM OR SERVICE: HCPCS | Performed by: INTERNAL MEDICINE

## 2021-05-14 PROCEDURE — A9270 NON-COVERED ITEM OR SERVICE: HCPCS | Performed by: HOSPITALIST

## 2021-05-14 PROCEDURE — 96367 TX/PROPH/DG ADDL SEQ IV INF: CPT

## 2021-05-14 PROCEDURE — 85610 PROTHROMBIN TIME: CPT

## 2021-05-14 PROCEDURE — 99226 PR SUBSEQUENT OBSERVATION CARE,LEVEL III: CPT | Performed by: INTERNAL MEDICINE

## 2021-05-14 PROCEDURE — 96366 THER/PROPH/DIAG IV INF ADDON: CPT

## 2021-05-14 PROCEDURE — 96372 THER/PROPH/DIAG INJ SC/IM: CPT

## 2021-05-14 PROCEDURE — 700111 HCHG RX REV CODE 636 W/ 250 OVERRIDE (IP): Performed by: HOSPITALIST

## 2021-05-14 PROCEDURE — 700102 HCHG RX REV CODE 250 W/ 637 OVERRIDE(OP): Performed by: HOSPITALIST

## 2021-05-14 PROCEDURE — 85027 COMPLETE CBC AUTOMATED: CPT

## 2021-05-14 PROCEDURE — 99204 OFFICE O/P NEW MOD 45 MIN: CPT | Performed by: INTERNAL MEDICINE

## 2021-05-14 PROCEDURE — 700105 HCHG RX REV CODE 258: Performed by: INTERNAL MEDICINE

## 2021-05-14 PROCEDURE — 83735 ASSAY OF MAGNESIUM: CPT

## 2021-05-14 PROCEDURE — 700102 HCHG RX REV CODE 250 W/ 637 OVERRIDE(OP): Performed by: INTERNAL MEDICINE

## 2021-05-14 PROCEDURE — G0378 HOSPITAL OBSERVATION PER HR: HCPCS

## 2021-05-14 RX ORDER — MAGNESIUM SULFATE HEPTAHYDRATE 40 MG/ML
2 INJECTION, SOLUTION INTRAVENOUS ONCE
Status: COMPLETED | OUTPATIENT
Start: 2021-05-14 | End: 2021-05-14

## 2021-05-14 RX ORDER — CHOLECALCIFEROL (VITAMIN D3) 125 MCG
5 CAPSULE ORAL NIGHTLY
Status: DISCONTINUED | OUTPATIENT
Start: 2021-05-14 | End: 2021-05-20 | Stop reason: HOSPADM

## 2021-05-14 RX ADMIN — METRONIDAZOLE 500 MG: 500 TABLET ORAL at 21:58

## 2021-05-14 RX ADMIN — METRONIDAZOLE 500 MG: 500 TABLET ORAL at 14:55

## 2021-05-14 RX ADMIN — ENOXAPARIN SODIUM 150 MG: 150 INJECTION SUBCUTANEOUS at 06:15

## 2021-05-14 RX ADMIN — OMEPRAZOLE 20 MG: 20 CAPSULE, DELAYED RELEASE ORAL at 06:15

## 2021-05-14 RX ADMIN — MAGNESIUM SULFATE 2 G: 2 INJECTION INTRAVENOUS at 14:57

## 2021-05-14 RX ADMIN — METRONIDAZOLE 500 MG: 500 TABLET ORAL at 06:14

## 2021-05-14 RX ADMIN — CEFTRIAXONE SODIUM 2 G: 2 INJECTION, POWDER, FOR SOLUTION INTRAMUSCULAR; INTRAVENOUS at 14:55

## 2021-05-14 RX ADMIN — ENOXAPARIN SODIUM 150 MG: 150 INJECTION SUBCUTANEOUS at 18:00

## 2021-05-14 RX ADMIN — Medication 5 MG: at 22:52

## 2021-05-14 RX ADMIN — METOPROLOL SUCCINATE 25 MG: 25 TABLET, EXTENDED RELEASE ORAL at 06:15

## 2021-05-14 ASSESSMENT — PATIENT HEALTH QUESTIONNAIRE - PHQ9
1. LITTLE INTEREST OR PLEASURE IN DOING THINGS: NOT AT ALL
2. FEELING DOWN, DEPRESSED, IRRITABLE, OR HOPELESS: NOT AT ALL
SUM OF ALL RESPONSES TO PHQ9 QUESTIONS 1 AND 2: 0

## 2021-05-14 ASSESSMENT — ENCOUNTER SYMPTOMS
ABDOMINAL PAIN: 1
NAUSEA: 1
CONSTIPATION: 1

## 2021-05-14 NOTE — PROGRESS NOTES
Assessment complete. Pt ambulated to restroom, steady gait. Pt denies pain or discomfort. PIV flushes well. Due med given per MAR. Safety precautions in place. Call light in reach.

## 2021-05-14 NOTE — PROGRESS NOTES
Rounding complete. Pt resting comfortably in bed w/ eyes closed. Unlabored breathing. Safety precautions in place. Call light in reach.

## 2021-05-14 NOTE — PROGRESS NOTES
Telemetry Shift Summary    Rhythm SR  HR Range 70s  Ectopy Occ PVC, Rare coup/bigem/trigem   Measurements 0.16/0.08/0.40        Normal Values  Rhythm SR  HR Range    Measurements 0.12-0.20 / 0.06-0.10  / 0.30-0.52

## 2021-05-14 NOTE — PROGRESS NOTES
Received report from Joanne, at pt bedside. Pt A/o x 4 no C/O of pain at this time, monitoring INR for possible surgery with MD Carbone, POC discussed. Call light and belongings within reach. Bed locked and in low position.

## 2021-05-14 NOTE — PROGRESS NOTES
"S: Abel Day  is a 71 y.o.  male admitted for multiple medical complaints, found to have acute cholecystitis      O:  /81   Pulse 75   Temp 36.9 °C (98.5 °F) (Oral)   Resp 18   Ht 1.905 m (6' 3\")   Wt (!) 143 kg (314 lb 2.5 oz)   SpO2 93%   Intake/Output                             05/12/21 0700 - 05/13/21 0659 05/13/21 0700 - 05/14/21 0659 05/14/21 0700 - 05/15/21 0659     [id number] 7185-3716 Total 9781-8709 9779-5646 Total 5065-2976 0319-1696 Total                    Intake    IV Piggyback  --  100 100  170.4  -- 170.4  --  -- --    Volume (mL) (piperacillin-tazobactam (ZOSYN) 3.375 g in  mL IVPB) -- 100 100 -- -- -- -- -- --    Volume (mL) (piperacillin-tazobactam (ZOSYN) 3.375 g in  mL IVPB) -- -- -- 170.4 -- 170.4 -- -- --    Total Intake -- 100 100 170.4 -- 170.4 -- -- --       Output    Total Output -- -- -- -- -- -- -- -- --       Net I/O     -- 100 100 170.4 -- 170.4 -- -- --        Recent Labs     05/12/21  1320 05/13/21  0231   SODIUM 134* 134*   POTASSIUM 3.6 4.0   CHLORIDE 97 97   CO2 22 23   GLUCOSE 152* 142*   BUN 14 13   CREATININE 0.79 0.82   CALCIUM 9.8 9.2     Recent Labs     05/12/21  1320 05/13/21  0231 05/14/21  0238   WBC 15.4* 15.7* 14.3*   RBC 5.22 4.81 4.88   HEMOGLOBIN 17.2 16.1 16.0   HEMATOCRIT 49.0 46.4 46.5   MCV 93.9 96.5 95.3   MCH 33.0 33.5* 32.8   MCHC 35.1 34.7 34.4   RDW 45.0 46.7 46.5   PLATELETCT 180 156* 135*   MPV 9.9 9.8 10.0       Alert and Oriented x3, No Acute Distress  Normal Respiratory Effort  Abdomen soft, appropriately tender  Incisions/Bandages clean/dry/intact  Extremities warm and well perfused    A/P:  Acute cholecystitis, INR still 2, Will discuss reversal vs bridging with primary team. Plan for lap brenda poss open during this admission.    Panda Bird MD  [facility]    "

## 2021-05-14 NOTE — PROGRESS NOTES
"Hospital Medicine Daily Progress Note    Date of Service  5/14/2021    Chief Complaint  71 y.o. male admitted 5/12/2021 with abdominal pain    Hospital Course  Per notes, \"71 y.o. male with a past medical history of atrial fibrillation s/p ablation, venous thrombosis and pulmonary embolism on anticoagulation with Coumadin with a past medical history of atrial fibrillation status post ablation, chronic anticoagulation who presented 5/12/2021 with episodes of palpitations, abdominal pain nausea and vomiting.  Patient reports having episodes of palpitations over the past 2 days and reports that he was able to catch rapid heart rate on home monitoring device on several occasions.  He also has noticed left lower quadrant abdominal pain, that is described as crampy/dull, and rated moderate in severity, associated with reduced ostomy output and he was concerned about possible ostomy stenosis.  He reports anorexia and reduced oral intake.  He reports that his abdominal pain has been resolved.\"    Patient was admitted evaluated by general surgery.  He had a HIDA scan on 5/13/2021, which was highly suggestive for cholecystitis.      Interval Problem Update  Patient was seen and examined this morning at bedside, feeling significantly better, no reports by nursing overnight.    HIDA scan revealed high suspicion for cholecystitis, discussed with surgery on 5/14.  Patient has been on warfarin, will plan for surgery on Monday 5/17 unless patient is worsening.  Also pending cardiology evaluation.     Consultants/Specialty  General surgery  Cardiology    Code Status  Full Code    Disposition  Anticipated DC to home    Review of Systems  Review of Systems   Constitutional: Positive for malaise/fatigue.   Gastrointestinal: Positive for abdominal pain, constipation and nausea.   All other systems reviewed and are negative.       Physical Exam  Temp:  [36.7 °C (98.1 °F)-37.2 °C (98.9 °F)] 36.9 °C (98.4 °F)  Pulse:  [71-75] 74  Resp:  " [18] 18  BP: (126-150)/(60-83) 126/66  SpO2:  [93 %-96 %] 95 %    Physical Exam  Vitals and nursing note reviewed.   Constitutional:       Appearance: Normal appearance. He is obese. He is not ill-appearing.   Cardiovascular:      Rate and Rhythm: Normal rate and regular rhythm.      Pulses: Normal pulses.      Heart sounds: Normal heart sounds.   Pulmonary:      Effort: Pulmonary effort is normal.      Breath sounds: Normal breath sounds.   Abdominal:      General: Abdomen is flat. Bowel sounds are normal.      Palpations: Abdomen is soft.      Tenderness: There is no guarding or rebound.   Musculoskeletal:      Right lower leg: No edema.      Left lower leg: No edema.   Neurological:      General: No focal deficit present.      Mental Status: He is alert and oriented to person, place, and time.         Fluids    Intake/Output Summary (Last 24 hours) at 5/14/2021 1317  Last data filed at 5/14/2021 0900  Gross per 24 hour   Intake 360 ml   Output --   Net 360 ml       Laboratory  Recent Labs     05/12/21  1320 05/13/21  0231 05/14/21  0238   WBC 15.4* 15.7* 14.3*   RBC 5.22 4.81 4.88   HEMOGLOBIN 17.2 16.1 16.0   HEMATOCRIT 49.0 46.4 46.5   MCV 93.9 96.5 95.3   MCH 33.0 33.5* 32.8   MCHC 35.1 34.7 34.4   RDW 45.0 46.7 46.5   PLATELETCT 180 156* 135*   MPV 9.9 9.8 10.0     Recent Labs     05/12/21  1320 05/13/21  0231   SODIUM 134* 134*   POTASSIUM 3.6 4.0   CHLORIDE 97 97   CO2 22 23   GLUCOSE 152* 142*   BUN 14 13   CREATININE 0.79 0.82   CALCIUM 9.8 9.2     Recent Labs     05/12/21  1428 05/13/21  0231 05/14/21  0238   APTT  --  49.0* 61.9*   INR 2.17* 2.15* 2.07*               Imaging  NM-HEPATOBILIARY SCAN   Final Result      1.  The gallbladder is not definitively identified prior to or after the administration of morphine. Findings are highly suspicious for acute cholecystitis.      US-RUQ   Final Result      1.  Sludge and stones present in the gallbladder.   2.  Gallbladder wall thickening raises concern for  cholecystitis.   3.  No gross biliary dilation.   4.  Very limited exam due to body habitus.      CT-CTA COMPLETE THORACOABDOMINAL AORTA   Final Result      1.  No aortic aneurysm or dissection.   2.  Atherosclerosis including prominent coronary calcification.   3.  Cholelithiasis and distended gallbladder. If there is concern for acute cholecystitis, further evaluation with ultrasound and nuclear medicine HIDA scan may be performed.   4.  Hepatic steatosis.   5.  Postoperative changes of colectomy with right lower quadrant ostomy.                 Assessment/Plan  * Abdominal pain with imaging finding concerning for cholecystitis- (present on admission)  Assessment & Plan  Ultrasound shows evidence for gallbladder wall thickening raises concern for cholecystitis  Initially started on Zosyn, will switch to ceftriaxone metronidazole today.  General surgery consulted, follow-up on recommendations  HIDA scan suggestive of cholecystitis, continue with antibiotics and follow-up on surgery recommendations    Hypomagnesemia  Assessment & Plan  Continue to monitor and replace as needed    Cholecystitis- (present on admission)  Assessment & Plan  hida on 5/13 with high suspicion of cholecystitis, plan for surgery on 5/17  Follow-up with general surgery recommendations  Continue with Lovenox  Continue with antibiotics    Palpitations- (present on admission)  Assessment & Plan  History of atrial fibrillation s/p ablation  Patient reports that he has been having palpitations and that he has self-monitoring device at home that captured several episodes of RVR.    EKG shows sinus tachycardia with a rate of 99, no ST elevation there is T wave flattening in leads V3-V6, .  Resume home metoprolol with hold parameters.  Recent echo reviewed, Mild concentric left ventricular hypertrophy. Normal estimated right atrial pressure. Mild pulmonary hypertension, estimated PASP 40mmHg.   Cardiology consulted in ED, Dr. Dumont, pending  cardiology evaluation  Continue with telemetry  No symptoms today    S/P ablation of atrial fibrillation- (present on admission)  Assessment & Plan  Patient reports that he has been having palpitations and that he has self-monitoring device at home that captured several episodes of RVR.  I will start continuous cardiac monitoring  Cardiology Dr. Marquez, consulted by EDP, plan to see a.m.   EKG shows sinus tachycardia with a rate of 99, no ST elevation there is T wave flattening in leads V3-V6, .  Continue home metoprolol with hold parameters.    History of pulmonary embolism and DVT, unprovoked.- (present on admission)  Assessment & Plan  History of remote venous thromboses and pulmonary embolism, on Coumadin  INR is currently therapeutic.    Coumadin switched to Lovenox due to possible surgery    Acid reflux disease- (present on admission)  Assessment & Plan  Continue with omeprazole.       VTE prophylaxis: SCDs, ambulation

## 2021-05-14 NOTE — WOUND TEAM
Pt seen by wound team for an established ileostomy.  Pt performs his own dressing changes and had supplies at bedside brought by his wife who was also present.  Pt stated that he had no complaints with his ostomy and that he would not be needing supplies during hospital stay.  Pt has no advanced wound care needs at this time, please re-consult with new concerns.

## 2021-05-14 NOTE — CARE PLAN
The patient is Stable - Low risk of patient condition declining or worsening    Shift Goals  Clinical Goals: Tolerate full liquid diet    Progress made toward(s) clinical / shift goals:  Obtained diet order post HIDA scan. Pt ate applesauce and drank gingerale. Tolerated well    Patient is not progressing towards the following goals:

## 2021-05-14 NOTE — ASSESSMENT & PLAN NOTE
hida on 5/13 with high suspicion of cholecystitis, s/p surgery    Continue with Lovenox and coumadin  IV antibiotics completed

## 2021-05-14 NOTE — HOSPITAL COURSE
"Per notes, \"71 y.o. male with a past medical history of atrial fibrillation s/p ablation, venous thrombosis and pulmonary embolism on anticoagulation with Coumadin with a past medical history of atrial fibrillation status post ablation, chronic anticoagulation who presented 5/12/2021 with episodes of palpitations, abdominal pain nausea and vomiting.  Patient reports having episodes of palpitations over the past 2 days and reports that he was able to catch rapid heart rate on home monitoring device on several occasions.  He also has noticed left lower quadrant abdominal pain, that is described as crampy/dull, and rated moderate in severity, associated with reduced ostomy output and he was concerned about possible ostomy stenosis.  He reports anorexia and reduced oral intake.  He reports that his abdominal pain has been resolved.\"    Patient was admitted evaluated by general surgery.  He had a HIDA scan on 5/13/2021, which was highly suggestive for cholecystitis.  Underwent open cholecystectomy on 5/17, without complications.  Plan for ERCP on 5/18.  " No

## 2021-05-14 NOTE — CARE PLAN
The patient is Stable - Low risk of patient condition declining or worsening    Shift Goals  Clinical Goals: Tolerate full liquid diet    Progress made toward(s) clinical / shift goals:  monitor INR, pt tolerating diet, will continue to monitor INR level.    Patient is not progressing towards the following goals:

## 2021-05-14 NOTE — PROGRESS NOTES
Report received from Juana FRANCIS. POC discussed. Pt resting comfortably in bed. Spouse at bedside. MD contacted for diet order. Safety precautions in place.

## 2021-05-14 NOTE — PROGRESS NOTES
Report given to Kelli FRANCIS. POC discussed. Pt resting comfortably in bed. Safety precautions in place.

## 2021-05-14 NOTE — CONSULTS
"Reason for Consult:  Asked by Dr Missael Corado M.D. to see this patient with AF    CC:   Chief Complaint   Patient presents with   • Atrial Fibrillation     \" In and out last night and today\" per pt.    • Abdominal Pain     No bm into ostomy since 2130 last night . Low abd pain.    • N/V     x 5 last evening        HPI:     71 year old man with PMH paroxysmal AF s/p ablation x2, mild pHTN, DVT/PE with LE venous insufficiency, GERD, UC, obesity presents with abd pain, n/v found cholecystitis awaiting cholecystectomy. Presentation complicated by palpitations finding AF, now back in sinus rhythm. No active cardiac complaints. Able to ambulate >4 METS recent past he states. Patient's medical team discussed with Dr Marquez.     Medications / Drug list prior to admission:  No current facility-administered medications on file prior to encounter.     Current Outpatient Medications on File Prior to Encounter   Medication Sig Dispense Refill   • warfarin (COUMADIN) 2.5 MG Tab Take 2.5 mg by mouth see administration instructions. Pt takes:  Warfarin 2.5mg on:   Tuesday, Wednesday, Thursday, Saturday, Sunday    Pt also has Rx for:  Warfarin 5mg on:   Monday, Friday     • diazePAM (VALIUM) 5 MG Tab Take 2.5 mg by mouth as needed for Anxiety.     • metoprolol SR (TOPROL XL) 25 MG TABLET SR 24 HR TAKE 1 TABLET DAILY (Patient taking differently: Take 25 mg by mouth every day.) 90 tablet 3   • Multiple Vitamin (MULTI-VITAMIN PO) Take 1 tablet by mouth every day.     • montelukast (SINGULAIR) 10 MG Tab Take 10 mg by mouth every day. IN THE EVENING     • fluticasone (FLONASE) 50 MCG/ACT nasal spray Administer 1 Spray into affected nostril(S) 1 time a day as needed.     • Multiple Vitamins-Minerals (PRESERVISION AREDS 2 PO) Take 1 tablet by mouth 2 times a day.     • CALCIUM-VITAMIN D PO Take 1 tablet by mouth 2 times a day.     • Lansoprazole (PREVACID PO) Take 15 mg by mouth every morning.     • warfarin (COUMADIN) 5 MG Tab Take 1 " Tab by mouth every Friday. As directed per [employer]. (Patient taking differently: Take 5 mg by mouth see administration instructions. Pt takes:  Warfarin 5mg on:   Monday, Friday.    Pt also has Rx for:  Warfarin 2.5mg on:   Tuesday, Wednesday, Thursday, Saturday, Sunday) 30 Tab 0   • cetirizine (ZYRTEC) 10 MG TABS Take 10 mg by mouth every morning. Indications: **OTC**         Current list of administered Medications:    Current Facility-Administered Medications:   •  cefTRIAXone (ROCEPHIN) 2 g in  mL IVPB, 2 g, Intravenous, Q24HRS, Missael Corado M.D., Stopped at 05/13/21 1734  •  metroNIDAZOLE (FLAGYL) tablet 500 mg, 500 mg, Oral, Q8HRS, Missael Corado M.D., 500 mg at 05/14/21 0614  •  lactated ringers infusion (BOLUS): BMI greater than 30, 30 mL/kg (Ideal), Intravenous, Once PRN, Naomy Cadena M.D.  •  fluticasone (FLONASE) nasal spray 50 mcg, 1 Spray, Nasal, QDAY PRN, Delvis Antunez M.D.  •  omeprazole (PRILOSEC) capsule 20 mg, 20 mg, Oral, QAM, Delvis Antunez M.D., 20 mg at 05/14/21 0615  •  metoprolol SR (TOPROL XL) tablet 25 mg, 25 mg, Oral, Q DAY, Delvis Antunez M.D., 25 mg at 05/14/21 0615  •  senna-docusate (PERICOLACE or SENOKOT S) 8.6-50 MG per tablet 2 tablet, 2 tablet, Oral, BID **AND** polyethylene glycol/lytes (MIRALAX) PACKET 1 Packet, 1 Packet, Oral, QDAY PRN **AND** magnesium hydroxide (MILK OF MAGNESIA) suspension 30 mL, 30 mL, Oral, QDAY PRN **AND** bisacodyl (DULCOLAX) suppository 10 mg, 10 mg, Rectal, QDAY PRN, Delvis Antunez M.D.  •  lactated ringers infusion (BOLUS), 500 mL, Intravenous, Once PRN, Delvis Antunez M.D.  •  acetaminophen (Tylenol) tablet 650 mg, 650 mg, Oral, Q6HRS PRN, Delvis Antunez M.D.  •  Notify provider if pain remains uncontrolled, , , CONTINUOUS **AND** Use the Numeric Rating Scale (NRS), Craig-Baker Faces (WBF), or FLACC on regular floors and Critical-Care Pain Observation Tool (CPOT) on ICUs/Trauma to assess pain, , , CONTINUOUS **AND** Pulse  Ox, , , CONTINUOUS **AND** Pharmacy Consult Request ...Pain Management Review 1 Each, 1 Each, Other, PHARMACY TO DOSE **AND** If patient difficult to arouse and/or has respiratory depression (respiratory rate of 10 or less), stop any opiates that are currently infusing and call a Rapid Response., , , CONTINUOUS, Delvis Antunez M.D.  •  oxyCODONE immediate-release (ROXICODONE) tablet 2.5 mg, 2.5 mg, Oral, Q3HRS PRN **OR** oxyCODONE immediate-release (ROXICODONE) tablet 5 mg, 5 mg, Oral, Q3HRS PRN **OR** HYDROmorphone (Dilaudid) injection 0.25 mg, 0.25 mg, Intravenous, Q3HRS PRN, Delvis Antunez M.D.  •  ondansetron (ZOFRAN) syringe/vial injection 4 mg, 4 mg, Intravenous, Q4HRS PRN, Delvis Antunez M.D.  •  ondansetron (ZOFRAN ODT) dispertab 4 mg, 4 mg, Oral, Q4HRS PRN, Delvis Antunez M.D.  •  Metoprolol Tartrate (LOPRESSOR) injection 5 mg, 5 mg, Intravenous, Q5 MIN PRN, Delvis Antunez M.D.  •  enoxaparin (LOVENOX) injection 150 mg, 1 mg/kg, Subcutaneous, Q12HRS, Delvis Antunez M.D., 150 mg at 05/14/21 0615    Past Medical History:   Diagnosis Date   • Cancer (HCC)     melanoma   • GERD (gastroesophageal reflux disease)    • Hemorrhagic disorder     on coumadin   • Obesity    • Paroxysmal atrial fibrillation (HCC) 11/26/2012   • Skin cancer     basil cell   • ULCERATIVE COLITIS 11/26/2012       Past Surgical History:   Procedure Laterality Date   • RECOVERY  5/4/2016    Procedure: CATH LAB-[facility] GROUP-EPS ABLATION/AFIB 70018/90101/48151-XLXY I48.0;  Surgeon: Jacquie Surgery;  Location: SURGERY PRE-POST [facility] UNIT [facility];  Service:    • MASS EXCISION GENERAL Left 4/7/2016    Procedure: MASS EXCISION GENERAL FOR TEMPORAL MELANOMA;  Surgeon: Feng Sharpe M.D.;  Location: SURGERY SAME DAY [facility];  Service:    • FLAP GRAFT Left 4/7/2016    Procedure: FLAP GRAFT FOR CLOSURE WITH LOCAL FLAPS;  Surgeon: Feng Sharpe M.D.;  Location: SURGERY SAME DAY [facility];  Service:    • COLON RESECTION           • ILEOSTOMY         Family History   Problem Relation Age of Onset   • Hypertension Mother    • Heart Failure Father    • Heart Attack Maternal Uncle      Patient family history was personally reviewed, no pertinent family history to current presentation    Social History     Socioeconomic History   • Marital status:      Spouse name: Not on file   • Number of children: Not on file   • Years of education: Not on file   • Highest education level: Not on file   Occupational History   • Not on file   Tobacco Use   • Smoking status: Former Smoker     Packs/day: 1.00     Years: 10.00     Pack years: 10.00     Types: Cigarettes     Quit date: 1984     Years since quittin.4   • Smokeless tobacco: Never Used   Vaping Use   • Vaping Use: Never used   Substance and Sexual Activity   • Alcohol use: Yes     Alcohol/week: 12.6 oz     Types: 21 Shots of liquor per week     Comment: 1 beer and 1 scotch daily   • Drug use: Not Currently     Types: Marijuana   • Sexual activity: Not on file   Other Topics Concern   • Not on file   Social History Narrative   • Not on file     Social Determinants of Health     Financial Resource Strain:    • Difficulty of Paying Living Expenses:    Food Insecurity:    • Worried About Running Out of Food in the Last Year:    • Ran Out of Food in the Last Year:    Transportation Needs:    • Lack of Transportation (Medical):    • Lack of Transportation (Non-Medical):    Physical Activity:    • Days of Exercise per Week:    • Minutes of Exercise per Session:    Stress:    • Feeling of Stress :    Social Connections:    • Frequency of Communication with Friends and Family:    • Frequency of Social Gatherings with Friends and Family:    • Attends [employer] Services:    • Active Member of Clubs or Organizations:    • Attends Club or Organization Meetings:    • Marital Status:    Intimate Partner Violence:    • Fear of Current or Ex-Partner:    • Emotionally Abused:    • Physically  Abused:    • Sexually Abused:        ALLERGIES:  Allergies   Allergen Reactions   • Other Misc Unspecified     Silk sutures (body rejected them)   • Omnicef [Cefdinir]      Internal bleeding stated by patient       Review of systems:  A complete review of symptoms was reviewed with patient. This is reviewed in H&P and PMH. ALL OTHERS reviewed and negative    Physical exam:  Patient Vitals for the past 24 hrs:   BP Temp Temp src Pulse Resp SpO2   05/14/21 1111 126/66 36.9 °C (98.4 °F) Oral 74 18 95 %   05/14/21 0800 129/60 36.7 °C (98.1 °F) Oral 72 18 93 %   05/14/21 0600 139/81 -- -- 75 -- 93 %   05/14/21 0443 147/78 36.9 °C (98.5 °F) Oral 71 18 93 %   05/14/21 0039 150/83 37.2 °C (98.9 °F) Oral 75 18 95 %   05/13/21 2024 143/68 36.7 °C (98.1 °F) Oral 74 18 96 %     General: No acute distress.   EYES: no jaundice  HEENT: OP clear   Neck: No bruits No JVD.   CVS:  RRR. S1 + S2. No M/R/G. No edema.  Resp: CTAB. No wheezing or crackles/rhonchi.  Abdomen: Soft, NT, ND,  Skin: Grossly nothing acute no obvious rashes  Neurological: Alert, Moves all extremities, no cranial nerve defects on limited exam  Extremities: Pulse 2+ in b/l LE. No cyanosis.     Data:  Laboratory studies personally reviewed by me:  Recent Results (from the past 24 hour(s))   Prothrombin Time    Collection Time: 05/14/21  2:38 AM   Result Value Ref Range    PT 22.9 (H) 12.0 - 14.6 sec    INR 2.07 (H) 0.87 - 1.13   APTT    Collection Time: 05/14/21  2:38 AM   Result Value Ref Range    APTT 61.9 (H) 24.7 - 36.0 sec   MAGNESIUM    Collection Time: 05/14/21  2:38 AM   Result Value Ref Range    Magnesium 1.2 (L) 1.5 - 2.5 mg/dL   CBC WITHOUT DIFFERENTIAL    Collection Time: 05/14/21  2:38 AM   Result Value Ref Range    WBC 14.3 (H) 4.8 - 10.8 K/uL    RBC 4.88 4.70 - 6.10 M/uL    Hemoglobin 16.0 14.0 - 18.0 g/dL    Hematocrit 46.5 42.0 - 52.0 %    MCV 95.3 81.4 - 97.8 fL    MCH 32.8 27.0 - 33.0 pg    MCHC 34.4 33.7 - 35.3 g/dL    RDW 46.5 35.9 - 50.0 fL     Platelet Count 135 (L) 164 - 446 K/uL    MPV 10.0 9.0 - 12.9 fL       EKG : personally reviewed by me  [   ]    All pertinent features of laboratory and imaging reviewed including primary images where applicable    Nuclear cardiac stress spect 4/2021  NUCLEAR IMAGING INTERPRETATION   Normal left ventricular size, ejection fraction, and wall motion.   Inferolateral wall fixed perfusion defect, normal wall motion, likely    attenuation artifact. No reversible ischemia.   ECG INTERPRETATION   Non-diagnostic EKG    TTE 3/2021  CONCLUSIONS  Technically difficult study - adequate information is obtained  Grossly normal left ventricular systolic function.  Left ventricular ejection fraction is visually estimated to be 55%.  Wall motion not well visualized even with contrast, however appeared   grossly normal.  Indeterminate diastolic function.  Mild concentric left ventricular hypertrophy.  Normal right ventricular size and systolic function.  No significant valvular disease noted.   Normal estimated right atrial pressure.   Mild pulmonary hypertension, estimated PASP 40mmHg.      Compared to the images of the prior study done on 03/14/18: the   estimated PASP has decreased from 47mmHg to 40mmHg.    Principal Problem:    Abdominal pain with imaging finding concerning for cholecystitis POA: Yes  Active Problems:    Acid reflux disease POA: Yes    History of pulmonary embolism and DVT, unprovoked. POA: Yes    S/P ablation of atrial fibrillation POA: Yes      Overview: Time two 2016 [place]    Palpitations POA: Yes  Resolved Problems:    * No resolved hospital problems. *      Assessment / Plan:  71 year old man with PMH paroxysmal AF s/p ablation x2, mild pHTN, DVT/PE with LE venous insufficiency, GERD, UC, obesity presents with abd pain, n/v found cholecystitis awaiting cholecystectomy. Paroxysmal AF on presentation now converted to sinus rhythm.    -Can hold AC and reverse as needed to facilitate surgery. Resume  anticoagulation when hemostasis achieved.  -continue beta blocker perioperatively   -non-elective surgery. No further cardiac testing preoperatively. Recent normal evaluation as well; see above.   -appears to have coronary calcification on CT. Please start high intensity statin post-op. No need additional antiplatelets at that time so long as on systemic AC.  -please communicate to anesthesia elevated estimated pulmonary pressures on TTE    I personally discussed his case with Dr Corado. Will sign off. Please do not hesitate to contact me with questions or concerns.    It is my pleasure to participate in the care of Mr. Day.      Jarad Hernandez MD  Cardiologist [facility]

## 2021-05-14 NOTE — PROGRESS NOTES
"Hospital Medicine Daily Progress Note    Date of Service  5/13/2021    Chief Complaint  71 y.o. male admitted 5/12/2021 with abdominal pain    Hospital Course  Per notes, \"71 y.o. male with a past medical history of atrial fibrillation s/p ablation, venous thrombosis and pulmonary embolism on anticoagulation with Coumadin with a past medical history of atrial fibrillation status post ablation, chronic anticoagulation who presented 5/12/2021 with episodes of palpitations, abdominal pain nausea and vomiting.  Patient reports having episodes of palpitations over the past 2 days and reports that he was able to catch rapid heart rate on home monitoring device on several occasions.  He also has noticed left lower quadrant abdominal pain, that is described as crampy/dull, and rated moderate in severity, associated with reduced ostomy output and he was concerned about possible ostomy stenosis.  He reports anorexia and reduced oral intake.  He reports that his abdominal pain has been resolved.\"    Patient was admitted evaluated by general surgery.  He had a HIDA scan on 5/13/2021, which was highly suggestive for cholecystitis.      Interval Problem Update  Patient was seen and examined this morning at bedside, feeling significantly better.  Did state that symptoms were initially and left lower quadrant had migrated to right upper quadrant and not back.    Nursing had no other events to report.    Consultants/Specialty  General surgery    Code Status  Full Code    Disposition  Anticipated DC to home    Review of Systems  Review of Systems   Constitutional: Positive for malaise/fatigue.   Gastrointestinal: Positive for abdominal pain, constipation and nausea.   All other systems reviewed and are negative.       Physical Exam  Temp:  [36.5 °C (97.7 °F)-36.9 °C (98.5 °F)] 36.9 °C (98.5 °F)  Pulse:  [76-93] 76  Resp:  [18] 18  BP: (148-153)/(69-81) 148/72  SpO2:  [93 %-96 %] 93 %    Physical Exam  Vitals and nursing note " reviewed.   Constitutional:       Appearance: Normal appearance. He is obese. He is not ill-appearing.   Cardiovascular:      Rate and Rhythm: Normal rate and regular rhythm.      Pulses: Normal pulses.      Heart sounds: Normal heart sounds.   Pulmonary:      Effort: Pulmonary effort is normal.      Breath sounds: Normal breath sounds.   Abdominal:      General: Abdomen is flat. Bowel sounds are normal.      Palpations: Abdomen is soft.      Tenderness: There is no guarding or rebound.   Musculoskeletal:      Right lower leg: No edema.      Left lower leg: No edema.   Neurological:      General: No focal deficit present.      Mental Status: He is alert and oriented to person, place, and time.         Fluids    Intake/Output Summary (Last 24 hours) at 5/13/2021 2038  Last data filed at 5/13/2021 0850  Gross per 24 hour   Intake 170.42 ml   Output --   Net 170.42 ml       Laboratory  Recent Labs     05/12/21  1320 05/13/21  0231   WBC 15.4* 15.7*   RBC 5.22 4.81   HEMOGLOBIN 17.2 16.1   HEMATOCRIT 49.0 46.4   MCV 93.9 96.5   MCH 33.0 33.5*   MCHC 35.1 34.7   RDW 45.0 46.7   PLATELETCT 180 156*   MPV 9.9 9.8     Recent Labs     05/12/21  1320 05/13/21  0231   SODIUM 134* 134*   POTASSIUM 3.6 4.0   CHLORIDE 97 97   CO2 22 23   GLUCOSE 152* 142*   BUN 14 13   CREATININE 0.79 0.82   CALCIUM 9.8 9.2     Recent Labs     05/12/21  1428 05/13/21  0231   APTT  --  49.0*   INR 2.17* 2.15*               Imaging  NM-HEPATOBILIARY SCAN   Final Result      1.  The gallbladder is not definitively identified prior to or after the administration of morphine. Findings are highly suspicious for acute cholecystitis.      US-RUQ   Final Result      1.  Sludge and stones present in the gallbladder.   2.  Gallbladder wall thickening raises concern for cholecystitis.   3.  No gross biliary dilation.   4.  Very limited exam due to body habitus.      CT-CTA COMPLETE THORACOABDOMINAL AORTA   Final Result      1.  No aortic aneurysm or  dissection.   2.  Atherosclerosis including prominent coronary calcification.   3.  Cholelithiasis and distended gallbladder. If there is concern for acute cholecystitis, further evaluation with ultrasound and nuclear medicine HIDA scan may be performed.   4.  Hepatic steatosis.   5.  Postoperative changes of colectomy with right lower quadrant ostomy.                 Assessment/Plan  * Abdominal pain with imaging finding concerning for cholecystitis- (present on admission)  Assessment & Plan  Ultrasound shows evidence for gallbladder wall thickening raises concern for cholecystitis  Initially started on Zosyn, will switch to ceftriaxone metronidazole today.  General surgery consulted, follow-up on recommendations  HIDA scan suggestive of cholecystitis, continue with antibiotics and follow-up on surgery recommendations    Palpitations- (present on admission)  Assessment & Plan  History of atrial fibrillation s/p ablation  Patient reports that he has been having palpitations and that he has self-monitoring device at home that captured several episodes of RVR.    EKG shows sinus tachycardia with a rate of 99, no ST elevation there is T wave flattening in leads V3-V6, .  Resume home metoprolol with hold parameters.  Recent echo reviewed, Mild concentric left ventricular hypertrophy. Normal estimated right atrial pressure. Mild pulmonary hypertension, estimated PASP 40mmHg.   Cardiology consulted in ED, Dr. Dumont, will follow up on recommendations  Continue with telemetry  No symptoms today    S/P ablation of atrial fibrillation- (present on admission)  Assessment & Plan  Patient reports that he has been having palpitations and that he has self-monitoring device at home that captured several episodes of RVR.  I will start continuous cardiac monitoring  Cardiology Dr. Marquez, consulted by EDP, plan to see a.m.   EKG shows sinus tachycardia with a rate of 99, no ST elevation there is T wave flattening in leads  V3-V6, .  Continue home metoprolol with hold parameters.    History of pulmonary embolism and DVT, unprovoked.- (present on admission)  Assessment & Plan  History of remote venous thromboses and pulmonary embolism, on Coumadin  INR is currently therapeutic.    Coumadin switched to Lovenox due to possible surgery    Acid reflux disease- (present on admission)  Assessment & Plan  Continue with omeprazole.       VTE prophylaxis: SCDs, ambulation

## 2021-05-15 PROBLEM — I27.20 PULMONARY HYPERTENSION (HCC): Status: ACTIVE | Noted: 2021-05-15

## 2021-05-15 LAB
ANION GAP SERPL CALC-SCNC: 11 MMOL/L (ref 7–16)
BUN SERPL-MCNC: 11 MG/DL (ref 8–22)
CALCIUM SERPL-MCNC: 8.6 MG/DL (ref 8.4–10.2)
CHLORIDE SERPL-SCNC: 95 MMOL/L (ref 96–112)
CO2 SERPL-SCNC: 27 MMOL/L (ref 20–33)
CREAT SERPL-MCNC: 0.68 MG/DL (ref 0.5–1.4)
ERYTHROCYTE [DISTWIDTH] IN BLOOD BY AUTOMATED COUNT: 44.2 FL (ref 35.9–50)
GLUCOSE SERPL-MCNC: 116 MG/DL (ref 65–99)
HCT VFR BLD AUTO: 42.6 % (ref 42–52)
HGB BLD-MCNC: 14.8 G/DL (ref 14–18)
MAGNESIUM SERPL-MCNC: 1.5 MG/DL (ref 1.5–2.5)
MCH RBC QN AUTO: 32.7 PG (ref 27–33)
MCHC RBC AUTO-ENTMCNC: 34.7 G/DL (ref 33.7–35.3)
MCV RBC AUTO: 94.2 FL (ref 81.4–97.8)
PLATELET # BLD AUTO: 147 K/UL (ref 164–446)
PMV BLD AUTO: 10.1 FL (ref 9–12.9)
POTASSIUM SERPL-SCNC: 2.9 MMOL/L (ref 3.6–5.5)
RBC # BLD AUTO: 4.52 M/UL (ref 4.7–6.1)
SODIUM SERPL-SCNC: 133 MMOL/L (ref 135–145)
WBC # BLD AUTO: 10.9 K/UL (ref 4.8–10.8)

## 2021-05-15 PROCEDURE — 700102 HCHG RX REV CODE 250 W/ 637 OVERRIDE(OP): Performed by: INTERNAL MEDICINE

## 2021-05-15 PROCEDURE — 96372 THER/PROPH/DIAG INJ SC/IM: CPT

## 2021-05-15 PROCEDURE — A9270 NON-COVERED ITEM OR SERVICE: HCPCS | Performed by: INTERNAL MEDICINE

## 2021-05-15 PROCEDURE — 83735 ASSAY OF MAGNESIUM: CPT

## 2021-05-15 PROCEDURE — 96366 THER/PROPH/DIAG IV INF ADDON: CPT

## 2021-05-15 PROCEDURE — 80048 BASIC METABOLIC PNL TOTAL CA: CPT

## 2021-05-15 PROCEDURE — 99233 SBSQ HOSP IP/OBS HIGH 50: CPT | Performed by: INTERNAL MEDICINE

## 2021-05-15 PROCEDURE — 700111 HCHG RX REV CODE 636 W/ 250 OVERRIDE (IP): Performed by: INTERNAL MEDICINE

## 2021-05-15 PROCEDURE — 770020 HCHG ROOM/CARE - TELE (206)

## 2021-05-15 PROCEDURE — 700105 HCHG RX REV CODE 258: Performed by: INTERNAL MEDICINE

## 2021-05-15 PROCEDURE — 85027 COMPLETE CBC AUTOMATED: CPT

## 2021-05-15 PROCEDURE — 700102 HCHG RX REV CODE 250 W/ 637 OVERRIDE(OP): Performed by: HOSPITALIST

## 2021-05-15 PROCEDURE — A9270 NON-COVERED ITEM OR SERVICE: HCPCS | Performed by: HOSPITALIST

## 2021-05-15 PROCEDURE — 700111 HCHG RX REV CODE 636 W/ 250 OVERRIDE (IP): Performed by: HOSPITALIST

## 2021-05-15 RX ORDER — MAGNESIUM SULFATE HEPTAHYDRATE 40 MG/ML
4 INJECTION, SOLUTION INTRAVENOUS ONCE
Status: COMPLETED | OUTPATIENT
Start: 2021-05-15 | End: 2021-05-15

## 2021-05-15 RX ORDER — POTASSIUM CHLORIDE 20 MEQ/1
40 TABLET, EXTENDED RELEASE ORAL 2 TIMES DAILY
Status: COMPLETED | OUTPATIENT
Start: 2021-05-15 | End: 2021-05-15

## 2021-05-15 RX ADMIN — OMEPRAZOLE 20 MG: 20 CAPSULE, DELAYED RELEASE ORAL at 05:18

## 2021-05-15 RX ADMIN — ENOXAPARIN SODIUM 150 MG: 150 INJECTION SUBCUTANEOUS at 17:28

## 2021-05-15 RX ADMIN — Medication 5 MG: at 21:08

## 2021-05-15 RX ADMIN — METRONIDAZOLE 500 MG: 500 TABLET ORAL at 15:12

## 2021-05-15 RX ADMIN — ENOXAPARIN SODIUM 150 MG: 150 INJECTION SUBCUTANEOUS at 05:18

## 2021-05-15 RX ADMIN — MAGNESIUM SULFATE IN WATER 4 G: 40 INJECTION, SOLUTION INTRAVENOUS at 05:20

## 2021-05-15 RX ADMIN — POTASSIUM CHLORIDE 40 MEQ: 1500 TABLET, EXTENDED RELEASE ORAL at 17:26

## 2021-05-15 RX ADMIN — CEFTRIAXONE SODIUM 2 G: 2 INJECTION, POWDER, FOR SOLUTION INTRAMUSCULAR; INTRAVENOUS at 15:13

## 2021-05-15 RX ADMIN — METRONIDAZOLE 500 MG: 500 TABLET ORAL at 21:08

## 2021-05-15 RX ADMIN — METRONIDAZOLE 500 MG: 500 TABLET ORAL at 05:18

## 2021-05-15 RX ADMIN — METOPROLOL SUCCINATE 25 MG: 25 TABLET, EXTENDED RELEASE ORAL at 05:18

## 2021-05-15 RX ADMIN — POTASSIUM CHLORIDE 40 MEQ: 1500 TABLET, EXTENDED RELEASE ORAL at 05:19

## 2021-05-15 ASSESSMENT — ENCOUNTER SYMPTOMS
CONSTIPATION: 1
NAUSEA: 1
ABDOMINAL PAIN: 1

## 2021-05-15 NOTE — PROGRESS NOTES
"Hospital Medicine Daily Progress Note    Date of Service  5/15/2021    Chief Complaint  71 y.o. male admitted 5/12/2021 with abdominal pain    Hospital Course  Per notes, \"71 y.o. male with a past medical history of atrial fibrillation s/p ablation, venous thrombosis and pulmonary embolism on anticoagulation with Coumadin with a past medical history of atrial fibrillation status post ablation, chronic anticoagulation who presented 5/12/2021 with episodes of palpitations, abdominal pain nausea and vomiting.  Patient reports having episodes of palpitations over the past 2 days and reports that he was able to catch rapid heart rate on home monitoring device on several occasions.  He also has noticed left lower quadrant abdominal pain, that is described as crampy/dull, and rated moderate in severity, associated with reduced ostomy output and he was concerned about possible ostomy stenosis.  He reports anorexia and reduced oral intake.  He reports that his abdominal pain has been resolved.\"    Patient was admitted evaluated by general surgery.  He had a HIDA scan on 5/13/2021, which was highly suggestive for cholecystitis.      Interval Problem Update  Seen examined this morning bedside, pain is controlled.  No overnight events reported by nursing.    HIDA scan revealed high suspicion for cholecystitis, discussed with surgery on 5/14.  Patient has been on warfarin, will plan for surgery on Monday 5/17.    Consultants/Specialty  General surgery  Cardiology    Code Status  Full Code    Disposition  Anticipated DC to home    Review of Systems  Review of Systems   Constitutional: Positive for malaise/fatigue.   Gastrointestinal: Positive for abdominal pain, constipation and nausea.   All other systems reviewed and are negative.       Physical Exam  Temp:  [36.5 °C (97.7 °F)-37 °C (98.6 °F)] 36.6 °C (97.9 °F)  Pulse:  [67-85] 68  Resp:  [16-18] 18  BP: (123-159)/(65-88) 124/65  SpO2:  [92 %-96 %] 95 %    Physical Exam  Vitals " and nursing note reviewed.   Constitutional:       Appearance: Normal appearance. He is obese. He is not ill-appearing.   Cardiovascular:      Rate and Rhythm: Normal rate and regular rhythm.      Pulses: Normal pulses.      Heart sounds: Normal heart sounds.   Pulmonary:      Effort: Pulmonary effort is normal.      Breath sounds: Normal breath sounds.   Abdominal:      General: Abdomen is flat. Bowel sounds are normal.      Palpations: Abdomen is soft.      Tenderness: There is abdominal tenderness. There is no guarding or rebound.   Musculoskeletal:      Right lower leg: No edema.      Left lower leg: No edema.   Neurological:      General: No focal deficit present.      Mental Status: He is alert and oriented to person, place, and time.         Fluids    Intake/Output Summary (Last 24 hours) at 5/15/2021 1349  Last data filed at 5/15/2021 1259  Gross per 24 hour   Intake 720 ml   Output --   Net 720 ml       Laboratory  Recent Labs     05/13/21  0231 05/14/21  0238 05/15/21  0327   WBC 15.7* 14.3* 10.9*   RBC 4.81 4.88 4.52*   HEMOGLOBIN 16.1 16.0 14.8   HEMATOCRIT 46.4 46.5 42.6   MCV 96.5 95.3 94.2   MCH 33.5* 32.8 32.7   MCHC 34.7 34.4 34.7   RDW 46.7 46.5 44.2   PLATELETCT 156* 135* 147*   MPV 9.8 10.0 10.1     Recent Labs     05/13/21  0231 05/15/21  0327   SODIUM 134* 133*   POTASSIUM 4.0 2.9*   CHLORIDE 97 95*   CO2 23 27   GLUCOSE 142* 116*   BUN 13 11   CREATININE 0.82 0.68   CALCIUM 9.2 8.6     Recent Labs     05/12/21  1428 05/13/21  0231 05/14/21  0238   APTT  --  49.0* 61.9*   INR 2.17* 2.15* 2.07*               Imaging  NM-HEPATOBILIARY SCAN   Final Result      1.  The gallbladder is not definitively identified prior to or after the administration of morphine. Findings are highly suspicious for acute cholecystitis.      US-RUQ   Final Result      1.  Sludge and stones present in the gallbladder.   2.  Gallbladder wall thickening raises concern for cholecystitis.   3.  No gross biliary dilation.    4.  Very limited exam due to body habitus.      CT-CTA COMPLETE THORACOABDOMINAL AORTA   Final Result      1.  No aortic aneurysm or dissection.   2.  Atherosclerosis including prominent coronary calcification.   3.  Cholelithiasis and distended gallbladder. If there is concern for acute cholecystitis, further evaluation with ultrasound and nuclear medicine HIDA scan may be performed.   4.  Hepatic steatosis.   5.  Postoperative changes of colectomy with right lower quadrant ostomy.                 Assessment/Plan  * Abdominal pain with imaging finding concerning for cholecystitis- (present on admission)  Assessment & Plan  Ultrasound shows evidence for gallbladder wall thickening raises concern for cholecystitis  Initially started on Zosyn, will switch to ceftriaxone metronidazole today.  General surgery consulted, follow-up on recommendations  HIDA scan suggestive of cholecystitis, continue with antibiotics and follow-up on surgery recommendations    Pulmonary hypertension (HCC)- (present on admission)  Assessment & Plan  Mild pulmonary hypertension, estimated PASP 40mmHg noted on echocardiogram 3/2/2021       Hypomagnesemia  Assessment & Plan  Continue to monitor and replace as needed    Cholecystitis- (present on admission)  Assessment & Plan  hida on 5/13 with high suspicion of cholecystitis, plan for surgery on 5/17  Follow-up with general surgery recommendations  Continue with Lovenox  Continue with IV antibiotics  Will need to be n.p.o. and with IV fluids on 5/16 for planned surgery on 5/17    Palpitations- (present on admission)  Assessment & Plan  History of atrial fibrillation s/p ablation  Patient reports that he has been having palpitations and that he has self-monitoring device at home that captured several episodes of RVR.    EKG shows sinus tachycardia with a rate of 99, no ST elevation there is T wave flattening in leads V3-V6, .  Resume home metoprolol with hold parameters.  Recent echo  reviewed, Mild concentric left ventricular hypertrophy. Normal estimated right atrial pressure. Mild pulmonary hypertension, estimated PASP 40mmHg.   Cardiology consulted in ED, Dr. Dumont, pending cardiology evaluation  Continue with telemetry  No symptoms today    S/P ablation of atrial fibrillation- (present on admission)  Assessment & Plan  Patient reports that he has been having palpitations and that he has self-monitoring device at home that captured several episodes of RVR.  I will start continuous cardiac monitoring  EKG shows sinus tachycardia with a rate of 99, no ST elevation there is T wave flattening in leads V3-V6, .  Continue home metoprolol with hold parameters.  Evaluated by cardiology, no acute findings, see note for further recommendations, will need to start a statin after surgery.    History of pulmonary embolism and DVT, unprovoked.- (present on admission)  Assessment & Plan  History of remote venous thromboses and pulmonary embolism, on Coumadin  INR is currently therapeutic.   Continue with Lovenox for planned surgery on Monday 5/17    Acid reflux disease- (present on admission)  Assessment & Plan  Continue with omeprazole.       VTE prophylaxis: SCDs, ambulation

## 2021-05-15 NOTE — PROGRESS NOTES
Telemetry shift summary:  Rhythm: SR     HR Range: 70s to 80s      Measurements from strip printed at 03:08:17   DE: 0.16   QRS 0.08   QT 0.36     Ectopies: Rare multifocal PAC; rare PVC, bigeminy, couplets, and trigeminy.

## 2021-05-15 NOTE — CARE PLAN
The patient is Stable - Low risk of patient condition declining or worsening    Shift Goals  Clinical Goals: Tolerate diet without n/v  Patient Goals: No abd pain    Progress made toward(s) clinical / shift goals:  Pt tolerating diet, Pt denies abd pain    Patient is not progressing towards the following goals:N/A      Problem: Pain - Standard  Goal: Alleviation of pain or a reduction in pain to the patient’s comfort goal  Outcome: Progressing  Note: Assess Pt for abdominal or any other pain. Observe for non-verbal clues of discomfort. Teach Pt pain scale. Encouraged Pt to report any pain or discomfort. Apply comfort measures. Administer pain medications as appropriate. Instruct Pt to notify RN if pain relief is not achieved.        Problem: Knowledge Deficit - Standard  Goal: Patient and family/care givers will demonstrate understanding of plan of care, disease process/condition, diagnostic tests and medications  Outcome: Progressing  Note: Discuss POC with Pt. Assess Pt's knowledge of disease process, treatment plan, diagnostic test, labs, and medications; and explain and give information as needed.

## 2021-05-15 NOTE — PROGRESS NOTES
Tele strip at 1459 shows SR at 72.      Measurements from am strip were as follows:  IL=0.16  QRS=0.08  QT=0.40    Tele Shift Summary:    Rhythm : SR  Rate : 66-81  Ectopy : Per CCT Esha, pt had occasional to frequent PVCs, rare couplets, trigeminy, and bigeminy and rare PACs.     Telemetry monitoring strips placed in pt's chart.

## 2021-05-15 NOTE — PROGRESS NOTES
Received report from previous shift nurse. Assumed patient's cares Pt is up in bed, family present. Pt denies any pain, discomfort, or any needs at this time. Encouraged Pt to call for assistance if needed. Call light within reach.

## 2021-05-16 LAB
ANION GAP SERPL CALC-SCNC: 11 MMOL/L (ref 7–16)
BUN SERPL-MCNC: 12 MG/DL (ref 8–22)
CALCIUM SERPL-MCNC: 9.1 MG/DL (ref 8.4–10.2)
CHLORIDE SERPL-SCNC: 96 MMOL/L (ref 96–112)
CO2 SERPL-SCNC: 27 MMOL/L (ref 20–33)
CREAT SERPL-MCNC: 0.8 MG/DL (ref 0.5–1.4)
GLUCOSE SERPL-MCNC: 124 MG/DL (ref 65–99)
INR PPP: 1.23 (ref 0.87–1.13)
MAGNESIUM SERPL-MCNC: 1.8 MG/DL (ref 1.5–2.5)
POTASSIUM SERPL-SCNC: 3.8 MMOL/L (ref 3.6–5.5)
PROTHROMBIN TIME: 15.2 SEC (ref 12–14.6)
SODIUM SERPL-SCNC: 134 MMOL/L (ref 135–145)

## 2021-05-16 PROCEDURE — 700105 HCHG RX REV CODE 258: Performed by: INTERNAL MEDICINE

## 2021-05-16 PROCEDURE — A9270 NON-COVERED ITEM OR SERVICE: HCPCS | Performed by: HOSPITALIST

## 2021-05-16 PROCEDURE — 83735 ASSAY OF MAGNESIUM: CPT

## 2021-05-16 PROCEDURE — A9270 NON-COVERED ITEM OR SERVICE: HCPCS | Performed by: INTERNAL MEDICINE

## 2021-05-16 PROCEDURE — 700102 HCHG RX REV CODE 250 W/ 637 OVERRIDE(OP): Performed by: INTERNAL MEDICINE

## 2021-05-16 PROCEDURE — 99233 SBSQ HOSP IP/OBS HIGH 50: CPT | Performed by: INTERNAL MEDICINE

## 2021-05-16 PROCEDURE — 80048 BASIC METABOLIC PNL TOTAL CA: CPT

## 2021-05-16 PROCEDURE — 700111 HCHG RX REV CODE 636 W/ 250 OVERRIDE (IP): Performed by: INTERNAL MEDICINE

## 2021-05-16 PROCEDURE — 700111 HCHG RX REV CODE 636 W/ 250 OVERRIDE (IP): Performed by: HOSPITALIST

## 2021-05-16 PROCEDURE — 770020 HCHG ROOM/CARE - TELE (206)

## 2021-05-16 PROCEDURE — 85610 PROTHROMBIN TIME: CPT

## 2021-05-16 PROCEDURE — 700102 HCHG RX REV CODE 250 W/ 637 OVERRIDE(OP): Performed by: HOSPITALIST

## 2021-05-16 RX ORDER — POTASSIUM CHLORIDE 20 MEQ/1
40 TABLET, EXTENDED RELEASE ORAL ONCE
Status: COMPLETED | OUTPATIENT
Start: 2021-05-16 | End: 2021-05-16

## 2021-05-16 RX ORDER — MAGNESIUM SULFATE HEPTAHYDRATE 40 MG/ML
2 INJECTION, SOLUTION INTRAVENOUS ONCE
Status: COMPLETED | OUTPATIENT
Start: 2021-05-16 | End: 2021-05-16

## 2021-05-16 RX ADMIN — METRONIDAZOLE 500 MG: 500 TABLET ORAL at 21:03

## 2021-05-16 RX ADMIN — OMEPRAZOLE 20 MG: 20 CAPSULE, DELAYED RELEASE ORAL at 05:20

## 2021-05-16 RX ADMIN — METRONIDAZOLE 500 MG: 500 TABLET ORAL at 14:32

## 2021-05-16 RX ADMIN — POTASSIUM CHLORIDE 40 MEQ: 1500 TABLET, EXTENDED RELEASE ORAL at 12:09

## 2021-05-16 RX ADMIN — METOPROLOL SUCCINATE 25 MG: 25 TABLET, EXTENDED RELEASE ORAL at 05:20

## 2021-05-16 RX ADMIN — Medication 5 MG: at 21:03

## 2021-05-16 RX ADMIN — ENOXAPARIN SODIUM 150 MG: 150 INJECTION SUBCUTANEOUS at 05:19

## 2021-05-16 RX ADMIN — CEFTRIAXONE SODIUM 2 G: 2 INJECTION, POWDER, FOR SOLUTION INTRAMUSCULAR; INTRAVENOUS at 14:27

## 2021-05-16 RX ADMIN — METRONIDAZOLE 500 MG: 500 TABLET ORAL at 05:20

## 2021-05-16 RX ADMIN — MAGNESIUM SULFATE 2 G: 2 INJECTION INTRAVENOUS at 12:25

## 2021-05-16 RX ADMIN — ENOXAPARIN SODIUM 150 MG: 150 INJECTION SUBCUTANEOUS at 17:35

## 2021-05-16 ASSESSMENT — FIBROSIS 4 INDEX
FIB4 SCORE: 3.46
FIB4 SCORE: 3.46

## 2021-05-16 ASSESSMENT — ENCOUNTER SYMPTOMS
ABDOMINAL PAIN: 1
CONSTIPATION: 1
NAUSEA: 1

## 2021-05-16 NOTE — PROGRESS NOTES
Report received from Gabo at bedside, pt awake but did not sleep well related to the movement of his roommates throughout the night.  Morning assessment done after report finished.  No changes from yesterday.  Stool remains loose and pt reports its his normal amount now that he's eating.  For POC, pt wanting a shower, change his ostomy wafer and pouch and possibly getting his tele monitor off.    Wife Sherine came about 0900.  She assisted him in setting him up to shave.  Dr. Corado rounded and gave the ok for a shower, going to continue heart monitor for cardiology to see if he goes in and out of a-fib and repeat his labs.  After she left pt shower with some assistance of his wife.    Labs came back and new orders received for electrolyte replacement and started when lunch came.  Informed them of his INR valve and his electrolytes.  Magnesium started.

## 2021-05-16 NOTE — PROGRESS NOTES
"Hospital Medicine Daily Progress Note    Date of Service  5/16/2021    Chief Complaint  71 y.o. male admitted 5/12/2021 with abdominal pain    Hospital Course  Per notes, \"71 y.o. male with a past medical history of atrial fibrillation s/p ablation, venous thrombosis and pulmonary embolism on anticoagulation with Coumadin with a past medical history of atrial fibrillation status post ablation, chronic anticoagulation who presented 5/12/2021 with episodes of palpitations, abdominal pain nausea and vomiting.  Patient reports having episodes of palpitations over the past 2 days and reports that he was able to catch rapid heart rate on home monitoring device on several occasions.  He also has noticed left lower quadrant abdominal pain, that is described as crampy/dull, and rated moderate in severity, associated with reduced ostomy output and he was concerned about possible ostomy stenosis.  He reports anorexia and reduced oral intake.  He reports that his abdominal pain has been resolved.\"    Patient was admitted evaluated by general surgery.  He had a HIDA scan on 5/13/2021, which was highly suggestive for cholecystitis.      Interval Problem Update  Seen examined this morning bedside, no complaints at time of examination.    HIDA scan revealed high suspicion for cholecystitis, discussed with surgery on 5/14.  Patient has been on warfarin, will plan for surgery on Monday 5/17.  Hold Lovenox and n.p.o. after midnight.    Consultants/Specialty  General surgery  Cardiology    Code Status  Full Code    Disposition  Anticipated DC to home    Review of Systems  Review of Systems   Constitutional: Positive for malaise/fatigue.   Gastrointestinal: Positive for abdominal pain, constipation and nausea.   All other systems reviewed and are negative.       Physical Exam  Temp:  [36.3 °C (97.4 °F)-37.6 °C (99.7 °F)] 37.6 °C (99.7 °F)  Pulse:  [68-87] 78  Resp:  [16-18] 18  BP: (124-143)/(57-74) 143/65  SpO2:  [91 %-95 %] 93 " %    Physical Exam  Vitals and nursing note reviewed.   Constitutional:       Appearance: Normal appearance. He is obese. He is not ill-appearing.   Cardiovascular:      Rate and Rhythm: Normal rate and regular rhythm.      Pulses: Normal pulses.      Heart sounds: Normal heart sounds.   Pulmonary:      Effort: Pulmonary effort is normal.      Breath sounds: Normal breath sounds.   Abdominal:      General: Abdomen is flat. Bowel sounds are normal.      Palpations: Abdomen is soft.      Tenderness: There is abdominal tenderness. There is no guarding or rebound.   Musculoskeletal:      Right lower leg: No edema.      Left lower leg: No edema.   Neurological:      General: No focal deficit present.      Mental Status: He is alert and oriented to person, place, and time.         Fluids    Intake/Output Summary (Last 24 hours) at 5/16/2021 1128  Last data filed at 5/16/2021 0800  Gross per 24 hour   Intake 1800 ml   Output --   Net 1800 ml       Laboratory  Recent Labs     05/14/21  0238 05/15/21  0327   WBC 14.3* 10.9*   RBC 4.88 4.52*   HEMOGLOBIN 16.0 14.8   HEMATOCRIT 46.5 42.6   MCV 95.3 94.2   MCH 32.8 32.7   MCHC 34.4 34.7   RDW 46.5 44.2   PLATELETCT 135* 147*   MPV 10.0 10.1     Recent Labs     05/15/21  0327 05/16/21  0959   SODIUM 133* 134*   POTASSIUM 2.9* 3.8   CHLORIDE 95* 96   CO2 27 27   GLUCOSE 116* 124*   BUN 11 12   CREATININE 0.68 0.80   CALCIUM 8.6 9.1     Recent Labs     05/14/21  0238 05/16/21  0959   APTT 61.9*  --    INR 2.07* 1.23*               Imaging  NM-HEPATOBILIARY SCAN   Final Result      1.  The gallbladder is not definitively identified prior to or after the administration of morphine. Findings are highly suspicious for acute cholecystitis.      US-RUQ   Final Result      1.  Sludge and stones present in the gallbladder.   2.  Gallbladder wall thickening raises concern for cholecystitis.   3.  No gross biliary dilation.   4.  Very limited exam due to body habitus.      CT-CTA COMPLETE  THORACOABDOMINAL AORTA   Final Result      1.  No aortic aneurysm or dissection.   2.  Atherosclerosis including prominent coronary calcification.   3.  Cholelithiasis and distended gallbladder. If there is concern for acute cholecystitis, further evaluation with ultrasound and nuclear medicine HIDA scan may be performed.   4.  Hepatic steatosis.   5.  Postoperative changes of colectomy with right lower quadrant ostomy.                 Assessment/Plan  * Abdominal pain with imaging finding concerning for cholecystitis- (present on admission)  Assessment & Plan  Ultrasound shows evidence for gallbladder wall thickening raises concern for cholecystitis  Initially started on Zosyn, will switch to ceftriaxone metronidazole today.  General surgery consulted, plan for surgery on 5/17  N.p.o. after midnight  HIDA scan suggestive of cholecystitis, continue with antibiotics and follow-up on surgery recommendations    Pulmonary hypertension (HCC)- (present on admission)  Assessment & Plan  Mild pulmonary hypertension, estimated PASP 40mmHg noted on echocardiogram 3/2/2021       Hypomagnesemia  Assessment & Plan  Continue to monitor and replace as needed    Cholecystitis- (present on admission)  Assessment & Plan  hida on 5/13 with high suspicion of cholecystitis, plan for surgery on 5/17  Follow-up with general surgery recommendations  Continue with Lovenox  Continue with IV antibiotics  N.p.o. at midnight for surgery on 5/16    Palpitations- (present on admission)  Assessment & Plan  History of atrial fibrillation s/p ablation  Patient reports that he has been having palpitations and that he has self-monitoring device at home that captured several episodes of RVR.    EKG shows sinus tachycardia with a rate of 99, no ST elevation there is T wave flattening in leads V3-V6, .  Resume home metoprolol with hold parameters.  Recent echo reviewed, Mild concentric left ventricular hypertrophy. Normal estimated right atrial  pressure. Mild pulmonary hypertension, estimated PASP 40mmHg.   Continue with telemetry  No symptoms today    Hypokalemia  Assessment & Plan  Continue to monitor in place as needed    S/P ablation of atrial fibrillation- (present on admission)  Assessment & Plan  Patient reports that he has been having palpitations and that he has self-monitoring device at home that captured several episodes of RVR.  I will start continuous cardiac monitoring  EKG shows sinus tachycardia with a rate of 99, no ST elevation there is T wave flattening in leads V3-V6, .  Continue home metoprolol with hold parameters.  Evaluated by cardiology, no acute findings, see note for further recommendations, will need to start a statin after surgery.    History of pulmonary embolism and DVT, unprovoked.- (present on admission)  Assessment & Plan  History of remote venous thromboses and pulmonary embolism, on Coumadin  INR 1.23 today  Hold Lovenox for planned surgery on Monday 5/17    Acid reflux disease- (present on admission)  Assessment & Plan  Continue with omeprazole.       VTE prophylaxis: SCDs, ambulation

## 2021-05-16 NOTE — PROGRESS NOTES
Telemetry Shift Summary     Rhythm: SR  HR: 67-83  Ectopy: o PVC, r Coup, r PAC, r Trig    Measurements: 0.16/0.10/0.40    Normal Values  Rhythm: SR  HR:   Measurements: 0.12-0.20/0.08-0.10/0.30-0.52

## 2021-05-16 NOTE — PROGRESS NOTES
Tele strip at 1428 shows SR at 88.      Measurements from am strip were as follows:  [place]=0.16  QRS=0.08  QT=0.36    Tele Shift Summary:    Rhythm : SR  Rate : 71-91  Ectopy : Per CCT Esha, pt had occasional PVCs, rare couplets, bigeminy and trigeminy.     Telemetry monitoring strips placed in pt's chart.

## 2021-05-16 NOTE — PROGRESS NOTES
Report received from Bryanna after placing a new IV, pt awake and adding to report.    Morning assessment done 0835.  Ostomy having loose brown output and pt denies nausea or vomiting.  Discussed POC and pt asking what his INR is as Dr. Bird will be in on Monday to do his operation.      Pt has been up to the bathroom independently.  Tolerating his diet.  Wife has been at bedside bringing him a few items from home.  VSS.

## 2021-05-16 NOTE — CARE PLAN
The patient is Stable - Low risk of patient condition declining or worsening    Shift Goals  Clinical Goals: Pain management, Infection Prevention  Patient Goals: Ostomy Care, Rest    Progress made toward(s) clinical / shift goals:  Monitoring patient for signs and symptoms of infection. Performing hand hygiene, and administering antibiotics as scheduled.     Patient is not progressing towards the following goals:      Problem: Knowledge Deficit - Standard  Goal: Patient and family/care givers will demonstrate understanding of plan of care, disease process/condition, diagnostic tests and medications  Outcome: Progressing     Problem: Physical Regulation  Goal: Diagnostic test results will improve  Outcome: Progressing  Goal: Signs and symptoms of infection will decrease  Outcome: Progressing

## 2021-05-16 NOTE — CARE PLAN
Problem: Knowledge Deficit - Standard  Goal: Patient and family/care givers will demonstrate understanding of plan of care, disease process/condition, diagnostic tests and medications  Outcome: Progressing  Educating about labs values and waiting for surgeon to schedule.     Problem: Physical Regulation  Goal: Diagnostic test results will improve  Outcome: Progressing  Replacing magnesium and potassium.  Goal: Signs and symptoms of infection will decrease  Outcome: Progressing   The patient is Stable - Low risk of patient condition declining or worsening    Shift Goals  Clinical Goals: INR <1.5, shower, plan for surgery  Patient Goals: shower, change ostomy, shave, adls    Progress made toward(s) clinical / shift goals:  INR is 1.23    Patient is not progressing towards the following goals: electrolyte correction

## 2021-05-16 NOTE — CARE PLAN
Problem: Knowledge Deficit - Standard  Goal: Patient and family/care givers will demonstrate understanding of plan of care, disease process/condition, diagnostic tests and medications  Outcome: Progressing     Problem: Physical Regulation  Goal: Diagnostic test results will improve  Outcome: Progressing  Goal: Signs and symptoms of infection will decrease  Outcome: Progressing     Problem: Pain - Standard  Goal: Alleviation of pain or a reduction in pain to the patient’s comfort goal  Outcome: Met     Problem: Hemodynamics  Goal: Patient's hemodynamics, fluid balance and neurologic status will be stable or improve  Outcome: Met     Problem: Fluid Volume  Goal: Fluid volume balance will be maintained  Outcome: Met     Problem: Urinary - Renal Perfusion  Goal: Ability to achieve and maintain adequate renal perfusion and functioning will improve  Outcome: Met     Problem: Respiratory  Goal: Patient will achieve/maintain optimum respiratory ventilation and gas exchange  Outcome: Met     Problem: Mechanical Ventilation  Goal: Safe management of artificial airway and ventilation  Outcome: Met  Goal: Successful weaning off mechanical ventilator, spontaneously maintains adequate gas exchange  Outcome: Met  Goal: Patient will be able to express needs and understand communication  Outcome: Met   The patient is Stable - Low risk of patient condition declining or worsening    Shift Goals  Clinical Goals: To achieve INR <2 for surgery  Patient Goals: Care for ostomy    Progress made toward(s) clinical / shift goals:  INR is decreasing    Patient is not progressing towards the following goals: NA

## 2021-05-17 ENCOUNTER — ANESTHESIA (OUTPATIENT)
Dept: SURGERY | Facility: MEDICAL CENTER | Age: 71
DRG: 418 | End: 2021-05-17
Payer: MEDICARE

## 2021-05-17 ENCOUNTER — ANESTHESIA EVENT (OUTPATIENT)
Dept: SURGERY | Facility: MEDICAL CENTER | Age: 71
DRG: 418 | End: 2021-05-17
Payer: MEDICARE

## 2021-05-17 LAB
ANION GAP SERPL CALC-SCNC: 11 MMOL/L (ref 7–16)
BACTERIA BLD CULT: NORMAL
BACTERIA BLD CULT: NORMAL
BUN SERPL-MCNC: 10 MG/DL (ref 8–22)
CALCIUM SERPL-MCNC: 8.7 MG/DL (ref 8.4–10.2)
CHLORIDE SERPL-SCNC: 97 MMOL/L (ref 96–112)
CO2 SERPL-SCNC: 26 MMOL/L (ref 20–33)
CREAT SERPL-MCNC: 0.79 MG/DL (ref 0.5–1.4)
ERYTHROCYTE [DISTWIDTH] IN BLOOD BY AUTOMATED COUNT: 44.9 FL (ref 35.9–50)
GLUCOSE SERPL-MCNC: 117 MG/DL (ref 65–99)
HCT VFR BLD AUTO: 45.6 % (ref 42–52)
HGB BLD-MCNC: 15.6 G/DL (ref 14–18)
INR PPP: 1.21 (ref 0.87–1.13)
MAGNESIUM SERPL-MCNC: 1.9 MG/DL (ref 1.5–2.5)
MCH RBC QN AUTO: 33.3 PG (ref 27–33)
MCHC RBC AUTO-ENTMCNC: 34.2 G/DL (ref 33.7–35.3)
MCV RBC AUTO: 97.2 FL (ref 81.4–97.8)
PATHOLOGY CONSULT NOTE: NORMAL
PLATELET # BLD AUTO: 150 K/UL (ref 164–446)
PMV BLD AUTO: 10.2 FL (ref 9–12.9)
POTASSIUM SERPL-SCNC: 4.1 MMOL/L (ref 3.6–5.5)
PROTHROMBIN TIME: 15 SEC (ref 12–14.6)
RBC # BLD AUTO: 4.69 M/UL (ref 4.7–6.1)
SIGNIFICANT IND 70042: NORMAL
SIGNIFICANT IND 70042: NORMAL
SITE SITE: NORMAL
SITE SITE: NORMAL
SODIUM SERPL-SCNC: 134 MMOL/L (ref 135–145)
SOURCE SOURCE: NORMAL
SOURCE SOURCE: NORMAL
WBC # BLD AUTO: 13.4 K/UL (ref 4.8–10.8)

## 2021-05-17 PROCEDURE — 160002 HCHG RECOVERY MINUTES (STAT): Performed by: SURGERY

## 2021-05-17 PROCEDURE — 700101 HCHG RX REV CODE 250: Performed by: ANESTHESIOLOGY

## 2021-05-17 PROCEDURE — 500373 HCHG DRAIN, J-P FLAT 10MM 7044: Performed by: SURGERY

## 2021-05-17 PROCEDURE — 700102 HCHG RX REV CODE 250 W/ 637 OVERRIDE(OP): Performed by: INTERNAL MEDICINE

## 2021-05-17 PROCEDURE — 700111 HCHG RX REV CODE 636 W/ 250 OVERRIDE (IP): Performed by: ANESTHESIOLOGY

## 2021-05-17 PROCEDURE — 501838 HCHG SUTURE GENERAL: Performed by: SURGERY

## 2021-05-17 PROCEDURE — 501572 HCHG TROCAR, SHIELD OBTU 5X100: Performed by: SURGERY

## 2021-05-17 PROCEDURE — 502571 HCHG PACK, LAP CHOLE: Performed by: SURGERY

## 2021-05-17 PROCEDURE — 160048 HCHG OR STATISTICAL LEVEL 1-5: Performed by: SURGERY

## 2021-05-17 PROCEDURE — 160009 HCHG ANES TIME/MIN: Performed by: SURGERY

## 2021-05-17 PROCEDURE — 85027 COMPLETE CBC AUTOMATED: CPT

## 2021-05-17 PROCEDURE — 700105 HCHG RX REV CODE 258: Performed by: SURGERY

## 2021-05-17 PROCEDURE — 501568 HCHG TROCAR, BLUNTPORT 12MM: Performed by: SURGERY

## 2021-05-17 PROCEDURE — A9270 NON-COVERED ITEM OR SERVICE: HCPCS | Performed by: INTERNAL MEDICINE

## 2021-05-17 PROCEDURE — 85610 PROTHROMBIN TIME: CPT

## 2021-05-17 PROCEDURE — 500514 HCHG ENDOCLIP: Performed by: SURGERY

## 2021-05-17 PROCEDURE — A9270 NON-COVERED ITEM OR SERVICE: HCPCS | Performed by: HOSPITALIST

## 2021-05-17 PROCEDURE — 160029 HCHG SURGERY MINUTES - 1ST 30 MINS LEVEL 4: Performed by: SURGERY

## 2021-05-17 PROCEDURE — 501583 HCHG TROCAR, THRD CAN&SEAL 5X100: Performed by: SURGERY

## 2021-05-17 PROCEDURE — 80048 BASIC METABOLIC PNL TOTAL CA: CPT

## 2021-05-17 PROCEDURE — 160035 HCHG PACU - 1ST 60 MINS PHASE I: Performed by: SURGERY

## 2021-05-17 PROCEDURE — 160036 HCHG PACU - EA ADDL 30 MINS PHASE I: Performed by: SURGERY

## 2021-05-17 PROCEDURE — 700111 HCHG RX REV CODE 636 W/ 250 OVERRIDE (IP): Performed by: INTERNAL MEDICINE

## 2021-05-17 PROCEDURE — 160041 HCHG SURGERY MINUTES - EA ADDL 1 MIN LEVEL 4: Performed by: SURGERY

## 2021-05-17 PROCEDURE — 0FB44ZZ EXCISION OF GALLBLADDER, PERCUTANEOUS ENDOSCOPIC APPROACH: ICD-10-PCS | Performed by: SURGERY

## 2021-05-17 PROCEDURE — 501664 HCHG TUBING, FILTER STRYKER: Performed by: SURGERY

## 2021-05-17 PROCEDURE — 700101 HCHG RX REV CODE 250: Performed by: SURGERY

## 2021-05-17 PROCEDURE — A6402 STERILE GAUZE <= 16 SQ IN: HCPCS | Performed by: SURGERY

## 2021-05-17 PROCEDURE — 770020 HCHG ROOM/CARE - TELE (206)

## 2021-05-17 PROCEDURE — 501570 HCHG TROCAR, SEPARATOR: Performed by: SURGERY

## 2021-05-17 PROCEDURE — 500800 HCHG LAPAROSCOPIC J/L HOOK: Performed by: SURGERY

## 2021-05-17 PROCEDURE — 99233 SBSQ HOSP IP/OBS HIGH 50: CPT | Performed by: INTERNAL MEDICINE

## 2021-05-17 PROCEDURE — 83735 ASSAY OF MAGNESIUM: CPT

## 2021-05-17 PROCEDURE — 88304 TISSUE EXAM BY PATHOLOGIST: CPT

## 2021-05-17 PROCEDURE — 500002 HCHG ADHESIVE, DERMABOND: Performed by: SURGERY

## 2021-05-17 PROCEDURE — 700102 HCHG RX REV CODE 250 W/ 637 OVERRIDE(OP): Performed by: HOSPITALIST

## 2021-05-17 PROCEDURE — 700105 HCHG RX REV CODE 258: Performed by: INTERNAL MEDICINE

## 2021-05-17 RX ORDER — OXYCODONE HCL 5 MG/5 ML
10 SOLUTION, ORAL ORAL
Status: DISCONTINUED | OUTPATIENT
Start: 2021-05-17 | End: 2021-05-17 | Stop reason: HOSPADM

## 2021-05-17 RX ORDER — LIDOCAINE HYDROCHLORIDE 20 MG/ML
INJECTION, SOLUTION EPIDURAL; INFILTRATION; INTRACAUDAL; PERINEURAL PRN
Status: DISCONTINUED | OUTPATIENT
Start: 2021-05-17 | End: 2021-05-17 | Stop reason: SURG

## 2021-05-17 RX ORDER — SODIUM CHLORIDE, SODIUM LACTATE, POTASSIUM CHLORIDE, CALCIUM CHLORIDE 600; 310; 30; 20 MG/100ML; MG/100ML; MG/100ML; MG/100ML
INJECTION, SOLUTION INTRAVENOUS CONTINUOUS
Status: DISCONTINUED | OUTPATIENT
Start: 2021-05-17 | End: 2021-05-17 | Stop reason: HOSPADM

## 2021-05-17 RX ORDER — BUPIVACAINE HYDROCHLORIDE AND EPINEPHRINE 5; 5 MG/ML; UG/ML
INJECTION, SOLUTION EPIDURAL; INTRACAUDAL; PERINEURAL
Status: DISCONTINUED | OUTPATIENT
Start: 2021-05-17 | End: 2021-05-17 | Stop reason: HOSPADM

## 2021-05-17 RX ORDER — SODIUM CHLORIDE, SODIUM LACTATE, POTASSIUM CHLORIDE, CALCIUM CHLORIDE 600; 310; 30; 20 MG/100ML; MG/100ML; MG/100ML; MG/100ML
INJECTION, SOLUTION INTRAVENOUS CONTINUOUS
Status: ACTIVE | OUTPATIENT
Start: 2021-05-17 | End: 2021-05-17

## 2021-05-17 RX ORDER — ROCURONIUM BROMIDE 10 MG/ML
INJECTION, SOLUTION INTRAVENOUS PRN
Status: DISCONTINUED | OUTPATIENT
Start: 2021-05-17 | End: 2021-05-17 | Stop reason: SURG

## 2021-05-17 RX ORDER — CEFAZOLIN SODIUM 1 G/3ML
INJECTION, POWDER, FOR SOLUTION INTRAMUSCULAR; INTRAVENOUS PRN
Status: DISCONTINUED | OUTPATIENT
Start: 2021-05-17 | End: 2021-05-17 | Stop reason: SURG

## 2021-05-17 RX ORDER — ONDANSETRON 2 MG/ML
4 INJECTION INTRAMUSCULAR; INTRAVENOUS
Status: DISCONTINUED | OUTPATIENT
Start: 2021-05-17 | End: 2021-05-17 | Stop reason: HOSPADM

## 2021-05-17 RX ORDER — HALOPERIDOL 5 MG/ML
1 INJECTION INTRAMUSCULAR
Status: DISCONTINUED | OUTPATIENT
Start: 2021-05-17 | End: 2021-05-17 | Stop reason: HOSPADM

## 2021-05-17 RX ORDER — OXYCODONE HCL 5 MG/5 ML
5 SOLUTION, ORAL ORAL
Status: DISCONTINUED | OUTPATIENT
Start: 2021-05-17 | End: 2021-05-17 | Stop reason: HOSPADM

## 2021-05-17 RX ORDER — ONDANSETRON 2 MG/ML
INJECTION INTRAMUSCULAR; INTRAVENOUS PRN
Status: DISCONTINUED | OUTPATIENT
Start: 2021-05-17 | End: 2021-05-17 | Stop reason: SURG

## 2021-05-17 RX ORDER — METOPROLOL TARTRATE 1 MG/ML
INJECTION, SOLUTION INTRAVENOUS PRN
Status: DISCONTINUED | OUTPATIENT
Start: 2021-05-17 | End: 2021-05-17 | Stop reason: SURG

## 2021-05-17 RX ORDER — LABETALOL HYDROCHLORIDE 5 MG/ML
5 INJECTION, SOLUTION INTRAVENOUS
Status: DISCONTINUED | OUTPATIENT
Start: 2021-05-17 | End: 2021-05-17 | Stop reason: HOSPADM

## 2021-05-17 RX ORDER — MIDAZOLAM HYDROCHLORIDE 1 MG/ML
INJECTION INTRAMUSCULAR; INTRAVENOUS PRN
Status: DISCONTINUED | OUTPATIENT
Start: 2021-05-17 | End: 2021-05-17 | Stop reason: SURG

## 2021-05-17 RX ORDER — DIPHENHYDRAMINE HYDROCHLORIDE 50 MG/ML
12.5 INJECTION INTRAMUSCULAR; INTRAVENOUS
Status: DISCONTINUED | OUTPATIENT
Start: 2021-05-17 | End: 2021-05-17 | Stop reason: HOSPADM

## 2021-05-17 RX ORDER — METOPROLOL TARTRATE 1 MG/ML
1 INJECTION, SOLUTION INTRAVENOUS
Status: DISCONTINUED | OUTPATIENT
Start: 2021-05-17 | End: 2021-05-17 | Stop reason: HOSPADM

## 2021-05-17 RX ADMIN — WATER 15 ML: 100 IRRIGANT IRRIGATION at 08:35

## 2021-05-17 RX ADMIN — ROCURONIUM BROMIDE 20 MG: 10 INJECTION, SOLUTION INTRAVENOUS at 09:21

## 2021-05-17 RX ADMIN — CEFAZOLIN 3 G: 1 INJECTION, POWDER, FOR SOLUTION INTRAVENOUS at 08:56

## 2021-05-17 RX ADMIN — OXYCODONE HYDROCHLORIDE 2.5 MG: 5 TABLET ORAL at 16:12

## 2021-05-17 RX ADMIN — MIDAZOLAM HYDROCHLORIDE 1 MG: 1 INJECTION, SOLUTION INTRAMUSCULAR; INTRAVENOUS at 08:46

## 2021-05-17 RX ADMIN — PROPOFOL 200 MG: 10 INJECTION, EMULSION INTRAVENOUS at 08:48

## 2021-05-17 RX ADMIN — FENTANYL CITRATE 100 MCG: 50 INJECTION, SOLUTION INTRAMUSCULAR; INTRAVENOUS at 08:48

## 2021-05-17 RX ADMIN — METOPROLOL SUCCINATE 25 MG: 25 TABLET, EXTENDED RELEASE ORAL at 05:03

## 2021-05-17 RX ADMIN — CEFTRIAXONE SODIUM 2 G: 2 INJECTION, POWDER, FOR SOLUTION INTRAMUSCULAR; INTRAVENOUS at 14:55

## 2021-05-17 RX ADMIN — FENTANYL CITRATE 100 MCG: 50 INJECTION, SOLUTION INTRAMUSCULAR; INTRAVENOUS at 09:10

## 2021-05-17 RX ADMIN — ROCURONIUM BROMIDE 50 MG: 10 INJECTION, SOLUTION INTRAVENOUS at 08:48

## 2021-05-17 RX ADMIN — SODIUM CHLORIDE, POTASSIUM CHLORIDE, SODIUM LACTATE AND CALCIUM CHLORIDE: 600; 310; 30; 20 INJECTION, SOLUTION INTRAVENOUS at 08:34

## 2021-05-17 RX ADMIN — METOPROLOL TARTRATE 1 MG: 5 INJECTION INTRAVENOUS at 09:17

## 2021-05-17 RX ADMIN — METOPROLOL TARTRATE 1 MG: 5 INJECTION INTRAVENOUS at 10:50

## 2021-05-17 RX ADMIN — FENTANYL CITRATE 50 MCG: 50 INJECTION, SOLUTION INTRAMUSCULAR; INTRAVENOUS at 09:56

## 2021-05-17 RX ADMIN — METRONIDAZOLE 500 MG: 500 TABLET ORAL at 21:06

## 2021-05-17 RX ADMIN — ONDANSETRON 4 MG: 2 INJECTION INTRAMUSCULAR; INTRAVENOUS at 10:46

## 2021-05-17 RX ADMIN — METRONIDAZOLE 500 MG: 500 TABLET ORAL at 14:55

## 2021-05-17 RX ADMIN — SUGAMMADEX 200 MG: 100 INJECTION, SOLUTION INTRAVENOUS at 11:01

## 2021-05-17 RX ADMIN — FENTANYL CITRATE 50 MCG: 50 INJECTION, SOLUTION INTRAMUSCULAR; INTRAVENOUS at 11:08

## 2021-05-17 RX ADMIN — OMEPRAZOLE 20 MG: 20 CAPSULE, DELAYED RELEASE ORAL at 05:03

## 2021-05-17 RX ADMIN — Medication 5 MG: at 21:06

## 2021-05-17 RX ADMIN — METRONIDAZOLE 500 MG: 500 TABLET ORAL at 05:03

## 2021-05-17 RX ADMIN — LIDOCAINE HYDROCHLORIDE 60 MG: 20 INJECTION, SOLUTION EPIDURAL; INFILTRATION; INTRACAUDAL; PERINEURAL at 08:48

## 2021-05-17 RX ADMIN — OXYCODONE HYDROCHLORIDE 5 MG: 5 TABLET ORAL at 19:10

## 2021-05-17 ASSESSMENT — ENCOUNTER SYMPTOMS
CONSTIPATION: 1
NAUSEA: 1
ABDOMINAL PAIN: 1

## 2021-05-17 ASSESSMENT — PAIN DESCRIPTION - PAIN TYPE
TYPE: ACUTE PAIN

## 2021-05-17 ASSESSMENT — PAIN SCALES - GENERAL: PAIN_LEVEL: 2

## 2021-05-17 ASSESSMENT — FIBROSIS 4 INDEX: FIB4 SCORE: 3.39

## 2021-05-17 NOTE — PROGRESS NOTES
Received report from Gabo FRANCIS, plan of care reviewed at bedside. Pt would like to know what time his surgery is scheduled for, will look into it. Pt denies any other needs at this time, safety precautions in place.     Surgeon at bedside discussing procedure with pt, surgeon does not have a time for the procedure yet but will communicate the time    OR called stating procedure will be done at 0900

## 2021-05-17 NOTE — PROGRESS NOTES
"Hospital Medicine Daily Progress Note    Date of Service  5/17/2021    Chief Complaint  71 y.o. male admitted 5/12/2021 with abdominal pain    Hospital Course  Per notes, \"71 y.o. male with a past medical history of atrial fibrillation s/p ablation, venous thrombosis and pulmonary embolism on anticoagulation with Coumadin with a past medical history of atrial fibrillation status post ablation, chronic anticoagulation who presented 5/12/2021 with episodes of palpitations, abdominal pain nausea and vomiting.  Patient reports having episodes of palpitations over the past 2 days and reports that he was able to catch rapid heart rate on home monitoring device on several occasions.  He also has noticed left lower quadrant abdominal pain, that is described as crampy/dull, and rated moderate in severity, associated with reduced ostomy output and he was concerned about possible ostomy stenosis.  He reports anorexia and reduced oral intake.  He reports that his abdominal pain has been resolved.\"    Patient was admitted evaluated by general surgery.  He had a HIDA scan on 5/13/2021, which was highly suggestive for cholecystitis.  Underwent open cholecystectomy on 5/17, without complications.  Plan for ERCP on 5/18.      Interval Problem Update  Seen was seen and examined by me after surgery.  Still having some drowsiness from anesthesia, pain is controlled.      Plan is for ERCP tomorrow    HIDA scan revealed high suspicion for cholecystitis, discussed with surgery on 5/14.  Patient has been on warfarin, transition to Lovenox for surgery, will need bridging back to warfarin upon discharge.    Consultants/Specialty  General surgery  Cardiology    Code Status  Full Code    Disposition  Anticipated DC to home    Review of Systems  Review of Systems   Constitutional: Positive for malaise/fatigue.   Gastrointestinal: Positive for abdominal pain, constipation and nausea.   All other systems reviewed and are negative.       Physical " Exam  Temp:  [36.2 °C (97.2 °F)-37.2 °C (99 °F)] 37.2 °C (99 °F)  Pulse:  [69-91] 76  Resp:  [16-24] 18  BP: (113-142)/(62-79) 126/75  SpO2:  [93 %-97 %] 94 %    Physical Exam  Vitals and nursing note reviewed.   Constitutional:       Appearance: Normal appearance. He is obese. He is not ill-appearing.   Cardiovascular:      Rate and Rhythm: Normal rate and regular rhythm.      Pulses: Normal pulses.      Heart sounds: Normal heart sounds.   Pulmonary:      Effort: Pulmonary effort is normal.      Breath sounds: Normal breath sounds.   Abdominal:      General: Abdomen is flat. Bowel sounds are normal.      Palpations: Abdomen is soft.      Tenderness: There is abdominal tenderness. There is no guarding or rebound.   Musculoskeletal:      Right lower leg: No edema.      Left lower leg: No edema.   Neurological:      General: No focal deficit present.      Mental Status: He is alert and oriented to person, place, and time.         Fluids    Intake/Output Summary (Last 24 hours) at 5/17/2021 1425  Last data filed at 5/17/2021 1232  Gross per 24 hour   Intake 1700 ml   Output 150 ml   Net 1550 ml       Laboratory  Recent Labs     05/15/21  0327 05/17/21  0414   WBC 10.9* 13.4*   RBC 4.52* 4.69*   HEMOGLOBIN 14.8 15.6   HEMATOCRIT 42.6 45.6   MCV 94.2 97.2   MCH 32.7 33.3*   MCHC 34.7 34.2   RDW 44.2 44.9   PLATELETCT 147* 150*   MPV 10.1 10.2     Recent Labs     05/15/21  0327 05/16/21  0959 05/17/21  0414   SODIUM 133* 134* 134*   POTASSIUM 2.9* 3.8 4.1   CHLORIDE 95* 96 97   CO2 27 27 26   GLUCOSE 116* 124* 117*   BUN 11 12 10   CREATININE 0.68 0.80 0.79   CALCIUM 8.6 9.1 8.7     Recent Labs     05/16/21  0959 05/17/21  0414   INR 1.23* 1.21*               Imaging  NM-HEPATOBILIARY SCAN   Final Result      1.  The gallbladder is not definitively identified prior to or after the administration of morphine. Findings are highly suspicious for acute cholecystitis.      US-RUQ   Final Result      1.  Sludge and stones  present in the gallbladder.   2.  Gallbladder wall thickening raises concern for cholecystitis.   3.  No gross biliary dilation.   4.  Very limited exam due to body habitus.      CT-CTA COMPLETE THORACOABDOMINAL AORTA   Final Result      1.  No aortic aneurysm or dissection.   2.  Atherosclerosis including prominent coronary calcification.   3.  Cholelithiasis and distended gallbladder. If there is concern for acute cholecystitis, further evaluation with ultrasound and nuclear medicine HIDA scan may be performed.   4.  Hepatic steatosis.   5.  Postoperative changes of colectomy with right lower quadrant ostomy.                 Assessment/Plan  * Abdominal pain with imaging finding concerning for cholecystitis- (present on admission)  Assessment & Plan  Ultrasound shows evidence for gallbladder wall thickening raises concern for cholecystitis  Initially started on Zosyn, will switched  to ceftriaxone and metronidazole. HIDA scan suggestive of cholecystitis, continue with antibiotics and follow-up on surgery recommendations  General surgery consulted, follow-up on recommendations    Status post cholecystectomy on 5/17, without complications  Plan for ERCP on 5/18    Pulmonary hypertension (HCC)- (present on admission)  Assessment & Plan  Mild pulmonary hypertension, estimated PASP 40mmHg noted on echocardiogram 3/2/2021       Hypomagnesemia  Assessment & Plan  Continue to monitor and replace as needed    Cholecystitis- (present on admission)  Assessment & Plan  hida on 5/13 with high suspicion of cholecystitis, plan for surgery on 5/17  Follow-up with general surgery recommendations  Continue with Lovenox  Continue with IV antibiotics  N.p.o. at midnight for ERCP on 5/18    Palpitations- (present on admission)  Assessment & Plan  History of atrial fibrillation s/p ablation  Patient reports that he has been having palpitations and that he has self-monitoring device at home that captured several episodes of RVR.    EKG  shows sinus tachycardia with a rate of 99, no ST elevation there is T wave flattening in leads V3-V6, .  Resume home metoprolol with hold parameters.  Recent echo reviewed, Mild concentric left ventricular hypertrophy. Normal estimated right atrial pressure. Mild pulmonary hypertension, estimated PASP 40mmHg.   Continue with telemetry  No symptoms today    Hypokalemia  Assessment & Plan  Continue to monitor in place as needed    S/P ablation of atrial fibrillation- (present on admission)  Assessment & Plan  Patient reports that he has been having palpitations and that he has self-monitoring device at home that captured several episodes of RVR.  I will start continuous cardiac monitoring  EKG shows sinus tachycardia with a rate of 99, no ST elevation there is T wave flattening in leads V3-V6, .  Continue home metoprolol with hold parameters.  Evaluated by cardiology, no acute findings, see note for further recommendations, will need to start a statin after surgery.    History of pulmonary embolism and DVT, unprovoked.- (present on admission)  Assessment & Plan  History of remote venous thromboses and pulmonary embolism, on Coumadin  INR 1.23 today  Hold Lovenox for planned surgery on Monday 5/17    Acid reflux disease- (present on admission)  Assessment & Plan  Continue with omeprazole.       VTE prophylaxis: SCDs, ambulation

## 2021-05-17 NOTE — PROGRESS NOTES
Telemetry Shift Summary     Rhythm: SR  HR: 71-90  Ectopy: r-o PVC, r Coup, r Trig, r PAC    Measurements: 0.16/0.08/0.40    Normal Values  Rhythm: SR  HR:   Measurements: 0.12-0.20/0.08-0.10/0.30-0.52

## 2021-05-17 NOTE — CARE PLAN
Problem: Knowledge Deficit - Standard  Goal: Patient and family/care givers will demonstrate understanding of plan of care, disease process/condition, diagnostic tests and medications  Outcome: Progressing  Note: Pt updated on plan of care. Surgeon was at bedside this morning to explain risks and benefits of procedure. Pt verbalizes understanding. Surgeon scheduled surgery for 0900 today.    The patient is Stable - Low risk of patient condition declining or worsening    Shift Goals  Clinical Goals: Get surgery  Patient Goals: Get surgery, go home    Progress made toward(s) clinical / shift goals:  Pt currently in OR  Problem: Pre Op  Goal: Optimal preparation for surgery  Outcome: Met  Note: Pt has been NPO since midnight, surgeon scheduled procedure for 0900. Gave report to OR RN. RN Completed CHG bath and linen change with CNA prior to pt being transported to pre-op.        Patient is not progressing towards the following goals:

## 2021-05-17 NOTE — ANESTHESIA PROCEDURE NOTES
Airway    Date/Time: 5/17/2021 9:00 AM  Performed by: China Starks M.D.  Authorized by: China Starks M.D.     Location:  OR  Urgency:  Elective  Difficult Airway: No    Indications for Airway Management:  Anesthesia      Spontaneous Ventilation: absent    Sedation Level:  Deep  Preoxygenated: Yes    Patient Position:  Sniffing  MILS Maintained Throughout: No    Mask Difficulty Assessment:  1 - vent by mask  Final Airway Type:  Endotracheal airway  Final Endotracheal Airway:  ETT  Cuffed: Yes    Technique Used for Successful ETT Placement:  Video laryngoscopy  Devices/Methods Used in Placement:  Intubating stylet    Insertion Site:  Oral  Blade Type:  Glide  Laryngoscope Blade/Videolaryngoscope Blade Size:  4  ETT Size (mm):  7.5  Placement Verified by: capnometry    Cormack-Lehane Classification:  Grade I - full view of glottis  Number of Attempts at Approach:  1

## 2021-05-17 NOTE — PROGRESS NOTES
"S: Abel Day  is a 71 y.o.  male admitted for multiple medical complaints, found to have acute cholecystitis      O:  /79   Pulse 76   Temp 36.8 °C (98.2 °F) (Oral)   Resp 18   Ht 1.905 m (6' 3\")   Wt (!) 140 kg (308 lb 13.8 oz)   SpO2 95%   Intake/Output                             05/15/21 0700 - 05/16/21 0659 05/16/21 0700 - 05/17/21 0659 05/17/21 0700 - 05/18/21 0659     [id number] 3114-2737 Total 8478-0300 2115-8380 Total 1257-2328 8776-5921 Total                    Intake    P.O.  720  740 1460  480  -- 480  --  -- --    P.O.  480 -- 480 -- -- --    IV Piggyback  100  -- 100  --  -- --  --  -- --    Volume (mL) (cefTRIAXone (ROCEPHIN) 2 g in  mL IVPB) 100 -- 100 -- -- -- -- -- --    Total Intake  480 -- 480 -- -- --       Output    Urine  --  -- --  --  -- --  --  -- --    Number of Times Voided 1 x -- 1 x 1 x -- 1 x -- -- --    Stool  --  -- --  --  -- --  --  -- --    Number of Times Stooled 1 x -- 1 x 1 x -- 1 x -- -- --    Total Output -- -- -- -- -- -- -- -- --       Net I/O      480 -- 480 -- -- --        Recent Labs     05/15/21  0327 05/16/21  0959 05/17/21  0414   SODIUM 133* 134* 134*   POTASSIUM 2.9* 3.8 4.1   CHLORIDE 95* 96 97   CO2 27 27 26   GLUCOSE 116* 124* 117*   BUN 11 12 10   CREATININE 0.68 0.80 0.79   CALCIUM 8.6 9.1 8.7     Recent Labs     05/15/21  0327 05/17/21  0414   WBC 10.9* 13.4*   RBC 4.52* 4.69*   HEMOGLOBIN 14.8 15.6   HEMATOCRIT 42.6 45.6   MCV 94.2 97.2   MCH 32.7 33.3*   MCHC 34.7 34.2   RDW 44.2 44.9   PLATELETCT 147* 150*   MPV 10.1 10.2       Alert and Oriented x3, No Acute Distress  Normal Respiratory Effort  Abdomen soft, appropriately tender  Extremities warm and well perfused    A/P:  Acute cholecystitis, INR now 1.2, plan for lap brenda, possible open, extensive conversation regarding risks, benefits, and alternatives discussed with patient. Cleared by cardiology. Plan to proceed    Panda Bird MD  [facility]

## 2021-05-17 NOTE — ANESTHESIA TIME REPORT
Anesthesia Start and Stop Event Times     Date Time Event    5/17/2021 0837 Ready for Procedure     0845 Anesthesia Start     1117 Anesthesia Stop        Responsible Staff  05/17/21    Name Role Begin End    China Starks M.D. Anesth 0845 1117        Preop Diagnosis (Free Text):  Pre-op Diagnosis      acute cholecystitis        Preop Diagnosis (Codes):    Post op Diagnosis  Acute cholecystitis      Premium Reason  Non-Premium    Comments:

## 2021-05-17 NOTE — ANESTHESIA PREPROCEDURE EVALUATION
Relevant Problems   NEURO   (positive) History of pulmonary embolism and DVT, unprovoked.   (positive) History of ulcerative colitis   (positive) S/P ablation of atrial fibrillation      CARDIAC   (positive) Atrial fibrillation (HCC)   (positive) Pulmonary hypertension (HCC)      GI   (positive) Acid reflux disease       Physical Exam    Airway   Mallampati: IV  TM distance: <3 FB  Neck ROM: full       Cardiovascular   Rhythm: regular  Rate: normal     Dental - normal exam           Pulmonary   Breath sounds clear to auscultation     Abdominal - normal exam     Neurological              Anesthesia Plan    ASA 3   ASA physical status 3 criteria: other (comment) and hypertension - poorly controlled    Plan - general       Airway plan will be ETT          Induction: intravenous    Postoperative Plan: Postoperative administration of opioids is intended.    Pertinent diagnostic labs and testing reviewed    Informed Consent:    Anesthetic plan and risks discussed with patient.    Use of blood products discussed with: whom consented to blood products.

## 2021-05-17 NOTE — CONSULTS
Gastroenterology Consult Note     Date of Consult: 5/17/2021  Referring Physician: Panda Bird     Reason for consult: acute cholecystitis with partial cholecystectomy        HPI: This is a 72 yo male with history of AFIB, UC s/p total colectomy and DVT who presented with palpitations, abdominal pain and poor ostomy output on 5/12. He says that he was in his usual state of health until Tuesday last week. He began having pain in his left abdomen with associated poor ostomy output. He says that he was concerned initially that he had a blockage in his ostomy. He started having palpitations and shortness of breath so he contacted his PCP the next day. He was advised to come to the hospital for evaluation. He underwent CT scan and was found to have a distended GB with stone present. Findings were concerning for acute cholecystitis. Dr Bird was consulted from Surgery and US and HIDA scan were ordered. He was found to have acute cholecystitis. He anticoagulation was held and Cholecystectomy was performed today. During his surgery he was found to have severe adhesions so only a partial cholecystectomy was performed. ERCP is requested post-procedure to help prevent post-op bile leak. Currently the patient reports only mild pain at the surgery sites. He has a drain in the RUQ draining bloody fluid. His initial complaints of pain from admission have improved and he has been having more normal ostomy output.       PMHX:  Past Medical History:   Diagnosis Date   • Cancer (HCC)     melanoma   • GERD (gastroesophageal reflux disease)    • Hemorrhagic disorder     on coumadin   • Obesity    • Paroxysmal atrial fibrillation (HCC) 11/26/2012   • Skin cancer     basil cell   • ULCERATIVE COLITIS 11/26/2012          PSurgHx:   Past Surgical History:   Procedure Laterality Date   • TOÑO BY LAPAROSCOPY  5/17/2021    Procedure: CHOLECYSTECTOMY, LAPAROSCOPIC;  Surgeon: Panda Bird M.D.;   Location: SURGERY [facility];  Service: General   • RECOVERY  2016    [facility] [id number] I48.0;  Surgeon: Recoveryonly Surgery;  [place] PRE-[place] [facility] [place];  Service:    • MASS EXCISION GENERAL Left 2016    Procedure: MASS EXCISION GENERAL FOR TEMPORAL MELANOMA;  Surgeon: Feng Sharpe M.D.;  Location: SURGERY SAME DAY [facility];  Service:    • FLAP GRAFT Left 2016    Procedure: FLAP GRAFT FOR CLOSURE WITH LOCAL FLAPS;  Surgeon: Feng Sharpe M.D.;  Location: SURGERY SAME DAY [facility];  Service:    • COLON RESECTION          • ILEOSTOMY          ALLERGIES:Other misc and Omnicef [cefdinir]     SocHx:   Social History     Socioeconomic History   • Marital status:      Spouse name: Not on file   • Number of children: Not on file   • Years of education: Not on file   • Highest education level: Not on file   Occupational History   • Not on file   Tobacco Use   • Smoking status: Former Smoker     Packs/day: 1.00     Years: 10.00     Pack years: 10.00     Types: Cigarettes     Quit date: 1984     Years since quittin.4   • Smokeless tobacco: Never Used   Vaping Use   • Vaping Use: Never used   Substance and Sexual Activity   • Alcohol use: Yes     Alcohol/week: 12.6 oz     Types: 21 Shots of liquor per week     Comment: 1 beer and 1 scotch daily   • Drug use: Not Currently     Types: Marijuana   • Sexual activity: Not on file   Other Topics Concern   • Not on file   Social History Narrative   • Not on file     Social Determinants of Health     Financial Resource Strain:    • Difficulty of Paying Living Expenses:    Food Insecurity:    • Worried About Running Out of Food in the Last Year:    • Ran Out of Food in the Last Year:    Transportation Needs:    • Lack of Transportation (Medical):    • Lack of Transportation (Non-Medical):    Physical Activity:    • Days of Exercise per Week:    • Minutes of  Exercise per Session:    Stress:    • Feeling of Stress :    Social Connections:    • Frequency of Communication with Friends and Family:    • Frequency of Social Gatherings with Friends and Family:    • Attends Mu-ism Services:    • Active Member of Clubs or Organizations:    • Attends Club or Organization Meetings:    • Marital Status:    Intimate Partner Violence:    • Fear of Current or Ex-Partner:    • Emotionally Abused:    • Physically Abused:    • Sexually Abused:         FAMHx:   Family History   Problem Relation Age of Onset   • Hypertension Mother    • Heart Failure Father    • Heart Attack Maternal Uncle         ROS:  Constitutional: No fevers, chills, no night sweats, +recent weight gain  HEENT: no vision or hearing changes, no dry mouth, no change in smell  CARDIO: + palpitations, no orthopnea, no chest pain  PULM: no cough, + shortness of breath  NEURO: no Seizures, no memory impairment, no change in sensation  GI: as above  : no dysuria, no hematuria  HEME: no anemia, no easy brusing  MUSCULOSKELETAL: no muscle aches, no back pain, no arthritis  PSYCH: no anxiety or depression  SKIN: no rashes     PE:  Vitals:    05/17/21 1139 05/17/21 1154 05/17/21 1209 05/17/21 1303   BP: 113/70 114/78 120/63 126/75   Pulse: 70 72 70 76   Resp: (!) 24 18 (!) 24 18   Temp:    37.2 °C (99 °F)   TempSrc:    Oral   SpO2: 97% 97% 96% 94%   Weight:       Height:         Gen: AAOx3, NAD, lying in bed, obese  HEENT: PERRL, EOMI, nares patent, Mucous membranes moist  Neck: supple, no cervical or supraclavicular adenopathy  CVS: regular rhythm, normal rate, no MRG  Pulm: CTAB, no crackles  Abd: soft, Nd, TTP at the MATT drain site, left abdomen ostomy, no guarding or rebound  Ext: no edema, normal sensation  NEURO: grossly normal, no weakness  Skin: warm, no rash  Psych: normal Affect, no anxiety     LABS:  Lab Results   Component Value Date/Time    SODIUM 134 (L) 05/17/2021 04:14 AM    POTASSIUM 4.1 05/17/2021 04:14 AM     CHLORIDE 97 05/17/2021 04:14 AM    CO2 26 05/17/2021 04:14 AM    GLUCOSE 117 (H) 05/17/2021 04:14 AM    BUN 10 05/17/2021 04:14 AM    CREATININE 0.79 05/17/2021 04:14 AM    BUNCREATRAT 19 06/03/2013 04:03 PM      Lab Results   Component Value Date/Time    WBC 13.4 (H) 05/17/2021 04:14 AM    RBC 4.69 (L) 05/17/2021 04:14 AM    HEMOGLOBIN 15.6 05/17/2021 04:14 AM    HEMATOCRIT 45.6 05/17/2021 04:14 AM    MCV 97.2 05/17/2021 04:14 AM    MCH 33.3 (H) 05/17/2021 04:14 AM    MCHC 34.2 05/17/2021 04:14 AM    MPV 10.2 05/17/2021 04:14 AM    NEUTSPOLYS 82.80 (H) 05/13/2021 02:31 AM    LYMPHOCYTES 7.50 (L) 05/13/2021 02:31 AM    MONOCYTES 9.00 05/13/2021 02:31 AM    EOSINOPHILS 0.10 05/13/2021 02:31 AM    BASOPHILS 0.20 05/13/2021 02:31 AM        Lab Results   Component Value Date/Time    PROTHROMBTM 15.0 (H) 05/17/2021 04:14 AM    INR 1.21 (H) 05/17/2021 04:14 AM      Recent Labs     05/12/21  1320 05/12/21  1428 05/13/21  0231 05/14/21  0238 05/16/21  0959 05/17/21  0414   ASTSGOT 24  --  43  --   --   --    ALTSGPT 28  --  36  --   --   --    TBILIRUBIN 0.8  --  1.2  --   --   --    GLOBULIN 3.7*  --  3.4  --   --   --    INR  --  2.17* 2.15* 2.07* 1.23* 1.21*          Problem List Items Addressed This Visit     * (Principal) Abdominal pain with imaging finding concerning for cholecystitis     Ultrasound shows evidence for gallbladder wall thickening raises concern for cholecystitis  Initially started on Zosyn, will switch to ceftriaxone metronidazole today.  General surgery consulted, plan for surgery on 5/17  N.p.o. after midnight  HIDA scan suggestive of cholecystitis, continue with antibiotics and follow-up on surgery recommendations         Relevant Orders    NM-HEPATOBILIARY SCAN (Completed)    Palpitations     History of atrial fibrillation s/p ablation  Patient reports that he has been having palpitations and that he has self-monitoring device at home that captured several episodes of RVR.    EKG shows sinus  tachycardia with a rate of 99, no ST elevation there is T wave flattening in leads V3-V6, .  Resume home metoprolol with hold parameters.  Recent echo reviewed, Mild concentric left ventricular hypertrophy. Normal estimated right atrial pressure. Mild pulmonary hypertension, estimated PASP 40mmHg.   Continue with telemetry  No symptoms today           Other Visit Diagnoses     Shortness of breath        Hypoxia        Generalized abdominal pain        Pain, upper back        Relevant Medications    acetaminophen (TYLENOL) tablet 1,000 mg (Completed)    acetaminophen (Tylenol) tablet 650 mg    oxyCODONE immediate-release (ROXICODONE) tablet 2.5 mg    oxyCODONE immediate-release (ROXICODONE) tablet 5 mg    HYDROmorphone (Dilaudid) injection 0.25 mg           ASSESSMENT: 70 yo male with history of AFIB, UC s/p total colectomy and DVT who presented with palpitations, abdominal pain and poor ostomy output on 5/12. He was found to have acute cholecystitis and underwent partial cholecystectomy today with Dr Bird. Due to inability to completely remove the GB, ERCP is requested to ensure any potential bile leak can be closed.     PLAN:   1) NPO at midnight  2) ERCP with stent placement in the AM at 730  3) Hold anticoagulation. Recommend checking with Dr Bird due to blood in the MATT  4) Ok for diet today if ok with Dr Bird      Thank you for this consult.     Olivier Stanford MD

## 2021-05-17 NOTE — ANESTHESIA POSTPROCEDURE EVALUATION
Patient: Abel Day III    Procedure Summary     Date: 05/17/21 Room / Location:  OR  / SURGERY [facility]    Anesthesia Start: 0845 Anesthesia Stop: 1117    Procedure: CHOLECYSTECTOMY, LAPAROSCOPIC (Abdomen) Diagnosis: ( acute cholecystitis)    Surgeons: Panda Bird M.D. Responsible Provider: China Starks M.D.    Anesthesia Type: general ASA Status: 3          Final Anesthesia Type: general  Last vitals  BP   Blood Pressure : 126/75, NIBP: 129/72    Temp   37.2 °C (99 °F)    Pulse   76   Resp   18    SpO2   94 %      Anesthesia Post Evaluation    Patient location during evaluation: PACU  Patient participation: complete - patient participated  Level of consciousness: awake and alert  Pain score: 2    Airway patency: patent  Anesthetic complications: no  Cardiovascular status: adequate and hemodynamically stable  Respiratory status: acceptable and nasal cannula  Hydration status: acceptable    PONV: none          No complications documented.     Nurse Pain Score: 0 (NPRS)

## 2021-05-17 NOTE — PROGRESS NOTES
Spoke with Dr. Bird regarding advancing diet, Per Dr. Bird he ok with advancing to clear liquid diet.

## 2021-05-17 NOTE — OP REPORT
Operative Report    Date: 5/17/2021    Surgeon: Panda Bird M.D.     Assistant: None    Pre-operative Diagnosis: Acute cholecystitis    Post-operative Diagnosis: Acute on chronic gangrenous cholecystitis    Procedure: Laparoscopic partial cholecystectomy    ASA Classification: III.    Indications: This is a 71 y.o. male who presented with symptoms of acute cholecystitis here for laparoscopic possible open cholecystectomy.    The indications for a surgical assistant in this surgery were indicated due to complexity of the procedure. Their role included aiding in incision, retraction, holding devices including camera for laparoscopic procedure, and closure of the wound.      Findings: Significantly edematous and necrotic gallbladder with multiple adhesions to small bowel throughout.  Significant edema and adhesions in the infundibulum secondary to this felt it was safer to perform partial cholecystectomy with plans for ERCP postoperatively    Wound Classification: Class II, II, Clean Contaminated..    Procedure in detail: The patient was seen and examined in the preoperative holding area.  The risks benefits and alternatives of the procedure were discussed with the patient who wished to proceed with the procedure as described.  The patient was transferred to the operating room placed in supine position and all pressure points were properly padded.  General endotracheal anesthesia was induced and preoperative antibiotics were given per SCIP protocol.  Patient's abdomen was prepped with ChloraPrep and draped in the normal sterile fashion.  A timeout was performed confirming correct patient, correct procedure, and that all necessary equipment was in the room.      Began the procedure performing a right upper quadrant Optiview access.  We sharp incised skin use a 5 mm Optiview port to access the abdominal cavity.  Pneumoperitoneum was then achieved and maintained to 15 mmHg carbon dioxide.  We carefully examined the area  directly underneath the access point and found no injury.  Under direct visualization we placed a 5 mm periumbilical port and 2 additional right upper quadrant 5 mm ports and a 10 mm epigastric port.  There was a significant amount of edema and adhesions between the small bowel and the gallbladder which were very carefully dissected free were then able to elevate the gallbladder somewhat and were able to drain this with the laparoscopic needle.  We then able to grasp the gallbladder and retracted somewhat cephalad but secondary to the size of his liver as well as the semiamount of adhesions and edema were unable to get good retraction on this.  We continue with our careful dissection until were able to identify what appeared to be the infundibulum.  There was still more adhesions and inflammation down there and I deemed to be unsafe to continue dissection in this area.  We open the gallbladder wall at the fundus and carefully continues dissection down toward the infundibulum once we reach an area was close infundibulum but final flare from all the adhesions to be safe we transected the gallbladder while here and then took this off the fossa leaving the back of the gallbladder wall and the base of the infundibulum.  We carefully obtained hemostasis and copiously irrigated the abdominal cavity.  We then placed the partial cholecystectomy remnant and 2 large gallstones in the 10 mm Endo Catch bag removed through the 10 mm subxiphoid port.  We then used Lucia along the edge of the gallbladder and the fossa to ensure continue hemostasis given his anticoagulation status.  We placed a 10 Portuguese flat MATT drain into the infundibulum along the gallbladder fossa.  This secured to skin with a 3-0 nylon suture.  We then closed the terminal port with Endo stitch device and 0 Vicryl suture.  And remove the remainder of the ports and closed the skin with 4-0 Monocryl in a subcuticular fashion.  Dermabond was placed over the wounds  and a drain sponge was placed over the drain site.    The patient was awakened from general anesthetic, and was taken to the recovery room in stable condition.    Sponge and needle counts were correct at the end of the case.     Specimen: Partial cholecystectomy with several large gallstones    EBL: 150 cc    Dispo: stable, extubated, to PACU    Panda Bird M.D.  [facility]  [phone]      \

## 2021-05-17 NOTE — PROGRESS NOTES
"Pt returned from PACU to unit. Pt alert and oriented, pt states he feels \"soupy\" according to him this means still a little foggy. Pt reports 2/10 discomfort at drain site. Pt denies any nausea. VSS. Currently on 2L NC. Pt hooked up to vitals machine per policy.   "

## 2021-05-17 NOTE — OR NURSING
1109: To PACU post lap brenda. Pt is extubated, breathing is spontaneous and unlabored. Compressed MATT drain in place.  1121: Pt denies pain at this time.  1146: Pt unable to give pain scale number, but states tolerable at this time.  1155: Pt given IS w/ goal of 3100. Able to pull 1750

## 2021-05-18 ENCOUNTER — ANESTHESIA EVENT (OUTPATIENT)
Dept: SURGERY | Facility: MEDICAL CENTER | Age: 71
DRG: 418 | End: 2021-05-18
Payer: MEDICARE

## 2021-05-18 ENCOUNTER — APPOINTMENT (OUTPATIENT)
Dept: RADIOLOGY | Facility: MEDICAL CENTER | Age: 71
DRG: 418 | End: 2021-05-18
Attending: INTERNAL MEDICINE
Payer: MEDICARE

## 2021-05-18 ENCOUNTER — APPOINTMENT (OUTPATIENT)
Dept: RADIOLOGY | Facility: MEDICAL CENTER | Age: 71
DRG: 418 | End: 2021-05-18
Attending: HOSPITALIST
Payer: MEDICARE

## 2021-05-18 ENCOUNTER — ANESTHESIA (OUTPATIENT)
Dept: SURGERY | Facility: MEDICAL CENTER | Age: 71
DRG: 418 | End: 2021-05-18
Payer: MEDICARE

## 2021-05-18 PROBLEM — E78.5 HYPERLIPIDEMIA: Status: ACTIVE | Noted: 2020-06-29

## 2021-05-18 LAB
ANION GAP SERPL CALC-SCNC: 12 MMOL/L (ref 7–16)
APPEARANCE UR: ABNORMAL
BACTERIA #/AREA URNS HPF: ABNORMAL /HPF
BILIRUB UR QL STRIP.AUTO: NEGATIVE
BUN SERPL-MCNC: 13 MG/DL (ref 8–22)
CALCIUM SERPL-MCNC: 8.4 MG/DL (ref 8.4–10.2)
CHLORIDE SERPL-SCNC: 94 MMOL/L (ref 96–112)
CO2 SERPL-SCNC: 25 MMOL/L (ref 20–33)
COLOR UR: ABNORMAL
CREAT SERPL-MCNC: 0.9 MG/DL (ref 0.5–1.4)
EPI CELLS #/AREA URNS HPF: NEGATIVE /HPF
ERYTHROCYTE [DISTWIDTH] IN BLOOD BY AUTOMATED COUNT: 45.1 FL (ref 35.9–50)
GLUCOSE SERPL-MCNC: 135 MG/DL (ref 65–99)
GLUCOSE UR STRIP.AUTO-MCNC: NEGATIVE MG/DL
HCT VFR BLD AUTO: 45.3 % (ref 42–52)
HGB BLD-MCNC: 15.2 G/DL (ref 14–18)
KETONES UR STRIP.AUTO-MCNC: NEGATIVE MG/DL
LEUKOCYTE ESTERASE UR QL STRIP.AUTO: NEGATIVE
MAGNESIUM SERPL-MCNC: 1.7 MG/DL (ref 1.5–2.5)
MCH RBC QN AUTO: 32.5 PG (ref 27–33)
MCHC RBC AUTO-ENTMCNC: 33.6 G/DL (ref 33.7–35.3)
MCV RBC AUTO: 97 FL (ref 81.4–97.8)
MICRO URNS: ABNORMAL
MUCOUS THREADS #/AREA URNS HPF: ABNORMAL /HPF
NITRITE UR QL STRIP.AUTO: POSITIVE
PH UR STRIP.AUTO: 5.5 [PH] (ref 5–8)
PLATELET # BLD AUTO: 184 K/UL (ref 164–446)
PMV BLD AUTO: 10 FL (ref 9–12.9)
POTASSIUM SERPL-SCNC: 4.3 MMOL/L (ref 3.6–5.5)
PROT UR QL STRIP: NEGATIVE MG/DL
RBC # BLD AUTO: 4.67 M/UL (ref 4.7–6.1)
RBC # URNS HPF: ABNORMAL /HPF
RBC UR QL AUTO: ABNORMAL
SODIUM SERPL-SCNC: 131 MMOL/L (ref 135–145)
SP GR UR STRIP.AUTO: 1.02
WBC # BLD AUTO: 15 K/UL (ref 4.8–10.8)
WBC #/AREA URNS HPF: ABNORMAL /HPF

## 2021-05-18 PROCEDURE — 770020 HCHG ROOM/CARE - TELE (206)

## 2021-05-18 PROCEDURE — A9270 NON-COVERED ITEM OR SERVICE: HCPCS | Performed by: HOSPITALIST

## 2021-05-18 PROCEDURE — 81001 URINALYSIS AUTO W/SCOPE: CPT

## 2021-05-18 PROCEDURE — 160035 HCHG PACU - 1ST 60 MINS PHASE I: Performed by: INTERNAL MEDICINE

## 2021-05-18 PROCEDURE — 160009 HCHG ANES TIME/MIN: Performed by: INTERNAL MEDICINE

## 2021-05-18 PROCEDURE — 0F798DZ DILATION OF COMMON BILE DUCT WITH INTRALUMINAL DEVICE, VIA NATURAL OR ARTIFICIAL OPENING ENDOSCOPIC: ICD-10-PCS | Performed by: INTERNAL MEDICINE

## 2021-05-18 PROCEDURE — 87086 URINE CULTURE/COLONY COUNT: CPT

## 2021-05-18 PROCEDURE — 160203 HCHG ENDO MINUTES - 1ST 30 MINS LEVEL 4: Performed by: INTERNAL MEDICINE

## 2021-05-18 PROCEDURE — 76775 US EXAM ABDO BACK WALL LIM: CPT

## 2021-05-18 PROCEDURE — 80048 BASIC METABOLIC PNL TOTAL CA: CPT

## 2021-05-18 PROCEDURE — 500066 HCHG BITE BLOCK, ECT: Performed by: INTERNAL MEDICINE

## 2021-05-18 PROCEDURE — 74328 X-RAY BILE DUCT ENDOSCOPY: CPT

## 2021-05-18 PROCEDURE — 83735 ASSAY OF MAGNESIUM: CPT

## 2021-05-18 PROCEDURE — 700111 HCHG RX REV CODE 636 W/ 250 OVERRIDE (IP): Performed by: ANESTHESIOLOGY

## 2021-05-18 PROCEDURE — 99233 SBSQ HOSP IP/OBS HIGH 50: CPT | Performed by: HOSPITALIST

## 2021-05-18 PROCEDURE — 160002 HCHG RECOVERY MINUTES (STAT): Performed by: INTERNAL MEDICINE

## 2021-05-18 PROCEDURE — 700105 HCHG RX REV CODE 258: Performed by: HOSPITALIST

## 2021-05-18 PROCEDURE — 700102 HCHG RX REV CODE 250 W/ 637 OVERRIDE(OP): Performed by: INTERNAL MEDICINE

## 2021-05-18 PROCEDURE — 700105 HCHG RX REV CODE 258: Performed by: ANESTHESIOLOGY

## 2021-05-18 PROCEDURE — 85027 COMPLETE CBC AUTOMATED: CPT

## 2021-05-18 PROCEDURE — 700111 HCHG RX REV CODE 636 W/ 250 OVERRIDE (IP): Performed by: HOSPITALIST

## 2021-05-18 PROCEDURE — 700102 HCHG RX REV CODE 250 W/ 637 OVERRIDE(OP): Performed by: HOSPITALIST

## 2021-05-18 PROCEDURE — 700101 HCHG RX REV CODE 250: Performed by: ANESTHESIOLOGY

## 2021-05-18 PROCEDURE — A9270 NON-COVERED ITEM OR SERVICE: HCPCS | Performed by: INTERNAL MEDICINE

## 2021-05-18 PROCEDURE — 160048 HCHG OR STATISTICAL LEVEL 1-5: Performed by: INTERNAL MEDICINE

## 2021-05-18 PROCEDURE — 502240 HCHG MISC OR SUPPLY RC 0272: Performed by: INTERNAL MEDICINE

## 2021-05-18 PROCEDURE — C2617 STENT, NON-COR, TEM W/O DEL: HCPCS | Performed by: INTERNAL MEDICINE

## 2021-05-18 PROCEDURE — 110371 HCHG SHELL REV 272: Performed by: INTERNAL MEDICINE

## 2021-05-18 PROCEDURE — 160036 HCHG PACU - EA ADDL 30 MINS PHASE I: Performed by: INTERNAL MEDICINE

## 2021-05-18 PROCEDURE — 160208 HCHG ENDO MINUTES - EA ADDL 1 MIN LEVEL 4: Performed by: INTERNAL MEDICINE

## 2021-05-18 DEVICE — STENT BILIARY ADVANTIX 10FR X 7CM: Type: IMPLANTABLE DEVICE | Status: FUNCTIONAL

## 2021-05-18 RX ORDER — ATORVASTATIN CALCIUM 40 MG/1
40 TABLET, FILM COATED ORAL EVERY EVENING
Status: DISCONTINUED | OUTPATIENT
Start: 2021-05-18 | End: 2021-05-20 | Stop reason: HOSPADM

## 2021-05-18 RX ORDER — PHENAZOPYRIDINE HYDROCHLORIDE 200 MG/1
200 TABLET, FILM COATED ORAL
Status: DISCONTINUED | OUTPATIENT
Start: 2021-05-18 | End: 2021-05-20 | Stop reason: HOSPADM

## 2021-05-18 RX ORDER — HALOPERIDOL 5 MG/ML
1 INJECTION INTRAMUSCULAR
Status: DISCONTINUED | OUTPATIENT
Start: 2021-05-18 | End: 2021-05-18 | Stop reason: HOSPADM

## 2021-05-18 RX ORDER — ONDANSETRON 2 MG/ML
INJECTION INTRAMUSCULAR; INTRAVENOUS PRN
Status: DISCONTINUED | OUTPATIENT
Start: 2021-05-18 | End: 2021-05-18 | Stop reason: SURG

## 2021-05-18 RX ORDER — LIDOCAINE HYDROCHLORIDE 20 MG/ML
INJECTION, SOLUTION EPIDURAL; INFILTRATION; INTRACAUDAL; PERINEURAL PRN
Status: DISCONTINUED | OUTPATIENT
Start: 2021-05-18 | End: 2021-05-18 | Stop reason: SURG

## 2021-05-18 RX ORDER — WARFARIN SODIUM 5 MG/1
5 TABLET ORAL
Status: COMPLETED | OUTPATIENT
Start: 2021-05-18 | End: 2021-05-18

## 2021-05-18 RX ORDER — SODIUM CHLORIDE 9 MG/ML
INJECTION, SOLUTION INTRAVENOUS CONTINUOUS
Status: DISCONTINUED | OUTPATIENT
Start: 2021-05-18 | End: 2021-05-20 | Stop reason: HOSPADM

## 2021-05-18 RX ORDER — SODIUM CHLORIDE, SODIUM LACTATE, POTASSIUM CHLORIDE, CALCIUM CHLORIDE 600; 310; 30; 20 MG/100ML; MG/100ML; MG/100ML; MG/100ML
INJECTION, SOLUTION INTRAVENOUS CONTINUOUS
Status: ACTIVE | OUTPATIENT
Start: 2021-05-18 | End: 2021-05-18

## 2021-05-18 RX ORDER — DIPHENHYDRAMINE HYDROCHLORIDE 50 MG/ML
12.5 INJECTION INTRAMUSCULAR; INTRAVENOUS
Status: DISCONTINUED | OUTPATIENT
Start: 2021-05-18 | End: 2021-05-18 | Stop reason: HOSPADM

## 2021-05-18 RX ORDER — LABETALOL HYDROCHLORIDE 5 MG/ML
INJECTION, SOLUTION INTRAVENOUS PRN
Status: DISCONTINUED | OUTPATIENT
Start: 2021-05-18 | End: 2021-05-18 | Stop reason: SURG

## 2021-05-18 RX ORDER — ONDANSETRON 2 MG/ML
4 INJECTION INTRAMUSCULAR; INTRAVENOUS
Status: DISCONTINUED | OUTPATIENT
Start: 2021-05-18 | End: 2021-05-18 | Stop reason: HOSPADM

## 2021-05-18 RX ADMIN — SODIUM CHLORIDE, POTASSIUM CHLORIDE, SODIUM LACTATE AND CALCIUM CHLORIDE: 600; 310; 30; 20 INJECTION, SOLUTION INTRAVENOUS at 07:47

## 2021-05-18 RX ADMIN — ONDANSETRON 4 MG: 2 INJECTION INTRAMUSCULAR; INTRAVENOUS at 04:21

## 2021-05-18 RX ADMIN — LIDOCAINE HYDROCHLORIDE 100 MG: 20 INJECTION, SOLUTION EPIDURAL; INFILTRATION; INTRACAUDAL; PERINEURAL at 07:57

## 2021-05-18 RX ADMIN — PHENAZOPYRIDINE HYDROCHLORIDE 200 MG: 200 TABLET ORAL at 12:47

## 2021-05-18 RX ADMIN — ENOXAPARIN SODIUM 150 MG: 150 INJECTION SUBCUTANEOUS at 17:55

## 2021-05-18 RX ADMIN — SODIUM CHLORIDE, POTASSIUM CHLORIDE, SODIUM LACTATE AND CALCIUM CHLORIDE: 600; 310; 30; 20 INJECTION, SOLUTION INTRAVENOUS at 07:16

## 2021-05-18 RX ADMIN — PHENAZOPYRIDINE HYDROCHLORIDE 200 MG: 200 TABLET ORAL at 17:57

## 2021-05-18 RX ADMIN — Medication 5 MG: at 21:19

## 2021-05-18 RX ADMIN — ATORVASTATIN CALCIUM 40 MG: 40 TABLET, FILM COATED ORAL at 17:55

## 2021-05-18 RX ADMIN — PROPOFOL 300 MG: 10 INJECTION, EMULSION INTRAVENOUS at 07:57

## 2021-05-18 RX ADMIN — WARFARIN SODIUM 5 MG: 5 TABLET ORAL at 17:55

## 2021-05-18 RX ADMIN — SUGAMMADEX 200 MG: 100 INJECTION, SOLUTION INTRAVENOUS at 08:26

## 2021-05-18 RX ADMIN — SODIUM CHLORIDE: 9 INJECTION, SOLUTION INTRAVENOUS at 12:48

## 2021-05-18 RX ADMIN — ROCURONIUM BROMIDE 50 MG: 10 INJECTION INTRAVENOUS at 07:57

## 2021-05-18 RX ADMIN — LABETALOL HYDROCHLORIDE 20 MG: 5 INJECTION, SOLUTION INTRAVENOUS at 08:29

## 2021-05-18 RX ADMIN — ONDANSETRON 4 MG: 2 INJECTION INTRAMUSCULAR; INTRAVENOUS at 08:30

## 2021-05-18 ASSESSMENT — ENCOUNTER SYMPTOMS
BACK PAIN: 0
HALLUCINATIONS: 0
SENSORY CHANGE: 0
PALPITATIONS: 0
ABDOMINAL PAIN: 0
HEADACHES: 0
COUGH: 0
DEPRESSION: 0
TREMORS: 0
ORTHOPNEA: 0
TINGLING: 0
NAUSEA: 0
DIZZINESS: 0
SPUTUM PRODUCTION: 0
CONSTIPATION: 0
MYALGIAS: 0

## 2021-05-18 ASSESSMENT — CHA2DS2 SCORE
DIABETES: NO
HYPERTENSION: YES
SEX: MALE
AGE 65 TO 74: YES
CHA2DS2 VASC SCORE: 2
CHF OR LEFT VENTRICULAR DYSFUNCTION: NO
AGE 75 OR GREATER: NO
VASCULAR DISEASE: NO
PRIOR STROKE OR TIA OR THROMBOEMBOLISM: NO

## 2021-05-18 ASSESSMENT — COGNITIVE AND FUNCTIONAL STATUS - GENERAL
MOBILITY SCORE: 24
DAILY ACTIVITIY SCORE: 24
SUGGESTED CMS G CODE MODIFIER MOBILITY: CH
SUGGESTED CMS G CODE MODIFIER DAILY ACTIVITY: CH

## 2021-05-18 ASSESSMENT — LIFESTYLE VARIABLES: SUBSTANCE_ABUSE: 0

## 2021-05-18 NOTE — PROGRESS NOTES
Pt arrived to unit from PACU. Pt alert and oriented, complains of discomfort with movement at drain site, otherwise 0/10 pain. Pt denies any nausea, has been tolerating water without complaints. Pt assessment completed. Set up on post op vitals.

## 2021-05-18 NOTE — CARE PLAN
Problem: Knowledge Deficit - Standard  Goal: Patient and family/care givers will demonstrate understanding of plan of care, disease process/condition, diagnostic tests and medications  Outcome: Progressing     Problem: Urinary Elimination  Goal: Establish and maintain regular urinary output  Outcome: Progressing  Note: Pyridium ordered for pt to held with dysuria. MD ordered urine culture, pt placed on fluids to aid in hydration. Plan discussed with MD, pt and wife at bedside, pt verbalizes understanding and denies any questions.    The patient is Stable - Low risk of patient condition declining or worsening    Shift Goals  Clinical Goals: get ERSP done, determine if there is a bladder infection, control pain or nausea  Patient Goals: descrease discomfort with urinating    Progress made toward(s) clinical / shift goals:  ERCP was done without complication, new medications and labs ordered, Pt started on fluids, will continue to monitor MATT drainage.     Patient is not progressing towards the following goals:

## 2021-05-18 NOTE — ANESTHESIA POSTPROCEDURE EVALUATION
Patient: Abel Day III    Procedure Summary     Date: 05/18/21 Room / Location:  ENDOSCOPIC ULTRASOUND ROOM / SURGERY [facility]    Anesthesia Start: 0752 Anesthesia Stop: 0845    Procedure: ERCP WITH STENT INSERTION Diagnosis:       Bile leak, postoperative      (bile leak)    Surgeons: Olivier Stanford M.D. Responsible Provider: Rickie Chapman M.D.    Anesthesia Type: general ASA Status: 3          Final Anesthesia Type: general  Last vitals  BP   Blood Pressure : (!) 96/60, NIBP: 129/72    Temp   36.2 °C (97.1 °F)    Pulse   82   Resp   18    SpO2   91 %      Anesthesia Post Evaluation    Patient location during evaluation: PACU  Patient participation: complete - patient participated  Level of consciousness: awake and alert    Airway patency: patent  Anesthetic complications: no  Cardiovascular status: hemodynamically stable  Respiratory status: acceptable  Hydration status: euvolemic    PONV: none          There were no known complications for this encounter.     Nurse Pain Score: 3 (NPRS)

## 2021-05-18 NOTE — ANESTHESIA PROCEDURE NOTES
Airway    Date/Time: 5/18/2021 7:57 AM  Performed by: Rickie Chapman M.D.  Authorized by: Rickie Chapman M.D.     Location:  OR  Urgency:  Elective  Indications for Airway Management:  Anesthesia      Spontaneous Ventilation: absent    Sedation Level:  Deep  Preoxygenated: Yes    Patient Position:  Sniffing  Final Airway Type:  Endotracheal airway  Final Endotracheal Airway:  ETT  Cuffed: Yes    Technique Used for Successful ETT Placement:  Direct laryngoscopy    Insertion Site:  Oral  Blade Type:  Ibarra  Laryngoscope Blade/Videolaryngoscope Blade Size:  3  ETT Size (mm):  8.0  Measured from:  Teeth  ETT to Teeth (cm):  24  Placement Verified by: auscultation and capnometry    Cormack-Lehane Classification:  Grade IIa - partial view of glottis  Number of Attempts at Approach:  1

## 2021-05-18 NOTE — PROGRESS NOTES
Patient having complaints of dysuria, urine appeared to be blood tinged and patient had bright red blood on a tissue after wiping. Notified Dr. Antunez, new orders placed for bladder scan, urinalysis, and renal ultrasound.

## 2021-05-18 NOTE — OP REPORT
DATE OF SERVICE:  05/18/2021     PROCEDURE PERFORMED:  Endoscopic retrograde cholangiography with sphincterotomy and stent placement     CONSENT:  Procedure risks and benefits reviewed thoroughly with the patient, risks including but not limited to bleeding, perforation, side effects of medication were informed.  Patient voiced understanding and agreed to proceed.  Additional risks inherent to ERCP that being mild, moderate, severe pancreatitis that could lead to postprocedural pain, prolonged hospitalization, intensive care unit stay, and/or death were reviewed with the patient who voiced understanding and agreed to proceed.     PREPROCEDURE DIAGNOSIS:  bile leak, s/p partial cholecystectomy     POSTPROCEDURE DIAGNOSES:  bile leak, s/p partial cholecystectomy     PHYSICIAN: Olivier Stanford MD        DESCRIPTION OF PROCEDURE:  The patient was placed in a prone position after intubation and sedation.  A bite block was inserted in the mouth and a side-viewing duodenoscope was passed carefully and easily under semi-direct visualization into the esophagus and passed through the stomach to the duodenum. Upon reaching the second portion of the duodenum, the scope was withdrawn to the short-position.The ampulla was identified. The ampulla appeared normal.    Using a [employer] Rx44 sphincterotome preloaded with a 0.035 wire the CBD was cannulated. The wire was advanced under fluoroscopy to the level of the intrahepatics and then sphincterotome was advanced over the wire. Bile was aspirated and then contrast was injected to obtain a cholangiogram. The cholangiogram was entirely interpreted by myself during the exam. The cholangiogram revealed a normal caliber CBD measuring 5mm in diameter. There appeared to be a small amount of contrast extravasation from the cystic duct stump indicating a small bile leak. The sphincterotome was withdrawn to the level of the papilla and an adequate sphincterotomy was performed. The  sphincterotome was then removed and exchanged for a 9-12mm biliary extraction balloon. Multiple balloon dredges were performed with no evidence of choledocholithiasis present. The balloon was removed and a CBD stent was placed. A 10F x 7cm plastic stent was selected and this was successfully advanced into the duct. There was brisk flow of bile from the stent at deployment. All instruments were then removed and the procedure was completed.        COMPLICATIONS:  None.     BLOOD LOSS:  None.     SPECIMENS:  None.    SUMMARY:  1.) ERCP with biliary cannulation  2.) Cholangiogram - normal caliber CBD, small bile leak off of the cystic duct stump  3.) Sphincterotomy performed  4.) Successful stent placement of a 10F x 7cm plastic stent     RECOMMENDATIONS:   1.) repeat ERCP in 4-6 weeks for stent removal  2.) f/u with Dr Bird for removal of the MATT drain  3.) Hold Lovenox until tonight's dose  4.) restart diet

## 2021-05-18 NOTE — PROGRESS NOTES
I was paged by Gabo for patient complaining of dysuria and possible gross hematuria when the patient last peed. I evaluated and examined the patient at bedside, patient denies current pain but does report that he had dysuria and the passage of bright blood when he last had urine output.  Not clear if he had a Hitchcock catheter placed before/during his procedure today.  We will continue monitor for evidence of bleeding we will continue monitor his urine output I will check bladder scan, renal ultrasound and the urine analysis.  Plan discussed with patient and nurse.  We will patient's and nursing's questions were answered.

## 2021-05-18 NOTE — OR NURSING
0838 PT RECEIVED IN PACU, REPORT RECEIVED.  VSS, RESP SPONT, EVEN, NON LABORED. PT DENIES PAIN, NAUSEA. MATT DRAIN TO SELF SUCTION. COLOSTOMY CONTAINMENT IN SITU.     0911 VSS. PT TOLERATING SIPS OF WATER.     0925 VSS    0941 VSS. BATTERY CHANGE TO TELE PACK. TELE VERIFIED BY LEVON FRANCIS. PT MEETS CRITERIA FOR TRANSFER TO ROOM

## 2021-05-18 NOTE — CARE PLAN
The patient is Stable - Low risk of patient condition declining or worsening    Shift Goals  Clinical Goals: NPO at 0000 for ERCP, monitor drainage from MATT drain.  Patient Goals: pain management, rest    Progress made toward(s) clinical / shift goals:  patient to be NPO at midnight, MATT drain emptied, bright red blood.     Patient is not progressing towards the following goals:      Problem: Knowledge Deficit - Standard  Goal: Patient and family/care givers will demonstrate understanding of plan of care, disease process/condition, diagnostic tests and medications  Outcome: Progressing     Problem: Physical Regulation  Goal: Diagnostic test results will improve  Outcome: Progressing  Goal: Signs and symptoms of infection will decrease  Outcome: Progressing

## 2021-05-18 NOTE — ANESTHESIA TIME REPORT
Anesthesia Start and Stop Event Times     Date Time Event    5/18/2021 0739 Ready for Procedure     0752 Anesthesia Start     0845 Anesthesia Stop        Responsible Staff  05/18/21    Name Role Begin End    Rickie Chapman M.D. Anesth 0752 0845        Preop Diagnosis (Free Text):  Pre-op Diagnosis     bile leak        Preop Diagnosis (Codes):  Diagnosis Information     Diagnosis Code(s): Bile leak, postoperative [K91.89, K83.8]        Post op Diagnosis  Bile leak, postoperative      Premium Reason  Non-Premium    Comments:

## 2021-05-18 NOTE — PROGRESS NOTES
"S: Abel Day  is a 71 y.o.  male admitted for multiple medical complaints, found to have acute cholecystitis, s/p partial cholecystectomy 5/17      O:  /84   Pulse 75   Temp 36.7 °C (98.1 °F) (Oral)   Resp 18   Ht 1.905 m (6' 3\")   Wt (!) 140 kg (308 lb 13.8 oz)   SpO2 96%   Intake/Output                             05/16/21 0700 - 05/17/21 0659 05/17/21 0700 - 05/18/21 0659 05/18/21 0700 - 05/19/21 0659     2164-5353 7967-4850 Total 2959-4365 8091-4975 Total 7928-2552 5708-8932 Total                    Intake    P.O.  480  -- 480  --  -- --  --  -- --    P.O. 480 -- 480 -- -- -- -- -- --    I.V.  --  -- --  1700  -- 1700  --  -- --    Volume (mL) (lactated ringers infusion) -- -- -- 1700 -- 1700 -- -- --    Total Intake 480 -- 480 1700 -- 1700 -- -- --       Output    Urine  --  -- --  80  750 830  --  -- --    Number of Times Voided 1 x -- 1 x 1 x 2 x 3 x -- -- --    Urine Void (mL) -- -- -- 80 750 830 -- -- --    Drains  --  -- --  80  80 160  --  -- --    Output (mL) (Closed/Suction Drain 1 Inferior Abdomen Raymond Prince 10 Fr.) -- -- -- 80 80 160 -- -- --    Stool  --  -- --  --  -- --  --  -- --    Number of Times Stooled 1 x -- 1 x -- -- -- -- -- --    Blood  --  -- --  150  -- 150  --  -- --    Est. Blood Loss -- -- -- 150 -- 150 -- -- --    Total Output -- -- --  -- -- --       Net I/O     480 -- 480 1390 -830 560 -- -- --        Recent Labs     05/16/21  0959 05/17/21  0414 05/18/21  0334   SODIUM 134* 134* 131*   POTASSIUM 3.8 4.1 4.3   CHLORIDE 96 97 94*   CO2 27 26 25   GLUCOSE 124* 117* 135*   BUN 12 10 13   CREATININE 0.80 0.79 0.90   CALCIUM 9.1 8.7 8.4     Recent Labs     05/17/21 0414 05/18/21  0334   WBC 13.4* 15.0*   RBC 4.69* 4.67*   HEMOGLOBIN 15.6 15.2   HEMATOCRIT 45.6 45.3   MCV 97.2 97.0   MCH 33.3* 32.5   MCHC 34.2 33.6*   RDW 44.9 45.1   PLATELETCT 150* 184   MPV 10.2 10.0       Alert and Oriented x3, No Acute Distress  Normal Respiratory Effort  Abdomen soft, " appropriately tender  Extremities warm and well perfused    A/P:  Acute cholecystitis, s/p partial cholecystectomy, pending ERCP this AM - appreciate GI involvement in this case. Drain to remain in place. Will follow-up after procedure    Panda Bird MD  [facility]

## 2021-05-18 NOTE — OR NURSING
0713:  Patient allergies and NPO status verified, home medication reconciliation completed and belongings secured. Patient verbalizes understanding of pain scale, expected course of stay and plan of care. Surgical site verified with patient. IV fluids hung. MATT drain from abd in place, draining dark liquid.    0745:  Pt c/o abrasion on penis, asking if catheter was placed during yesterday's procedure.  Anesthesia aware.  Pt emptied out his ostomy.

## 2021-05-18 NOTE — PROGRESS NOTES
"Hospital Medicine Daily Progress Note    Date of Service  5/18/2021    Chief Complaint  71 y.o. male admitted 5/12/2021 with abdominal pain    Hospital Course  Per notes, \"71 y.o. male with a past medical history of atrial fibrillation s/p ablation, venous thrombosis and pulmonary embolism on anticoagulation with Coumadin with a past medical history of atrial fibrillation status post ablation, chronic anticoagulation who presented 5/12/2021 with episodes of palpitations, abdominal pain nausea and vomiting.  Patient reports having episodes of palpitations over the past 2 days and reports that he was able to catch rapid heart rate on home monitoring device on several occasions.  He also has noticed left lower quadrant abdominal pain, that is described as crampy/dull, and rated moderate in severity, associated with reduced ostomy output and he was concerned about possible ostomy stenosis.  He reports anorexia and reduced oral intake.  He reports that his abdominal pain has been resolved.\"    Patient was admitted evaluated by general surgery.  He had a HIDA scan on 5/13/2021, which was highly suggestive for cholecystitis.  Underwent open cholecystectomy on 5/17, without complications.  Plan for ERCP on 5/18.      Interval Problem Update  Noted ERCP results    No pain this morning    Case d/w patient renal US shows no serious abnormalities    Patient c/o new dysuria    Pt c/o blood at penile tip    ua reveals findings c/w UTI    Consultants/Specialty  General surgery  Cardiology    Code Status  Full Code    Disposition  Anticipated DC to home    Review of Systems  Review of Systems   Constitutional: Positive for malaise/fatigue.   HENT: Negative.    Respiratory: Negative for cough and sputum production.    Cardiovascular: Negative for chest pain, palpitations and orthopnea.   Gastrointestinal: Negative for abdominal pain, constipation and nausea.   Genitourinary: Positive for dysuria.   Musculoskeletal: Negative for back " pain and myalgias.   Skin: Negative.    Neurological: Negative for dizziness, tingling, tremors, sensory change and headaches.   Psychiatric/Behavioral: Negative for depression, hallucinations, substance abuse and suicidal ideas.   All other systems reviewed and are negative.       Physical Exam  Temp:  [36.1 °C (97 °F)-37.2 °C (99 °F)] 36.1 °C (97 °F)  Pulse:  [70-89] 70  Resp:  [16-18] 17  BP: ()/(60-85) 138/76  SpO2:  [91 %-96 %] 94 %    Physical Exam  Vitals and nursing note reviewed.   Constitutional:       Appearance: Normal appearance. He is obese. He is not ill-appearing.   Cardiovascular:      Rate and Rhythm: Normal rate and regular rhythm.      Pulses: Normal pulses.      Heart sounds: Normal heart sounds.   Pulmonary:      Effort: Pulmonary effort is normal.      Breath sounds: Normal breath sounds.   Abdominal:      General: Abdomen is flat. Bowel sounds are normal.      Palpations: Abdomen is soft.      Tenderness: There is abdominal tenderness. There is no guarding or rebound.   Musculoskeletal:      Right lower leg: No edema.      Left lower leg: No edema.   Neurological:      General: No focal deficit present.      Mental Status: He is alert and oriented to person, place, and time.         Fluids    Intake/Output Summary (Last 24 hours) at 5/18/2021 1225  Last data filed at 5/18/2021 0916  Gross per 24 hour   Intake 550 ml   Output 1060 ml   Net -510 ml       Laboratory  Recent Labs     05/17/21 0414 05/18/21  0334   WBC 13.4* 15.0*   RBC 4.69* 4.67*   HEMOGLOBIN 15.6 15.2   HEMATOCRIT 45.6 45.3   MCV 97.2 97.0   MCH 33.3* 32.5   MCHC 34.2 33.6*   RDW 44.9 45.1   PLATELETCT 150* 184   MPV 10.2 10.0     Recent Labs     05/16/21 0959 05/17/21 0414 05/18/21  0334   SODIUM 134* 134* 131*   POTASSIUM 3.8 4.1 4.3   CHLORIDE 96 97 94*   CO2 27 26 25   GLUCOSE 124* 117* 135*   BUN 12 10 13   CREATININE 0.80 0.79 0.90   CALCIUM 9.1 8.7 8.4     Recent Labs     05/16/21 0959 05/17/21  0414   INR  1.23* 1.21*               Imaging  DX-PORTABLE FLUOROSCOPY < 1 HOUR   Final Result      Portable fluoroscopy utilized for 1.5 minutes.         INTERPRETING LOCATION: [address] [place] [address], [place], [id number]      [id number] STENT - TUBE   Final Result      ERCP in progress with common duct stent placement      Please refer to operative report for details as fluoroscopic images are submitted postprocedure      US-RENAL   Final Result         1.  Echogenic focus in the upper pole of the right kidney without visualized obstructive changes.   2.  Echogenic liver      Examination is technically limited due to patient body habitus.      NM-HEPATOBILIARY SCAN   Final Result      1.  The gallbladder is not definitively identified prior to or after the administration of morphine. Findings are highly suspicious for acute cholecystitis.      US-RUQ   Final Result      1.  Sludge and stones present in the gallbladder.   2.  Gallbladder wall thickening raises concern for cholecystitis.   3.  No gross biliary dilation.   4.  Very limited exam due to body habitus.      CT-CTA COMPLETE THORACOABDOMINAL AORTA   Final Result      1.  No aortic aneurysm or dissection.   2.  Atherosclerosis including prominent coronary calcification.   3.  Cholelithiasis and distended gallbladder. If there is concern for acute cholecystitis, further evaluation with ultrasound and nuclear medicine HIDA scan may be performed.   4.  Hepatic steatosis.   5.  Postoperative changes of colectomy with right lower quadrant ostomy.                 Assessment/Plan  * Abdominal pain with imaging finding concerning for cholecystitis- (present on admission)  Assessment & Plan  Ultrasound shows evidence for gallbladder wall thickening raises concern for cholecystitis  Initially started on Zosyn, will switched  to ceftriaxone and metronidazole. HIDA scan suggestive of cholecystitis, continue with antibiotics and follow-up on surgery recommendations  General surgery consulted,  follow-up on recommendations    Status post cholecystectomy on 5/17, without complications  S/p biliary stent    Pulmonary hypertension (HCC)- (present on admission)  Assessment & Plan  Mild pulmonary hypertension, estimated PASP 40mmHg noted on echocardiogram 3/2/2021       Hypomagnesemia  Assessment & Plan  Continue to monitor and replace as needed    Cholecystitis- (present on admission)  Assessment & Plan  hida on 5/13 with high suspicion of cholecystitis, s/p surgery    Continue with Lovenox and coumadin  IV antibiotics completed      Palpitations- (present on admission)  Assessment & Plan  History of atrial fibrillation s/p ablation  Patient reports that he has been having palpitations and that he has self-monitoring device at home that captured several episodes of RVR.    EKG shows sinus tachycardia with a rate of 99, no ST elevation there is T wave flattening in leads V3-V6, .  Resume home metoprolol with hold parameters.  Recent echo reviewed, Mild concentric left ventricular hypertrophy. Normal estimated right atrial pressure. Mild pulmonary hypertension, estimated PASP 40mmHg.   Continue with telemetry      Hyperlipidemia- (present on admission)  Assessment & Plan  lipitor 40mg qhs started on 5/18    Hypokalemia  Assessment & Plan  Continue to monitor in place as needed    S/P ablation of atrial fibrillation- (present on admission)  Assessment & Plan  Patient reports that he has been having palpitations and that he has self-monitoring device at home that captured several episodes of RVR.  I  cardiac monitoring    Continue home metoprolol with hold parameters.  Evaluated by cardiology, no acute findings,    History of pulmonary embolism and DVT, unprovoked.- (present on admission)  Assessment & Plan  History of remote venous thromboses and pulmonary embolism, on Coumadin    Resume lovenox and coumadin    Acid reflux disease- (present on admission)  Assessment & Plan  Continue with omeprazole.        VTE prophylaxis: SCDs, ambulation    Check am cbc, cmp

## 2021-05-18 NOTE — PROGRESS NOTES
Monitor Summary     Rhythm:SR 63-80  Measurements: 0.16/0.10/0.42  ECTOPIES: rpvc, rpac        Normal Values  Rhythm SR  HR Range    Measurements 0.12-0.20 / 0.06-0.10  / 0.30-0.52

## 2021-05-18 NOTE — PROGRESS NOTES
Telemetry Shift Summary     Rhythm: SR  HR: 76-87  Ectopy: r PVC, r PAC    Measurements: 0.16/0.08/0.38    Normal Values  Rhythm: SR  HR:   Measurements: 0.12-0.20/0.08-0.10/0.30-0.52

## 2021-05-19 PROBLEM — R53.1 WEAKNESS: Status: ACTIVE | Noted: 2021-05-19

## 2021-05-19 PROBLEM — N30.01 ACUTE CYSTITIS WITH HEMATURIA: Status: ACTIVE | Noted: 2021-05-19

## 2021-05-19 PROBLEM — R10.13 DYSPEPSIA: Status: ACTIVE | Noted: 2021-05-19

## 2021-05-19 LAB
ALBUMIN SERPL BCP-MCNC: 2.6 G/DL (ref 3.2–4.9)
ALBUMIN/GLOB SERPL: 0.8 G/DL
ALP SERPL-CCNC: 64 U/L (ref 30–99)
ALT SERPL-CCNC: 28 U/L (ref 2–50)
ANION GAP SERPL CALC-SCNC: 9 MMOL/L (ref 7–16)
AST SERPL-CCNC: 18 U/L (ref 12–45)
BILIRUB SERPL-MCNC: 0.6 MG/DL (ref 0.1–1.5)
BUN SERPL-MCNC: 13 MG/DL (ref 8–22)
CALCIUM SERPL-MCNC: 8.1 MG/DL (ref 8.4–10.2)
CHLORIDE SERPL-SCNC: 95 MMOL/L (ref 96–112)
CO2 SERPL-SCNC: 28 MMOL/L (ref 20–33)
CREAT SERPL-MCNC: 0.82 MG/DL (ref 0.5–1.4)
ERYTHROCYTE [DISTWIDTH] IN BLOOD BY AUTOMATED COUNT: 45 FL (ref 35.9–50)
GLOBULIN SER CALC-MCNC: 3.3 G/DL (ref 1.9–3.5)
GLUCOSE SERPL-MCNC: 137 MG/DL (ref 65–99)
HCT VFR BLD AUTO: 41.4 % (ref 42–52)
HGB BLD-MCNC: 13.6 G/DL (ref 14–18)
LIPASE SERPL-CCNC: 32 U/L (ref 7–58)
MCH RBC QN AUTO: 32.2 PG (ref 27–33)
MCHC RBC AUTO-ENTMCNC: 32.9 G/DL (ref 33.7–35.3)
MCV RBC AUTO: 97.9 FL (ref 81.4–97.8)
PLATELET # BLD AUTO: 192 K/UL (ref 164–446)
PMV BLD AUTO: 9.8 FL (ref 9–12.9)
POTASSIUM SERPL-SCNC: 4.2 MMOL/L (ref 3.6–5.5)
PROT SERPL-MCNC: 5.9 G/DL (ref 6–8.2)
RBC # BLD AUTO: 4.23 M/UL (ref 4.7–6.1)
SODIUM SERPL-SCNC: 132 MMOL/L (ref 135–145)
WBC # BLD AUTO: 12 K/UL (ref 4.8–10.8)

## 2021-05-19 PROCEDURE — A9270 NON-COVERED ITEM OR SERVICE: HCPCS | Performed by: INTERNAL MEDICINE

## 2021-05-19 PROCEDURE — 700111 HCHG RX REV CODE 636 W/ 250 OVERRIDE (IP): Performed by: HOSPITALIST

## 2021-05-19 PROCEDURE — A9270 NON-COVERED ITEM OR SERVICE: HCPCS | Performed by: HOSPITALIST

## 2021-05-19 PROCEDURE — 700105 HCHG RX REV CODE 258: Performed by: HOSPITALIST

## 2021-05-19 PROCEDURE — 700102 HCHG RX REV CODE 250 W/ 637 OVERRIDE(OP): Performed by: INTERNAL MEDICINE

## 2021-05-19 PROCEDURE — 700102 HCHG RX REV CODE 250 W/ 637 OVERRIDE(OP): Performed by: HOSPITALIST

## 2021-05-19 PROCEDURE — 85027 COMPLETE CBC AUTOMATED: CPT

## 2021-05-19 PROCEDURE — 99233 SBSQ HOSP IP/OBS HIGH 50: CPT | Performed by: HOSPITALIST

## 2021-05-19 PROCEDURE — 770020 HCHG ROOM/CARE - TELE (206)

## 2021-05-19 PROCEDURE — 80053 COMPREHEN METABOLIC PANEL: CPT

## 2021-05-19 PROCEDURE — 83690 ASSAY OF LIPASE: CPT

## 2021-05-19 RX ORDER — METOCLOPRAMIDE 10 MG/1
5 TABLET ORAL
Status: DISCONTINUED | OUTPATIENT
Start: 2021-05-19 | End: 2021-05-20 | Stop reason: HOSPADM

## 2021-05-19 RX ORDER — METOCLOPRAMIDE HYDROCHLORIDE 5 MG/ML
10 INJECTION INTRAMUSCULAR; INTRAVENOUS ONCE
Status: COMPLETED | OUTPATIENT
Start: 2021-05-19 | End: 2021-05-19

## 2021-05-19 RX ORDER — WARFARIN SODIUM 2.5 MG/1
2.5 TABLET ORAL
Status: DISCONTINUED | OUTPATIENT
Start: 2021-05-19 | End: 2021-05-19

## 2021-05-19 RX ORDER — WARFARIN SODIUM 7.5 MG/1
7.5 TABLET ORAL
Status: COMPLETED | OUTPATIENT
Start: 2021-05-19 | End: 2021-05-19

## 2021-05-19 RX ADMIN — PHENAZOPYRIDINE HYDROCHLORIDE 200 MG: 200 TABLET ORAL at 06:43

## 2021-05-19 RX ADMIN — METOCLOPRAMIDE 5 MG: 10 TABLET ORAL at 11:27

## 2021-05-19 RX ADMIN — METOCLOPRAMIDE 5 MG: 10 TABLET ORAL at 23:21

## 2021-05-19 RX ADMIN — SODIUM CHLORIDE: 9 INJECTION, SOLUTION INTRAVENOUS at 03:53

## 2021-05-19 RX ADMIN — PHENAZOPYRIDINE HYDROCHLORIDE 200 MG: 200 TABLET ORAL at 18:07

## 2021-05-19 RX ADMIN — FLUTICASONE PROPIONATE 50 MCG: 50 SPRAY, METERED NASAL at 21:52

## 2021-05-19 RX ADMIN — WARFARIN SODIUM 7.5 MG: 7.5 TABLET ORAL at 18:04

## 2021-05-19 RX ADMIN — SODIUM CHLORIDE: 9 INJECTION, SOLUTION INTRAVENOUS at 17:04

## 2021-05-19 RX ADMIN — ONDANSETRON 4 MG: 2 INJECTION INTRAMUSCULAR; INTRAVENOUS at 03:46

## 2021-05-19 RX ADMIN — ENOXAPARIN SODIUM 150 MG: 150 INJECTION SUBCUTANEOUS at 06:43

## 2021-05-19 RX ADMIN — PHENAZOPYRIDINE HYDROCHLORIDE 200 MG: 200 TABLET ORAL at 11:27

## 2021-05-19 RX ADMIN — METOCLOPRAMIDE 5 MG: 10 TABLET ORAL at 18:06

## 2021-05-19 RX ADMIN — OMEPRAZOLE 20 MG: 20 CAPSULE, DELAYED RELEASE ORAL at 06:43

## 2021-05-19 RX ADMIN — ENOXAPARIN SODIUM 150 MG: 150 INJECTION SUBCUTANEOUS at 18:03

## 2021-05-19 RX ADMIN — METOCLOPRAMIDE 10 MG: 5 INJECTION, SOLUTION INTRAMUSCULAR; INTRAVENOUS at 09:02

## 2021-05-19 RX ADMIN — ATORVASTATIN CALCIUM 40 MG: 40 TABLET, FILM COATED ORAL at 18:07

## 2021-05-19 RX ADMIN — Medication 5 MG: at 21:52

## 2021-05-19 RX ADMIN — METOPROLOL SUCCINATE 25 MG: 25 TABLET, EXTENDED RELEASE ORAL at 06:43

## 2021-05-19 ASSESSMENT — ENCOUNTER SYMPTOMS
SPUTUM PRODUCTION: 0
DIZZINESS: 0
CONSTIPATION: 0
NAUSEA: 1
HALLUCINATIONS: 0
PALPITATIONS: 0
TINGLING: 0
BACK PAIN: 0
HEADACHES: 0
DIARRHEA: 0
CHILLS: 0
DEPRESSION: 0
TREMORS: 0
COUGH: 0
ABDOMINAL PAIN: 0
BLOOD IN STOOL: 0
SENSORY CHANGE: 0
MYALGIAS: 0
SHORTNESS OF BREATH: 0
VOMITING: 0
ABDOMINAL PAIN: 1
ORTHOPNEA: 0
FEVER: 0
NAUSEA: 0
HEARTBURN: 0

## 2021-05-19 ASSESSMENT — COGNITIVE AND FUNCTIONAL STATUS - GENERAL
SUGGESTED CMS G CODE MODIFIER DAILY ACTIVITY: CH
SUGGESTED CMS G CODE MODIFIER MOBILITY: CK
MOVING TO AND FROM BED TO CHAIR: A LITTLE
DAILY ACTIVITIY SCORE: 24
WALKING IN HOSPITAL ROOM: A LITTLE
MOBILITY SCORE: 19
STANDING UP FROM CHAIR USING ARMS: A LITTLE
MOVING FROM LYING ON BACK TO SITTING ON SIDE OF FLAT BED: A LITTLE
CLIMB 3 TO 5 STEPS WITH RAILING: A LITTLE

## 2021-05-19 ASSESSMENT — FIBROSIS 4 INDEX: FIB4 SCORE: 1.26

## 2021-05-19 ASSESSMENT — PAIN DESCRIPTION - PAIN TYPE: TYPE: ACUTE PAIN

## 2021-05-19 ASSESSMENT — LIFESTYLE VARIABLES: SUBSTANCE_ABUSE: 0

## 2021-05-19 NOTE — PROGRESS NOTES
"Hospital Medicine Daily Progress Note    Date of Service  5/19/2021    Chief Complaint  71 y.o. male admitted 5/12/2021 with abdominal pain    Hospital Course  Per notes, \"71 y.o. male with a past medical history of atrial fibrillation s/p ablation, venous thrombosis and pulmonary embolism on anticoagulation with Coumadin with a past medical history of atrial fibrillation status post ablation, chronic anticoagulation who presented 5/12/2021 with episodes of palpitations, abdominal pain nausea and vomiting.  Patient reports having episodes of palpitations over the past 2 days and reports that he was able to catch rapid heart rate on home monitoring device on several occasions.  He also has noticed left lower quadrant abdominal pain, that is described as crampy/dull, and rated moderate in severity, associated with reduced ostomy output and he was concerned about possible ostomy stenosis.  He reports anorexia and reduced oral intake.  He reports that his abdominal pain has been resolved.\"    Patient was admitted evaluated by general surgery.  He had a HIDA scan on 5/13/2021, which was highly suggestive for cholecystitis.  Underwent open cholecystectomy on 5/17, without complications.  Plan for ERCP on 5/18.      Interval Problem Update  Nausea this morning    Mild epigastric pain, dull ache, no radiation    Feels weak     dysuria persists      Consultants/Specialty  General surgery  Cardiology    Code Status  Full Code    Disposition  Anticipated DC to home    Review of Systems  Review of Systems   Constitutional: Positive for malaise/fatigue.   HENT: Negative.    Respiratory: Negative for cough and sputum production.    Cardiovascular: Negative for chest pain, palpitations and orthopnea.   Gastrointestinal: Negative for abdominal pain, constipation and nausea.   Genitourinary: Positive for dysuria.   Musculoskeletal: Negative for back pain and myalgias.   Skin: Negative.    Neurological: Negative for dizziness, " tingling, tremors, sensory change and headaches.   Psychiatric/Behavioral: Negative for depression, hallucinations, substance abuse and suicidal ideas.   All other systems reviewed and are negative.       Physical Exam  Temp:  [36.7 °C (98.1 °F)-36.9 °C (98.5 °F)] 36.8 °C (98.2 °F)  Pulse:  [] 72  Resp:  [17-18] 17  BP: (132-154)/() 143/67  SpO2:  [92 %-97 %] 97 %    Physical Exam  Vitals and nursing note reviewed.   Constitutional:       Appearance: Normal appearance. He is obese. He is not ill-appearing.   Cardiovascular:      Rate and Rhythm: Normal rate and regular rhythm.      Pulses: Normal pulses.      Heart sounds: Normal heart sounds.   Pulmonary:      Effort: Pulmonary effort is normal.      Breath sounds: Normal breath sounds.   Abdominal:      General: Abdomen is flat. Bowel sounds are normal.      Palpations: Abdomen is soft.      Tenderness: There is abdominal tenderness. There is no guarding or rebound.   Musculoskeletal:      Right lower leg: No edema.      Left lower leg: No edema.   Neurological:      General: No focal deficit present.      Mental Status: He is alert and oriented to person, place, and time.         Fluids    Intake/Output Summary (Last 24 hours) at 5/19/2021 0947  Last data filed at 5/19/2021 0416  Gross per 24 hour   Intake 1050 ml   Output 1550 ml   Net -500 ml       Laboratory  Recent Labs     05/17/21 0414 05/18/21  0334 05/19/21  0341   WBC 13.4* 15.0* 12.0*   RBC 4.69* 4.67* 4.23*   HEMOGLOBIN 15.6 15.2 13.6*   HEMATOCRIT 45.6 45.3 41.4*   MCV 97.2 97.0 97.9*   MCH 33.3* 32.5 32.2   MCHC 34.2 33.6* 32.9*   RDW 44.9 45.1 45.0   PLATELETCT 150* 184 192   MPV 10.2 10.0 9.8     Recent Labs     05/17/21 0414 05/18/21  0334 05/19/21  0341   SODIUM 134* 131* 132*   POTASSIUM 4.1 4.3 4.2   CHLORIDE 97 94* 95*   CO2 26 25 28   GLUCOSE 117* 135* 137*   BUN 10 13 13   CREATININE 0.79 0.90 0.82   CALCIUM 8.7 8.4 8.1*     Recent Labs     05/16/21  0959 05/17/21  0414   INR  1.23* 1.21*               Imaging  DX-PORTABLE FLUOROSCOPY < 1 HOUR   Final Result      Portable fluoroscopy utilized for 1.5 minutes.         INTERPRETING LOCATION: [address] [place] [address], [place], [address]      [id number] STENT - TUBE   Final Result      ERCP in progress with common duct stent placement      Please refer to operative report for details as fluoroscopic images are submitted postprocedure      US-RENAL   Final Result         1.  Echogenic focus in the upper pole of the right kidney without visualized obstructive changes.   2.  Echogenic liver      Examination is technically limited due to patient body habitus.      NM-HEPATOBILIARY SCAN   Final Result      1.  The gallbladder is not definitively identified prior to or after the administration of morphine. Findings are highly suspicious for acute cholecystitis.      US-RUQ   Final Result      1.  Sludge and stones present in the gallbladder.   2.  Gallbladder wall thickening raises concern for cholecystitis.   3.  No gross biliary dilation.   4.  Very limited exam due to body habitus.      CT-CTA COMPLETE THORACOABDOMINAL AORTA   Final Result      1.  No aortic aneurysm or dissection.   2.  Atherosclerosis including prominent coronary calcification.   3.  Cholelithiasis and distended gallbladder. If there is concern for acute cholecystitis, further evaluation with ultrasound and nuclear medicine HIDA scan may be performed.   4.  Hepatic steatosis.   5.  Postoperative changes of colectomy with right lower quadrant ostomy.                 Assessment/Plan  * Abdominal pain with imaging finding concerning for cholecystitis- (present on admission)  Assessment & Plan      Status post cholecystectomy on 5/17, without complications  S/p biliary stent    Dyspepsia  Assessment & Plan  Iv reglan x 1 now then po qac and qhs    Weakness  Assessment & Plan  PT eval ordered    Acute cystitis with hematuria- (present on admission)  Assessment & Plan  S/p iv rocephin but  still having dysuria    F/u urine cx    Po ;pyridium    Pulmonary hypertension (HCC)- (present on admission)  Assessment & Plan  Mild pulmonary hypertension, estimated PASP 40mmHg noted on echocardiogram 3/2/2021       Hypomagnesemia  Assessment & Plan  Continue to monitor and replace as needed    Cholecystitis- (present on admission)  Assessment & Plan  hida on 5/13 with high suspicion of cholecystitis, s/p surgery    Continue with Lovenox and coumadin  IV antibiotics completed      Palpitations- (present on admission)  Assessment & Plan  History of atrial fibrillation s/p ablation  Patient reports that he has been having palpitations and that he has self-monitoring device at home that captured several episodes of RVR.    EKG shows sinus tachycardia with a rate of 99, no ST elevation there is T wave flattening in leads V3-V6, .  Resume home metoprolol with hold parameters.  Recent echo reviewed, Mild concentric left ventricular hypertrophy. Normal estimated right atrial pressure. Mild pulmonary hypertension, estimated PASP 40mmHg.   Continue with telemetry      Hyperlipidemia- (present on admission)  Assessment & Plan  lipitor 40mg qhs started on 5/18    Hypokalemia- (present on admission)  Assessment & Plan  replaced    S/P ablation of atrial fibrillation- (present on admission)  Assessment & Plan  Patient reports that he has been having palpitations and that he has self-monitoring device at home that captured several episodes of RVR.  I  cardiac monitoring    Continue home metoprolol with hold parameters.  Evaluated by cardiology, no acute findings,    History of pulmonary embolism and DVT, unprovoked.- (present on admission)  Assessment & Plan  History of remote venous thromboses and pulmonary embolism, on Coumadin    on lovenox bridge and coumadin    Acid reflux disease- (present on admission)  Assessment & Plan  Continue with omeprazole.       VTE prophylaxis: SCDs, ambulation    Check am cbc, bmp

## 2021-05-19 NOTE — PROGRESS NOTES
"S: Abel Day  is a 71 y.o.  male admitted for multiple medical complaints, found to have acute cholecystitis, s/p partial cholecystectomy 5/17 now s/p ERCP. Doing well      O:  /70   Pulse 71   Temp 36.7 °C (98.1 °F) (Oral)   Resp 18   Ht 1.905 m (6' 3\")   Wt (!) 140 kg (308 lb 13.8 oz)   SpO2 95%   Intake/Output                             05/17/21 0700 - 05/18/21 0659 05/18/21 0700 - 05/19/21 0659 05/19/21 0700 - 05/20/21 0659     6380-0026 8242-9538 Total 6811-8555 1086-8246 Total 1827-8692 4597-3908 Total                    Intake    I.V.  1700  -- 1700  250  1050 1300  --  -- --    Volume (mL) (lactated ringers infusion) 1700 -- 1700 -- -- -- -- -- --    Volume (mL) (lactated ringers infusion) -- -- -- 250 1050 1300 -- -- --    Total Intake 1700 -- 0124 630 7436 1300 -- -- --       Output    Urine  80  750 830  300  1000 1300  --  -- --    Number of Times Voided 1 x 2 x 3 x -- 3 x 3 x -- -- --    Urine Void (mL) 80 750  1300 -- -- --    Drains  80  80 160  70  50 120  --  -- --    Output (mL) (Closed/Suction Drain 1 Inferior Abdomen Raymond Prince 10 Fr.) 80 80 160 70 50 120 -- -- --    Stool  --  -- --  --  200 200  --  -- --    Number of Times Stooled -- -- -- -- 2 x 2 x -- -- --    Measurable Stool (mL) -- -- -- -- 200 200 -- -- --    Blood  150  -- 150  --  -- --  --  -- --    Est. Blood Loss 150 -- 150 -- -- -- -- -- --    Total Output  370 1250 1620 -- -- --       Net I/O     1390 -830 560 -120 -200 -320 -- -- --        Recent Labs     05/17/21 0414 05/18/21 0334 05/19/21 0341   SODIUM 134* 131* 132*   POTASSIUM 4.1 4.3 4.2   CHLORIDE 97 94* 95*   CO2 26 25 28   GLUCOSE 117* 135* 137*   BUN 10 13 13   CREATININE 0.79 0.90 0.82   CALCIUM 8.7 8.4 8.1*     Recent Labs     05/17/21 0414 05/18/21 0334 05/19/21 0341   WBC 13.4* 15.0* 12.0*   RBC 4.69* 4.67* 4.23*   HEMOGLOBIN 15.6 15.2 13.6*   HEMATOCRIT 45.6 45.3 41.4*   MCV 97.2 97.0 97.9*   MCH 33.3* 32.5 32.2 "   MCHC 34.2 33.6* 32.9*   RDW 44.9 45.1 45.0   PLATELETCT 150* 184 192   MPV 10.2 10.0 9.8       Alert and Oriented x3, No Acute Distress  Normal Respiratory Effort  Abdomen soft, appropriately tender  Incisions CDI, drain with scant bile in bulb  Extremities warm and well perfused    A/P:  Acute cholecystitis, s/p partial cholecystectomy and ERCP. Can ADAT from surgical prospective. DC when cleared by primary team and GI. F/U in 1 week. Call with questions or concerns.     Panda Bird MD  [facility]

## 2021-05-19 NOTE — CARE PLAN
The patient is Stable - Low risk of patient condition declining or worsening      Problem: Knowledge Deficit - Standard  Goal: Patient and family/care givers will demonstrate understanding of plan of care, disease process/condition, diagnostic tests and medications  Outcome: Progressing  Note: Discussed POC including stoma/MATT drain care. Pt demonstrated ability to care for both.      Problem: Urinary Elimination  Goal: Establish and maintain regular urinary output  Outcome: Progressing  Note: Patient maintaining adequate urine output via urinal.         Shift Goals  Clinical Goals: Control pain  Patient Goals: Sleep well tonight    Progress made toward(s) clinical / shift goals:  Pt reports no pain at baseline, only with movement. Declines interventions. Observed sleeping soundly on several occasions.    Patient is not progressing towards the following goals: n/a

## 2021-05-19 NOTE — PROGRESS NOTES
Tele strip printed at 0207 shows SR HR: 73    Measurements: 0.16/0.10/0.44    Telemetry Shift Summary     Rhythm SR  HR Range 60's-70's  Ectopy Occasional PVC, Rare Couplets/PAC     Telemetry monitoring strips placed in pt's chart.     Normal Values  Rhythm SR  HR Range    Measurements 0.12-0.20 / 0.06-0.10  / 0.30-0.52

## 2021-05-19 NOTE — PROGRESS NOTES
Inpatient Anticoagulation Service Note    Date: 5/19/2021    Reason for Anticoagulation: Atrial Fibrillation   Target INR: 2.0 to 3.0  [id number] VASc Score: 2  HAS-BLED Score: 2   Hemoglobin Value: (!) 13.6  Hematocrit Value: (!) 41.4  Lab Platelet Value: 192    INR from last 7 days     Date/Time INR Value    05/17/21 04:14:01  (!) 1.21    05/16/21 0959  (!) 1.23    05/14/21 02:38:01  (!) 2.07    05/13/21 02:31:01  (!) 2.15        Dose from last 7 days     Date/Time Dose (mg)    05/18/21 1200  5        Significant Interactions: Antibiotics, Proton Pump Inhibitor, Statin  Bridge Therapy: Yes (If less than 5 days and overlap therapy discontinued -- document reason (i.e. Bleed Risk))    (If still on overlap therapy, if No -- document reason (i.e. Bleed Risk))         Plan:  Warfarin 7.5 mg PO x 1 for INR 1.21 w/ LMWH bridge     Pharmacist suggested discharge dosing: resume home dose if appropriate      Diogenes Miranda, PharmD

## 2021-05-19 NOTE — CARE PLAN
Problem: Mobility  Goal: Patient's capacity to carry out activities will improve  Outcome: Progressing   Patient got up today and pivoted to the chair. Patient needed assistance of one from bed to chair and assistance of two when moving from chair to bed.     Problem: Urinary Elimination  Goal: Establish and maintain regular urinary output  5/19/2021 1447 by Michael Perry R.N.  Outcome: Progressing  Patient is voiding regularly with decrease urethral pain into urinal.    5/19/2021 1445 by Michael Perry R.N.  Outcome: Progressing   The patient is Stable - Low risk of patient condition declining or worsening    Shift Goals  Clinical Goals: Contol Pain and Wean oxygen  Patient Goals: Move better    Progress made toward(s) clinical / shift goals:  Patient has been weaned of oxygen per n/c. Patient ambulated to chair and is awaiting PT evaluation.     Patient is not progressing towards the following goals: Patient continues to complain of nausea at the sight/smell of food.

## 2021-05-19 NOTE — PROGRESS NOTES
Gastroenterology Progress Note     Author: Olivier Stanford M.D.   Date & Time Created: 5/19/2021 10:19 AM    Chief Complaint:  Cholecystitis    Interval History:  Patient reports epigastric pain today with nausea    Review of Systems:  Review of Systems   Constitutional: Negative for chills and fever.   Respiratory: Negative for shortness of breath.    Cardiovascular: Negative for chest pain.   Gastrointestinal: Positive for abdominal pain and nausea. Negative for blood in stool, constipation, diarrhea, heartburn, melena and vomiting.   Genitourinary: Positive for dysuria.       Physical Exam:  Physical Exam  Constitutional:       General: He is not in acute distress.     Appearance: Normal appearance. He is obese. He is not ill-appearing.   Cardiovascular:      Rate and Rhythm: Normal rate and regular rhythm.   Pulmonary:      Effort: Pulmonary effort is normal. No respiratory distress.      Breath sounds: Normal breath sounds.   Abdominal:      General: Abdomen is flat. Bowel sounds are normal. There is no distension.      Palpations: Abdomen is soft.      Tenderness: There is abdominal tenderness.   Neurological:      Mental Status: He is alert.         Labs:          Recent Labs     05/17/21 0414 05/18/21 0334 05/19/21 0341   SODIUM 134* 131* 132*   POTASSIUM 4.1 4.3 4.2   CHLORIDE 97 94* 95*   CO2 26 25 28   BUN 10 13 13   CREATININE 0.79 0.90 0.82   MAGNESIUM 1.9 1.7  --    CALCIUM 8.7 8.4 8.1*     Recent Labs     05/17/21 0414 05/18/21 0334 05/19/21 0341   ALTSGPT  --   --  28   ASTSGOT  --   --  18   ALKPHOSPHAT  --   --  64   TBILIRUBIN  --   --  0.6   GLUCOSE 117* 135* 137*     Recent Labs     05/17/21 0414 05/18/21 0334 05/19/21 0341   RBC 4.69* 4.67* 4.23*   HEMOGLOBIN 15.6 15.2 13.6*   HEMATOCRIT 45.6 45.3 41.4*   PLATELETCT 150* 184 192   PROTHROMBTM 15.0*  --   --    INR 1.21*  --   --      Recent Labs     05/17/21 0414 05/18/21 0334 05/19/21 0341   WBC 13.4* 15.0* 12.0*   ASTSGOT  --    --  18   ALTSGPT  --   --  28   ALKPHOSPHAT  --   --  64   TBILIRUBIN  --   --  0.6     Hemodynamics:  Temp (24hrs), Av.8 °C (98.2 °F), Min:36.7 °C (98.1 °F), Max:36.9 °C (98.5 °F)  Temperature: 36.8 °C (98.2 °F)  Pulse  Av.8  Min: 67  Max: 111   Blood Pressure : 143/67     Respiratory:    Respiration: 17, Pulse Oximetry: 97 %        RUL Breath Sounds: Clear, RML Breath Sounds: Clear, RLL Breath Sounds: Clear;Diminished, BINA Breath Sounds: Clear, LLL Breath Sounds: Clear;Diminished  Fluids:    Intake/Output Summary (Last 24 hours) at 2021 1019  Last data filed at 2021 0416  Gross per 24 hour   Intake 1050 ml   Output 1550 ml   Net -500 ml       GI/Nutrition:  Orders Placed This Encounter   Procedures   • Diet Order Diet: Regular     Standing Status:   Standing     Number of Occurrences:   1     Order Specific Question:   Diet:     Answer:   Regular [1]     Medical Decision Making, by Problem:  Active Hospital Problems    Diagnosis    • *Abdominal pain with imaging finding concerning for cholecystitis [R10.9]    • Acute cystitis with hematuria [N30.01]    • Weakness [R53.1]    • Dyspepsia [R10.13]    • Pulmonary hypertension (HCC) [I27.20]    • Cholecystitis [K81.9]    • Hypomagnesemia [E83.42]    • Palpitations [R00.2]    • Hyperlipidemia [E78.5]    • Hypokalemia [E87.6]    • S/P ablation of atrial fibrillation [Z98.890, Z86.79]    • History of pulmonary embolism and DVT, unprovoked. [Z86.711]    • Acid reflux disease [K21.9]        Plan:  70 yo with partial cholecystectomy due to severe cholecystitis. Patient is s/p ERCP due to bile leak. Now with poor appetite, epigastric pain and nausea. Will check lipase to assess for post-ERCP pancreatitis. Patient encouraged to drink fluids. Treat with antiemetics and pain control as needed. Continue to monitor    Quality-Core Measures

## 2021-05-19 NOTE — PROGRESS NOTES
Bedside report received from PENNY Naqvi. Assumed pt care. Pt is AOx4, reports moderate pain with movement/coughing, none at rest, declines interventions at this time. Denies any other distress. Discussed POC including monitoring MATT output, pain control, ileostomy care. Pt verbalized understanding. Hourly rounding in place. Fall precautions in place and call lights w/in reach.

## 2021-05-20 ENCOUNTER — PATIENT OUTREACH (OUTPATIENT)
Dept: HEALTH INFORMATION MANAGEMENT | Facility: OTHER | Age: 71
End: 2021-05-20

## 2021-05-20 VITALS
OXYGEN SATURATION: 90 % | TEMPERATURE: 98 F | RESPIRATION RATE: 20 BRPM | WEIGHT: 313.94 LBS | HEIGHT: 75 IN | BODY MASS INDEX: 39.03 KG/M2 | DIASTOLIC BLOOD PRESSURE: 57 MMHG | HEART RATE: 75 BPM | SYSTOLIC BLOOD PRESSURE: 126 MMHG

## 2021-05-20 PROBLEM — N30.01 ACUTE CYSTITIS WITH HEMATURIA: Status: RESOLVED | Noted: 2021-05-19 | Resolved: 2021-05-20

## 2021-05-20 PROBLEM — E83.42 HYPOMAGNESEMIA: Status: RESOLVED | Noted: 2021-05-14 | Resolved: 2021-05-20

## 2021-05-20 PROBLEM — R53.1 WEAKNESS: Status: RESOLVED | Noted: 2021-05-19 | Resolved: 2021-05-20

## 2021-05-20 PROBLEM — R10.9 AP (ABDOMINAL PAIN): Status: RESOLVED | Noted: 2021-05-12 | Resolved: 2021-05-20

## 2021-05-20 PROBLEM — R10.13 DYSPEPSIA: Status: RESOLVED | Noted: 2021-05-19 | Resolved: 2021-05-20

## 2021-05-20 PROBLEM — R00.2 PALPITATIONS: Status: RESOLVED | Noted: 2021-05-12 | Resolved: 2021-05-20

## 2021-05-20 PROBLEM — K81.9 CHOLECYSTITIS: Status: RESOLVED | Noted: 2021-05-14 | Resolved: 2021-05-20

## 2021-05-20 LAB
ANION GAP SERPL CALC-SCNC: 9 MMOL/L (ref 7–16)
BUN SERPL-MCNC: 13 MG/DL (ref 8–22)
CALCIUM SERPL-MCNC: 8.1 MG/DL (ref 8.4–10.2)
CHLORIDE SERPL-SCNC: 95 MMOL/L (ref 96–112)
CO2 SERPL-SCNC: 27 MMOL/L (ref 20–33)
CREAT SERPL-MCNC: 0.74 MG/DL (ref 0.5–1.4)
ERYTHROCYTE [DISTWIDTH] IN BLOOD BY AUTOMATED COUNT: 45 FL (ref 35.9–50)
GLUCOSE SERPL-MCNC: 119 MG/DL (ref 65–99)
HCT VFR BLD AUTO: 40 % (ref 42–52)
HGB BLD-MCNC: 13.3 G/DL (ref 14–18)
INR PPP: 1.42 (ref 0.87–1.13)
MCH RBC QN AUTO: 32.8 PG (ref 27–33)
MCHC RBC AUTO-ENTMCNC: 33.3 G/DL (ref 33.7–35.3)
MCV RBC AUTO: 98.5 FL (ref 81.4–97.8)
PLATELET # BLD AUTO: 192 K/UL (ref 164–446)
PMV BLD AUTO: 9.6 FL (ref 9–12.9)
POTASSIUM SERPL-SCNC: 3.9 MMOL/L (ref 3.6–5.5)
PROTHROMBIN TIME: 17 SEC (ref 12–14.6)
RBC # BLD AUTO: 4.06 M/UL (ref 4.7–6.1)
SODIUM SERPL-SCNC: 131 MMOL/L (ref 135–145)
WBC # BLD AUTO: 9.3 K/UL (ref 4.8–10.8)

## 2021-05-20 PROCEDURE — 700102 HCHG RX REV CODE 250 W/ 637 OVERRIDE(OP): Performed by: HOSPITALIST

## 2021-05-20 PROCEDURE — 700111 HCHG RX REV CODE 636 W/ 250 OVERRIDE (IP): Performed by: HOSPITALIST

## 2021-05-20 PROCEDURE — A9270 NON-COVERED ITEM OR SERVICE: HCPCS | Performed by: HOSPITALIST

## 2021-05-20 PROCEDURE — 99239 HOSP IP/OBS DSCHRG MGMT >30: CPT | Performed by: HOSPITALIST

## 2021-05-20 PROCEDURE — 700105 HCHG RX REV CODE 258: Performed by: HOSPITALIST

## 2021-05-20 PROCEDURE — 85027 COMPLETE CBC AUTOMATED: CPT

## 2021-05-20 PROCEDURE — 97162 PT EVAL MOD COMPLEX 30 MIN: CPT

## 2021-05-20 PROCEDURE — 80048 BASIC METABOLIC PNL TOTAL CA: CPT

## 2021-05-20 PROCEDURE — 85610 PROTHROMBIN TIME: CPT

## 2021-05-20 RX ORDER — ONDANSETRON 4 MG/1
4 TABLET, ORALLY DISINTEGRATING ORAL EVERY 4 HOURS PRN
Qty: 10 TABLET | Refills: 0 | Status: SHIPPED | OUTPATIENT
Start: 2021-05-20 | End: 2021-06-14

## 2021-05-20 RX ORDER — WARFARIN SODIUM 7.5 MG/1
7.5 TABLET ORAL
Status: DISCONTINUED | OUTPATIENT
Start: 2021-05-20 | End: 2021-05-20 | Stop reason: HOSPADM

## 2021-05-20 RX ORDER — OXYCODONE HYDROCHLORIDE 5 MG/1
2.5 TABLET ORAL
Qty: 28 TABLET | Refills: 0 | Status: SHIPPED | OUTPATIENT
Start: 2021-05-20 | End: 2021-05-27

## 2021-05-20 RX ORDER — PHENAZOPYRIDINE HYDROCHLORIDE 100 MG/1
100 TABLET, FILM COATED ORAL 3 TIMES DAILY PRN
Qty: 15 TABLET | Refills: 0 | Status: SHIPPED | OUTPATIENT
Start: 2021-05-20 | End: 2021-06-14

## 2021-05-20 RX ORDER — ATORVASTATIN CALCIUM 40 MG/1
40 TABLET, FILM COATED ORAL EVERY EVENING
Qty: 30 TABLET | Refills: 3 | Status: SHIPPED | OUTPATIENT
Start: 2021-05-20 | End: 2021-06-03 | Stop reason: SDUPTHER

## 2021-05-20 RX ADMIN — OMEPRAZOLE 20 MG: 20 CAPSULE, DELAYED RELEASE ORAL at 05:57

## 2021-05-20 RX ADMIN — METOCLOPRAMIDE 5 MG: 10 TABLET ORAL at 05:57

## 2021-05-20 RX ADMIN — SODIUM CHLORIDE: 9 INJECTION, SOLUTION INTRAVENOUS at 06:32

## 2021-05-20 RX ADMIN — METOPROLOL SUCCINATE 25 MG: 25 TABLET, EXTENDED RELEASE ORAL at 05:55

## 2021-05-20 RX ADMIN — ENOXAPARIN SODIUM 150 MG: 150 INJECTION SUBCUTANEOUS at 05:54

## 2021-05-20 RX ADMIN — PHENAZOPYRIDINE HYDROCHLORIDE 200 MG: 200 TABLET ORAL at 08:19

## 2021-05-20 RX ADMIN — FLUTICASONE PROPIONATE 50 MCG: 50 SPRAY, METERED NASAL at 05:59

## 2021-05-20 ASSESSMENT — COGNITIVE AND FUNCTIONAL STATUS - GENERAL
SUGGESTED CMS G CODE MODIFIER MOBILITY: CH
MOBILITY SCORE: 24

## 2021-05-20 ASSESSMENT — GAIT ASSESSMENTS
ASSISTIVE DEVICE: FRONT WHEEL WALKER
DISTANCE (FEET): 100
GAIT LEVEL OF ASSIST: SUPERVISED
DEVIATION: STEP TO

## 2021-05-20 ASSESSMENT — PAIN DESCRIPTION - PAIN TYPE: TYPE: ACUTE PAIN

## 2021-05-20 NOTE — FACE TO FACE
Face to Face Note  -  Durable Medical Equipment    Jordan Acosta M.D. - NPI: [id number]  I certify that this patient is under my care and that they had a durable medical equipment(DME)face to face encounter by myself that meets the physician DME face-to-face encounter requirements with this patient on:    Date of encounter:   Patient:                    MRN:                       YOB: 2021  [facility]  [id number]  1950     The encounter with the patient was in whole, or in part, for the following medical condition, which is the primary reason for durable medical equipment:  Post-Op Surgery    I certify that, based on my findings, the following durable medical equipment is medically necessary:  Walkers.    HOME O2 Saturation Measurements:(Values must be present for Home Oxygen orders)         ,     ,         My Clinical findings support the need for the above equipment due to:  Abnormal Gait    Supporting Symptoms: ataxia    If patient feels more short of breath, they can go up to 6 liters per minute and contact healthcare provider.

## 2021-05-20 NOTE — PROGRESS NOTES
Tele strip printed at 0311 shows SR HR: 77    Measurements: 0.16/0.08/0.40    Telemetry Shift Summary     Rhythm SR/ST  HR Range 60's-100's  Ectopy Frequent PVC, Occasional Couplets, Rare PAC/Trigeminy/Triplets     Telemetry monitoring strips placed in pt's chart.     Normal Values  Rhythm SR  HR Range    Measurements 0.12-0.20 / 0.06-0.10  / 0.30-0.52

## 2021-05-20 NOTE — PROGRESS NOTES
Pt's spouse called post dc, stating [facility] does not have lovenox 150mg. This RN notified hosp RN and SW. This RN called [facility] and they stated they have 10 in stock and they can order the rest for tmrw. RN updated pharmacy in [facility] and notified hosp RN, then called pt to let them know.

## 2021-05-20 NOTE — PROGRESS NOTES
Pt is A&Ox4. Discharge instructions reviewed with pt. IV and telebox removed. Belongings accounted for. Rx escribed. Controlled substance info form signed

## 2021-05-20 NOTE — CARE PLAN
The patient is Stable - Low risk of patient condition declining or worsening    Problem: Urinary Elimination  Goal: Establish and maintain regular urinary output  Outcome: Progressing  Note: Pt maintaining adequate urine output.     Problem: Mobility  Goal: Patient's capacity to carry out activities will improve  Outcome: Progressing  Note: Pt up to chair x 75 minutes tonight. Pt tolerated ambulation well.     Shift Goals  Clinical Goals: Mobilize patient this evening  Patient Goals: To edge of bed tonight    Progress made toward(s) clinical / shift goals:  Pt up to chair x 75 minutes    Patient is not progressing towards the following goals:n/a

## 2021-05-20 NOTE — PROGRESS NOTES
Received patient from NOC RN. Assessment complete. A&Ox4. Denies pain. POC discussed. Call light within reach. Bed in low, locked position. All needs attended to at this time.

## 2021-05-20 NOTE — PROGRESS NOTES
Inpatient Anticoagulation Service Note    Date: 5/20/2021    Reason for Anticoagulation: Atrial Fibrillation   Target INR: 2.0 to 3.0  [id number] VASc Score: 2  HAS-BLED Score: 2   Hemoglobin Value: (!) 13.3  Hematocrit Value: (!) 40  Lab Platelet Value: 192    INR from last 7 days     Date/Time INR Value    05/20/21 02:44:01  (!) 1.42    05/17/21 04:14:01  (!) 1.21    05/16/21 0959  (!) 1.23    05/14/21 02:38:01  (!) 2.07        Dose from last 7 days     Date/Time Dose (mg)    05/20/21 1300  7.5    05/19/21 1400  7.5    05/18/21 1200  5        Significant Interactions: Antibiotics, Proton Pump Inhibitor, Statin  Bridge Therapy: Yes (If less than 5 days and overlap therapy discontinued -- document reason (i.e. Bleed Risk))    (If still on overlap therapy, if No -- document reason (i.e. Bleed Risk))         Plan:  Repeat warfarin 7.5 mg x 1 for INR of 1.42. INR trending appropriately. CTM     Pharmacist suggested discharge dosing: resume home dose if appropriate     Diogenes Miranda, YoniD

## 2021-05-20 NOTE — PROGRESS NOTES
Notified by monitors that it appears pt experienced 4bts VT. Pt was actively getting out of bed with PT

## 2021-05-20 NOTE — DISCHARGE INSTRUCTIONS
Discharge Instructions    Discharged to home by car with relative. Discharged via wheelchair, hospital escort: Yes.  Special equipment needed: Walker    Be sure to schedule a follow-up appointment with your primary care doctor or any specialists as instructed.     Discharge Plan:   Diet Plan: Discussed  Activity Level: Discussed  Confirmed Follow up Appointment: Appointment Scheduled  Confirmed Symptoms Management: Discussed  Medication Reconciliation Updated: Yes    I understand that a diet low in cholesterol, fat, and sodium is recommended for good health. Unless I have been given specific instructions below for another diet, I accept this instruction as my diet prescription.   Other diet: Low fat    Special Instructions: None    · Is patient discharged on Warfarin / Coumadin?   Yes    You are receiving the drug warfarin. Please understand the importance of monitoring warfarin with scheduled PT/INR blood draws.  Follow-up with a call to your personal Doctor's office in 3 days to schedule a PT/INR. .    IMPORTANT: HOW TO USE THIS INFORMATION:  This is a summary and does NOT have all possible information about this product. This information does not assure that this product is safe, effective, or appropriate for you. This information is not individual medical advice and does not substitute for the advice of your health care professional. Always ask your health care professional for complete information about this product and your specific health needs.      WARFARIN - ORAL (WARF-uh-rin)      COMMON BRAND NAME(S): Coumadin      WARNING:  Warfarin can cause very serious (possibly fatal) bleeding. This is more likely to occur when you first start taking this medication or if you take too much warfarin. To decrease your risk for bleeding, your doctor or other health care provider will monitor you closely and check your lab results (INR test) to make sure you are not taking too much warfarin. Keep all medical and  "laboratory appointments. Tell your doctor right away if you notice any signs of serious bleeding. See also Side Effects section.      USES:  This medication is used to treat blood clots (such as in deep vein thrombosis-DVT or pulmonary embolus-PE) and/or to prevent new clots from forming in your body. Preventing harmful blood clots helps to reduce the risk of a stroke or heart attack. Conditions that increase your risk of developing blood clots include a certain type of irregular heart rhythm (atrial fibrillation), heart valve replacement, recent heart attack, and certain surgeries (such as hip/knee replacement). Warfarin is commonly called a \"blood thinner,\" but the more correct term is \"anticoagulant.\" It helps to keep blood flowing smoothly in your body by decreasing the amount of certain substances (clotting proteins) in your blood.      HOW TO USE:  Read the Medication Guide provided by your pharmacist before you start taking warfarin and each time you get a refill. If you have any questions, ask your doctor or pharmacist. Take this medication by mouth with or without food as directed by your doctor or other health care professional, usually once a day. It is very important to take it exactly as directed. Do not increase the dose, take it more frequently, or stop using it unless directed by your doctor. Dosage is based on your medical condition, laboratory tests (such as INR), and response to treatment. Your doctor or other health care provider will monitor you closely while you are taking this medication to determine the right dose for you. Use this medication regularly to get the most benefit from it. To help you remember, take it at the same time each day. It is important to eat a balanced, consistent diet while taking warfarin. Some foods can affect how warfarin works in your body and may affect your treatment and dose. Avoid sudden large increases or decreases in your intake of foods high in vitamin K " (such as broccoli, cauliflower, cabbage, brussels sprouts, kale, spinach, and other green leafy vegetables, liver, green tea, certain vitamin supplements). If you are trying to lose weight, check with your doctor before you try to go on a diet. Cranberry products may also affect how your warfarin works. Limit the amount of cranberry juice (16 ounces/480 milliliters a day) or other cranberry products you may drink or eat.      SIDE EFFECTS:  Nausea, loss of appetite, or stomach/abdominal pain may occur. If any of these effects persist or worsen, tell your doctor or pharmacist promptly. Remember that your doctor has prescribed this medication because he or she has judged that the benefit to you is greater than the risk of side effects. Many people using this medication do not have serious side effects. This medication can cause serious bleeding if it affects your blood clotting proteins too much (shown by unusually high INR lab results). Even if your doctor stops your medication, this risk of bleeding can continue for up to a week. Tell your doctor right away if you have any signs of serious bleeding, including: unusual pain/swelling/discomfort, unusual/easy bruising, prolonged bleeding from cuts or gums, persistent/frequent nosebleeds, unusually heavy/prolonged menstrual flow, pink/dark urine, coughing up blood, vomit that is bloody or looks like coffee grounds, severe headache, dizziness/fainting, unusual or persistent tiredness/weakness, bloody/black/tarry stools, chest pain, shortness of breath, difficulty swallowing. Tell your doctor right away if any of these unlikely but serious side effects occur: persistent nausea/vomiting, severe stomach/abdominal pain, yellowing eyes/skin. This drug rarely has caused very serious (possibly fatal) problems if its effects lead to small blood clots (usually at the beginning of treatment). This can lead to severe skin/tissue damage that may require surgery or amputation if left  untreated. Patients with certain blood conditions (protein C or S deficiency) may be at greater risk. Get medical help right away if any of these rare but serious side effects occur: painful/red/purplish patches on the skin (such as on the toe, breast, abdomen), change in the amount of urine, vision changes, confusion, slurred speech, weakness on one side of the body. A very serious allergic reaction to this drug is rare. However, get medical help right away if you notice any symptoms of a serious allergic reaction, including: rash, itching/swelling (especially of the face/tongue/throat), severe dizziness, trouble breathing. This is not a complete list of possible side effects. If you notice other effects not listed above, contact your doctor or pharmacist. In the [place] - Call your doctor for medical advice about side effects. You may report side effects to FDA at 4-256-VRY-5877. In [place] - Call your doctor for medical advice about side effects. You may report side effects to [facility] [place] at [phone].      PRECAUTIONS:  Before taking warfarin, tell your doctor or pharmacist if you are allergic to it; or if you have any other allergies. This product may contain inactive ingredients, which can cause allergic reactions or other problems. Talk to your pharmacist for more details. Before using this medication, tell your doctor or pharmacist your medical history, especially of: blood disorders (such as anemia, hemophilia), bleeding problems (such as bleeding of the stomach/intestines, bleeding in the brain), blood vessel disorders (such as aneurysms), recent major injury/surgery, liver disease, alcohol use, mental/mood disorders (including memory problems), frequent falls/injuries. It is important that all your doctors and dentists know that you take warfarin. Before having surgery or any medical/dental procedures, tell your doctor or dentist that you are taking this medication and about all the products you use  (including prescription drugs, nonprescription drugs, and herbal products). Avoid getting injections into the muscles. If you must have an injection into a muscle (for example, a flu shot), it should be given in the arm. This way, it will be easier to check for bleeding and/or apply pressure bandages. This medication may cause stomach bleeding. Daily use of alcohol while using this medicine will increase your risk for stomach bleeding and may also affect how this medication works. Limit or avoid alcoholic beverages. If you have not been eating well, if you have an illness or infection that causes fever, vomiting, or diarrhea for more than 2 days, or if you start using any antibiotic medications, contact your doctor or pharmacist immediately because these conditions can affect how warfarin works. This medication can cause heavy bleeding. To lower the chance of getting cut, bruised, or injured, use great caution with sharp objects like safety razors and nail cutters. Use an electric razor when shaving and a soft toothbrush when brushing your teeth. Avoid activities such as contact sports. If you fall or injure yourself, especially if you hit your head, call your doctor immediately. Your doctor may need to check you. The Food & Drug Administration has stated that generic warfarin products are interchangeable. However, consult your doctor or pharmacist before switching warfarin products. Be careful not to take more than one medication that contains warfarin unless specifically directed by the doctor or health care provider who is monitoring your warfarin treatment. Older adults may be at greater risk for bleeding while using this drug. This medication is not recommended for use during pregnancy because of serious (possibly fatal) harm to an unborn baby. Discuss the use of reliable forms of birth control with your doctor. If you become pregnant or think you may be pregnant, tell your doctor immediately. If you are  "planning pregnancy, discuss a plan for managing your condition with your doctor before you become pregnant. Your doctor may switch the type of medication you use during pregnancy. Very small amounts of this medication may pass into breast milk but is unlikely to harm a nursing infant. Consult your doctor before breast-feeding.      DRUG INTERACTIONS:  Drug interactions may change how your medications work or increase your risk for serious side effects. This document does not contain all possible drug interactions. Keep a list of all the products you use (including prescription/nonprescription drugs and herbal products) and share it with your doctor and pharmacist. Do not start, stop, or change the dosage of any medicines without your doctor's approval. Warfarin interacts with many prescription, nonprescription, vitamin, and herbal products. This includes medications that are applied to the skin or inside the vagina or rectum. The interactions with warfarin usually result in an increase or decrease in the \"blood-thinning\" (anticoagulant) effect. Your doctor or other health care professional should closely monitor you to prevent serious bleeding or clotting problems. While taking warfarin, it is very important to tell your doctor or pharmacist of any changes in medications, vitamins, or herbal products that you are taking. Some products that may interact with this drug include: capecitabine, imatinib, mifepristone. Aspirin, aspirin-like drugs (salicylates), and nonsteroidal anti-inflammatory drugs (NSAIDs such as ibuprofen, naproxen, celecoxib) may have effects similar to warfarin. These drugs may increase the risk of bleeding problems if taken during treatment with warfarin. Carefully check all prescription/nonprescription product labels (including drugs applied to the skin such as pain-relieving creams) since the products may contain NSAIDs or salicylates. Talk to your doctor about using a different medication (such " as acetaminophen) to treat pain/fever. Low-dose aspirin and related drugs (such as clopidogrel, ticlopidine) should be continued if prescribed by your doctor for specific medical reasons such as heart attack or stroke prevention. Consult your doctor or pharmacist for more details. Many herbal products interact with warfarin. Tell your doctor before taking any herbal products, especially bromelains, coenzyme Q10, cranberry, danshen, dong quai, fenugreek, garlic, ginkgo biloba, ginseng, and Elizabet's wort, among others. This medication may interfere with a certain laboratory test to measure theophylline levels, possibly causing false test results. Make sure laboratory personnel and all your doctors know you use this drug.      OVERDOSE:  If overdose is suspected, contact a poison control center or emergency room immediately. [place] residents can call the [place] National Poison Hotline at 1-661.358.2784. [place] residents can call a provincial poison control center. Symptoms of overdose may include: bloody/black/tarry stools, pink/dark urine, unusual/prolonged bleeding.      NOTES:  Do not share this medication with others. Laboratory and/or medical tests (such as INR, complete blood count) must be performed periodically to monitor your progress or check for side effects. Consult your doctor for more details.      MISSED DOSE:  For the best possible benefit, do not miss any doses. If you do miss a dose and remember on the same day, take it as soon as you remember. If you remember on the next day, skip the missed dose and resume your usual dosing schedule. Do not double the dose to catch up because this could increase your risk for bleeding. Keep a record of missed doses to give to your doctor or pharmacist. Contact your doctor or pharmacist if you miss 2 or more doses in a row.      STORAGE:  Store at room temperature away from light and moisture. Do not store in the bathroom. Keep all medications away from children and pets.  Do not flush medications down the toilet or pour them into a drain unless instructed to do so. Properly discard this product when it is  or no longer needed. Consult your pharmacist or local waste disposal company for more details about how to safely discard your product.      MEDICAL ALERT:  Your condition and medication can cause complications in a medical emergency. For information about enrolling in MedicAlert, call 1-651.138.2742 ([place]) or 1-425.985.3398 ([place]).      Information last revised 2010 Copyright(c)  [employer]             Depression / Suicide Risk    As you are discharged from this [facility] facility, it is important to learn how to keep safe from harming yourself.    Recognize the warning signs:  · Abrupt changes in personality, positive or negative- including increase in energy   · Giving away possessions  · Change in eating patterns- significant weight changes-  positive or negative  · Change in sleeping patterns- unable to sleep or sleeping all the time   · Unwillingness or inability to communicate  · Depression  · Unusual sadness, discouragement and loneliness  · Talk of wanting to die  · Neglect of personal appearance   · Rebelliousness- reckless behavior  · Withdrawal from people/activities they love  · Confusion- inability to concentrate     If you or a loved one observes any of these behaviors or has concerns about self-harm, here's what you can do:  · Talk about it- your feelings and reasons for harming yourself  · Remove any means that you might use to hurt yourself (examples: pills, rope, extension cords, firearm)  · Get professional help from the community (Mental Health, Substance Abuse, psychological counseling)  · Do not be alone:Call your Safe Contact- someone whom you trust who will be there for you.  · Call your local CRISIS HOTLINE 849-7894 or 571-185-6552  · Call your local Children's Mobile Crisis Response Team [place] (789) 633-8608 or  www."[employer]The Society  · Call the toll free National Suicide Prevention Hotlines   · National Suicide Prevention Lifeline 368-715-JKFX (1885)  · National Hope Line Network 800-SUICIDE (593-3860)      Surgical Drain Home Care  Surgical drains are used to remove extra fluid that normally builds up in a surgical wound after surgery. A surgical drain helps to heal a surgical wound. Different kinds of surgical drains include:  · Active drains. These drains use suction to pull drainage away from the surgical wound. Drainage flows through a tube to a container outside of the body. With these drains, you need to keep the bulb or the drainage container flat (compressed) at all times, except while you empty it. Flattening the bulb or container creates suction.  · Passive drains. These drains allow fluid to drain naturally, by gravity. Drainage flows through a tube to a bandage (dressing) or a container outside of the body. Passive drains do not need to be emptied.  A drain is placed during surgery. Right after surgery, drainage is usually bright red and a little thicker than water. The drainage may gradually turn yellow or pink and become thinner. It is likely that your health care provider will remove the drain when the drainage stops or when the amount decreases to 1-2 Tbsp (15-30 mL) during a 24-hour period.  Supplies needed:  · Tape.  · Germ-free cleaning solution (sterile saline).  · Cotton swabs.  · Split gauze drain sponge: 4 x 4 inches (10 x 10 cm).  · Gauze square: 4 x 4 inches (10 x 10 cm).  How to care for your surgical drain  Care for your drain as told by your health care provider. This is important to help prevent infection. If your drain is placed at your back, or any other hard-to-reach area, ask another person to assist you in performing the following tasks:  General care  · Keep the skin around the drain dry and covered with a dressing at all times.  · Check your drain area every day for signs of infection.  Check for:  ? Redness, swelling, or pain.  ? Pus or a bad smell.  ? Cloudy drainage.  ? Tenderness or pressure at the drain exit site.  Changing the dressing  Follow instructions from your health care provider about how to change your dressing. Change your dressing at least once a day. Change it more often if needed to keep the dressing dry. Make sure you:  1. Gather your supplies.  2. Wash your hands with soap and water before you change your dressing. If soap and water are not available, use hand .  3. Remove the old dressing. Avoid using scissors to do that.  4. Wash your hands with soap and water again after removing the old dressing.  5. Use sterile saline to clean your skin around the drain. You may need to use a cotton swab to clean the skin.  6. Place the tube through the slit in a drain sponge. Place the drain sponge so that it covers your wound.  7. Place the gauze square or another drain sponge on top of the drain sponge that is on the wound. Make sure the tube is between those layers.  8. Tape the dressing to your skin.  9. Tape the drainage tube to your skin 1-2 inches (2.5-5 cm) below the place where the tube enters your body. Taping keeps the tube from pulling on any stitches (sutures) that you have.  10. Wash your hands with soap and water.  11. Write down the color of your drainage and how often you change your dressing.  How to empty your active drain    1. Make sure that you have a measuring cup that you can empty your drainage into.  2. Wash your hands with soap and water. If soap and water are not available, use hand .  3. Loosen any pins or clips that hold the tube in place.  4. If your health care provider tells you to strip the tube to prevent clots and tube blockages:  ? Hold the tube at the skin with one hand. Use your other hand to pinch the tubing with your thumb and first finger.  ? Gently move your fingers down the tube while squeezing very lightly. This clears any  drainage, clots, or tissue from the tube.  ? You may need to do this several times each day to keep the tube clear. Do not pull on the tube.  5. Open the bulb cap or the drain plug. Do not touch the inside of the cap or the bottom of the plug.  6. Turn the device upside down and gently squeeze.  7. Empty all of the drainage into the measuring cup.  8. Compress the bulb or the container and replace the cap or the plug. To compress the bulb or the container, squeeze it firmly in the middle while you close the cap or plug the container.  9. Write down the amount of drainage that you have in each 24-hour period. If you have less than 2 Tbsp (30 mL) of drainage during 24 hours, contact your health care provider.  10. Flush the drainage down the toilet.  11. Wash your hands with soap and water.  Contact a health care provider if:  · You have redness, swelling, or pain around your drain area.  · You have pus or a bad smell coming from your drain area.  · You have a fever or chills.  · The skin around your drain is warm to the touch.  · The amount of drainage that you have is increasing instead of decreasing.  · You have drainage that is cloudy.  · There is a sudden stop or a sudden decrease in the amount of drainage that you have.  · Your drain tube falls out.  · Your active drain does not stay compressed after you empty it.  Summary  · Surgical drains are used to remove extra fluid that normally builds up in a surgical wound after surgery.  · Different kinds of surgical drains include active drains and passive drains. Active drains use suction to pull drainage away from the surgical wound, and passive drains allow fluid to drain naturally.  · It is important to care for your drain to prevent infection. If your drain is placed at your back, or any other hard-to-reach area, ask another person to assist you.  · Contact your health care provider if you have redness, swelling, or pain around your drain area.  This information  is not intended to replace advice given to you by your health care provider. Make sure you discuss any questions you have with your health care provider.  Document Released: 12/15/2001 Document Revised: 01/22/2020 Document Reviewed: 01/22/2020  Elsevier Patient Education © 2020 [employer]       Warfarin Coagulopathy  Warfarin coagulopathy refers to bleeding that may occur as a complication of the medicine warfarin. Warfarin is a blood thinner (anticoagulant). Anticoagulants prevent dangerous blood clots. Bleeding is the most common and most serious complication of warfarin.  While taking warfarin, you will need to have blood tests (prothrombin tests, or PT tests) regularly to measure your blood clotting time. The PT test results will be reported as the International Normalized Ratio (INR). The INR tells your health care provider whether your dosage of warfarin needs to be changed. The longer it takes your blood to clot, the higher the INR. Your risk of warfarin coagulopathy increases as your INR increases.  What are the causes?  This condition may be caused by:  · Taking too much warfarin (overdose).  · Underlying medical conditions.  · Changes to your diet.  · Interactions with medicines, supplements, or alcohol.  What are the signs or symptoms?  Warfarin coagulopathy may cause bleeding from any tissue or organ. Symptoms may include:  · Bleeding from the gums.  · A nosebleed that is not easily stopped.  · Blood in stool. This may look like bright red, dark, or black, tarry stools.  · Blood in urine. This may look like pink, red, or brown urine.  · Unusual bruising or bruising easily.  · A cut that does not stop bleeding within 10 minutes.  · Coughing up blood.  · Vomiting blood.  · Feeling nauseous for longer than 1 day.  · Broken blood vessels in the eye (subconjunctival hemorrhage). This may look like a bright red or dark red patch on the white part of the eye.  · Abdominal or back pain with or without  bruising.  · Sudden, severe headache.  · Sudden weakness or numbness of the face, arm, or leg, especially on one side of the body.  · Sudden confusion.  · Difficulty speaking (aphasia) or understanding speech.  · Sudden trouble seeing out of one or both eyes.  · Unexpected difficulty walking.  · Dizziness.  · Loss of balance or coordination.  · Unusual vaginal bleeding.  · Swelling or pain at an injection site.  · Skin scarring due to tissue death (necrosis) of fatty tissue. This may cause pain in the waist, thighs, or buttocks. This is more common among women.  How is this diagnosed?  This condition is diagnosed after your health care provider places you on warfarin and then finds out how it affects your blood's ability to clot. Prothrombin time (PT) clotting tests are used to monitor your clotting factor. These tests also help your health care provider to find the warfarin dose that is best for you.  How is this treated?  This condition is treated with vitamin K. Vitamin K helps the blood to clot. You may receive vitamin K every 12 hours, or as needed. You may also receive donated plasma (transfusions of fresh frozen plasma). Plasma is the liquid part of blood, and contains substances that help the blood clot.  Follow these instructions at home:  Medicines  · Take warfarin exactly as told by your health care provider. This ensures that you avoid bleeding or clots that could result in serious injury, pain, or disability.  · Take your medicine at the same time every day. If you forget to take your dose of warfarin, take it as soon as you remember on that day. If you do not remember to take it on that day, do not take an extra dose the next day.  · Contact your health care provider if you miss a dose or take an extra dose. Do not change your dosage on your own to make up for missed or extra doses.  · Talk with your health care provider or your pharmacist before starting or stopping any new medicines. Many  prescription and over-the-counter medicines can interfere with warfarin. This includes over-the-counter vitamins, dietary supplements, herbal medicines, and pain medicines. Your warfarin dosage may need to be adjusted.  Eating and drinking  · It is important to maintain a normal, balanced diet while taking warfarin. Avoid major changes in your diet. If you are planning to change your diet, talk with your health care provider before making changes.  · Your health care provider may recommend that you work with a diet and nutrition specialist (dietitian).  · Vitamin K makes warfarin less effective. It is found in many foods. Eat a consistent amount of foods that contain vitamin K. For example, you may decide to eat 2 vitamin K-containing foods each day. Your warfarin dose is set according to the amount of vitamin K in your blood.  · Eat a consistent amount of foods that contain vitamin K. Vitamin K makes warfarin less effective, so eating the same amount each day enables your health care provider to set the correct dose of warfarin. You may decide to eat 2 vitamin K-containing foods each day.  Tests    · Make sure to have PT tests at least once every 4-6 weeks for the entire time you are taking warfarin.  · Ask your health care provider what your target INR range is. Make sure you always know your target range. If your INR is not in your target range, your health care provider may adjust your dosage.  Preventing bleeding and injury  · Some common over-the-counter medicines and supplements may increase the risk of bleeding while taking warfarin. They include:  ? Acetaminophen.  ? Aspirin.  ? NSAIDs, such as ibuprofen or naproxen.  ? Vitamin E.  · Avoid situations that cause bleeding. You may bleed more easily while taking warfarin. To limit bleeding, take the following actions:  ? Use a softer toothbrush.  ? Floss with waxed floss, not unwaxed floss.  ? Shave with an electric razor, not with a blade.  ? Limit your use  of sharp objects.  ? Avoid potentially harmful activities, such as contact sports.  General instructions  · Wear or carry identification that says that you are taking warfarin.  · Make sure that all health care providers, including your dentist, know that you are taking warfarin.  · If you need surgery, tell your health care provider that you are taking warfarin. You may have to stop taking warfarin before your surgery.  · If you plan to breastfeed or become pregnant while taking warfarin, talk with your health care provider.  · Avoid alcohol, tobacco, and drugs.  ? If your health care provider approves, limit alcohol intake to no more than 1 drink a day for non-pregnant women and 2 drinks a day for men. One drink equals 12 oz. of beer, 5 oz. of wine, or 1½ oz. of hard liquor.  ? If you change the amount of nicotine, tobacco, or alcohol you use, tell your health care provider.  · Keep all follow-up visits and lab visits as told by your health care provider. This is very important because warfarin is a medicine that needs to be closely monitored.  Contact a health care provider if:  · You miss a dose.  · You take an extra dose.  · You plan to have any kind of surgery or procedure.  · You are unable to take your medicine due to nausea, vomiting, or diarrhea.  · You have any major changes in your diet, or you plan to make major changes in your diet.  · You start or stop any over-the-counter medicine, prescription medicine, or dietary supplement.  · You become pregnant, plan to become pregnant, or think you may be pregnant.  · You have menstrual periods that are heavier than usual, or unusual vaginal bleeding.  · You have unusual bruising.  · You lose your appetite.  · You have a fever.  · You have diarrhea that lasts for more than 24 hours.  Get help right away if:  · You develop symptoms of an allergic reaction, such as:  ? Swelling of the lips, face, tongue, mouth, or throat.  ? Rash.  ? Itching.  ? Itchy, red,  swollen areas of skin (hives).  ? Trouble breathing.  ? Chest tightness.  · You have any symptoms of stroke. BEFAST is an easy way to remember the main warning signs of stroke:  ? B - Balance. Signs are dizziness, sudden trouble walking, or loss of balance.  ? E - Eye. Signs are trouble seeing or a sudden change in vision.  ? F - Face. Signs are sudden weakness or numbness of the face, or the face or eyelid drooping on one side.  ? A - Arm. Signs are weakness or numbness in an arm. This happens suddenly and usually on one side of the body.  ? S - Speech. Signs are trouble speaking, slurred speech, or trouble understanding speech.  ? T - Time. Time to call emergency services. Write down the time your symptoms started.  · You have other signs of stroke, such as:  ? A sudden, severe headache with no known cause.  ? Nausea or vomiting.  ? Seizure.  · You have signs or symptoms of a blood clot, such as:  ? Pain or swelling in your leg or arm.  ? Skin that is red or warm to the touch on your arm or leg.  ? Shortness of breath or difficulty breathing.  ? Chest pain.  ? Unexplained fever.  · You have:  ? A fall or have an accident, especially if you hit your head.  ? Blood in your urine. Your urine may look reddish, pinkish, or tea-colored.  ? Blood in your stool. Your stool may be black or bright red.  ? Bleeding that does not stop after applying pressure to the area for 30 minutes.  ? Severe pain in your joints or back.  ? Purple or blue toes.  ? Skin ulcers that do not go away.  · You vomit blood or cough up blood. The blood may be bright red, or it may look like coffee grounds.  These symptoms may represent a serious problem that is an emergency. Do not wait to see if the symptoms will go away. Get medical help right away. Call your local emergency services (911 in the [place] Do not drive yourself to the hospital.  Summary  · Warfarin needs to be closely monitored with blood tests. It is very important to keep all lab  visits and follow-up visits with your health care provider. Make sure you know your target INR range and your warfarin dosage.  · Monitor how much vitamin K you eat every day. Try to eat the same amount every day.  · Wear or carry identification that says that you are taking warfarin.  · Take warfarin at the same time every day. Call your health care provider if you miss a dose or if you take an extra dose. Do not change the dosage of warfarin on your own.  · Know the signs and symptoms of blood clots, bleeding, and stroke. Know when to get emergency medical help.  This information is not intended to replace advice given to you by your health care provider. Make sure you discuss any questions you have with your health care provider.  Document Released: 11/26/2007 Document Revised: 11/30/2018 Document Reviewed: 03/07/2018  Elsevier Patient Education © 2020 [employer]      Cholecystitis    Cholecystitis is inflammation of the gallbladder. It is often called a gallbladder attack. The gallbladder is a pear-shaped organ that lies beneath the liver on the right side of the body. The gallbladder stores bile, which is a fluid that helps the body digest fats. If bile builds up in your gallbladder, your gallbladder becomes inflamed.  This condition may occur suddenly. Cholecystitis is a serious condition and requires treatment.  What are the causes?  The most common cause of this condition is gallstones. Gallstones can block the tube (duct) that carries bile out of your gallbladder. This causes bile to build up.  Other causes include:  · Damage to the gallbladder due to a decrease in blood flow.  · Infections in the bile ducts.  · Scars or kinks in the bile ducts.  · Tumors in the liver, pancreas, or gallbladder.  What increases the risk?  You are more likely to develop this condition if:  · You have sickle cell disease.  · You take birth control pills or use estrogen.  · You have alcoholic liver disease.  · You have liver  "cirrhosis.  · You have your nutrition delivered through a vein (parenteral nutrition).  · You are critically ill.  · You do not eat or drink for a long time. This is also called \"fasting.\"  · You are obese.  · You lose weight too fast.  · You are pregnant.  · You have high levels of fat (triglycerides) in the blood.  · You have pancreatitis.  What are the signs or symptoms?  Symptoms of this condition include:  · Pain in the abdomen, especially in the upper right area of the abdomen.  · Tenderness or bloating in the abdomen.  · Nausea.  · Vomiting.  · Fever.  · Chills.  How is this diagnosed?  This condition is diagnosed with a medical history and physical exam. You may also have other tests, including:  · Imaging tests, such as:  ? An ultrasound of the gallbladder.  ? A CT scan of the abdomen.  ? A gallbladder nuclear scan (HIDA scan). This scan allows your health care provider to see the bile moving from your liver to your gallbladder and on to your small intestine.  ? MRI.  · Blood tests, such as:  ? A complete blood count. The white blood cell count may be higher than normal.  ? Liver function tests. Certain types of gallstones cause some results to be higher than normal.  How is this treated?  Treatment may include:  · Surgery to remove your gallbladder (cholecystectomy).  · Antibiotic medicine, usually through an IV.  · Fasting for a certain amount of time.  · Giving IV fluids.  · Medicine to treat pain or vomiting.  Follow these instructions at home:  · If you had surgery, follow instructions from your health care provider about home care after the procedure.  Medicines    · Take over-the-counter and prescription medicines only as told by your health care provider.  · If you were prescribed an antibiotic medicine, take it as told by your health care provider. Do not stop taking the antibiotic even if you start to feel better.  General instructions  · Follow instructions from your health care provider about " what to eat or drink. When you are allowed to eat, avoid eating or drinking anything that triggers your symptoms.  · Do not lift anything that is heavier than 10 lb (4.5 kg), or the limit that you are told, until your health care provider says that it is safe.  · Do not use any products that contain nicotine or tobacco, such as cigarettes and e-cigarettes. If you need help quitting, ask your health care provider.  · Keep all follow-up visits as told by your health care provider. This is important.  Contact a health care provider if:  · Your pain is not controlled with medicine.  · You have a fever.  Get help right away if:  · Your pain moves to another part of your abdomen or to your back.  · You continue to have symptoms or you develop new symptoms even with treatment.  Summary  · Cholecystitis is inflammation of the gallbladder.  · The most common cause of this condition is gallstones. Gallstones can block the tube (duct) that carries bile out of your gallbladder.  · Common symptoms are pain in the abdomen, nausea, vomiting, fever, and chills.  · This condition is treated with surgery to remove the gallbladder, medicines, fasting, and IV fluids.  · Follow your health care provider's instructions for eating and drinking. Avoid eating anything that triggers your symptoms.  This information is not intended to replace advice given to you by your health care provider. Make sure you discuss any questions you have with your health care provider.  Document Released: 12/18/2006 Document Revised: 04/26/2019 Document Reviewed: 04/26/2019  [employer] Patient Education © 2020 [employer]

## 2021-05-20 NOTE — DISCHARGE PLANNING
Anticipated Discharge Disposition: Home    Action: LSW's first day with pt on LSW's assignment. LSW met with pt at bedside to discuss IMM. LSW provided copy of IMM. Pt able to provide signature for IMM.     Pt has d/c order. No known d/c needs at this time.     Barriers to Discharge: None    Plan: LSW to assist as needed     Addendum 0976  LSW faxed signed IMM to Adriana ROBERT.     Addendum 1239  Order for walker placed. LSW met with pt at bedside to collect DME CHOICE for walker. Pt provided signature for [facility].     Addendum 1242  LSW messaged bedside RNAdri to place order for traction.   LSW faxed DME CHOICE to Adriana ROBERT.     Addendum 1520  LSW informed by bedside Adri FRANCIS that pt has been d/c and needs lovenox. Pt's preferred pharmacy is [facility] on [address].   dAri has called [facility] on [address] [facility] and they also don't have the medication in stock.     Addendum 1526  LSW on hold with [facility] on [facility] to see if medication is in stock.   LSW received a call on Vo[employer] from bedside RNAdri informing LSW that [facility] on [address] has the medication in stock. Adri will be messaging Dr. Acosta to send script.     Addendum 4815  Dr. Acosta was able to send script to [facility] off of [facility].   LSW called [facility] [phone] and spoke to Heidi. Medication out of pocket is $10.00 and will be ready in 30 minutes.     Addendum 9036  LSW called pt to provide update.   LSW informed that he uses [facility] that provides test strips and lancets and he inputs results on [facility], then the [facility] notifies [facility] vascular clinic. [facility] then contacts him within an hour to notify whether he is in range or not.         
Care Transition Team Discharge Planning    Anticipated Discharge Disposition: TBD    Action: Per 2nd rounds, pending labs, possibly d/c home in 1-2 more days if cultures and PT are appropriate.     Barriers to Discharge: medical clearance/stability    Plan: Lsw will continue to follow, and assist w/ d/c planning.    
ER CM met with pt and spouse at bedside. Very pleasant man alert and oriented. He lives in a downstairs apt  With spouse. He has been independent in ADLS with no DME or O2 prior. PCP is Dr Geetha BARBER, Cardiology is Dr Yeboah at [address] [place] [address] office. He fills RX at [facility] on [address] or [facility] mail order. No concerns or issues.   Care Transition Team Assessment    Information Source  Orientation Level: Oriented X4  Information Given By: Patient  Informant's Name: Abel  Who is responsible for making decisions for patient? : Patient         Elopement Risk  Legal Hold: No  Ambulatory or Self Mobile in Wheelchair: No-Not an Elopement Risk  Disoriented: No  Psychiatric Symptoms: None  History of Wandering: No  Elopement this Admit: No  Vocalizing Wanting to Leave: No  Displays Behaviors, Body Language Wanting to Leave: No-Not at Risk for Elopement  Elopement Risk: Not at Risk for Elopement    Interdisciplinary Discharge Planning  Primary Care Physician: Dr Iniguez  Lives with - Patient's Self Care Capacity: Spouse  Patient or legal guardian wants to designate a caregiver: No  Support Systems: Spouse / Significant Other  Housing / Facility: 1 Story Apartment / Condo  Do You Take your Prescribed Medications Regularly: Yes  Mobility Issues: No  Prior Services: None  Assistance Needed: No  Durable Medical Equipment: Not Applicable    Discharge Preparedness  What is your plan after discharge?: Uncertain - pending medical team collaboration         Finances  Prescription Coverage: Yes    Vision / Hearing Impairment  Vision Impairment : No  Hearing Impairment : No              Domestic Abuse  Have you ever been the victim of abuse or violence?: No  Physical Abuse or Sexual Abuse: No  Verbal Abuse or Emotional Abuse: No  Possible Abuse/Neglect Reported to:: Not Applicable              Anticipated Discharge Information  Discharge Disposition: Still a Patient (30)                  
Received Choice form at 1240  Agency/Facility Name: [facility]  Referral sent per Choice form @ 1400       
English

## 2021-05-20 NOTE — THERAPY
Physical Therapy   Initial Evaluation     Patient Name: Abel Day III  Age:  71 y.o., Sex:  male  Medical Record #: [id number]  Today's Date: 5/20/2021     Precautions: (P) Fall Risk    Assessment  Patient is 71 y.o. male with a diagnosis of abdominal pain,Pt lives at home with wife and is mod active.Pt is safe with bed mob,transfers and ambulation with a FWW.02 sat 91% room air at rest 87% with ambulation. Pt appears safe for home with support of wife    Plan    Recommend Physical Therapy for Evaluation only      05/20/21 0900   Total Time Spent   Total Time Spent (Mins) 30   Charge Group   PT Evaluation PT Evaluation Mod   Initial Contact Note    Initial Contact Note Order Received and Verified, Physical Therapy Evaluation in Progress with Full Report to Follow.   Precautions   Precautions Fall Risk   Pain 0 - 10 Group   Pain Rating Scale (NPRS) 0   Comfort Goal 0   Prior Living Situation   Prior Services None   Housing / Facility 1 Story Apartment / Condo   Steps Into Home 0   Steps In Home 0   Equipment Owned None   Lives with - Patient's Self Care Capacity Spouse   Prior Level of Functional Mobility   Bed Mobility Independent   Transfer Status Independent   Ambulation Independent   Distance Ambulation (Feet)   (community amb)   Assistive Devices Used None   History of Falls   History of Falls No   Cognition    Cognition / Consciousness WDL   Level of Consciousness Alert   Passive ROM Lower Body   Passive ROM Lower Body WDL   Active ROM Lower Body    Active ROM Lower Body  WDL   Strength Lower Body   Lower Body Strength  X   Comments general weakness   Coordination Lower Body    Coordination Lower Body  WDL   Balance Assessment   Sitting Balance (Static) Fair +   Sitting Balance (Dynamic) Fair +   Standing Balance (Static) Fair   Standing Balance (Dynamic) Fair   Weight Shift Sitting Good   Weight Shift Standing Good   Gait Analysis   Gait Level Of Assist Supervised   Assistive Device Front Wheel Walker    Distance (Feet) 100   # of Times Distance was Traveled 1   Deviation Step To   # of Stairs Climbed 0   Weight Bearing Status full   Bed Mobility    Supine to Sit Modified Independent   Sit to Supine Modified Independent   Scooting Modified Independent   Functional Mobility   Sit to Stand Supervised   Bed, Chair, Wheelchair Transfer Supervised   Transfer Method Stand Step   How much difficulty does the patient currently have...   Turning over in bed (including adjusting bedclothes, sheets and blankets)? 4   Sitting down on and standing up from a chair with arms (e.g., wheelchair, bedside commode, etc.) 4   Moving from lying on back to sitting on the side of the bed? 4   How much help from another person does the patient currently need...   Moving to and from a bed to a chair (including a wheelchair)? 4   Need to walk in a hospital room? 4   Climbing 3-5 steps with a railing? 4   6 clicks Mobility Score 24   Activity Tolerance   Sitting Edge of Bed 10   Standing 10   Patient / Family Goals    Patient / Family Goal #1 Home   Anticipated Discharge Equipment and Recommendations   DC Equipment Recommendations Front-Wheel Walker   Discharge Recommendations Anticipate that the patient will have no further physical therapy needs after discharge from the hospital   Interdisciplinary Plan of Care Collaboration   IDT Collaboration with  Nursing   Session Information   Date / Session Number  5/20   Priority 0       DC Equipment Recommendations: (P) Front-Wheel Walker  Discharge Recommendations: (P) Anticipate that the patient will have no further physical therapy needs after discharge from the hospital

## 2021-05-20 NOTE — PROGRESS NOTES
"Pt requesting to get up to cardiac chair. Assisted pt to sitting position at edge of bed, pt reported feeling \"light-headed,\" pulse oximeter applied, SpO2 in the mid 80's. Applied 4L per NC cannula and pt's SpO2 recovered to the mid 90's with reports of feeling better. Assisted pt to ambulate to cardiac chair, pt able to stand on his own and ambulate x standby assist w/FWW. Pt remained in chair x 75 minutes. Required 2 person assist to stand from chair however was able to ambulate back to bed x standby assist w/FWW. Oxygen only required during exertion, removed afterward.    "

## 2021-05-20 NOTE — PROGRESS NOTES
Bedside report received from PENNY Rush and PENNY Rodriguez. Assumed pt care. Pt is AOx4, denies any pain or other distress at this time. Discussed POC including incentive spirometer usage, ambulation later this evening. Pt verbalized understanding. Hourly rounding in place. Fall precautions in place and call lights w/in reach.

## 2021-05-20 NOTE — PROGRESS NOTES
Received patient from NOC RN. Assessment complete. A&Ox4. Denies pain. POC discussed. Call light within reach. Bed in low, locked position. All needs attended to at this time.   Denies nausea    Pt requests low fat diet tray. RN left [facility] for kitchen

## 2021-05-20 NOTE — DISCHARGE SUMMARY
"Discharge Summary    CHIEF COMPLAINT ON ADMISSION  Chief Complaint   Patient presents with   • Atrial Fibrillation     \" In and out last night and today\" per pt.    • Abdominal Pain     No bm into ostomy since 2130 last night . Low abd pain.    • N/V     x 5 last evening        Reason for Admission  abdominal pain, back pain, dizzine*     Admission Date  5/12/2021    CODE STATUS  Full Code    HPI & HOSPITAL COURSE    Per notes, \"71 y.o. male with a past medical history of atrial fibrillation s/p ablation, venous thrombosis and pulmonary embolism on anticoagulation with Coumadin with a past medical history of atrial fibrillation status post ablation, chronic anticoagulation who presented 5/12/2021 with episodes of palpitations, abdominal pain nausea and vomiting.  Patient reports having episodes of palpitations over the past 2 days and reports that he was able to catch rapid heart rate on home monitoring device on several occasions.  He also has noticed left lower quadrant abdominal pain, that is described as crampy/dull, and rated moderate in severity, associated with reduced ostomy output and he was concerned about possible ostomy stenosis.  He reports anorexia and reduced oral intake.  He reports that his abdominal pain has been resolved.\"    Patient was admitted evaluated by general surgery.  He had a HIDA scan on 5/13/2021, which was highly suggestive for cholecystitis.  Underwent open cholecystectomy on 5/17, without complications.  Plan for ERCP on 5/18.    Ercp completed on 5/18 with biliary stent placement.    Post operatively patient complaining of nausea and abdominal pain. He was given zofran prn nausea and we checked lipase level which was normal.    he was c/o dysuria and we sent off a urine culture(results still pending at time of discharge but he did have some improvement with po pyridium which is available OTC.     He did receive five days of IV rocephin that should of covered gallbladder and " UTI    He was cleared for discharge ( with MATT drain) by general surgery Md with f/u with dr antoine in the office     He was resumed on weight based lovenox bridge  and coumadin in hospital and he will follow up with coumadin clinic for INR checks as he has done in the past. INR needs to be 2-3 for hx AFIB      Therefore, he is discharged in good and stable condition to home with close outpatient follow-up.    The patient met 2-midnight criteria for an inpatient stay at the time of discharge.    Discharge Date  5/20    FOLLOW UP ITEMS POST DISCHARGE  Dr antoine surgery    DISCHARGE DIAGNOSES  Principal Problem (Resolved):    Abdominal pain with imaging finding concerning for cholecystitis POA: Yes  Active Problems:    History of pulmonary embolism and DVT, unprovoked. POA: Yes    S/P ablation of atrial fibrillation POA: Yes      Overview: Time two 2016 [place]    Hyperlipidemia POA: Yes    Pulmonary hypertension (HCC) POA: Yes    Acid reflux disease POA: Yes  Resolved Problems:    Palpitations POA: Yes    Cholecystitis POA: Yes    Hypomagnesemia POA: Unknown    Acute cystitis with hematuria POA: Yes    Weakness POA: No    Dyspepsia POA: No    Hypokalemia POA: Yes      FOLLOW UP  Future Appointments   Date Time Provider Department Center   6/22/2021 11:00 AM Robb Yeboah M.D. [facility] None   10/20/2021  8:30 AM [id number] None   10/26/2021 10:50 AM Saranya Power M.D. [facility] None     Panda Antoine M.D.  [address] [address]  [phone]    Schedule an appointment as soon as possible for a visit in 1 week  for drain check      MEDICATIONS ON DISCHARGE     Medication List      START taking these medications      Instructions   atorvastatin 40 MG Tabs  Commonly known as: LIPITOR   Take 1 tablet by mouth every evening.  Dose: 40 mg     enoxaparin 150 MG/ML Soln  Commonly known as: LOVENOX   Inject 150 mg under the skin every 12 hours for 7 days.  Dose: 1 mg/kg     ondansetron 4 MG Tbdp  Commonly known as:  ZOFRAN ODT   Take 1 tablet by mouth every four hours as needed for Nausea (give PO if IV route is unavailable.).  Dose: 4 mg     oxyCODONE immediate-release 5 MG Tabs  Commonly known as: ROXICODONE   Take 0.5 Tablets by mouth every 3 hours as needed for up to 7 days.  Dose: 2.5 mg     phenazopyridine 100 MG Tabs  Commonly known as: PYRIDIUM   Take 1 tablet by mouth 3 times a day as needed for Mild Pain or Moderate Pain (painful urination).  Dose: 100 mg        CHANGE how you take these medications      Instructions   metoprolol SR 25 MG Tb24  What changed:   · how to take this  · when to take this  Commonly known as: TOPROL XL   TAKE 1 TABLET DAILY     warfarin 5 MG Tabs  What changed:   · when to take this  · additional instructions  Commonly known as: COUMADIN   Take 1 Tab by mouth every Friday. As directed per [employer].  Dose: 5 mg        CONTINUE taking these medications      Instructions   CALCIUM-VITAMIN D PO   Take 1 tablet by mouth 2 times a day.  Dose: 1 tablet     cetirizine 10 MG Tabs  Commonly known as: ZYRTEC   Take 10 mg by mouth every morning. Indications: **OTC**  Dose: 10 mg     Flonase 50 MCG/ACT nasal spray  Generic drug: fluticasone   Administer 1 Spray into affected nostril(S) 1 time a day as needed.  Dose: 1 Spray     montelukast 10 MG Tabs  Commonly known as: SINGULAIR   Take 10 mg by mouth every day. IN THE EVENING  Dose: 10 mg     MULTI-VITAMIN PO   Take 1 tablet by mouth every day.  Dose: 1 tablet     PRESERVISION AREDS 2 PO   Take 1 tablet by mouth 2 times a day.  Dose: 1 tablet     PREVACID PO   Take 15 mg by mouth every morning.  Dose: 15 mg     Valium 5 MG Tabs  Generic drug: diazePAM   Take 2.5 mg by mouth as needed for Anxiety.  Dose: 2.5 mg     warfarin 2.5 MG Tabs  Commonly known as: COUMADIN   Take 2.5 mg by mouth see administration instructions. Pt takes:  Warfarin 2.5mg on:   Tuesday, Wednesday, Thursday, Saturday, Sunday    Pt also has Rx for:  Warfarin 5mg on:   Monday,  Friday  Dose: 2.5 mg            Allergies  Allergies   Allergen Reactions   • Other Misc Unspecified     Silk sutures (body rejected them)   • Omnicef [Cefdinir]      Internal bleeding stated by patient       DIET  Orders Placed This Encounter   Procedures   • Diet Order Diet: Regular     Standing Status:   Standing     Number of Occurrences:   1     Order Specific Question:   Diet:     Answer:   Regular [1]       ACTIVITY  As tolerated.  Weight bearing as tolerated    CONSULTATIONS  Dr antoine surgery    Dr st[facility]  PROCEDURES  DATE OF SERVICE:  05/18/2021     PROCEDURE PERFORMED:  Endoscopic retrograde cholangiography with sphincterotomy and stent placement     CONSENT:  Procedure risks and benefits reviewed thoroughly with the patient, risks including but not limited to bleeding, perforation, side effects of medication were informed.  Patient voiced understanding and agreed to proceed.  Additional risks inherent to ERCP that being mild, moderate, severe pancreatitis that could lead to postprocedural pain, prolonged hospitalization, intensive care unit stay, and/or death were reviewed with the patient who voiced understanding and agreed to proceed.     PREPROCEDURE DIAGNOSIS:  bile leak, s/p partial cholecystectomy     POSTPROCEDURE DIAGNOSES:  bile leak, s/p partial cholecystectomy     PHYSICIAN: Olivier Stanford MD        DESCRIPTION OF PROCEDURE:  The patient was placed in a prone position after intubation and sedation.  A bite block was inserted in the mouth and a side-viewing duodenoscope was passed carefully and easily under semi-direct visualization into the esophagus and passed through the stomach to the duodenum. Upon reaching the second portion of the duodenum, the scope was withdrawn to the short-position.The ampulla was identified. The ampulla appeared normal.     Using a [employer] Rx44 sphincterotome preloaded with a 0.035 wire the CBD was cannulated. The wire was advanced under fluoroscopy to  the level of the intrahepatics and then sphincterotome was advanced over the wire. Bile was aspirated and then contrast was injected to obtain a cholangiogram. The cholangiogram was entirely interpreted by myself during the exam. The cholangiogram revealed a normal caliber CBD measuring 5mm in diameter. There appeared to be a small amount of contrast extravasation from the cystic duct stump indicating a small bile leak. The sphincterotome was withdrawn to the level of the papilla and an adequate sphincterotomy was performed. The sphincterotome was then removed and exchanged for a 9-12mm biliary extraction balloon. Multiple balloon dredges were performed with no evidence of choledocholithiasis present. The balloon was removed and a CBD stent was placed. A 10F x 7cm plastic stent was selected and this was successfully advanced into the duct. There was brisk flow of bile from the stent at deployment. All instruments were then removed and the procedure was completed.        COMPLICATIONS:  None.     BLOOD LOSS:  None.     SPECIMENS:  None.     SUMMARY:  1.) ERCP with biliary cannulation  2.) Cholangiogram - normal caliber CBD, small bile leak off of the cystic duct stump  3.) Sphincterotomy performed  4.) Successful stent placement of a 10F x 7cm plastic stent     RECOMMENDATIONS:   1.) repeat ERCP in 4-6 weeks for stent removal  2.) f/u with Dr Bird for removal of the MATT drain  3.) Hold Lovenox until tonight's dose  4.) restart diet      Routing History              ==============================================  Panda Bird M.D.   Physician   Surgery General   OP Report      Signed   Date of Service:  5/17/2021 11:08 AM                    []Hide copied text    []Jordonver for details  Operative Report     Date: 5/17/2021     Surgeon: Panda Bird M.D.      Assistant: None     Pre-operative Diagnosis: Acute cholecystitis     Post-operative Diagnosis: Acute on chronic gangrenous cholecystitis     Procedure: Laparoscopic  partial cholecystectomy     ASA Classification: III.     Indications: This is a 71 y.o. male who presented with symptoms of acute cholecystitis here for laparoscopic possible open cholecystectomy.     The indications for a surgical assistant in this surgery were indicated due to complexity of the procedure. Their role included aiding in incision, retraction, holding devices including camera for laparoscopic procedure, and closure of the wound.       Findings: Significantly edematous and necrotic gallbladder with multiple adhesions to small bowel throughout.  Significant edema and adhesions in the infundibulum secondary to this felt it was safer to perform partial cholecystectomy with plans for ERCP postoperatively     Wound Classification: Class II, II, Clean Contaminated..     Procedure in detail: The patient was seen and examined in the preoperative holding area.  The risks benefits and alternatives of the procedure were discussed with the patient who wished to proceed with the procedure as described.  The patient was transferred to the operating room placed in supine position and all pressure points were properly padded.  General endotracheal anesthesia was induced and preoperative antibiotics were given per SCIP protocol.  Patient's abdomen was prepped with ChloraPrep and draped in the normal sterile fashion.  A timeout was performed confirming correct patient, correct procedure, and that all necessary equipment was in the room.       Began the procedure performing a right upper quadrant Optiview access.  We sharp incised skin use a 5 mm Optiview port to access the abdominal cavity.  Pneumoperitoneum was then achieved and maintained to 15 mmHg carbon dioxide.  We carefully examined the area directly underneath the access point and found no injury.  Under direct visualization we placed a 5 mm periumbilical port and 2 additional right upper quadrant 5 mm ports and a 10 mm epigastric port.  There was a significant  amount of edema and adhesions between the small bowel and the gallbladder which were very carefully dissected free were then able to elevate the gallbladder somewhat and were able to drain this with the laparoscopic needle.  We then able to grasp the gallbladder and retracted somewhat cephalad but secondary to the size of his liver as well as the semiamount of adhesions and edema were unable to get good retraction on this.  We continue with our careful dissection until were able to identify what appeared to be the infundibulum.  There was still more adhesions and inflammation down there and I deemed to be unsafe to continue dissection in this area.  We open the gallbladder wall at the fundus and carefully continues dissection down toward the infundibulum once we reach an area was close infundibulum but final flare from all the adhesions to be safe we transected the gallbladder while here and then took this off the fossa leaving the back of the gallbladder wall and the base of the infundibulum.  We carefully obtained hemostasis and copiously irrigated the abdominal cavity.  We then placed the partial cholecystectomy remnant and 2 large gallstones in the 10 mm Endo Catch bag removed through the 10 mm subxiphoid port.  We then used Lucia along the edge of the gallbladder and the fossa to ensure continue hemostasis given his anticoagulation status.  We placed a 10 Portuguese flat MATT drain into the infundibulum along the gallbladder fossa.  This secured to skin with a 3-0 nylon suture.  We then closed the terminal port with Endo stitch device and 0 Vicryl suture.  And remove the remainder of the ports and closed the skin with 4-0 Monocryl in a subcuticular fashion.  Dermabond was placed over the wounds and a drain sponge was placed over the drain site.     The patient was awakened from general anesthetic, and was taken to the recovery room in stable condition.     Sponge and needle counts were correct at the end of the  case.      Specimen: Partial cholecystectomy with several large gallstones     EBL: 150 cc     Dispo: stable, extubated, to PACU     Panda Bird M.D.  [facility]  [phone]       \      Routing History                    LABORATORY  Lab Results   Component Value Date    SODIUM 131 (L) 05/20/2021    POTASSIUM 3.9 05/20/2021    CHLORIDE 95 (L) 05/20/2021    CO2 27 05/20/2021    GLUCOSE 119 (H) 05/20/2021    BUN 13 05/20/2021    CREATININE 0.74 05/20/2021        Lab Results   Component Value Date    WBC 9.3 05/20/2021    HEMOGLOBIN 13.3 (L) 05/20/2021    HEMATOCRIT 40.0 (L) 05/20/2021    PLATELETCT 192 05/20/2021        Total time of the discharge process exceeds 40 minutes.

## 2021-05-21 LAB
BACTERIA UR CULT: NORMAL
SIGNIFICANT IND 70042: NORMAL
SITE SITE: NORMAL
SOURCE SOURCE: NORMAL

## 2021-05-24 ENCOUNTER — ANTICOAGULATION MONITORING (OUTPATIENT)
Dept: VASCULAR LAB | Facility: MEDICAL CENTER | Age: 71
End: 2021-05-24

## 2021-05-24 DIAGNOSIS — Z86.711 HISTORY OF PULMONARY EMBOLISM: ICD-10-CM

## 2021-05-24 LAB — INR PPP: 1.8 (ref 2–3.5)

## 2021-05-24 NOTE — PROGRESS NOTES
OP   Telephone Anticoagulation Service Note      Anticoagulation Summary  As of 2021    INR goal:  2.0-3.0   TTR:  63.5 % (5.9 y)   INR used for dosin.80 (2021)   Warfarin maintenance plan:  5 mg (5 mg x 1) every Mon, Fri; 2.5 mg (5 mg x 0.5) all other days   Weekly warfarin total:  22.5 mg   Plan last modified:  Yoni GoreD (2020)   Next INR check:  2021   Target end date:  Indefinite    Indications    History of pulmonary embolism and DVT  unprovoked. [Z86.711]  Atrial fibrillation with RVR (HCC) (Resolved) [I48.91]             Anticoagulation Episode Summary     INR check location:      Preferred lab:      Send INR reminders to:      Comments:  [place]      Anticoagulation Care Providers     Provider Role Specialty Phone number    [facility] Responsible  [phone]        Anticoagulation Patient Findings    Spoke with the patient on the phone today, reporting a SUB-therapeutic INR of 1.8.   Confirmed the current warfarin dosing regimen and patient compliance.  Patient reports recent admission to hospital and current bridge with Lovenox.  Patient denies any interval changes to diet and/or medications.   Patient denies any signs/symptoms of bleeding or clotting.  Patient instructed to bolus with 7.5mg TONIGHT, as a one time boost dose, then to resume his current regimen along with Lovenox injections.   Patient almost out of injections and will retest again tomorrow so see if we need to call in a refill for his Lovenox.      Jay Styles  PharmD

## 2021-05-25 LAB — INR PPP: 1.9 (ref 2–3.5)

## 2021-05-26 ENCOUNTER — ANTICOAGULATION MONITORING (OUTPATIENT)
Dept: VASCULAR LAB | Facility: MEDICAL CENTER | Age: 71
End: 2021-05-26

## 2021-05-26 DIAGNOSIS — Z86.711 HISTORY OF PULMONARY EMBOLISM: ICD-10-CM

## 2021-05-26 LAB — INR PPP: 2.2 (ref 2–3.5)

## 2021-05-26 NOTE — PROGRESS NOTES
Anticoagulation Summary  As of 2021    INR goal:  2.0-3.0   TTR:  63.5 % (5.9 y)   INR used for dosin.90 (2021)   Warfarin maintenance plan:  5 mg (5 mg x 1) every Mon, Fri; 2.5 mg (5 mg x 0.5) all other days   Weekly warfarin total:  22.5 mg   Plan last modified:  Yoni GoreD (2020)   Next INR check:     Target end date:  Indefinite    Indications    History of pulmonary embolism and DVT  unprovoked. [Z86.711]  Atrial fibrillation with RVR (HCC) (Resolved) [I48.91]             Anticoagulation Episode Summary     INR check location:      Preferred lab:      Send INR reminders to:      Comments:  [place]      Anticoagulation Care Providers     Provider Role Specialty Phone number    [facility] Responsible  [phone]        Anticoagulation Patient Findings          Spoke with patient today regarding subtherapeutic INR of 1.9.  Patient denies any signs/symptoms of bruising or bleeding or any changes in diet and medications.  Instructed patient to call clinic with any questions or concerns.    PT adjusted on his own yesterday for a total of 5 mg. He tested his INR shortly before I called and self reported a 2.2 today  PT has not had lovenox for the past 2 days.     Follow up tomorrow with INR, to reduce risk of adverse events related to this high risk medication,  Warfarin.    Sharif Holliday, Pharmacy Intern    Discussed with Makenna moreira PharmD    I have reviewed and concur with the above plan on 2021.  Makenna Moreira, Clinical Pharmacist, CDE, CACP

## 2021-05-28 LAB — INR PPP: 2 (ref 2–3.5)

## 2021-06-01 ENCOUNTER — ANTICOAGULATION MONITORING (OUTPATIENT)
Dept: VASCULAR LAB | Facility: MEDICAL CENTER | Age: 71
End: 2021-06-01

## 2021-06-01 DIAGNOSIS — Z86.711 HISTORY OF PULMONARY EMBOLISM: ICD-10-CM

## 2021-06-01 NOTE — PROGRESS NOTES
Anticoagulation Summary  As of 2021    INR goal:  2.0-3.0   TTR:  63.5 % (6 y)   INR used for dosin.00 (2021)   Warfarin maintenance plan:  5 mg (5 mg x 1) every Mon, Fri; 2.5 mg (5 mg x 0.5) all other days   Weekly warfarin total:  22.5 mg   Plan last modified:  Kyree Al, PharmD (2020)   Next INR check:     Target end date:  Indefinite    Indications    History of pulmonary embolism and DVT  unprovoked. [Z86.711]  Atrial fibrillation with RVR (HCC) (Resolved) [I48.91]             Anticoagulation Episode Summary     INR check location:      Preferred lab:      Send INR reminders to:      Comments:  [place]      Anticoagulation Care Providers     Provider Role Specialty Phone number    [facility] Responsible  [phone]        Anticoagulation Patient Findings        Left voicemail message to report a   therapeutic INR of 2.0.  Pt to continue with current warfarin dosing regimen. Requested pt contact the clinic for any s/s of unusual bleeding, bruising, clotting or any changes to diet or medication.  Follow up in 1 weeks, to reduce the risk of adverse events related to this high risk medication, warfarin.    Makenna Hopkins, Clinical Pharmacist

## 2021-06-03 ENCOUNTER — TELEPHONE (OUTPATIENT)
Dept: CARDIOLOGY | Facility: MEDICAL CENTER | Age: 71
End: 2021-06-03

## 2021-06-03 DIAGNOSIS — E78.5 DYSLIPIDEMIA: ICD-10-CM

## 2021-06-03 RX ORDER — ATORVASTATIN CALCIUM 40 MG/1
40 TABLET, FILM COATED ORAL EVERY EVENING
Qty: 90 TABLET | Refills: 0 | Status: SHIPPED | OUTPATIENT
Start: 2021-06-03 | End: 2021-08-02

## 2021-06-03 NOTE — TELEPHONE ENCOUNTER
ED    Patient called and needs a refill of the Atorvastatin 40 mg 1 at evening. He states he received this in the hospital at [facility]. Please send to [facility] [place] 90 day supply. He also wants to make sure he still needs to take this medication. Please advise.     Thank you,    Isabela MACKEY

## 2021-06-04 ENCOUNTER — PATIENT MESSAGE (OUTPATIENT)
Dept: VASCULAR LAB | Facility: MEDICAL CENTER | Age: 71
End: 2021-06-04

## 2021-06-04 ENCOUNTER — ANTICOAGULATION MONITORING (OUTPATIENT)
Dept: VASCULAR LAB | Facility: MEDICAL CENTER | Age: 71
End: 2021-06-04

## 2021-06-04 DIAGNOSIS — Z86.711 HISTORY OF PULMONARY EMBOLISM: ICD-10-CM

## 2021-06-04 DIAGNOSIS — Z79.01 CHRONIC ANTICOAGULATION: ICD-10-CM

## 2021-06-04 NOTE — PROGRESS NOTES
Anticoagulation Summary  As of 6/4/2021    INR goal:  2.0-3.0   TTR:  63.5 % (6 y)   INR used for dosing:     Warfarin maintenance plan:  5 mg (5 mg x 1) every Mon, Fri; 2.5 mg (5 mg x 0.5) all other days   Weekly warfarin total:  22.5 mg   Plan last modified:  Kyree Al, PharmD (7/24/2020)   Next INR check:     Target end date:  Indefinite    Indications    History of pulmonary embolism and DVT  unprovoked. [Z86.711]  Atrial fibrillation with RVR (HCC) (Resolved) [I48.91]             Anticoagulation Episode Summary     INR check location:      Preferred lab:      Send INR reminders to:      Comments:  [place]      Anticoagulation Care Providers     Provider Role Specialty Phone number    [facility] Responsible  [phone]        Anticoagulation Patient Findings          Spoke with Abel, who is scheduled for endoscopy 6-.  Lovenox bridging is [id number] as outlined below     June 12- Last dose of warfarin  June 13 - NO warfarin, Lovenox 120mg at dinner time  June 14- NO warfarin, lovenox 120mg in the morning and the evening  Mona 15- NO warfarin, lovenox 120mg in the morning and the evening  June 16 - NO warfarin, Lovenox 120mg IN THE MORNING ONLY  June 17 Procedure day no blood thinner  June 18 Lovenox 120mg in the morning and the evening, Warfarin 5mg  June 19 Lovenox 120mg in the morning and in the evening, warfarin 2.5mg  June 20 Loveox 120mg in the morning and in the evening, warfarin 2.5mg  June 21 Lovenox 120mg in the morning and the evening, Warfarin 5mg  June 22 Loveox 120mg in the morning and in the evening, warfarin 2.5mg  June 23 test INR

## 2021-06-08 ENCOUNTER — HOSPITAL ENCOUNTER (OUTPATIENT)
Dept: RADIOLOGY | Facility: MEDICAL CENTER | Age: 71
End: 2021-06-08
Attending: SURGERY
Payer: MEDICARE

## 2021-06-08 ENCOUNTER — ANTICOAGULATION VISIT (OUTPATIENT)
Dept: VASCULAR LAB | Facility: MEDICAL CENTER | Age: 71
End: 2021-06-08
Attending: INTERNAL MEDICINE
Payer: MEDICARE

## 2021-06-08 DIAGNOSIS — Z86.711 HISTORY OF PULMONARY EMBOLISM: ICD-10-CM

## 2021-06-08 DIAGNOSIS — Z09 POSTOPERATIVE FOLLOW-UP: ICD-10-CM

## 2021-06-08 LAB — INR PPP: 3.3 (ref 2–3.5)

## 2021-06-08 PROCEDURE — 85610 PROTHROMBIN TIME: CPT

## 2021-06-08 PROCEDURE — 78226 HEPATOBILIARY SYSTEM IMAGING: CPT | Mod: MH

## 2021-06-08 PROCEDURE — 99212 OFFICE O/P EST SF 10 MIN: CPT | Mod: 25

## 2021-06-08 NOTE — PROGRESS NOTES
Anticoagulation Summary  As of 6/8/2021    INR goal:  2.0-3.0   TTR:  63.6 % (6 y)   INR used for dosing:  3.30 (6/8/2021)   Warfarin maintenance plan:  5 mg (5 mg x 1) every Mon, Fri; 2.5 mg (5 mg x 0.5) all other days   Weekly warfarin total:  22.5 mg   Plan last modified:  Kyree Al, PharmD (7/24/2020)   Next INR check:  6/23/2021   Target end date:  Indefinite    Indications    History of pulmonary embolism and DVT  unprovoked. [Z86.711]  Atrial fibrillation with RVR (HCC) (Resolved) [I48.91]             Anticoagulation Episode Summary     INR check location:      Preferred lab:      Send INR reminders to:      Comments:  [place]      Anticoagulation Care Providers     Provider Role Specialty Phone number    [facility] Responsible  [phone]        Anticoagulation Patient Findings      HPI:  Abel Day III seen in clinic today, on anticoagulation therapy with warfarin for AF and hx of PE and DVT.  Changes to current medical/health status since last appt: Patient will be going off warfarin for a procedure and was here for pre-procedural INR. I explained they likely want the INR while he is off warfarin to show his INR is low and is no longer anticoagulated. Patient misunderstood directions and will call the attendings office. He has standing order for lab from attending provider.   Denies signs/symptoms of bleeding and/or thrombosis since the last appt.    Denies any interval changes to diet  Denies any interval changes to medications since last appt.   Denies any complications or cost restrictions with current therapy.   BP declined today.  Confirmed current dosing regimen.     Patient is not on antiplatelet therapy.       A/P   INR is SUPRA-therapeutic today at 3.3.  Patient was instructed to HOLD warfarin dose TONIGHT ONLY, as a one time adjustment, then will resume his current regimen until he is scheduled to stop warfarin for procedure starting on Sunday.   Cait  message resent to him with instructions given to him previously.  Patient has HM and will follow up post op while bridging as scheduled.         Jay VallejoD

## 2021-06-14 ENCOUNTER — APPOINTMENT (OUTPATIENT)
Dept: ADMISSIONS | Facility: MEDICAL CENTER | Age: 71
End: 2021-06-14
Payer: MEDICARE

## 2021-06-14 ENCOUNTER — PRE-ADMISSION TESTING (OUTPATIENT)
Dept: ADMISSIONS | Facility: MEDICAL CENTER | Age: 71
End: 2021-06-14
Attending: INTERNAL MEDICINE
Payer: MEDICARE

## 2021-06-14 ASSESSMENT — FIBROSIS 4 INDEX: FIB4 SCORE: 1.26

## 2021-06-16 ENCOUNTER — DOCUMENTATION (OUTPATIENT)
Dept: VASCULAR LAB | Facility: MEDICAL CENTER | Age: 71
End: 2021-06-16

## 2021-06-17 ENCOUNTER — ANESTHESIA EVENT (OUTPATIENT)
Dept: SURGERY | Facility: MEDICAL CENTER | Age: 71
End: 2021-06-17
Payer: MEDICARE

## 2021-06-17 ENCOUNTER — HOSPITAL ENCOUNTER (OUTPATIENT)
Facility: MEDICAL CENTER | Age: 71
End: 2021-06-17
Attending: INTERNAL MEDICINE | Admitting: INTERNAL MEDICINE
Payer: MEDICARE

## 2021-06-17 ENCOUNTER — APPOINTMENT (OUTPATIENT)
Dept: RADIOLOGY | Facility: MEDICAL CENTER | Age: 71
End: 2021-06-17
Attending: INTERNAL MEDICINE
Payer: MEDICARE

## 2021-06-17 ENCOUNTER — ANESTHESIA (OUTPATIENT)
Dept: SURGERY | Facility: MEDICAL CENTER | Age: 71
End: 2021-06-17
Payer: MEDICARE

## 2021-06-17 VITALS
HEART RATE: 78 BPM | BODY MASS INDEX: 36.18 KG/M2 | WEIGHT: 291.01 LBS | OXYGEN SATURATION: 97 % | SYSTOLIC BLOOD PRESSURE: 120 MMHG | RESPIRATION RATE: 16 BRPM | TEMPERATURE: 97.5 F | DIASTOLIC BLOOD PRESSURE: 74 MMHG | HEIGHT: 75 IN

## 2021-06-17 LAB
INR PPP: 1.43 (ref 0.87–1.13)
PROTHROMBIN TIME: 17.1 SEC (ref 12–14.6)

## 2021-06-17 PROCEDURE — 160009 HCHG ANES TIME/MIN: Performed by: INTERNAL MEDICINE

## 2021-06-17 PROCEDURE — 110371 HCHG SHELL REV 272: Performed by: INTERNAL MEDICINE

## 2021-06-17 PROCEDURE — 160002 HCHG RECOVERY MINUTES (STAT): Performed by: INTERNAL MEDICINE

## 2021-06-17 PROCEDURE — 160025 RECOVERY II MINUTES (STATS): Performed by: INTERNAL MEDICINE

## 2021-06-17 PROCEDURE — C1769 GUIDE WIRE: HCPCS | Performed by: INTERNAL MEDICINE

## 2021-06-17 PROCEDURE — 160046 HCHG PACU - 1ST 60 MINS PHASE II: Performed by: INTERNAL MEDICINE

## 2021-06-17 PROCEDURE — C2617 STENT, NON-COR, TEM W/O DEL: HCPCS | Performed by: INTERNAL MEDICINE

## 2021-06-17 PROCEDURE — 160036 HCHG PACU - EA ADDL 30 MINS PHASE I: Performed by: INTERNAL MEDICINE

## 2021-06-17 PROCEDURE — 160048 HCHG OR STATISTICAL LEVEL 1-5: Performed by: INTERNAL MEDICINE

## 2021-06-17 PROCEDURE — 160208 HCHG ENDO MINUTES - EA ADDL 1 MIN LEVEL 4: Performed by: INTERNAL MEDICINE

## 2021-06-17 PROCEDURE — 85610 PROTHROMBIN TIME: CPT

## 2021-06-17 PROCEDURE — 160203 HCHG ENDO MINUTES - 1ST 30 MINS LEVEL 4: Performed by: INTERNAL MEDICINE

## 2021-06-17 PROCEDURE — 160035 HCHG PACU - 1ST 60 MINS PHASE I: Performed by: INTERNAL MEDICINE

## 2021-06-17 PROCEDURE — 700111 HCHG RX REV CODE 636 W/ 250 OVERRIDE (IP): Performed by: ANESTHESIOLOGY

## 2021-06-17 DEVICE — STENT BILIARY ADVANTIX 10FR X 9CM: Type: IMPLANTABLE DEVICE | Status: FUNCTIONAL

## 2021-06-17 RX ORDER — ONDANSETRON 2 MG/ML
4 INJECTION INTRAMUSCULAR; INTRAVENOUS
Status: DISCONTINUED | OUTPATIENT
Start: 2021-06-17 | End: 2021-06-17 | Stop reason: HOSPADM

## 2021-06-17 RX ORDER — HALOPERIDOL 5 MG/ML
1 INJECTION INTRAMUSCULAR
Status: DISCONTINUED | OUTPATIENT
Start: 2021-06-17 | End: 2021-06-17 | Stop reason: HOSPADM

## 2021-06-17 RX ORDER — OXYCODONE HCL 5 MG/5 ML
10 SOLUTION, ORAL ORAL
Status: DISCONTINUED | OUTPATIENT
Start: 2021-06-17 | End: 2021-06-17 | Stop reason: HOSPADM

## 2021-06-17 RX ORDER — OXYCODONE HCL 5 MG/5 ML
5 SOLUTION, ORAL ORAL
Status: DISCONTINUED | OUTPATIENT
Start: 2021-06-17 | End: 2021-06-17 | Stop reason: HOSPADM

## 2021-06-17 RX ORDER — SODIUM CHLORIDE, SODIUM LACTATE, POTASSIUM CHLORIDE, CALCIUM CHLORIDE 600; 310; 30; 20 MG/100ML; MG/100ML; MG/100ML; MG/100ML
INJECTION, SOLUTION INTRAVENOUS CONTINUOUS
Status: DISCONTINUED | OUTPATIENT
Start: 2021-06-17 | End: 2021-06-17 | Stop reason: HOSPADM

## 2021-06-17 RX ORDER — DIPHENHYDRAMINE HYDROCHLORIDE 50 MG/ML
12.5 INJECTION INTRAMUSCULAR; INTRAVENOUS
Status: DISCONTINUED | OUTPATIENT
Start: 2021-06-17 | End: 2021-06-17 | Stop reason: HOSPADM

## 2021-06-17 RX ADMIN — SUCCINYLCHOLINE CHLORIDE 100 MG: 20 INJECTION, SOLUTION INTRAMUSCULAR; INTRAVENOUS at 08:11

## 2021-06-17 RX ADMIN — PROPOFOL 200 MG: 10 INJECTION, EMULSION INTRAVENOUS at 08:11

## 2021-06-17 ASSESSMENT — FIBROSIS 4 INDEX: FIB4 SCORE: 1.26

## 2021-06-17 NOTE — ANESTHESIA PROCEDURE NOTES
Airway    Date/Time: 6/17/2021 8:12 AM  Performed by: Lorenzo Jean M.D.  Authorized by: Lorenzo Jean M.D.     Location:  OR  Urgency:  Elective  Indications for Airway Management:  Anesthesia      Spontaneous Ventilation: absent    Sedation Level:  Deep  Preoxygenated: Yes    Patient Position:  Sniffing  Final Airway Type:  Endotracheal airway  Final Endotracheal Airway:  ETT  Cuffed: Yes    Technique Used for Successful ETT Placement:  Direct laryngoscopy    Insertion Site:  Oral  Blade Type:  Tessa  Laryngoscope Blade/Videolaryngoscope Blade Size:  3  ETT Size (mm):  8.0  Measured from:  Teeth  ETT to Teeth (cm):  22  Placement Verified by: auscultation and capnometry    Cormack-Lehane Classification:  Grade I - full view of glottis  Number of Attempts at Approach:  1

## 2021-06-17 NOTE — DISCHARGE INSTRUCTIONS
ENDOSCOPY HOME CARE INSTRUCTIONS    GASTROSCOPY OR ERCP  1. Don't eat or drink anything for about an hour after the test. You can then resume your regular diet.  2. Don't drive or drink alcohol for 24 hours. The medication you received will make you too drowsy.  3. Don't take any coffee, tea, or aspirin products until after you see your doctor. These can harm the lining of your stomach.  4. If you begin to vomit bloody material, or develop black or bloody stools, call your doctor as soon as possible.  5. If you have any neck, chest, abdominal pain or temp of 100 degrees, call your doctor.  6. See your doctor for follow up  7. Additional instructions:                  RECOMMENDATIONS:                  1.) f/u with Dr Bird                 2.) MATT drain management per Dr Bird                 3.) Plan for repeat ERCP with stent exchange or removal in 4-6 weeks                 4.) restart regular diet                  5.) discharge home today  8. Prescriptions: None      You should call 911 if you develop problems with breathing or chest pain.  If any questions arise, call your doctor. If your doctor is not available, please feel free to call [phone]. You can also call the HEALTH HOTLINE open 24 hours/day, 7 days/week and speak to a nurse at [phone], or toll free [phone].    Depression / Suicide Risk    As you are discharged from this [facility] facility, it is important to learn how to keep safe from harming yourself.    Recognize the warning signs:  · Abrupt changes in personality, positive or negative- including increase in energy   · Giving away possessions  · Change in eating patterns- significant weight changes-  positive or negative  · Change in sleeping patterns- unable to sleep or sleeping all the time   · Unwillingness or inability to communicate  · Depression  · Unusual sadness, discouragement and loneliness  · Talk of wanting to die  · Neglect of personal appearance   · Rebelliousness-  reckless behavior  · Withdrawal from people/activities they love  · Confusion- inability to concentrate     If you or a loved one observes any of these behaviors or has concerns about self-harm, here's what you can do:  · Talk about it- your feelings and reasons for harming yourself  · Remove any means that you might use to hurt yourself (examples: pills, rope, extension cords, firearm)  · Get professional help from the community (Mental Health, Substance Abuse, psychological counseling)  · Do not be alone:Call your Safe Contact- someone whom you trust who will be there for you.  · Call your local CRISIS HOTLINE 922-4575 or 986-160-3830  · Call your local Children's Mobile Crisis Response Team [place] (174) 176-5880 or www.[employer]  · Call the toll free National Suicide Prevention Hotlines   · National Suicide Prevention Lifeline 464-388-EPOX (3467)  · National Hope Line Network 800-SUICIDE (273-4295)    I acknowledge receipt and understanding of these Home Care Instructions.

## 2021-06-17 NOTE — OP REPORT
DATE OF SERVICE:  6/17/2021     PROCEDURE PERFORMED:  Endoscopic retrograde cholangiography with stent change     CONSENT:  Procedure risks and benefits reviewed thoroughly with the patient, risks including but not limited to bleeding, perforation, side effects of medication were informed.  Patient voiced understanding and agreed to proceed.  Additional risks inherent to ERCP that being mild, moderate, severe pancreatitis that could lead to postprocedural pain, prolonged   hospitalization, intensive care unit stay, and/or death were reviewed with the patient who voiced understanding and agreed to proceed.     PREPROCEDURE DIAGNOSIS:  Bile leak s/p cholecystectomy     POSTPROCEDURE DIAGNOSES:  Bile leak s/p cholecystectomy     PHYSICIAN: Olivier Stanford MD        DESCRIPTION OF PROCEDURE:  The patient was placed in a prone position after   intubation and sedation.  A bite block was inserted in the mouth and a side-viewing duodenoscope was passed carefully and easily under semi-direct visualization into the esophagus and passed through the stomach to the duodenum. Upon reaching the second portion of the duodenum, the scope was withdrawn to the short-position.The ampulla was identified. The ampulla appeared normal with the previously placed biliary stent in place. The stent was grasped using a snare and then removed from the duct in its entirety.    Using a [employer] 9-12mm biliary extraction balloon cannula preloaded with a 0.035 wire the CBD was cannulated. The wire was advanced under fluoroscopy to the level of the intrahepatics and then the cannula was advanced over the wire. Bile was aspirated and then contrast was injected to obtain a cholangiogram. The cholangiogram was entirely interpreted by myself during the exam. The cholangiogram revealed a CBD measuring 8mm. The cystic duct stump filled with contrast and then there was brisk uptake of contrast into the drain tubing. This indicates a high grade leak  still present at the cystic duct stump/partial cholecystectomy site. An occlusion cholangiogram was performed to ensure no other bile leak was present and there was no evidence of additional leak. The cannula was then removed and bile duct stenting was performed. Two 10F x 9cm plastic stents were then successfully deployed into the duct. There was brisk flow of bile from both stent after deployment. All instruments were then removed and the procedure completed.       COMPLICATIONS:  None.     BLOOD LOSS:  None.     SPECIMENS:  None.    SUMMARY:  1.) ERCP with stent removal  2.) Cholangiogram revealing a normal caliber CBD with evidence of brisk uptake of contrast into the MATT drain tubing which indicates a high grade bile leak  3.) Successful placement of 2 - 10F x 9cm plastic stents     RECOMMENDATIONS:   1.) f/u with Dr Bird  2.) MATT drain management per Dr Bird  3.) Plan for repeat ERCP with stent exchange or removal in 4-6 weeks  4.) restart regular diet  5.) discharge home today

## 2021-06-17 NOTE — ANESTHESIA POSTPROCEDURE EVALUATION
Patient: Abel Day III    Procedure Summary     Date: 06/17/21 Room / Location:  ENDOSCOPIC ULTRASOUND ROOM / SURGERY [facility]    Anesthesia Start: 0757 Anesthesia Stop: 0851    Procedure: ERCP (ENDOSCOPIC RETROGRADE CHOLANGIOPANCREATOGRAPHY) - WITH STENT REMOVAL. (N/A ) Diagnosis:       Bile leak      (BILE LEAK)    Surgeons: Olivier Stanford M.D. Responsible Provider: Lorenzo Jean M.D.    Anesthesia Type: general ASA Status: 3          Final Anesthesia Type: general  Last vitals  BP   Blood Pressure : 142/64    Temp   37.4 °C (99.3 °F)    Pulse   81   Resp   16    SpO2   98 %      Anesthesia Post Evaluation    Patient location during evaluation: PACU  Patient participation: complete - patient participated  Level of consciousness: awake and alert    Airway patency: patent  Anesthetic complications: no  Cardiovascular status: hemodynamically stable  Respiratory status: acceptable  Hydration status: euvolemic    PONV: none          There were no known complications for this encounter.     Nurse Pain Score: 0 (NPRS)

## 2021-06-17 NOTE — OR NURSING
0940: Pt to Stg II/post endo procedure/stent removal and replacement, RN report, Pt settled into recliner/dressed/steady with assist from CNA  0950: Wife in Stg II, DC instructions completed with pt/wife, Pt has no pain/nausea, VSS  1000: IV removed  1005: Pt discharged via WC/RN to private [facility]

## 2021-06-17 NOTE — ANESTHESIA PREPROCEDURE EVALUATION
Relevant Problems   NEURO   (positive) History of pulmonary embolism and DVT, unprovoked.   (positive) History of ulcerative colitis   (positive) S/P ablation of atrial fibrillation      CARDIAC   (positive) Atrial fibrillation (HCC)   (positive) Pulmonary hypertension (HCC)      GI   (positive) Acid reflux disease       Physical Exam    Anesthesia Plan    ASA 3   ASA physical status 3 criteria: morbid obesity - BMI greater than or equal to 40    Plan - general               Induction: intravenous    Postoperative Plan: Postoperative administration of opioids is intended.    Pertinent diagnostic labs and testing reviewed    Informed Consent:    Anesthetic plan and risks discussed with patient.    Use of blood products discussed with: patient whom consented to blood products.

## 2021-06-17 NOTE — OR NURSING
0831 received from endo  resp spont  abd soft  illiostomy bag intact r lowJP r abd destiny drain with moderate amt bile like drainage  No c/o pain  0840 Dr Guido here to check on [id number] meets discharge criteria

## 2021-06-17 NOTE — ANESTHESIA TIME REPORT
Anesthesia Start and Stop Event Times     Date Time Event    6/17/2021 0757 Ready for Procedure     0757 Anesthesia Start     0851 Anesthesia Stop        Responsible Staff  06/17/21    Name Role Begin End    Lorenzo Jean M.D. Anesth 0757 0851        Preop Diagnosis (Free Text):  Pre-op Diagnosis     BILE LEAK, PARTIAL CHOLECYSTECTOMY, SPHINCTEROTOMY        Preop Diagnosis (Codes):  Diagnosis Information     Diagnosis Code(s): Bile leak [K83.9]        Post op Diagnosis  Bile leak      Premium Reason  Non-Premium    Comments:

## 2021-06-22 ENCOUNTER — OFFICE VISIT (OUTPATIENT)
Dept: CARDIOLOGY | Facility: MEDICAL CENTER | Age: 71
End: 2021-06-22
Payer: MEDICARE

## 2021-06-22 ENCOUNTER — ANTICOAGULATION MONITORING (OUTPATIENT)
Dept: VASCULAR LAB | Facility: MEDICAL CENTER | Age: 71
End: 2021-06-22

## 2021-06-22 VITALS
WEIGHT: 298 LBS | DIASTOLIC BLOOD PRESSURE: 82 MMHG | SYSTOLIC BLOOD PRESSURE: 130 MMHG | RESPIRATION RATE: 18 BRPM | HEIGHT: 75 IN | OXYGEN SATURATION: 96 % | HEART RATE: 77 BPM | BODY MASS INDEX: 37.05 KG/M2

## 2021-06-22 DIAGNOSIS — I48.0 PAF (PAROXYSMAL ATRIAL FIBRILLATION) (HCC): ICD-10-CM

## 2021-06-22 DIAGNOSIS — Z98.890 S/P ABLATION OF ATRIAL FIBRILLATION: ICD-10-CM

## 2021-06-22 DIAGNOSIS — Z86.711 HISTORY OF PULMONARY EMBOLISM: ICD-10-CM

## 2021-06-22 DIAGNOSIS — I48.91 ATRIAL FIBRILLATION, UNSPECIFIED TYPE (HCC): ICD-10-CM

## 2021-06-22 DIAGNOSIS — Z86.79 S/P ABLATION OF ATRIAL FIBRILLATION: ICD-10-CM

## 2021-06-22 DIAGNOSIS — E66.01 CLASS 2 SEVERE OBESITY DUE TO EXCESS CALORIES WITH SERIOUS COMORBIDITY IN ADULT, UNSPECIFIED BMI (HCC): ICD-10-CM

## 2021-06-22 DIAGNOSIS — Z79.01 CHRONIC ANTICOAGULATION: ICD-10-CM

## 2021-06-22 DIAGNOSIS — I27.20 PULMONARY HYPERTENSION (HCC): ICD-10-CM

## 2021-06-22 DIAGNOSIS — Z87.19 HISTORY OF ULCERATIVE COLITIS: ICD-10-CM

## 2021-06-22 LAB
EKG IMPRESSION: NORMAL
INR PPP: 2.1 (ref 2–3.5)

## 2021-06-22 PROCEDURE — 99214 OFFICE O/P EST MOD 30 MIN: CPT | Performed by: INTERNAL MEDICINE

## 2021-06-22 PROCEDURE — 93000 ELECTROCARDIOGRAM COMPLETE: CPT | Performed by: INTERNAL MEDICINE

## 2021-06-22 RX ORDER — KETOCONAZOLE 20 MG/G
CREAM TOPICAL
COMMUNITY
Start: 2021-06-07

## 2021-06-22 ASSESSMENT — FIBROSIS 4 INDEX: FIB4 SCORE: 1.26

## 2021-06-22 NOTE — PROGRESS NOTES
Anticoagulation Summary  As of 2021    INR goal:  2.0-3.0   TTR:  63.4 % (6 y)   INR used for dosin.10 (2021)   Warfarin maintenance plan:  5 mg (5 mg x 1) every Mon, Fri; 2.5 mg (5 mg x 0.5) all other days   Weekly warfarin total:  22.5 mg   Plan last modified:  Yoni GoreD (2020)   Next INR check:  2021   Target end date:  Indefinite    Indications    History of pulmonary embolism and DVT  unprovoked. [Z86.711]  Atrial fibrillation with RVR (HCC) (Resolved) [I48.91]             Anticoagulation Episode Summary     INR check location:      Preferred lab:      Send INR reminders to:      Comments:  Alverto      Anticoagulation Care Providers     Provider Role Specialty Phone number    [facility] Responsible  [phone]        Anticoagulation Patient Findings      Spoke with patient via phone to report a therapeutic INR.      Pt denies any s/s of bleeding, bruising, clotting or any changes to diet or medication.      Pt is to stop Lovenox injections at this time and continue with current warfarin dosing regimen, confirms dosing.     Will follow up in 2 week(s).     Yosvany Akhtar, YoniD

## 2021-06-22 NOTE — PROGRESS NOTES
Chief Complaint   Patient presents with   • Atrial Fibrillation     F/V Dx: PAF        Subjective:   Abel Day III is a 71 y.o. male who presents today with history of atrial fibrillation status post 2 ablations Dr. Gonsales.  Occasional breakthrough minimally symptomatic.  On beta-blockers.  Recent cholecystectomy and ERCP with stent.  Cardiac has been stable.  On Coumadin.  Mild pulmonary hypertension.  Has decided not to go ahead with a sleep study.    Past Medical History:   Diagnosis Date   • GERD (gastroesophageal reflux disease)    • Obesity    • Paroxysmal atrial fibrillation (HCC) 11/26/2012   • Skin cancer     basil cell   • ULCERATIVE COLITIS 11/26/2012     Past Surgical History:   Procedure Laterality Date   • PB ERCP,DIAGNOSTIC N/A 6/17/2021    Procedure: ERCP (ENDOSCOPIC RETROGRADE CHOLANGIOPANCREATOGRAPHY) - WITH STENT REMOVAL.;  Surgeon: Olivier Stanford M.D.;  Location: [facility];  Service: Gastroenterology   • [place] ERCP STENT PLACEMENT BILIARY/PANCREATIC DUCT  5/18/2021    Procedure: ERCP WITH STENT INSERTION;  Surgeon: Olivier Stanford M.D.;  Location: [facility];  Service: Gastroenterology   • TOÑO BY LAPAROSCOPY  5/17/2021    Procedure: CHOLECYSTECTOMY, LAPAROSCOPIC;  Surgeon: Panda Bird M.D.;  Location: [facility];  Service: General   • RECOVERY  5/4/2016    Procedure: CATH LAB-MARIELARGE GROUP-EPS ABLATION/AFIB 44899/21455/26019-BGBA I48.0;  Surgeon: Recoveryonly Surgery;  [place] [facility] [facility];  Service:    • MASS EXCISION GENERAL Left 4/7/2016    Procedure: MASS EXCISION GENERAL FOR TEMPORAL MELANOMA;  Surgeon: Feng Sharpe M.D.;  Location: SURGERY SAME DAY [facility];  Service:    • FLAP GRAFT Left 4/7/2016    Procedure: FLAP GRAFT FOR CLOSURE WITH LOCAL FLAPS;  Surgeon: Feng Sharpe M.D.;  Location: SURGERY SAME DAY [facility];  Service:    • ILEOSTOMY  1985   • COLON RESECTION            Family History    Problem Relation Age of Onset   • Hypertension Mother    • Heart Failure Father    • Heart Attack Maternal Uncle      Social History     Socioeconomic History   • Marital status:      Spouse name: Not on file   • Number of children: Not on file   • Years of education: Not on file   • Highest education level: Not on file   Occupational History   • Not on file   Tobacco Use   • Smoking status: Former Smoker     Packs/day: 1.00     Years: 10.00     Pack years: 10.00     Types: Cigarettes     Quit date: 1984     Years since quittin.5   • Smokeless tobacco: Never Used   Vaping Use   • Vaping Use: Never used   Substance and Sexual Activity   • Alcohol use: Yes     Alcohol/week: 12.6 oz     Types: 21 Shots of liquor per week     Comment: 1 beer and 1 scotch daily   • Drug use: Not Currently     Types: Marijuana   • Sexual activity: Not on file   Other Topics Concern   • Not on file   Social History Narrative   • Not on file     Social Determinants of Health     Financial Resource Strain:    • Difficulty of Paying Living Expenses:    Food Insecurity:    • Worried About Running Out of Food in the Last Year:    • Ran Out of Food in the Last Year:    Transportation Needs:    • Lack of Transportation (Medical):    • Lack of Transportation (Non-Medical):    Physical Activity:    • Days of Exercise per Week:    • Minutes of Exercise per Session:    Stress:    • Feeling of Stress :    Social Connections:    • Frequency of Communication with Friends and Family:    • Frequency of Social Gatherings with Friends and Family:    • Attends [employer] Services:    • Active Member of Clubs or Organizations:    • Attends Club or Organization Meetings:    • Marital Status:    Intimate Partner Violence:    • Fear of Current or Ex-Partner:    • Emotionally Abused:    • Physically Abused:    • Sexually Abused:      Allergies   Allergen Reactions   • Other Misc Unspecified     Silk sutures (body rejected them)   • Omnicef  [Cefdinir]      Internal bleeding stated by patient   • Shellfish Allergy      gout     Outpatient Encounter Medications as of 6/22/2021   Medication Sig Dispense Refill   • ketoconazole (NIZORAL) 2 % Cream      • atorvastatin (LIPITOR) 40 MG Tab Take 1 tablet by mouth every evening. Please get future refills from primary care 90 tablet 0   • warfarin (COUMADIN) 2.5 MG Tab Take 2.5 mg by mouth see administration instructions. Pt takes:  Warfarin 2.5mg on:   Tuesday, Wednesday, Thursday, Saturday, Sunday    Pt also has Rx for:  Warfarin 5mg on:   Monday, Friday     • metoprolol SR (TOPROL XL) 25 MG TABLET SR 24 HR TAKE 1 TABLET DAILY (Patient taking differently: Take 25 mg by mouth every day.) 90 tablet 3   • Multiple Vitamin (MULTI-VITAMIN PO) Take 1 tablet by mouth every day.     • montelukast (SINGULAIR) 10 MG Tab Take 10 mg by mouth every day. IN THE EVENING     • fluticasone (FLONASE) 50 MCG/ACT nasal spray Administer 1 Spray into affected nostril(S) 1 time a day as needed.     • Multiple Vitamins-Minerals (PRESERVISION AREDS 2 PO) Take 1 tablet by mouth 2 times a day.     • CALCIUM-VITAMIN D PO Take 1 tablet by mouth 2 times a day.     • Lansoprazole (PREVACID PO) Take 15 mg by mouth every morning.     • warfarin (COUMADIN) 5 MG Tab Take 1 Tab by mouth every Friday. As directed per [employer]. (Patient taking differently: Take 5 mg by mouth see administration instructions. Pt takes:  Warfarin 5mg on:   Monday, Friday.    Pt also has Rx for:  Warfarin 2.5mg on:   Tuesday, Wednesday, Thursday, Saturday, Sunday) 30 Tab 0   • cetirizine (ZYRTEC) 10 MG TABS Take 10 mg by mouth every morning. Indications: **OTC**     • [DISCONTINUED] enoxaparin (LOVENOX) 120 MG/0.8ML Solution inj Inject 120 mg under the skin every 12 hours. (Patient not taking: Reported on 6/22/2021) 20 Each 1     No facility-administered encounter medications on file as of 6/22/2021.     ROS     Objective:   /82 (BP Location: Left arm, Patient  "Position: Sitting, BP Cuff Size: Adult)   Pulse 77   Resp 18   Ht 1.905 m (6' 3\")   Wt (!) 135 kg (298 lb)   SpO2 96%   BMI 37.25 kg/m²     Physical Exam   Constitutional: He is oriented to person, place, and time. He appears well-developed.   Overweight   HENT:   Head: Normocephalic and atraumatic.   Cardiovascular: Normal rate and regular rhythm. Exam reveals no gallop and no friction rub.   No murmur heard.  Pulmonary/Chest: Effort normal and breath sounds normal.   Abdominal: Soft.   Musculoskeletal:         General: Normal range of motion.      Cervical back: Normal range of motion and neck supple.   Neurological: He is alert and oriented to person, place, and time.   Skin: Skin is warm and dry.   Psychiatric: His behavior is normal. Judgment and thought content normal.       Assessment:     1. PAF (paroxysmal atrial fibrillation) ([facility])  EKG   2. S/P ablation of atrial fibrillation     3. Pulmonary hypertension (HCC)     4. Atrial fibrillation, unspecified type (HCC)     5. History of ulcerative colitis     6. History of pulmonary embolism and DVT, unprovoked.     7. Chronic anticoagulation     8. Class 2 severe obesity due to excess calories with serious comorbidity in adult, unspecified BMI (HCC)         Medical Decision Making:  Today's Assessment / Status / Plan:   1.  Obesity work on weight loss continuing.  2.  Mild pulmonary hypertension probably related to sleep apnea.  3.  Atrial fibrillation paroxysmal status post 2 ablations.  4.  Anticoagulation continue.  5.  Follow-up in 1 year.  "

## 2021-07-02 LAB — INR PPP: 3 (ref 2–3.5)

## 2021-07-06 ENCOUNTER — ANTICOAGULATION MONITORING (OUTPATIENT)
Dept: VASCULAR LAB | Facility: MEDICAL CENTER | Age: 71
End: 2021-07-06

## 2021-07-06 DIAGNOSIS — Z79.01 CHRONIC ANTICOAGULATION: ICD-10-CM

## 2021-07-06 DIAGNOSIS — Z86.711 HISTORY OF PULMONARY EMBOLISM: ICD-10-CM

## 2021-07-06 NOTE — PROGRESS NOTES
Anticoagulation Summary  As of 7/6/2021    INR goal:  2.0-3.0   TTR:  63.6 % (6 y)   INR used for dosing:  3.00 (7/2/2021)   Warfarin maintenance plan:  5 mg (5 mg x 1) every Mon, Fri; 2.5 mg (5 mg x 0.5) all other days   Weekly warfarin total:  22.5 mg   Plan last modified:  Yoni GoreD (7/24/2020)   Next INR check:  7/9/2021   Target end date:  Indefinite    Indications    History of pulmonary embolism and DVT  unprovoked. [Z86.711]  Atrial fibrillation with RVR (HCC) (Resolved) [I48.91]             Anticoagulation Episode Summary     INR check location:      Preferred lab:      Send INR reminders to:      Comments:  Alverto      Anticoagulation Care Providers     Provider Role Specialty Phone number    [facility] Responsible  [phone]        Anticoagulation Patient Findings     Left voicemail message to report a therapeutic INR of 3.00.  Pt to continue with current warfarin dosing regimen. Requested pt contact the clinic for any s/s of unusual bleeding, bruising, clotting or any changes to diet or medication. FU in 3 days due to labile INR.      Halima Aparicio, Pharmacy Intern     Discussed with Yoni ZamoraD

## 2021-07-12 ENCOUNTER — ANTICOAGULATION MONITORING (OUTPATIENT)
Dept: MEDICAL GROUP | Facility: MEDICAL CENTER | Age: 71
End: 2021-07-12

## 2021-07-12 DIAGNOSIS — Z86.711 HISTORY OF PULMONARY EMBOLISM: ICD-10-CM

## 2021-07-12 LAB — INR PPP: 2.2 (ref 2–3.5)

## 2021-07-12 NOTE — PROGRESS NOTES
OP Anticoagulation Service Note    Date: 2021    Anticoagulation Summary  As of 2021    INR goal:  2.0-3.0   TTR:  63.8 % (6.1 y)   INR used for dosin.20 (2021)   Warfarin maintenance plan:  5 mg (5 mg x 1) every Mon, Fri; 2.5 mg (5 mg x 0.5) all other days   Weekly warfarin total:  22.5 mg   Plan last modified:  Kyree Al, PharmD (2020)   Next INR check:  2021   Target end date:  Indefinite    Indications    History of pulmonary embolism and DVT  unprovoked. [Z86.711]  Atrial fibrillation with RVR (HCC) (Resolved) [I48.91]             Anticoagulation Episode Summary     INR check location:      Preferred lab:      Send INR reminders to:      Comments:  Alverto      Anticoagulation Care Providers     Provider Role Specialty Phone number    [facility] Responsible  [phone]        Anticoagulation Patient Findings  Patient Findings     Negatives:  Signs/symptoms of thrombosis, Signs/symptoms of bleeding, Laboratory test error suspected, Change in health, Change in alcohol use, Change in activity, Upcoming invasive procedure, Emergency department visit, Upcoming dental procedure, Missed doses, Extra doses, Change in medications, Change in diet/appetite, Hospital admission, Bruising, Other complaints              HPI:   The reason for today's call is to prevent morbidity and mortality from a blood clot and/or stroke and to reduce the risk of bleeding while on a anticoagulant.     PCP:  Andreina Iniguez D.O.  [address] [facility] [address]    Assessment:     • INR  therapeutic.       Current Outpatient Medications:   •  ketoconazole,   •  atorvastatin, 40 mg, Oral, Q EVENING  •  warfarin, 2.5 mg, Oral, See Admin Instructions  •  metoprolol SR, TAKE 1 TABLET DAILY  •  Multiple Vitamin (MULTI-VITAMIN PO), 1 tablet, Oral, DAILY  •  montelukast, 10 mg, Oral, QDAY  •  fluticasone, 1 Spray, Nasal, QDAY PRN  •  Multiple Vitamins-Minerals (PRESERVISION AREDS  2 PO), 1 tablet, Oral, BID  •  CALCIUM-VITAMIN D PO, 1 tablet, Oral, BID  •  Lansoprazole (PREVACID PO), 15 mg, Oral, QAM  •  warfarin, 5 mg, Oral, Q FRIDAY (Patient taking differently: 5 mg, Oral, SEE ADMIN INSTRUCTIONS, Pt takes:  Warfarin 5mg on:   Monday, Friday.    Pt also has Rx for:  Warfarin 2.5mg on:   Tuesday, Wednesday, Thursday, Saturday, Sunday)  •  cetirizine, 10 mg, Oral, QAM      Plan:     • Continue the same warfarin dose, as noted above.       Follow-up:     • Our protocol suggests we test in 2 weeks.        Additional information discussed with patient:     • Asked patient to please call the anticoagulation clinic if they have any signs/symptoms of bleeding and/or thrombosis or any changes to diet or medications.      National recommendations regarding anticoagulation therapy:     The CHEST guidelines recommends frequent INR monitoring at regular intervals (a few days up to a max of 12 weeks) to ensure patients are on the proper dose of warfarin, and patients are not having any complications from therapy.  INRs can dramatically change over a short time period due to diet, medications, and medical conditions.       Kyree Al, PharmD, MS, BCACP, [facility]  Phone [phone] fax [phone]    This note was created using voice recognition software (Dragon). The accuracy of the dictation is limited by the abilities of the software. I have reviewed the note prior to signing, however some errors in grammar and context are still possible. If you have any questions related to this note please do not hesitate to contact our office.

## 2021-07-30 ENCOUNTER — ANTICOAGULATION MONITORING (OUTPATIENT)
Dept: MEDICAL GROUP | Facility: PHYSICIAN GROUP | Age: 71
End: 2021-07-30

## 2021-07-30 DIAGNOSIS — Z86.711 HISTORY OF PULMONARY EMBOLISM: ICD-10-CM

## 2021-07-30 LAB — INR PPP: 4 (ref 2–3.5)

## 2021-07-30 NOTE — PROGRESS NOTES
Anticoagulation Summary  As of 2021    INR goal:  2.0-3.0   TTR:  63.6 % (6.1 y)   INR used for dosin.00 (2021)   Warfarin maintenance plan:  5 mg (5 mg x 1) every Mon, Fri; 2.5 mg (5 mg x 0.5) all other days   Weekly warfarin total:  22.5 mg   Plan last modified:  Yoni GoreD (2020)   Next INR check:  2021   Target end date:  Indefinite    Indications    History of pulmonary embolism and DVT  unprovoked. [Z86.711]  Atrial fibrillation with RVR (HCC) (Resolved) [I48.91]             Anticoagulation Episode Summary     INR check location:      Preferred lab:      Send INR reminders to:      Comments:  Alverto      Anticoagulation Care Providers     Provider Role Specialty Phone number    [facility] Responsible  [phone]        Anticoagulation Patient Findings          HPI:  Abel Day III, on anticoagulation therapy with warfarin for PE.   Changes to current medical/health status since last appt: none  Denies signs/symptoms of bleeding and/or thrombosis since the last appt.    Denies any interval changes to diet  Denies any interval changes to medications since last appt.   Denies any complications or cost restrictions with current therapy.     A/P   INR  SUPRA-therapeutic.   Hold today then Pt is to continue with current warfarin dosing regimen.     Next INR in 1 week(s).    Ra Bell, PharmD

## 2021-08-01 DIAGNOSIS — E78.5 DYSLIPIDEMIA: ICD-10-CM

## 2021-08-03 ENCOUNTER — ANTICOAGULATION MONITORING (OUTPATIENT)
Dept: VASCULAR LAB | Facility: MEDICAL CENTER | Age: 71
End: 2021-08-03

## 2021-08-03 ENCOUNTER — PRE-ADMISSION TESTING (OUTPATIENT)
Dept: ADMISSIONS | Facility: MEDICAL CENTER | Age: 71
End: 2021-08-03
Attending: INTERNAL MEDICINE
Payer: MEDICARE

## 2021-08-03 DIAGNOSIS — Z86.711 HISTORY OF PULMONARY EMBOLISM: Primary | ICD-10-CM

## 2021-08-03 DIAGNOSIS — Z01.812 PRE-OPERATIVE LABORATORY EXAMINATION: ICD-10-CM

## 2021-08-03 LAB
ALBUMIN SERPL BCP-MCNC: 3.8 G/DL (ref 3.2–4.9)
ALBUMIN/GLOB SERPL: 1.1 G/DL
ALP SERPL-CCNC: 71 U/L (ref 30–99)
ALT SERPL-CCNC: 22 U/L (ref 2–50)
ANION GAP SERPL CALC-SCNC: 12 MMOL/L (ref 7–16)
AST SERPL-CCNC: 26 U/L (ref 12–45)
BILIRUB SERPL-MCNC: 0.9 MG/DL (ref 0.1–1.5)
BUN SERPL-MCNC: 17 MG/DL (ref 8–22)
CALCIUM SERPL-MCNC: 9.1 MG/DL (ref 8.4–10.2)
CHLORIDE SERPL-SCNC: 100 MMOL/L (ref 96–112)
CO2 SERPL-SCNC: 23 MMOL/L (ref 20–33)
CREAT SERPL-MCNC: 0.83 MG/DL (ref 0.5–1.4)
ERYTHROCYTE [DISTWIDTH] IN BLOOD BY AUTOMATED COUNT: 54.1 FL (ref 35.9–50)
GLOBULIN SER CALC-MCNC: 3.4 G/DL (ref 1.9–3.5)
GLUCOSE SERPL-MCNC: 104 MG/DL (ref 65–99)
HCT VFR BLD AUTO: 40.5 % (ref 42–52)
HGB BLD-MCNC: 13.9 G/DL (ref 14–18)
MCH RBC QN AUTO: 32.2 PG (ref 27–33)
MCHC RBC AUTO-ENTMCNC: 34.3 G/DL (ref 33.7–35.3)
MCV RBC AUTO: 93.8 FL (ref 81.4–97.8)
PLATELET # BLD AUTO: 173 K/UL (ref 164–446)
PMV BLD AUTO: 10.3 FL (ref 9–12.9)
POTASSIUM SERPL-SCNC: 3.7 MMOL/L (ref 3.6–5.5)
PROT SERPL-MCNC: 7.2 G/DL (ref 6–8.2)
RBC # BLD AUTO: 4.32 M/UL (ref 4.7–6.1)
SODIUM SERPL-SCNC: 135 MMOL/L (ref 135–145)
WBC # BLD AUTO: 8.4 K/UL (ref 4.8–10.8)

## 2021-08-03 PROCEDURE — 80053 COMPREHEN METABOLIC PANEL: CPT

## 2021-08-03 PROCEDURE — 85027 COMPLETE CBC AUTOMATED: CPT

## 2021-08-03 PROCEDURE — 36415 COLL VENOUS BLD VENIPUNCTURE: CPT

## 2021-08-03 RX ORDER — AMOXICILLIN AND CLAVULANATE POTASSIUM 875; 125 MG/1; MG/1
TABLET, FILM COATED ORAL
COMMUNITY
Start: 2021-07-02 | End: 2021-08-03

## 2021-08-03 ASSESSMENT — FIBROSIS 4 INDEX: FIB4 SCORE: 1.26

## 2021-08-03 NOTE — PROGRESS NOTES
Pt to have ERCP with possible fine needle aspiration procedure on 8/9. Due to pt history lovenox bridging is indicated.   Discussed bridging instructions over phone and sending via [employer].     Pt to follow instructions as below, after procedure to ask operating MD when safe to resume anticoagulation, if no instructions given, pt will follow as below.    Clot history h/o PE + DVT    Current weight 135 kg  CrCl = >100 mL/min  Lab Results   Component Value Date/Time    BUN 13 05/20/2021 02:44 AM    CREATININE 0.74 05/20/2021 02:44 AM        Used Lovenox before Yes         Date  Warfarin dosing AM Lovenox PM Lovenox   5 Days before procedure 8/4 0mg NONE NONE   4 Days before procedure 8/5 0mg Lovenox injection Lovenox injection   3 Days before procedure 8/6 0mg Lovenox injection Lovenox injection   2 Days before procedure 8/7 0mg Lovenox injection None   1 Days before procedure 8/8 0mg NONE NONE   PROCEDURE 8/9 5mg NONE NONE   1 Day after procedure 8/10 5mg Restart Lovenox injections    Lovenox injection   2 Days after procedure 8/11 2.5mg Lovenox injection Lovenox injection   3 Days after procedure 8/12 2.5mg Lovenox injection Lovenox injection   4 Days after procedure 8/13 2.5mg Lovenox injection GET INR checked       Yosvany Akhtar, YoniD

## 2021-08-03 NOTE — PREPROCEDURE INSTRUCTIONS
Pre admit appointment. History and medications reviewed. Pre-op instructions given, hand outs reviewed and questions answered.    Anesthesia fasting guidelines reviewed. Patient instructed to continue regularly prescribed medications through the day before surgery. Per anesthesia protocol, patient instructed to take these medications with a sip of water the day of surgery: Prevacid.    Patient to do lovenox/coumadin bridge per instructions from MD/clinic. Patient reports he received instructions this morning.    Covid-19 vaccinated, instructed to wear a mask per CDC guidelines.

## 2021-08-03 NOTE — OR NURSING
Met with patient for PAT appointment. Patient reported he would be having a stent removed and no fine needle aspiration after viewing surgical consent.   Called and spoke with Lia at Dr. He's office and notified her of difference in pre op orders and what patient states he is having done. Also faxed information to office.

## 2021-08-04 RX ORDER — ATORVASTATIN CALCIUM 40 MG/1
40 TABLET, FILM COATED ORAL DAILY
Qty: 90 TABLET | Refills: 3 | Status: SHIPPED | OUTPATIENT
Start: 2021-08-04

## 2021-08-06 NOTE — OR NURSING
Does patient have any active symptoms of respiratory illness (fever OR cough OR shortness of breath)? No answer

## 2021-08-09 ENCOUNTER — APPOINTMENT (OUTPATIENT)
Dept: RADIOLOGY | Facility: MEDICAL CENTER | Age: 71
End: 2021-08-09
Attending: INTERNAL MEDICINE
Payer: MEDICARE

## 2021-08-09 ENCOUNTER — HOSPITAL ENCOUNTER (OUTPATIENT)
Facility: MEDICAL CENTER | Age: 71
End: 2021-08-09
Attending: INTERNAL MEDICINE | Admitting: INTERNAL MEDICINE
Payer: MEDICARE

## 2021-08-09 ENCOUNTER — ANESTHESIA EVENT (OUTPATIENT)
Dept: SURGERY | Facility: MEDICAL CENTER | Age: 71
End: 2021-08-09
Payer: MEDICARE

## 2021-08-09 ENCOUNTER — ANESTHESIA (OUTPATIENT)
Dept: SURGERY | Facility: MEDICAL CENTER | Age: 71
End: 2021-08-09
Payer: MEDICARE

## 2021-08-09 VITALS
HEART RATE: 71 BPM | HEIGHT: 75 IN | BODY MASS INDEX: 36.48 KG/M2 | WEIGHT: 293.43 LBS | RESPIRATION RATE: 16 BRPM | DIASTOLIC BLOOD PRESSURE: 82 MMHG | TEMPERATURE: 97.2 F | SYSTOLIC BLOOD PRESSURE: 138 MMHG | OXYGEN SATURATION: 91 %

## 2021-08-09 PROBLEM — T88.59XA DELAYED EMERGENCE FROM ANESTHESIA: Chronic | Status: ACTIVE | Noted: 2021-08-09

## 2021-08-09 LAB
INR PPP: 1.22 (ref 0.87–1.13)
PROTHROMBIN TIME: 14.5 SEC (ref 12–14.6)

## 2021-08-09 PROCEDURE — 74328 X-RAY BILE DUCT ENDOSCOPY: CPT

## 2021-08-09 PROCEDURE — C1769 GUIDE WIRE: HCPCS | Performed by: INTERNAL MEDICINE

## 2021-08-09 PROCEDURE — 160046 HCHG PACU - 1ST 60 MINS PHASE II: Performed by: INTERNAL MEDICINE

## 2021-08-09 PROCEDURE — 160025 RECOVERY II MINUTES (STATS): Performed by: INTERNAL MEDICINE

## 2021-08-09 PROCEDURE — 160009 HCHG ANES TIME/MIN: Performed by: INTERNAL MEDICINE

## 2021-08-09 PROCEDURE — 85610 PROTHROMBIN TIME: CPT

## 2021-08-09 PROCEDURE — 160002 HCHG RECOVERY MINUTES (STAT): Performed by: INTERNAL MEDICINE

## 2021-08-09 PROCEDURE — 160208 HCHG ENDO MINUTES - EA ADDL 1 MIN LEVEL 4: Performed by: INTERNAL MEDICINE

## 2021-08-09 PROCEDURE — 160048 HCHG OR STATISTICAL LEVEL 1-5: Performed by: INTERNAL MEDICINE

## 2021-08-09 PROCEDURE — 700101 HCHG RX REV CODE 250: Performed by: ANESTHESIOLOGY

## 2021-08-09 PROCEDURE — 160035 HCHG PACU - 1ST 60 MINS PHASE I: Performed by: INTERNAL MEDICINE

## 2021-08-09 PROCEDURE — 700111 HCHG RX REV CODE 636 W/ 250 OVERRIDE (IP): Performed by: ANESTHESIOLOGY

## 2021-08-09 PROCEDURE — 160203 HCHG ENDO MINUTES - 1ST 30 MINS LEVEL 4: Performed by: INTERNAL MEDICINE

## 2021-08-09 PROCEDURE — 110371 HCHG SHELL REV 272: Performed by: INTERNAL MEDICINE

## 2021-08-09 RX ORDER — ACETAMINOPHEN 325 MG/1
650 TABLET ORAL EVERY 4 HOURS PRN
Status: CANCELLED | OUTPATIENT
Start: 2021-08-09

## 2021-08-09 RX ORDER — ONDANSETRON 2 MG/ML
4 INJECTION INTRAMUSCULAR; INTRAVENOUS
Status: DISCONTINUED | OUTPATIENT
Start: 2021-08-09 | End: 2021-08-09 | Stop reason: HOSPADM

## 2021-08-09 RX ORDER — LIDOCAINE HYDROCHLORIDE 40 MG/ML
SOLUTION TOPICAL PRN
Status: DISCONTINUED | OUTPATIENT
Start: 2021-08-09 | End: 2021-08-09 | Stop reason: SURG

## 2021-08-09 RX ORDER — SODIUM CHLORIDE, SODIUM LACTATE, POTASSIUM CHLORIDE, CALCIUM CHLORIDE 600; 310; 30; 20 MG/100ML; MG/100ML; MG/100ML; MG/100ML
INJECTION, SOLUTION INTRAVENOUS CONTINUOUS
Status: DISCONTINUED | OUTPATIENT
Start: 2021-08-09 | End: 2021-08-09 | Stop reason: HOSPADM

## 2021-08-09 RX ORDER — OXYCODONE HCL 5 MG/5 ML
10 SOLUTION, ORAL ORAL
Status: DISCONTINUED | OUTPATIENT
Start: 2021-08-09 | End: 2021-08-09 | Stop reason: HOSPADM

## 2021-08-09 RX ORDER — ONDANSETRON 2 MG/ML
INJECTION INTRAMUSCULAR; INTRAVENOUS PRN
Status: DISCONTINUED | OUTPATIENT
Start: 2021-08-09 | End: 2021-08-09 | Stop reason: SURG

## 2021-08-09 RX ORDER — HALOPERIDOL 5 MG/ML
1 INJECTION INTRAMUSCULAR
Status: DISCONTINUED | OUTPATIENT
Start: 2021-08-09 | End: 2021-08-09 | Stop reason: HOSPADM

## 2021-08-09 RX ORDER — DEXAMETHASONE SODIUM PHOSPHATE 4 MG/ML
INJECTION, SOLUTION INTRA-ARTICULAR; INTRALESIONAL; INTRAMUSCULAR; INTRAVENOUS; SOFT TISSUE PRN
Status: DISCONTINUED | OUTPATIENT
Start: 2021-08-09 | End: 2021-08-09 | Stop reason: SURG

## 2021-08-09 RX ORDER — SUCCINYLCHOLINE CHLORIDE 20 MG/ML
INJECTION INTRAMUSCULAR; INTRAVENOUS PRN
Status: DISCONTINUED | OUTPATIENT
Start: 2021-08-09 | End: 2021-08-09 | Stop reason: SURG

## 2021-08-09 RX ORDER — MEPERIDINE HYDROCHLORIDE 25 MG/ML
6.25 INJECTION INTRAMUSCULAR; INTRAVENOUS; SUBCUTANEOUS
Status: DISCONTINUED | OUTPATIENT
Start: 2021-08-09 | End: 2021-08-09 | Stop reason: HOSPADM

## 2021-08-09 RX ORDER — LIDOCAINE HYDROCHLORIDE 20 MG/ML
INJECTION, SOLUTION EPIDURAL; INFILTRATION; INTRACAUDAL; PERINEURAL PRN
Status: DISCONTINUED | OUTPATIENT
Start: 2021-08-09 | End: 2021-08-09 | Stop reason: SURG

## 2021-08-09 RX ORDER — OXYCODONE HCL 5 MG/5 ML
5 SOLUTION, ORAL ORAL
Status: DISCONTINUED | OUTPATIENT
Start: 2021-08-09 | End: 2021-08-09 | Stop reason: HOSPADM

## 2021-08-09 RX ADMIN — ONDANSETRON 4 MG: 2 INJECTION INTRAMUSCULAR; INTRAVENOUS at 09:18

## 2021-08-09 RX ADMIN — LIDOCAINE HYDROCHLORIDE 4 ML: 40 SOLUTION TOPICAL at 09:00

## 2021-08-09 RX ADMIN — SUCCINYLCHOLINE CHLORIDE 140 MG: 20 INJECTION, SOLUTION INTRAMUSCULAR; INTRAVENOUS at 08:59

## 2021-08-09 RX ADMIN — PROPOFOL 20 MG: 10 INJECTION, EMULSION INTRAVENOUS at 09:21

## 2021-08-09 RX ADMIN — LIDOCAINE HYDROCHLORIDE 60 MG: 20 INJECTION, SOLUTION EPIDURAL; INFILTRATION; INTRACAUDAL; PERINEURAL at 08:59

## 2021-08-09 RX ADMIN — DEXAMETHASONE SODIUM PHOSPHATE 4 MG: 4 INJECTION, SOLUTION INTRAMUSCULAR; INTRAVENOUS at 09:05

## 2021-08-09 RX ADMIN — PROPOFOL 180 MG: 10 INJECTION, EMULSION INTRAVENOUS at 08:59

## 2021-08-09 ASSESSMENT — FIBROSIS 4 INDEX: FIB4 SCORE: 2.27

## 2021-08-09 NOTE — OR NURSING
1005: report received from Janeth FRANCIS, pt awake, on RA. Denies any pain or nausea.     1015-no change     1020-called and updated patients wife Sherine     1030-pt given water, instructed to only take small sips, tolerating PO well.       1047-pt meets criteria for transfer to stage II. Report called to Janeth FRANCIS.     1050-transferred to stage II.

## 2021-08-09 NOTE — ANESTHESIA PREPROCEDURE EVALUATION
72 yo M with history below here for ERCP, possible stent exchange.  NPO.     Relevant Problems   ANESTHESIA   (positive) Delayed emergence from anesthesia      NEURO   (positive) History of pulmonary embolism and DVT, unprovoked.   (positive) History of ulcerative colitis   (positive) S/P ablation of atrial fibrillation      CARDIAC   (positive) Atrial fibrillation (HCC)   (positive) Pulmonary hypertension (HCC)      GI   (positive) Acid reflux disease      Other   (positive) Chronic anticoagulation   (positive) Hyperlipidemia   (positive) Left ventricular hypertrophy   (positive) Obesity       Physical Exam    Airway   Mallampati: III  TM distance: >3 FB  Neck ROM: full       Cardiovascular - normal exam  Rhythm: regular  Rate: normal  (-) murmur     Dental - normal exam           Pulmonary - normal exam  Breath sounds clear to auscultation     Abdominal   (+) obese     Neurological - normal exam               Anesthesia Plan    ASA 3   ASA physical status 3 criteria: other (comment)    Plan - general       Airway plan will be ETT          Induction: intravenous      Pertinent diagnostic labs and testing reviewed    Informed Consent:    Anesthetic plan and risks discussed with patient.    Use of blood products discussed with: patient whom consented to blood products.

## 2021-08-09 NOTE — ANESTHESIA PROCEDURE NOTES
Airway    Date/Time: 8/9/2021 9:00 AM  Performed by: Deborah Orr M.D.  Authorized by: Deborah Orr M.D.     Location:  OR  Urgency:  Elective  Difficult Airway: No    Indications for Airway Management:  Anesthesia      Spontaneous Ventilation: absent    Sedation Level:  Deep  Preoxygenated: Yes    Patient Position:  Sniffing  Mask Difficulty Assessment:  1 - vent by mask  Final Airway Type:  Endotracheal airway  Final Endotracheal Airway:  ETT  Cuffed: Yes    Technique Used for Successful ETT Placement:  Direct laryngoscopy  Devices/Methods Used in Placement:  Intubating stylet    Insertion Site:  Oral  Blade Type:  Tessa  Laryngoscope Blade/Videolaryngoscope Blade Size:  3  ETT Size (mm):  8.0  Measured from:  Teeth  ETT to Teeth (cm):  23  Placement Verified by: auscultation and capnometry    Cormack-Lehane Classification:  Grade I - full view of glottis  Number of Attempts at Approach:  1

## 2021-08-09 NOTE — DISCHARGE INSTRUCTIONS
ENDOSCOPY HOME CARE INSTRUCTIONS    GASTROSCOPY OR ERCP  1. Don't eat or drink anything for about an hour after the test. You can then resume your regular diet.  2. Don't drive or drink alcohol for 24 hours. The medication you received will make you too drowsy.  3. Don't take any coffee, tea, or aspirin products until after you see your doctor. These can harm the lining of your stomach.  4. If you begin to vomit bloody material, or develop black or bloody stools, call your doctor as soon as possible.  5. If you have any neck, chest, abdominal pain or temp of 100 degrees, call your doctor.  6. See your doctor call to schedule        You should call 911 if you develop problems with breathing or chest pain.  If any questions arise, call your doctor. If your doctor is not available, please feel free to call [phone]. You can also call the HEALTH HOTLINE open 24 hours/day, 7 days/week and speak to a nurse at [phone], or toll free [phone].        Depression / Suicide Risk    As you are discharged from this [facility] facility, it is important to learn how to keep safe from harming yourself.    Recognize the warning signs:  · Abrupt changes in personality, positive or negative- including increase in energy   · Giving away possessions  · Change in eating patterns- significant weight changes-  positive or negative  · Change in sleeping patterns- unable to sleep or sleeping all the time   · Unwillingness or inability to communicate  · Depression  · Unusual sadness, discouragement and loneliness  · Talk of wanting to die  · Neglect of personal appearance   · Rebelliousness- reckless behavior  · Withdrawal from people/activities they love  · Confusion- inability to concentrate     If you or a loved one observes any of these behaviors or has concerns about self-harm, here's what you can do:  · Talk about it- your feelings and reasons for harming yourself  · Remove any means that you might use to hurt  yourself (examples: pills, rope, extension cords, firearm)  · Get professional help from the community (Mental Health, Substance Abuse, psychological counseling)  · Do not be alone:Call your Safe Contact- someone whom you trust who will be there for you.  · Call your local CRISIS HOTLINE 250-3497 or 913-860-8028  · Call your local Children's Mobile Crisis Response Team [place] (476) 454-7319 or www.[employer]  · Call the toll free National Suicide Prevention Hotlines   · National Suicide Prevention Lifeline 362-069-ONBQ (2506)  · National Hope Line Network 800-SUICIDE (304-2356)    Discharge Education for patients on NASEEM (Obstructive Sleep Apnea) Protocol    Prior to receiving sedation or anesthesia, we screen all patients for Obstructive Sleep Apnea.  During your screening, you were identified as having suspected, but not confirmed Obstructive Sleep Apnea(NASEEM).    What is Obstructive Sleep Apnea?  Sleep apnea (AP-ne-ah) is a common disorder which involves breathing pauses that occur during sleep.  These can last from 10 seconds to a minute or longer.  Normal breathing resumes often with a loud snort or choking sound.    Sleep apnea occurs in all age groups and both genders but is more common in men and people over 40 years of age.  It has been estimated that as many as 18 million Americans have sleep apnea.  Most people who have sleep apnea don’t know they have it because it only occurs during sleep.  A family member and/or bed partner may first notice the signs of sleep apnea.  Sleep apnea is a chronic (ongoing) condition that disrupts the quality and quantity of your sleep repeatedly throughout the night.  This often results in excessive daytime sleepiness or fatigue during the day.  It may also contribute to high blood pressure, heart problems, and complications following medications used for surgery and procedures.    To establish a definitive diagnosis, further testing from a specialist would be  needed.  We recommend that you follow up with your primary care physician.    We recommend that you should be with an adult observer for at least 24 hours after your sedation/anesthesia.  If you have a CPAP machine, you should wear it during any sleep period (day or night) for the week following your procedure.  We encourage you to sleep on your side or in a sitting position, even with napping.  Lying flat on your back increases the risk of apnea and airway obstruction during your post procedure recovery period.    It is important to prevent over-sedation that could increase your risk for apnea.  Please take all pain medication as directed by your physician.  If you are not getting pain relief, please contact your physician to discuss possible approaches to relieving pain while minimizing medications that can affect your breathing and oxygen levels.

## 2021-08-09 NOTE — PROCEDURES
DATE OF PROCEDURE:  08/09/2021     PHYSICIAN:  Vin He MD     ANESTHESIOLOGIST:  Deborah Orr MD     MEDICATION:  General anesthesia.     PREPROCEDURE DIAGNOSIS:  Bile leak.     POSTPROCEDURE DIAGNOSES:  ERCP with stent removal x2, stone removal and   occlusion balloon cholangiogram revealing no further evidence for a bile leak   after reviewed by me as well as the radiologist on-call, Dr. Feldman.     INDICATIONS:  Procedure risks and benefits reviewed thoroughly with the   patient.  Risks including but not limited to bleeding, perforation, side   effects of medication were informed.  The patient voiced understanding and   agreed to proceed.  Additional risks inherent to ERCP that being mild,   moderate or severe pancreatitis that could lead to postprocedural pain,   prolonged hospitalization, intensive care unit stay, and/or death reviewed.    The patient voiced understanding and agreed to proceed.     DESCRIPTION OF PROCEDURE:  The patient was placed in a left lateral position.    After intubation and sedation, a side-viewing duodenoscope was passed   carefully and easily under direct visualization.  Esophagus passed to the   second portion of duodenum brought in a shortened position.  Two previously   placed 10-French 7 cm straight plastic biliary stents were appreciated side by   side exiting the ampulla.  A snare was utilized to remove these sequentially.    Fluoroscopic images were obtained pre and post-removal, after which a   CleverCut sphincterotome with 0.035 wire was utilized to cannulate with the   ampulla within less than 1-2 seconds.  The trajectory of the wire was passed   in the biliary orientation and the contrast was not injected in total after   bile was appreciated upon aspiration.  Contrast injection confirmed bile duct   cannulation as well.  The wire was maintained, the tome was exchanged for an   occlusion balloon.  A 12 mm occlusion balloon was passed to the common hepatic    duct inflated and then occlusion balloon cholangiogram was performed.    Intrahepatic right and left were filled, they were not dilated.  As the   balloon was dragged through the bile duct, the cystic duct remnant was quite   generous and filled, but the most distal portion of the cystic duct remnant   was appreciated a clip and there was no filling beyond the clip, although this   is a duct remnant, diameter was quite large and large within the bile duct,   which was not dilated.  There was no diffuse contrast extravasation indicative   of a bile leak and therefore, upon further contrast injection under   occlusion, we confirmed that there is no bile leak and the balloon was removed   through the biliary system out through the ampulla and a small stone was   removed as well.  The fluoroscopic images were then sent to Dr. Feldman, the   on-call radiologist who again confirmed bile leak resolution.  The stomach was   suctioned, desufflated and scope was removed.     COMPLICATIONS:  None.     ESTIMATED BLOOD LOSS:  None.     SPECIMENS:  None.     RECOMMENDATIONS:  Bile leak resolved.  The patient to follow up with his   primary care doctor as needed.  The above was reviewed.  All questions were   answered.        ______________________________  MD CHARANJIT Vann/CARLI    DD:  08/09/2021 09:45  DT:  08/09/2021 10:09    Job#:  [id number]

## 2021-08-09 NOTE — OR NURSING
0947: To PACU from EUS via gurney, drowsy but rouses and denies pain/nausea. Abdo soft, ostomy bag without visible drainage, bag opaque so unable to view stoma. Plan to keep pt in PACU for full hour per [facility]ANG protocol.  1000: awake/alert, continues to deny discomfort. O2 d/richard.  1003: report to Amaya ROCHE RN

## 2021-08-09 NOTE — ANESTHESIA TIME REPORT
Anesthesia Start and Stop Event Times     Date Time Event    8/9/2021 0844 Ready for Procedure     0856 Anesthesia Start     0949 Anesthesia Stop        Responsible Staff  08/09/21    Name Role Begin End    Deborah Orr M.D. Anesth 0856 0949        Preop Diagnosis (Free Text):  Pre-op Diagnosis     BILE LEAK        Preop Diagnosis (Codes):  Diagnosis Information     Diagnosis Code(s): Bile leak [K83.9]        Post op Diagnosis  Bile leak      Premium Reason  Non-Premium    Comments:

## 2021-08-09 NOTE — ANESTHESIA POSTPROCEDURE EVALUATION
Patient: Abel Day III    Procedure Summary     Date: 08/09/21 Room / Location:  ENDOSCOPIC ULTRASOUND ROOM / SURGERY [facility]    Anesthesia Start:  Anesthesia Stop:     Procedure: ERCP (ENDOSCOPIC RETROGRADE CHOLANGIOPANCREATOGRAPHY) - REPEAT, WITH POSSIBLE STENT EXCHANGE OR REMOVAL (N/A ) Diagnosis: (BILE LEAK)    Surgeons: Vin He M.D. Responsible Provider:     Anesthesia Type: general ASA Status: 3          Final Anesthesia Type: general  Last vitals  BP   146/75   Temp   36.2   Pulse   68   Resp   16    SpO2   99%     Anesthesia Post Evaluation    Patient location during evaluation: PACU  Patient participation: complete - patient participated  Level of consciousness: awake and alert    Airway patency: patent  Anesthetic complications: no  Cardiovascular status: hemodynamically stable  Respiratory status: acceptable  Hydration status: euvolemic    PONV: none          No complications documented.     Nurse Pain Score: 0 (NPRS)

## 2021-08-09 NOTE — OR NURSING
Pt allergies and NPO status verified, home meds reconcilled. Belongings secured. Pt verbalizes understanding of the pain scale, expected course of stay, and plan of care.  Procedure verified with pt.  IV access established. PT/INR collected and sent to lab.

## 2021-08-10 ENCOUNTER — TELEPHONE (OUTPATIENT)
Dept: WOUND CARE | Facility: MEDICAL CENTER | Age: 71
End: 2021-08-10

## 2021-08-11 ENCOUNTER — ANTICOAGULATION MONITORING (OUTPATIENT)
Dept: VASCULAR LAB | Facility: MEDICAL CENTER | Age: 71
End: 2021-08-11

## 2021-08-11 DIAGNOSIS — Z86.711 HISTORY OF PULMONARY EMBOLISM: ICD-10-CM

## 2021-08-11 LAB — INR PPP: 1.2 (ref 2–3.5)

## 2021-08-12 NOTE — PROGRESS NOTES
Anticoagulation Summary  As of 2021    INR goal:  2.0-3.0   TTR:  63.4 % (6.2 y)   INR used for dosin.20 (2021)   Warfarin maintenance plan:  5 mg (5 mg x 1) every Mon, Fri; 2.5 mg (5 mg x 0.5) all other days   Weekly warfarin total:  22.5 mg   Plan last modified:  Yoni GoreD (2020)   Next INR check:  2021   Target end date:  Indefinite    Indications    History of pulmonary embolism and DVT  unprovoked. [Z86.711]  Atrial fibrillation with RVR (HCC) (Resolved) [I48.91]             Anticoagulation Episode Summary     INR check location:      Preferred lab:      Send INR reminders to:      Comments:  Alverto      Anticoagulation Care Providers     Provider Role Specialty Phone number    [facility] Responsible  [phone]        Anticoagulation Patient Findings  Patient Findings     Negatives:  Signs/symptoms of thrombosis, Signs/symptoms of bleeding, Laboratory test error suspected, Change in health, Change in alcohol use, Change in activity, Upcoming invasive procedure, Emergency department visit, Upcoming dental procedure, Missed doses, Extra doses, Change in medications, Change in diet/appetite, Hospital admission, Bruising, Other complaints         Spoke with patient today regarding subtherapeutic INR of 1.2.  Patient denies any signs/symptoms of bruising or bleeding or any changes in diet and medications.  Instructed patient to call clinic with any questions or concerns.  Surgical procedure two days ago, has since restarted warfarin and lovenox.  Instructed patient to increase dosing to 5mg daily until next INR.  He is to continue lovenox until INR >2.0.  Follow up in 2 days, to reduce risk of adverse events related to this high risk medication,  Warfarin.    Daniel Rutledge, PharmD, BCACP

## 2021-08-13 ENCOUNTER — ANTICOAGULATION MONITORING (OUTPATIENT)
Dept: VASCULAR LAB | Facility: MEDICAL CENTER | Age: 71
End: 2021-08-13

## 2021-08-13 DIAGNOSIS — Z86.711 HISTORY OF PULMONARY EMBOLISM: ICD-10-CM

## 2021-08-13 LAB — INR PPP: 1.6 (ref 2–3.5)

## 2021-08-14 NOTE — PROGRESS NOTES
Anticoagulation Summary  As of 2021    INR goal:  2.0-3.0   TTR:  63.4 % (6.2 y)   INR used for dosin.60 (2021)   Warfarin maintenance plan:  5 mg (5 mg x 1) every Mon, Fri; 2.5 mg (5 mg x 0.5) all other days   Weekly warfarin total:  22.5 mg   Plan last modified:  Kyree Al, PharmD (2020)   Next INR check:     Target end date:  Indefinite    Indications    History of pulmonary embolism and DVT  unprovoked. [Z86.711]  Atrial fibrillation with RVR (HCC) (Resolved) [I48.91]             Anticoagulation Episode Summary     INR check location:      Preferred lab:      Send INR reminders to:      Comments:  Alverto      Anticoagulation Care Providers     Provider Role Specialty Phone number    [facility] Responsible  [phone]        Anticoagulation Patient Findings          Spoke with Abel to report a sub therapeutic INR of 1.6.  PT continues on Lovenox bid dosing.  Will bolus dose with 5mg po daily and CONTINUE Lovenox until INR >/2.0    Makenna Hopkins, Clinical Pharmacist, CDE, CACP

## 2021-08-16 ENCOUNTER — ANTICOAGULATION MONITORING (OUTPATIENT)
Dept: VASCULAR LAB | Facility: MEDICAL CENTER | Age: 71
End: 2021-08-16

## 2021-08-16 DIAGNOSIS — Z86.711 HISTORY OF PULMONARY EMBOLISM: ICD-10-CM

## 2021-08-16 LAB — INR PPP: 2.7 (ref 2–3.5)

## 2021-08-17 NOTE — PROGRESS NOTES
OP Telephone Anticoagulation Service Note    Date: 2021      Anticoagulation Summary  As of 2021    INR goal:  2.0-3.0   TTR:  63.4 % (6.2 y)   INR used for dosin.70 (2021)   Warfarin maintenance plan:  5 mg (5 mg x 1) every Mon, Fri; 2.5 mg (5 mg x 0.5) all other days   Weekly warfarin total:  22.5 mg   Plan last modified:  Kyree Al, PharmD (2020)   Next INR check:     Target end date:  Indefinite    Indications    History of pulmonary embolism and DVT  unprovoked. [Z86.711]  Atrial fibrillation with RVR (HCC) (Resolved) [I48.91]             Anticoagulation Episode Summary     INR check location:      Preferred lab:      Send INR reminders to:      Comments:  Alverto      Anticoagulation Care Providers     Provider Role Specialty Phone number    [facility] Responsible  [phone]        Anticoagulation Patient Findings        Plan: Spoke with patient on the phone.   Patient is  therapeutic today.   Confirmed dosing.   Pt is not on antiplatelet agent  Instructed patient to call clinic if any unusual bleeding or bruising occurs.   Pt to lower today to 2.5 mg since he took  Higher dose yesterday, then will continue dosing as outlined. Stop Loenox  Will follow-up with patient in 1 week(s).          Shana Barragan, PharmD

## 2021-09-01 ENCOUNTER — ANTICOAGULATION MONITORING (OUTPATIENT)
Dept: VASCULAR LAB | Facility: MEDICAL CENTER | Age: 71
End: 2021-09-01

## 2021-09-01 DIAGNOSIS — Z86.711 HISTORY OF PULMONARY EMBOLISM: ICD-10-CM

## 2021-09-01 LAB — INR PPP: 2.7 (ref 2–3.5)

## 2021-09-01 NOTE — PROGRESS NOTES
Anticoagulation Summary  As of 2021    INR goal:  2.0-3.0   TTR:  63.6 % (6.2 y)   INR used for dosin.70 (2021)   Warfarin maintenance plan:  5 mg (5 mg x 1) every Mon, Fri; 2.5 mg (5 mg x 0.5) all other days   Weekly warfarin total:  22.5 mg   Plan last modified:  Kyree Al, PharmD (2020)   Next INR check:  9/15/2021   Target end date:  Indefinite    Indications    History of pulmonary embolism and DVT  unprovoked. [Z86.711]  Atrial fibrillation with RVR (HCC) (Resolved) [I48.91]             Anticoagulation Episode Summary     INR check location:      Preferred lab:      Send INR reminders to:      Comments:  Alverto      Anticoagulation Care Providers     Provider Role Specialty Phone number    [facility] Responsible  [phone]        Anticoagulation Patient Findings     Left voicemail message to report a therapeutic INR of 2.7.  Pt to continue with current warfarin dosing regimen. Requested pt contact the clinic for any s/s of unusual bleeding, bruising, clotting or any changes to diet or medication. FU 2 weeks.    Pt is to continue with current warfarin dosing regimen.delmis Woody, Pharmacy Intern

## 2021-10-08 ENCOUNTER — TELEPHONE (OUTPATIENT)
Dept: VASCULAR LAB | Facility: MEDICAL CENTER | Age: 71
End: 2021-10-08

## 2021-10-15 ENCOUNTER — TELEPHONE (OUTPATIENT)
Dept: VASCULAR LAB | Facility: MEDICAL CENTER | Age: 71
End: 2021-10-15

## 2021-10-15 ENCOUNTER — ANTICOAGULATION MONITORING (OUTPATIENT)
Dept: VASCULAR LAB | Facility: MEDICAL CENTER | Age: 71
End: 2021-10-15

## 2021-10-15 DIAGNOSIS — Z86.711 HISTORY OF PULMONARY EMBOLISM: ICD-10-CM

## 2021-10-15 NOTE — TELEPHONE ENCOUNTER
Pt reports that his PCP in [place] is managing his INRs.  Makenna Hopkins, Clinical Pharmacist, CDE, CACP

## 2021-10-15 NOTE — PROGRESS NOTES
Discharged from [facility].  Pt reports that his PCP in [place] is managing his anticoagulation  Makenna Filter, Clinical Pharmacist, CDE, CACP

## 2021-10-20 ENCOUNTER — APPOINTMENT (OUTPATIENT)
Dept: SLEEP MEDICINE | Facility: MEDICAL CENTER | Age: 71
End: 2021-10-20
Attending: INTERNAL MEDICINE
Payer: COMMERCIAL

## 2021-10-26 ENCOUNTER — APPOINTMENT (OUTPATIENT)
Dept: SLEEP MEDICINE | Facility: MEDICAL CENTER | Age: 71
End: 2021-10-26
Payer: COMMERCIAL

## 2022-01-10 NOTE — PROGRESS NOTES
EMG    Risks and benefits of study discussed. Specific and common risks of pain and bleeding as well as uncommon side effects of infection, hematoma and vasovagal episodes    Patient agreeable to testing and consents to such. Clinical: Bilateral hand numbness for several years. 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 #2 no evidence of radiculopathy, plexopathy or generalized neuropathy involving the upper limbs  *Referral to an upper extremity surgical specialist would be recommended, due to the severity of median neuropathy as described above. OP   Telephone Anticoagulation Service Note      Anticoagulation Summary  As of 2/22/2021    INR goal:  2.0-3.0   TTR:  62.4 % (5.7 y)   INR used for dosing:  3.10 (2/21/2021)   Warfarin maintenance plan:  5 mg (5 mg x 1) every Mon, Fri; 2.5 mg (5 mg x 0.5) all other days   Weekly warfarin total:  22.5 mg   Plan last modified:  Yoni GoreD (7/24/2020)   Next INR check:  3/8/2021   Target end date:  Indefinite    Indications    History of pulmonary embolism and DVT  unprovoked. [Z86.711]  Atrial fibrillation with RVR (HCC) (Resolved) [I48.91]             Anticoagulation Episode Summary     INR check location:      Preferred lab:      Send INR reminders to:      Comments:  Alverto      Anticoagulation Care Providers     Provider Role Specialty Phone number    [facility] Responsible  [phone]        Anticoagulation Patient Findings     Left a message on the patient's voicemail today, informing the patient of a slightly SUPRA-therapeutic INR of 3.1.   Unable to verify dosing or any interval changes, but Instructed the patient to call the clinic with any changes to diet or medications, with any signs/sx of bleeding or clotting or with any questions or concerns.     Patient instructed to reduce dose to 2.5mg, TONIGHT, as a one time dose adjustment, then to resume his current dosing regimen.   Patient asked to retest again in 2 weeks.     Jay VallejoD

## 2022-01-31 NOTE — PATIENT INSTRUCTIONS
"Change ileostomies every 3-5 days.  Change appliance immediately if it is leaking or peristomal skin feels irritated, has itching, or  burning.   To change the appliance, remove previous appliance (using adhesive remover spray), cleanse peristomal skin with warm water/washcloth (NO SOAP!), pat dry.  Use template to trace ostomy shape onto back of Brava sheet, then cut out.  Spray skin around stoma with medical adhesive spray, spread it out with finger, then wait approx 10 seconds until it feels tacky.  Apply the cut to fit Brava sheet sticky side (printed side) down.  Spray over this with medical adhesive spray, allow to get tacky, then apply Adapt paste pie wedges to the 3 and 9 o'clock positions to buil up the crease (2 per side).  Now apply the Convex barrier ring, with the \"volcano\" side facing down and flat side up.  Now place the cut to fit flange over this, and secure both sides with a Brava strip.  Apply pouch and snap into place.  Now apply the stoma belt and tighten so it is snug but comfortable.    Empty pouches when no more than ½ full. Check contents every 2 hours or as needed. Do not leave soap residue on skin and do not use baby wipes or skin prep wipes.     Should you experience any significant changes in your condition, such as infection (redness, swelling, localized heat, increased pain, fever > 101 F, chills) or have any questions regarding your home care instructions, please contact the wound center at [phone]. If after hours, contact your primary care physician or go to the hospital emergency room.       " Detail Level: Detailed Photo Preface (Leave Blank If You Do Not Want): Photographs were obtained today Detail Level: Zone

## 2022-07-13 ENCOUNTER — APPOINTMENT (OUTPATIENT)
Dept: ORTHOPEDIC SURGERY | Facility: CLINIC | Age: 72
End: 2022-07-13

## 2022-07-13 VITALS — BODY MASS INDEX: 36.68 KG/M2 | HEIGHT: 75 IN | WEIGHT: 295 LBS

## 2022-07-13 DIAGNOSIS — Z85.828 PERSONAL HISTORY OF OTHER MALIGNANT NEOPLASM OF SKIN: ICD-10-CM

## 2022-07-13 DIAGNOSIS — Z78.9 OTHER SPECIFIED HEALTH STATUS: ICD-10-CM

## 2022-07-13 DIAGNOSIS — I10 ESSENTIAL (PRIMARY) HYPERTENSION: ICD-10-CM

## 2022-07-13 DIAGNOSIS — Z82.49 FAMILY HISTORY OF ISCHEMIC HEART DISEASE AND OTHER DISEASES OF THE CIRCULATORY SYSTEM: ICD-10-CM

## 2022-07-13 DIAGNOSIS — Z86.79 PERSONAL HISTORY OF OTHER DISEASES OF THE CIRCULATORY SYSTEM: ICD-10-CM

## 2022-07-13 PROBLEM — Z00.00 ENCOUNTER FOR PREVENTIVE HEALTH EXAMINATION: Status: ACTIVE | Noted: 2022-07-13

## 2022-07-13 PROCEDURE — 73030 X-RAY EXAM OF SHOULDER: CPT | Mod: FY,RT

## 2022-07-13 PROCEDURE — 99203 OFFICE O/P NEW LOW 30 MIN: CPT | Mod: 25

## 2022-07-13 PROCEDURE — 20610 DRAIN/INJ JOINT/BURSA W/O US: CPT | Mod: RT

## 2022-07-13 RX ORDER — CYCLOBENZAPRINE HYDROCHLORIDE 5 MG/1
5 TABLET, FILM COATED ORAL
Qty: 14 | Refills: 0 | Status: ACTIVE | COMMUNITY
Start: 2022-03-09

## 2022-07-13 RX ORDER — LANSOPRAZOLE 15 MG/1
15 CAPSULE, DELAYED RELEASE ORAL
Qty: 90 | Refills: 0 | Status: ACTIVE | COMMUNITY
Start: 2022-06-08

## 2022-07-13 RX ORDER — WARFARIN SODIUM 5 MG/1
5 TABLET ORAL
Qty: 90 | Refills: 0 | Status: ACTIVE | COMMUNITY
Start: 2022-05-20

## 2022-07-13 RX ORDER — LOPERAMIDE HYDROCHLORIDE 2 MG/1
2 CAPSULE ORAL
Qty: 180 | Refills: 0 | Status: ACTIVE | COMMUNITY
Start: 2022-06-08

## 2022-07-13 RX ORDER — METOPROLOL SUCCINATE 25 MG/1
25 TABLET, EXTENDED RELEASE ORAL
Qty: 90 | Refills: 0 | Status: ACTIVE | COMMUNITY
Start: 2022-05-20

## 2022-07-13 RX ORDER — METHYLPREDNISOLONE 4 MG/1
4 TABLET ORAL
Qty: 21 | Refills: 0 | Status: ACTIVE | COMMUNITY
Start: 2022-03-09

## 2022-07-13 RX ORDER — ATORVASTATIN CALCIUM 40 MG/1
40 TABLET, FILM COATED ORAL
Qty: 90 | Refills: 0 | Status: ACTIVE | COMMUNITY
Start: 2022-05-20

## 2022-07-13 RX ORDER — CHOLESTYRAMINE 4 G/5.5G
4 POWDER, FOR SUSPENSION ORAL
Qty: 180 | Refills: 0 | Status: ACTIVE | COMMUNITY
Start: 2022-06-08

## 2022-07-13 NOTE — DISCUSSION/SUMMARY
[de-identified] : Options were discussed. He wants to proceed with CSI. \par The plan is to go forward with CSI in the right shoulder\par \par f/u 1 month

## 2022-07-13 NOTE — DISCUSSION/SUMMARY
[de-identified] : Options were discussed. He wants to proceed with CSI. \par The plan is to go forward with CSI in the right shoulder\par \par f/u 1 month

## 2022-07-13 NOTE — REASON FOR VISIT
[FreeTextEntry2] : Patient is here for right shoulder. Was lifting some propane tanks and felt a pop in right shoulder 2020. \par Went to see primary doctor and patient went to PT  recently but had no relief. Pain goes down to bicep. He may have reinjured it lifting boxes last fall.

## 2022-07-13 NOTE — PROCEDURE
[Large Joint Injection] : Large joint injection [Right] : of the right [Subacromial Space] : subacromial space [Pain] : pain [Inflammation] : inflammation [Betadine] : betadine [Sterile technique used] : sterile technique used [___ cc    6mg] :  Betamethasone (Celestone) ~Vcc of 6mg [___ cc    1%] : Lidocaine ~Vcc of 1%  [] : Patient tolerated procedure well [Call if redness, pain or fever occur] : call if redness, pain or fever occur [Apply ice for 15min out of every hour for the next 12-24 hours as tolerated] : apply ice for 15 minutes out of every hour for the next 12-24 hours as tolerated [Patient was advised to rest the joint(s) for ____ days] : patient was advised to rest the joint(s) for [unfilled] days [Previous OTC use and PT nontherapeutic] : patient has tried OTC's including aspirin, Ibuprofen, Aleve, etc or prescription NSAIDS, and/or exercises at home and/or physical therapy without satisfactory response [Patient had decreased mobility in the joint] : patient had decreased mobility in the joint [Risks, benefits, alternatives discussed / Verbal consent obtained] : the risks benefits, and alternatives have been discussed, and verbal consent was obtained

## 2022-08-17 ENCOUNTER — APPOINTMENT (OUTPATIENT)
Dept: ORTHOPEDIC SURGERY | Facility: CLINIC | Age: 72
End: 2022-08-17

## 2022-08-17 PROCEDURE — 99213 OFFICE O/P EST LOW 20 MIN: CPT

## 2022-08-17 NOTE — DISCUSSION/SUMMARY
[de-identified] : Discussed options including continued PT, MRI, observation. Patient will f/u with Dr. Henderson for further evaluation.

## 2022-08-17 NOTE — HISTORY OF PRESENT ILLNESS
[de-identified] : following up for the right shoulder. Patient had a CSI 7/13/2022 which provided great relief. He states he still has soreness at times.  He states since the injection he has a lot more improvement.  He still gets some muscle soreness especially after PT.  He notes most pain at the bicep and believes the PT ha sbeen helping with his ROM.

## 2022-09-01 ENCOUNTER — APPOINTMENT (OUTPATIENT)
Dept: PHYSICAL MEDICINE AND REHAB | Facility: CLINIC | Age: 72
End: 2022-09-01

## 2022-09-01 VITALS — HEIGHT: 75 IN | BODY MASS INDEX: 36.68 KG/M2 | WEIGHT: 295 LBS

## 2022-09-01 PROCEDURE — 99205 OFFICE O/P NEW HI 60 MIN: CPT

## 2022-09-01 RX ORDER — ATORVASTATIN CALCIUM 80 MG/1
TABLET, FILM COATED ORAL
Refills: 0 | Status: ACTIVE | COMMUNITY

## 2022-09-01 RX ORDER — CETIRIZINE HCL/PSEUDOEPHEDRINE 5 MG-120MG
TABLET, EXTENDED RELEASE 12 HR ORAL
Refills: 0 | Status: ACTIVE | COMMUNITY

## 2022-09-01 RX ORDER — WARFARIN 4 MG/1
TABLET ORAL
Refills: 0 | Status: ACTIVE | COMMUNITY

## 2022-09-01 RX ORDER — PROPAFENONE HYDROCHLORIDE 225 MG/1
TABLET, FILM COATED ORAL
Refills: 0 | Status: ACTIVE | COMMUNITY

## 2022-09-01 RX ORDER — LANSOPRAZOLE 15 MG/1
15 CAPSULE, DELAYED RELEASE ORAL
Refills: 0 | Status: ACTIVE | COMMUNITY

## 2022-09-01 RX ORDER — METOPROLOL TARTRATE 75 MG/1
TABLET, FILM COATED ORAL
Refills: 0 | Status: ACTIVE | COMMUNITY

## 2022-09-01 NOTE — PHYSICAL EXAM
[Normal] : The sclera and conjunctiva were normal, pupils were equal in size, round, and reactive to light and extraocular movements were intact [FreeTextEntry1] : EXAMINATION OF THE RIGHT SHOULDER: \par On palpation, bicipital tendon region and greater trochanteric bursa. \par Range of motion testing was performed with the use of a goniometer. \par On range of motion testing, active flexion was to 155º,(normal flexion - 180º)\par Active abduction was to 150º, (normal abduction - 180º)\par Active external rotation was to 70º,(normal external rotation - 90º)\par Active internal rotation was to 55º,  (normal internal rotation - 90º)\par Active extension was to 50º, (normal extension - 45º-60º)\par Drop Arm Test was .  Anterior and Posterior Apprehension Testing was negative \par MMT: flexion 5-/5, abduction 5-/5, external rotation 5-/5, internal rotation 5/5, extension 5/5\par Right elbow flexion and extension 5/5\par Good pronation and supination of the right forearm \par There was a mildly impingement Sign +.  \par Supraspinatus Test was mildly  + . \par Yergason is negative \par Drop arm is negative \par Painful arc from 80 degrees to 110 degrees \par Good scapula mobility and no winging.

## 2022-09-01 NOTE — HISTORY OF PRESENT ILLNESS
[FreeTextEntry1] : Patient is a 72 year old male who presents in my office today for eval of right shoulder pain with a duration of two year after lifting a propane tank. Pt states that this past March he exacerbated his right shoulder by reaching out for his dog. Since that time he has been experiencing increasing right shoulder pain. He reports he was evaluated by his PCP and initiated course of PT this past March currently attending PT 1xweekly. \par 3Pt reports he was eval by Dr. Adame this past July and received cortisone injection to his right shoulder and reports significant relief. \par Pt presents in my office today for eval of right shoulder pain with limited ROM and weakness. \par Pain level is 1 to 2 at rest and 6 at worst with activity. \par

## 2022-09-01 NOTE — CONSULT LETTER
[Dear  ___] : Dear  [unfilled], [Consult Letter:] : I had the pleasure of evaluating your patient, [unfilled]. [Sincerely,] : Sincerely, [FreeTextEntry3] : Dutch Henderson M.D

## 2022-09-01 NOTE — ASSESSMENT
[FreeTextEntry1] : PT 2xweek/4 weeks \par ICE,ES right shoulder\par GENTLE ROM AS TOLERATED \par CODMANS ROM\par ISOMETRICS FOR RC STREGTHENING ADVANCED AS TOLRATED -F/U W ICE \par SCAPULA MOBILIZATION \par INSTRUCTION IN HEP \par TRANSFER SKILLS TO HEP \par \par Recheck in 4weeks \par

## 2022-10-06 ENCOUNTER — APPOINTMENT (OUTPATIENT)
Dept: PHYSICAL MEDICINE AND REHAB | Facility: CLINIC | Age: 72
End: 2022-10-06

## 2022-10-13 ENCOUNTER — APPOINTMENT (OUTPATIENT)
Dept: PHYSICAL MEDICINE AND REHAB | Facility: CLINIC | Age: 72
End: 2022-10-13

## 2022-10-13 DIAGNOSIS — M75.80 OTHER SHOULDER LESIONS, UNSPECIFIED SHOULDER: ICD-10-CM

## 2022-10-13 DIAGNOSIS — M75.41 IMPINGEMENT SYNDROME OF RIGHT SHOULDER: ICD-10-CM

## 2022-10-13 PROCEDURE — 99213 OFFICE O/P EST LOW 20 MIN: CPT

## 2022-10-13 NOTE — HISTORY OF PRESENT ILLNESS
[FreeTextEntry1] : Pt reports improvement to right shoulder with HEP. Still complains of intermittent shoudle rpain with extremes of extension and external rotation of shoulder.

## 2022-10-13 NOTE — PHYSICAL EXAM
[FreeTextEntry1] : EXAMINATION OF THE RIGHT SHOULDER: \par \par On palpation, mild tenderness bicipital tendon region, greater trochanteric region improved \par Range of motion testing was performed with the use of a goniometer. \par On range of motion testing, active flexion was to 155º,(normal flexion - 180º)\par Active abduction was to 150º, (normal abduction - 180º)\par Active external rotation was to 68º,(normal external rotation - 90º)\par Active internal rotation was to 55º,  (normal internal rotation - 90º)\par Active extension was to 50º, (normal extension - 45º-60º)\par Drop Arm Test was .  Anterior and Posterior Apprehension Testing was negative \par MMT: flexion 5/5, abduction 5-/5, external rotation 5-/5, internal rotation 5/5, extension 5/5\par Right elbow flexion and extension 5/5\par Good pronation and supination of the right forearm \par There was a mildly impingement Sign +.  \par Supraspinatus Test was mildly  + . \par Yergason is negative \par Drop arm is negative  [Normal] : The sclera and conjunctiva were normal, pupils were equal in size, round, and reactive to light and extraocular movements were intact

## 2023-02-10 ENCOUNTER — TRANSCRIPTION ENCOUNTER (OUTPATIENT)
Age: 73
End: 2023-02-10

## 2024-04-29 ENCOUNTER — RESULT REVIEW (OUTPATIENT)
Age: 74
End: 2024-04-29

## 2024-10-09 NOTE — TELEPHONE ENCOUNTER
Olivier Rincon R.N.        Caller: Unspecified (Today, 2:44 PM)                     The referral is closed, as they were unable to contact the patient.  The referral showed that there schedule tried multiple times to contact the patient, and had no response.  They then sent a letter, but the letter was sent back in November as well.  A new referral will need to be initiated.  Thank you                       Home regimen: amlodipine-olmesartan 5-20mg qd  -hold amlodipine/losartan (therapeutic interchange) iso hypotension/hypovolemia Home regimen: amlodipine-olmesartan 5-20mg qd  -hold amlodipine/losartan (therapeutic interchange) iso hypotension/hypovolemia Home regimen: amlodipine-olmesartan 5-20mg qd  -hold amlodipine/losartan (therapeutic interchange) iso hypotension/hypovolemia Home regimen: amlodipine-olmesartan 5-20mg qd  -hold amlodipine/losartan (therapeutic interchange) iso hypotension/hypovolemia Home regimen: amlodipine-olmesartan 5-20mg qd  -hold amlodipine/losartan (therapeutic interchange) iso hypotension/hypovolemia

## 2025-01-14 DIAGNOSIS — E78.5 HYPERLIPIDEMIA, UNSPECIFIED: ICD-10-CM

## 2025-01-14 RX ORDER — APIXABAN 5 MG/1
5 TABLET, FILM COATED ORAL
Qty: 180 | Refills: 1 | Status: ACTIVE | COMMUNITY
Start: 2025-01-14

## 2025-01-14 RX ORDER — LANSOPRAZOLE 30 MG/1
30 CAPSULE, DELAYED RELEASE ORAL DAILY
Refills: 0 | Status: ACTIVE | COMMUNITY
Start: 2025-01-14

## 2025-01-14 RX ORDER — CETIRIZINE HYDROCHLORIDE 10 MG/1
10 TABLET, COATED ORAL DAILY
Refills: 0 | Status: ACTIVE | COMMUNITY
Start: 2025-01-14

## 2025-01-14 RX ORDER — ELECTROLYTES/DEXTROSE
SOLUTION, ORAL ORAL DAILY
Refills: 0 | Status: ACTIVE | COMMUNITY
Start: 2025-01-14

## 2025-01-14 RX ORDER — LOPERAMIDE HYDROCHLORIDE 2 MG/1
2 CAPSULE ORAL TWICE DAILY
Refills: 0 | Status: ACTIVE | COMMUNITY
Start: 2025-01-14

## 2025-01-14 RX ORDER — IPRATROPIUM BROMIDE 42 UG/1
0.06 SPRAY, METERED NASAL 3 TIMES DAILY
Refills: 0 | Status: ACTIVE | COMMUNITY
Start: 2025-01-14

## 2025-01-14 RX ORDER — CHOLECALCIFEROL (VITAMIN D3) 1250 MCG
1.25 MG CAPSULE ORAL DAILY
Refills: 0 | Status: ACTIVE | COMMUNITY
Start: 2025-01-14

## 2025-01-14 RX ORDER — CHOLESTYRAMINE 4 G/9G
4 POWDER, FOR SUSPENSION ORAL 3 TIMES DAILY
Refills: 0 | Status: ACTIVE | COMMUNITY
Start: 2025-01-14

## 2025-01-14 RX ORDER — FAMOTIDINE 40 MG/1
40 TABLET, FILM COATED ORAL DAILY
Refills: 0 | Status: ACTIVE | COMMUNITY
Start: 2025-01-14

## 2025-01-14 RX ORDER — DILTIAZEM HYDROCHLORIDE 180 MG/1
180 CAPSULE, EXTENDED RELEASE ORAL DAILY
Refills: 0 | Status: ACTIVE | COMMUNITY
Start: 2025-01-14

## 2025-01-17 ENCOUNTER — APPOINTMENT (OUTPATIENT)
Age: 75
End: 2025-01-17
Payer: MEDICARE

## 2025-01-17 VITALS
HEART RATE: 92 BPM | SYSTOLIC BLOOD PRESSURE: 138 MMHG | HEIGHT: 75 IN | OXYGEN SATURATION: 97 % | BODY MASS INDEX: 36.06 KG/M2 | WEIGHT: 290 LBS | DIASTOLIC BLOOD PRESSURE: 80 MMHG

## 2025-01-17 DIAGNOSIS — K51.90 ULCERATIVE COLITIS, UNSPECIFIED, W/OUT COMPLICATIONS: ICD-10-CM

## 2025-01-17 DIAGNOSIS — I48.4 ATYPICAL ATRIAL FLUTTER: ICD-10-CM

## 2025-01-17 DIAGNOSIS — I10 ESSENTIAL (PRIMARY) HYPERTENSION: ICD-10-CM

## 2025-01-17 DIAGNOSIS — I48.91 UNSPECIFIED ATRIAL FIBRILLATION: ICD-10-CM

## 2025-01-17 PROCEDURE — 99204 OFFICE O/P NEW MOD 45 MIN: CPT

## 2025-01-17 PROCEDURE — 93000 ELECTROCARDIOGRAM COMPLETE: CPT

## 2025-01-29 ENCOUNTER — OUTPATIENT (OUTPATIENT)
Dept: OUTPATIENT SERVICES | Facility: HOSPITAL | Age: 75
LOS: 1 days | End: 2025-01-29
Payer: MEDICARE

## 2025-01-29 VITALS
RESPIRATION RATE: 12 BRPM | TEMPERATURE: 98 F | SYSTOLIC BLOOD PRESSURE: 120 MMHG | WEIGHT: 299.83 LBS | HEART RATE: 105 BPM | DIASTOLIC BLOOD PRESSURE: 70 MMHG | HEIGHT: 75 IN | OXYGEN SATURATION: 97 %

## 2025-01-29 DIAGNOSIS — Z90.49 ACQUIRED ABSENCE OF OTHER SPECIFIED PARTS OF DIGESTIVE TRACT: Chronic | ICD-10-CM

## 2025-01-29 DIAGNOSIS — Z98.49 CATARACT EXTRACTION STATUS, UNSPECIFIED EYE: Chronic | ICD-10-CM

## 2025-01-29 DIAGNOSIS — Z98.890 OTHER SPECIFIED POSTPROCEDURAL STATES: Chronic | ICD-10-CM

## 2025-01-29 DIAGNOSIS — Z01.818 ENCOUNTER FOR OTHER PREPROCEDURAL EXAMINATION: ICD-10-CM

## 2025-01-29 DIAGNOSIS — Z90.89 ACQUIRED ABSENCE OF OTHER ORGANS: Chronic | ICD-10-CM

## 2025-01-29 LAB
ALBUMIN SERPL ELPH-MCNC: 4 G/DL — SIGNIFICANT CHANGE UP (ref 3.3–5.2)
ALP SERPL-CCNC: 93 U/L — SIGNIFICANT CHANGE UP (ref 40–120)
ALT FLD-CCNC: 23 U/L — SIGNIFICANT CHANGE UP
ANION GAP SERPL CALC-SCNC: 14 MMOL/L — SIGNIFICANT CHANGE UP (ref 5–17)
APTT BLD: 33.5 SEC — SIGNIFICANT CHANGE UP (ref 24.5–35.6)
AST SERPL-CCNC: 27 U/L — SIGNIFICANT CHANGE UP
BASOPHILS # BLD AUTO: 0.03 K/UL — SIGNIFICANT CHANGE UP (ref 0–0.2)
BASOPHILS NFR BLD AUTO: 0.3 % — SIGNIFICANT CHANGE UP (ref 0–2)
BILIRUB SERPL-MCNC: 1.1 MG/DL — SIGNIFICANT CHANGE UP (ref 0.4–2)
BLD GP AB SCN SERPL QL: SIGNIFICANT CHANGE UP
BUN SERPL-MCNC: 16.9 MG/DL — SIGNIFICANT CHANGE UP (ref 8–20)
CALCIUM SERPL-MCNC: 9 MG/DL — SIGNIFICANT CHANGE UP (ref 8.4–10.5)
CHLORIDE SERPL-SCNC: 102 MMOL/L — SIGNIFICANT CHANGE UP (ref 96–108)
CO2 SERPL-SCNC: 24 MMOL/L — SIGNIFICANT CHANGE UP (ref 22–29)
CREAT SERPL-MCNC: 0.91 MG/DL — SIGNIFICANT CHANGE UP (ref 0.5–1.3)
EGFR: 88 ML/MIN/1.73M2 — SIGNIFICANT CHANGE UP
EOSINOPHIL # BLD AUTO: 0.05 K/UL — SIGNIFICANT CHANGE UP (ref 0–0.5)
EOSINOPHIL NFR BLD AUTO: 0.6 % — SIGNIFICANT CHANGE UP (ref 0–6)
GLUCOSE SERPL-MCNC: 85 MG/DL — SIGNIFICANT CHANGE UP (ref 70–99)
HCT VFR BLD CALC: 43.6 % — SIGNIFICANT CHANGE UP (ref 39–50)
HGB BLD-MCNC: 14.8 G/DL — SIGNIFICANT CHANGE UP (ref 13–17)
IMM GRANULOCYTES NFR BLD AUTO: 0.3 % — SIGNIFICANT CHANGE UP (ref 0–0.9)
INR BLD: 1.19 RATIO — HIGH (ref 0.85–1.16)
LYMPHOCYTES # BLD AUTO: 2.45 K/UL — SIGNIFICANT CHANGE UP (ref 1–3.3)
LYMPHOCYTES # BLD AUTO: 27 % — SIGNIFICANT CHANGE UP (ref 13–44)
MAGNESIUM SERPL-MCNC: 1.4 MG/DL — LOW (ref 1.6–2.6)
MCHC RBC-ENTMCNC: 32.1 PG — SIGNIFICANT CHANGE UP (ref 27–34)
MCHC RBC-ENTMCNC: 33.9 G/DL — SIGNIFICANT CHANGE UP (ref 32–36)
MCV RBC AUTO: 94.6 FL — SIGNIFICANT CHANGE UP (ref 80–100)
MONOCYTES # BLD AUTO: 0.68 K/UL — SIGNIFICANT CHANGE UP (ref 0–0.9)
MONOCYTES NFR BLD AUTO: 7.5 % — SIGNIFICANT CHANGE UP (ref 2–14)
NEUTROPHILS # BLD AUTO: 5.85 K/UL — SIGNIFICANT CHANGE UP (ref 1.8–7.4)
NEUTROPHILS NFR BLD AUTO: 64.3 % — SIGNIFICANT CHANGE UP (ref 43–77)
PLATELET # BLD AUTO: 149 K/UL — LOW (ref 150–400)
POTASSIUM SERPL-MCNC: 4.2 MMOL/L — SIGNIFICANT CHANGE UP (ref 3.5–5.3)
POTASSIUM SERPL-SCNC: 4.2 MMOL/L — SIGNIFICANT CHANGE UP (ref 3.5–5.3)
PROT SERPL-MCNC: 7.2 G/DL — SIGNIFICANT CHANGE UP (ref 6.6–8.7)
PROTHROM AB SERPL-ACNC: 13.8 SEC — HIGH (ref 9.9–13.4)
RBC # BLD: 4.61 M/UL — SIGNIFICANT CHANGE UP (ref 4.2–5.8)
RBC # FLD: 13.2 % — SIGNIFICANT CHANGE UP (ref 10.3–14.5)
SODIUM SERPL-SCNC: 140 MMOL/L — SIGNIFICANT CHANGE UP (ref 135–145)
WBC # BLD: 9.09 K/UL — SIGNIFICANT CHANGE UP (ref 3.8–10.5)
WBC # FLD AUTO: 9.09 K/UL — SIGNIFICANT CHANGE UP (ref 3.8–10.5)

## 2025-01-29 PROCEDURE — G0463: CPT

## 2025-01-29 PROCEDURE — 80053 COMPREHEN METABOLIC PANEL: CPT

## 2025-01-29 PROCEDURE — 85610 PROTHROMBIN TIME: CPT

## 2025-01-29 PROCEDURE — 86901 BLOOD TYPING SEROLOGIC RH(D): CPT

## 2025-01-29 PROCEDURE — 86850 RBC ANTIBODY SCREEN: CPT

## 2025-01-29 PROCEDURE — 83735 ASSAY OF MAGNESIUM: CPT

## 2025-01-29 PROCEDURE — 36415 COLL VENOUS BLD VENIPUNCTURE: CPT

## 2025-01-29 PROCEDURE — 85730 THROMBOPLASTIN TIME PARTIAL: CPT

## 2025-01-29 PROCEDURE — 93010 ELECTROCARDIOGRAM REPORT: CPT

## 2025-01-29 PROCEDURE — 86900 BLOOD TYPING SEROLOGIC ABO: CPT

## 2025-01-29 PROCEDURE — 93005 ELECTROCARDIOGRAM TRACING: CPT

## 2025-01-29 PROCEDURE — 85025 COMPLETE CBC W/AUTO DIFF WBC: CPT

## 2025-01-29 NOTE — H&P PST ADULT - NSICDXPASTSURGICALHX_GEN_ALL_CORE_FT
PAST SURGICAL HISTORY:  H/O colectomy     H/O prior ablation treatment     S/P cataract surgery     S/P cholecystectomy     S/P ERCP     S/P tonsillectomy

## 2025-01-29 NOTE — H&P PST ADULT - HISTORY OF PRESENT ILLNESS
Department of Cardiology                                                                  [facility] [address] [address] [facility]                                                            Telephone: [phone]. Fax:[phone]                                                                                    PST H & P     Chief complaint:    Patient is a 75y old  Male who presents with a chief complaint of Fatigue SOB, in conjunction with Afib rate and episodes.     HPI:  76 yo male with with a history of hypertension, remote DVTs and BPV, Crohn's disease (s/p total colectomy and ileostomy in ), lap cholecystectomy (), and atrial fibrillation (diagnosed in , s/p two RF ablations in ) presents for initial evaluation. Initially diagnosed with atrial fibrillation in his mid-30s and managed with rate control, he underwent two ablations in  for worsening palpitations and dyspnea. He remained largely asymptomatic until 2024 when covid pneumonia complicated by atrial fibrillation with RVR required hospitalization. Subsequently managed with metoprolol and diltiazem (rate control), he recently reduced his metoprolol dose due to fatigue. He reports persistent low energy and fatigue, uncertain if it's related to the atrial fibrillation, medications, or both. Anticoagulation is tolerated without bleeding complications. He presented today in atypical atrial flutter.       DATE OF PST 25   DATE OR PROCEDURE 25     NA     Cardiology Testing Summary   EC25 ECG: atypical atrial flutter with varying undqwjnonm13/25/24 ECG: sinus rhythm nonspecific st-t wave changes24 ECG: Afib with PVC HR 80 bpm Remote/Ambulatory Rhythm Monitorin24-24 holter monitor: aflutter occurred continuously HR range 71-95 bpm. Echo: 6/10/24 TTE: LVEF 55-60%, LA mildly dilated, mild MR, mild TR, moderate PAH by RV pressure.    Anticoagulation Therapies:  Eliquis   Rate Control: Metoprolol, Diltiazem       Associated Risk Factors:        Cerebrovascular Disease: N/A       Chronic Lung Disease: N/A       Peripheral Arterial Disease: N/A       Chronic Kidney Disease (if yes, what is GFR): N/A       Uncontrolled Diabetes (if yes, what is HgbA1C or FBS): N/A       Poorly Controlled Hypertension (if yes, what is SBP): N/A       Morbid Obesity (if yes, what is BMI): N/A       History of Recent Ventricular Arrhythmia: N/A       Inability to Ambulate Safely: N/A       Need for Therapeutic Anticoagulation: N/A       Antiplatelet or Contrast Allergy: N/A    Home Medications:  atorvastatin 40 mg oral tablet: 1 tab(s) orally once a day (2025 19:42)  Atrovent 42 mcg/inh nasal spray: 2 spray(s) intranasally 2 times a day (2025 19:46)  cetirizine 10 mg oral tablet: 1 tab(s) orally once a day (2025 19:39)  D3 50 mcg (2000 intl units) oral capsule: 1 cap(s) orally once a day (2025 19:44)  Diltia  mg/24 hours oral capsule, extended release: 1 cap(s) orally once a day (2025 19:43)  famotidine 40 mg oral tablet: 1 tab(s) orally once a day (2025 19:44)  lansoprazole 30 mg oral tablet, disintegratin tab(s) orally once a day (2025 19:40)  Locholest Light 4 g/5 g oral powder for reconstitution: 4 gram(s) orally 2 times a day (2025 19:47)  magnesium oxide: 500 milligram(s) orally once a day (2025 19:42)  Metoprolol Succinate ER 50 mg oral tablet, extended release: 1 tab(s) orally once a day (2025 19:41)                      ROS: as stated above, otherwise negative    PHYSICAL EXAM:  Constitutional: A & O x 3  HEENT:   Normal oral mucosa, PERRL, EOMI	  Cardiovascular: Normal S1 S2, No JVD, No murmurs  Respiratory: Lungs clear to auscultation   Gastrointestinal:  Soft, Non-tender, + BS	  Skin: No rashes, No ecchymoses, No cyanosis  Neurologic: Non-focal  Extremities: Normal range of motion, + 3   Vascular: Peripheral pulses palpable + bilaterally  +1 PP bilat + AT bilat     ECG:  	Afib 105     LABS:	 	Pending

## 2025-01-29 NOTE — H&P PST ADULT - ATTENDING PHYSICIAN: I HAVE REVIEWED THE CLINICAL DOCUMENTATION AND AGREE WITH THE ABOVE NOTE
[Time Spent: ___ minutes] : I have spent [unfilled] minutes of time on the encounter. [FreeTextEntry3] : D/w  Statement Selected

## 2025-01-29 NOTE — H&P PST ADULT - NSICDXPASTMEDICALHX_GEN_ALL_CORE_FT
PAST MEDICAL HISTORY:  Chronic atrial fibrillation     Colostomy present     Deep vein thrombosis (DVT)     Edema of both legs     H/O atrial flutter     H/O cataract     History of ulcerative colitis     HLD (hyperlipidemia)     HTN (hypertension)     ENRICO on CPAP     Retinal drusen, bilateral     Skin cancer

## 2025-01-29 NOTE — H&P PST ADULT - ASSESSMENT
HPI:  In summary, this 75-year-old male has a history of persistent atrial fibrillation, initially diagnosed in his 30s, with two radiofrequency ablations performed in 2016. The atrial fibrillation recurred in March 2024, and a monitor in October 2024 demonstrated 100% AF burden. Primarily managed with rate control, he now experiences persistent fatigue and increasing intolerance to his dual beta-blocker and calcium channel blocker regimen. Now for ablation with Dr Vega       Plan/Recommendations:   -plan for afib aflutter ablation 2/5/25   -patient seen and examined  -ECG and Labs reviewed  -NPO after midnight prior with exception of sip of water with morning medications  -Continue all medications that are schedule for am as usual EXCEPT FOR ELIQUIS---LAST DOSE 2/4/25 PM   -Intermediate risk for CHELSEA, explained to pt including the possibility of worsening RFT post cath and if no recovery of RF, may need RRT/dialysis. Pt verbalized understanding. Proceed w/informed consent. Optimized renal stand point.   -Pre-procedure instructions provided (verbal & written)   -Consent to be obtained by attending electrophysiologist on the scheduled procedure date.   - Afib Ablation ok to do on theraputic coumadin Last dose of DOAC the evening before.   -ASA/Mallenpoti:   pt. assessed, appropriate for sedation, pt.  educated regarding the plan for Versed/fentanyl as needed      MALLAMPATI: 3  ASA: 3

## 2025-01-31 PROBLEM — Z86.79 PERSONAL HISTORY OF OTHER DISEASES OF THE CIRCULATORY SYSTEM: Chronic | Status: ACTIVE | Noted: 2025-01-29

## 2025-01-31 PROBLEM — I10 ESSENTIAL (PRIMARY) HYPERTENSION: Chronic | Status: ACTIVE | Noted: 2025-01-29

## 2025-01-31 PROBLEM — Z93.3 COLOSTOMY STATUS: Chronic | Status: ACTIVE | Noted: 2025-01-29

## 2025-01-31 PROBLEM — I48.20 CHRONIC ATRIAL FIBRILLATION, UNSPECIFIED: Chronic | Status: ACTIVE | Noted: 2025-01-29

## 2025-01-31 PROBLEM — I82.409 ACUTE EMBOLISM AND THROMBOSIS OF UNSPECIFIED DEEP VEINS OF UNSPECIFIED LOWER EXTREMITY: Chronic | Status: ACTIVE | Noted: 2025-01-29

## 2025-01-31 PROBLEM — G47.33 OBSTRUCTIVE SLEEP APNEA (ADULT) (PEDIATRIC): Chronic | Status: ACTIVE | Noted: 2025-01-29

## 2025-01-31 PROBLEM — E78.5 HYPERLIPIDEMIA, UNSPECIFIED: Chronic | Status: ACTIVE | Noted: 2025-01-29

## 2025-01-31 PROBLEM — Z86.69 PERSONAL HISTORY OF OTHER DISEASES OF THE NERVOUS SYSTEM AND SENSE ORGANS: Chronic | Status: ACTIVE | Noted: 2025-01-29

## 2025-01-31 PROBLEM — R60.0 LOCALIZED EDEMA: Chronic | Status: ACTIVE | Noted: 2025-01-29

## 2025-01-31 PROBLEM — C44.90 UNSPECIFIED MALIGNANT NEOPLASM OF SKIN, UNSPECIFIED: Chronic | Status: ACTIVE | Noted: 2025-01-29

## 2025-01-31 PROBLEM — H35.363 DRUSEN (DEGENERATIVE) OF MACULA, BILATERAL: Chronic | Status: ACTIVE | Noted: 2025-01-29

## 2025-02-05 ENCOUNTER — TRANSCRIPTION ENCOUNTER (OUTPATIENT)
Age: 75
End: 2025-02-05

## 2025-02-05 ENCOUNTER — INPATIENT (INPATIENT)
Facility: HOSPITAL | Age: 75
LOS: 0 days | Discharge: HOME | DRG: 310 | End: 2025-02-06
Attending: STUDENT IN AN ORGANIZED HEALTH CARE EDUCATION/TRAINING PROGRAM | Admitting: STUDENT IN AN ORGANIZED HEALTH CARE EDUCATION/TRAINING PROGRAM
Payer: MEDICARE

## 2025-02-05 VITALS
RESPIRATION RATE: 16 BRPM | HEART RATE: 77 BPM | OXYGEN SATURATION: 97 % | TEMPERATURE: 98 F | DIASTOLIC BLOOD PRESSURE: 76 MMHG | SYSTOLIC BLOOD PRESSURE: 158 MMHG

## 2025-02-05 DIAGNOSIS — Z90.89 ACQUIRED ABSENCE OF OTHER ORGANS: Chronic | ICD-10-CM

## 2025-02-05 DIAGNOSIS — Z98.890 OTHER SPECIFIED POSTPROCEDURAL STATES: Chronic | ICD-10-CM

## 2025-02-05 DIAGNOSIS — Z90.49 ACQUIRED ABSENCE OF OTHER SPECIFIED PARTS OF DIGESTIVE TRACT: Chronic | ICD-10-CM

## 2025-02-05 DIAGNOSIS — Z98.49 CATARACT EXTRACTION STATUS, UNSPECIFIED EYE: Chronic | ICD-10-CM

## 2025-02-05 DIAGNOSIS — I48.4 ATYPICAL ATRIAL FLUTTER: ICD-10-CM

## 2025-02-05 LAB
ABO RH CONFIRMATION: SIGNIFICANT CHANGE UP
MAGNESIUM SERPL-MCNC: 1.5 MG/DL — LOW (ref 1.6–2.6)

## 2025-02-05 PROCEDURE — 93623 PRGRMD STIMJ&PACG IV RX NFS: CPT | Mod: 26

## 2025-02-05 PROCEDURE — 93656 COMPRE EP EVAL ABLTJ ATR FIB: CPT

## 2025-02-05 PROCEDURE — 93655 ICAR CATH ABLTJ DSCRT ARRHYT: CPT

## 2025-02-05 RX ORDER — PANTOPRAZOLE 20 MG/1
40 TABLET, DELAYED RELEASE ORAL
Refills: 0 | Status: DISCONTINUED | OUTPATIENT
Start: 2025-02-05 | End: 2025-02-06

## 2025-02-05 RX ORDER — ONDANSETRON 4 MG/1
4 TABLET, ORALLY DISINTEGRATING ORAL EVERY 6 HOURS
Refills: 0 | Status: DISCONTINUED | OUTPATIENT
Start: 2025-02-05 | End: 2025-02-06

## 2025-02-05 RX ORDER — MAGNESIUM OXIDE 400 MG
500 TABLET ORAL
Refills: 0 | DISCHARGE

## 2025-02-05 RX ORDER — CHOLESTYRAMINE 4 G/9G
4 POWDER, FOR SUSPENSION ORAL
Refills: 0 | Status: DISCONTINUED | OUTPATIENT
Start: 2025-02-05 | End: 2025-02-06

## 2025-02-05 RX ORDER — METOPROLOL SUCCINATE 25 MG
50 TABLET, EXTENDED RELEASE 24 HR ORAL DAILY
Refills: 0 | Status: DISCONTINUED | OUTPATIENT
Start: 2025-02-05 | End: 2025-02-06

## 2025-02-05 RX ORDER — FAMOTIDINE 10 MG/ML
1 INJECTION INTRAVENOUS
Refills: 0 | DISCHARGE

## 2025-02-05 RX ORDER — PHENOL 1.4 %
1 AEROSOL, SPRAY (ML) MUCOUS MEMBRANE
Refills: 0 | Status: DISCONTINUED | OUTPATIENT
Start: 2025-02-05 | End: 2025-02-06

## 2025-02-05 RX ORDER — CHOLESTYRAMINE 4 G/9G
4 POWDER, FOR SUSPENSION ORAL
Refills: 0 | DISCHARGE

## 2025-02-05 RX ORDER — ALPRAZOLAM 2 MG
0.25 TABLET ORAL EVERY 6 HOURS
Refills: 0 | Status: DISCONTINUED | OUTPATIENT
Start: 2025-02-05 | End: 2025-02-06

## 2025-02-05 RX ORDER — MAGNESIUM, ALUMINUM HYDROXIDE 200-225/5
30 SUSPENSION, ORAL (FINAL DOSE FORM) ORAL EVERY 4 HOURS
Refills: 0 | Status: DISCONTINUED | OUTPATIENT
Start: 2025-02-05 | End: 2025-02-06

## 2025-02-05 RX ORDER — ACETAMINOPHEN 160 MG/5ML
650 SUSPENSION ORAL EVERY 6 HOURS
Refills: 0 | Status: DISCONTINUED | OUTPATIENT
Start: 2025-02-05 | End: 2025-02-06

## 2025-02-05 RX ORDER — METOPROLOL SUCCINATE 25 MG
1 TABLET, EXTENDED RELEASE 24 HR ORAL
Refills: 0 | DISCHARGE

## 2025-02-05 RX ORDER — MAGNESIUM OXIDE 400 MG
400 TABLET ORAL DAILY
Refills: 0 | Status: DISCONTINUED | OUTPATIENT
Start: 2025-02-05 | End: 2025-02-06

## 2025-02-05 RX ORDER — ATORVASTATIN CALCIUM 80 MG/1
1 TABLET, FILM COATED ORAL
Refills: 0 | DISCHARGE

## 2025-02-05 RX ORDER — OXYCODONE HYDROCHLORIDE 30 MG/1
5 TABLET ORAL EVERY 8 HOURS
Refills: 0 | Status: DISCONTINUED | OUTPATIENT
Start: 2025-02-05 | End: 2025-02-06

## 2025-02-05 RX ORDER — CETIRIZINE HCL 10 MG
1 TABLET ORAL
Refills: 0 | DISCHARGE

## 2025-02-05 RX ORDER — MAGNESIUM SULFATE 0.8 MEQ/ML
2 AMPUL (ML) INJECTION ONCE
Refills: 0 | Status: COMPLETED | OUTPATIENT
Start: 2025-02-05 | End: 2025-02-05

## 2025-02-05 RX ORDER — APIXABAN 5 MG/1
1 TABLET, FILM COATED ORAL
Refills: 0 | DISCHARGE

## 2025-02-05 RX ORDER — FAMOTIDINE 10 MG/ML
40 INJECTION INTRAVENOUS AT BEDTIME
Refills: 0 | Status: DISCONTINUED | OUTPATIENT
Start: 2025-02-05 | End: 2025-02-06

## 2025-02-05 RX ORDER — APIXABAN 5 MG/1
5 TABLET, FILM COATED ORAL
Refills: 0 | Status: DISCONTINUED | OUTPATIENT
Start: 2025-02-05 | End: 2025-02-06

## 2025-02-05 RX ORDER — ATORVASTATIN CALCIUM 80 MG/1
40 TABLET, FILM COATED ORAL AT BEDTIME
Refills: 0 | Status: DISCONTINUED | OUTPATIENT
Start: 2025-02-05 | End: 2025-02-06

## 2025-02-05 RX ORDER — CETIRIZINE HCL 10 MG
10 TABLET ORAL DAILY
Refills: 0 | Status: DISCONTINUED | OUTPATIENT
Start: 2025-02-05 | End: 2025-02-06

## 2025-02-05 RX ORDER — IPRATROPIUM BROMIDE 42 UG/1
2 SPRAY, METERED NASAL
Refills: 0 | DISCHARGE

## 2025-02-05 RX ORDER — LANSOPRAZOLE 30 MG
1 CAPSULE,DELAYED RELEASE (ENTERIC COATED) ORAL
Refills: 0 | DISCHARGE

## 2025-02-05 RX ADMIN — Medication 50 MILLIGRAM(S): at 17:45

## 2025-02-05 RX ADMIN — Medication 25 GRAM(S): at 10:10

## 2025-02-05 RX ADMIN — ATORVASTATIN CALCIUM 40 MILLIGRAM(S): 80 TABLET, FILM COATED ORAL at 21:13

## 2025-02-05 RX ADMIN — APIXABAN 5 MILLIGRAM(S): 5 TABLET, FILM COATED ORAL at 21:14

## 2025-02-05 RX ADMIN — FAMOTIDINE 40 MILLIGRAM(S): 10 INJECTION INTRAVENOUS at 21:13

## 2025-02-05 NOTE — PROGRESS NOTE ADULT - SUBJECTIVE AND OBJECTIVE BOX
75-year-old male has a history of persistent atrial fibrillation, initially diagnosed in his 30s, with two radiofrequency ablations performed in 2016. The atrial fibrillation recurred in March 2024, and a monitor in October 2024 demonstrated 100% AF burden. Primarily managed with rate control, he now experiences persistent fatigue and increasing intolerance to his dual beta-blocker and calcium channel blocker regimen. Patient presents for elective AF ablation on 2/5/2025    Plan  Consent by attending  NPO confirmed since last night  Last dose of Eliquis 2/4/2025 PM confirmed 75-year-old male has a history of persistent atrial fibrillation, initially diagnosed in his 30s, with two radiofrequency ablations performed in 2016. The atrial fibrillation recurred in March 2024, and a monitor in October 2024 demonstrated 100% AF burden. Primarily managed with rate control, he now experiences persistent fatigue and increasing intolerance to his dual beta-blocker and calcium channel blocker regimen. Patient presents for elective AF ablation on 2/5/2025    Plan  -Consent by attending  -iv heplock  -PST and labs from 1/29/25 reviewed; denies interval change.   - Magnesium of 1.4, repeat this am  - 2 U PRBC on hold  - Type and screen confirmatory  -Last dose of Eliquis confirmed last night  -NPO Confirmed since last  75-year-old male has a history of persistent atrial fibrillation, initially diagnosed in his 30s, with two radiofrequency ablations performed in 2016. The atrial fibrillation recurred in March 2024, and a monitor in October 2024 demonstrated 100% AF burden. Primarily managed with rate control, he now experiences persistent fatigue and increasing intolerance to his dual beta-blocker and calcium channel blocker regimen. Patient had been compliant with Eliquis. Patient presents for elective AF ablation on 2/5/2025    Plan  -Consent by attending  -sharifa arana  -PST and labs from 1/29/25 reviewed; denies interval change.   - Magnesium of 1.4, repeat this am  - 2 U PRBC on hold  - Type and screen confirmatory  -Last dose of Eliquis confirmed last night  -NPO Confirmed since last

## 2025-02-05 NOTE — DISCHARGE NOTE PROVIDER - NSDCFUSCHEDAPPT_GEN_ALL_CORE_FT
[facility]  [address]  Scheduled Appointment: 02/18/2025

## 2025-02-05 NOTE — PROGRESS NOTE ADULT - SUBJECTIVE AND OBJECTIVE BOX
Admission Criteria  Please admit the patient to the following service: CARDIOLOGY    Major Criteria:  - Continuous EKG monitoring is required for condition causing arrhythmia (hyperkalemia, etc)  - Significant volume load > 200 ml    Admit to: 4 BKT    Patient is being admitted to the inpatient service due to high risk characteristics and need for further management/monitoring and is considered to be at a significantly increased risk of major adverse cardiac and vascular events if discharged.

## 2025-02-05 NOTE — DISCHARGE NOTE PROVIDER - NSDCMRMEDTOKEN_GEN_ALL_CORE_FT
apixaban 5 mg oral tablet: 1 tab(s) orally 2 times a day  atorvastatin 40 mg oral tablet: 1 tab(s) orally once a day  Atrovent 42 mcg/inh nasal spray: 2 spray(s) intranasally 2 times a day  cetirizine 10 mg oral tablet: 1 tab(s) orally once a day  D3 50 mcg (2000 intl units) oral capsule: 1 cap(s) orally once a day  Diltia  mg/24 hours oral capsule, extended release: 1 cap(s) orally once a day  famotidine 40 mg oral tablet: 1 tab(s) orally once a day  lansoprazole 30 mg oral tablet, disintegratin tab(s) orally once a day  Locholest Light 4 g/5 g oral powder for reconstitution: 4 gram(s) orally 2 times a day  magnesium oxide: 500 milligram(s) orally once a day  Metoprolol Succinate ER 50 mg oral tablet, extended release: 1 tab(s) orally once a day   apixaban 5 mg oral tablet: 1 tab(s) orally 2 times a day  atorvastatin 40 mg oral tablet: 1 tab(s) orally once a day  Atrovent 42 mcg/inh nasal spray: 2 spray(s) intranasally 2 times a day  cetirizine 10 mg oral tablet: 1 tab(s) orally once a day  D3 50 mcg (2000 intl units) oral capsule: 1 cap(s) orally once a day  famotidine 40 mg oral tablet: 1 tab(s) orally once a day  lansoprazole 30 mg oral tablet, disintegratin tab(s) orally once a day  Locholest Light 4 g/5 g oral powder for reconstitution: 4 gram(s) orally 2 times a day  magnesium oxide: 500 milligram(s) orally once a day  Metoprolol Succinate ER 50 mg oral tablet, extended release: 1 tab(s) orally once a day

## 2025-02-05 NOTE — DISCHARGE NOTE PROVIDER - NSDCCPTREATMENT_GEN_ALL_CORE_FT
PRINCIPAL PROCEDURE  Procedure: Ablation of arrhythmogenic focus for atrial flutter with anesthesia  Findings and Treatment: - Bruising at the groin, sometimes extending down the leg, and/or a small lump under the skin at the groin access site is normal and will resolve within 2 – 3 weeks.   - Occasional skipped beats or palpitations that last for a few beats are common and generally resolve within 1-2 months.   - You may walk and take stairs at a regular pace.   - Do not perform any exercise more strenuous than walking for 1 week.   - Do not strain or lift heavy objects for 1 week.  - You may shower the day after the procedure.  - Do not soak in water (such as tub baths, hot tubs, swimming, etc.) for 1 week.   - You may resume all other activities the day after the procedure.  Call your doctor if:   - you notice bleeding, redness, drainage, swelling, increased tenderness or a hot sensation around the catheter insertion site.   - your temperature is greater than 100 degrees F for more than 24 hours.  - your rapid heart rhythm returns.  - you have any questions or concerns regarding the procedure.  If significant bleeding and/or a large lump (the size of a golf ball or bigger) occurs:  - Lie flat and apply continuous direct pressure just above the puncture site for at least 10 minutes  - If the issue resolves, notify your physician immediately.    - If the bleeding cannot be controlled, please seek immediate medical attention.  If you experience increased difficulty breathing or chest pain, or if you faint or have dizzy spells, please seek immediate medical attention.

## 2025-02-05 NOTE — DISCHARGE NOTE PROVIDER - CARE PROVIDER_API CALL
Emerson Vega  Cardiology  [address] [place] [address]  Phone: [phone]  Fax: [phone]  Follow Up Time:

## 2025-02-05 NOTE — DISCHARGE NOTE PROVIDER - NSDCFUADDINST_GEN_ALL_CORE_FT
Follow up with the [facility] Electrophysiology team in 4-6 weeks. Our office will contact you in 3-5 days to schedule this appointment. Please call [phone] with questions or concerns.

## 2025-02-05 NOTE — PROGRESS NOTE ADULT - SUBJECTIVE AND OBJECTIVE BOX
PROCEDURE(S): Pulsed Field Ablation of Atrial Flutter    ELECTROPHYSIOLOGIST(S): Emerson Vega MD         COMPLICATIONS:  none        DISPOSITION: observation      CONDITION: stable    Pt doing well s/p elective Pulsed Field atrial flutter ablation (Redo PVI, PW, Mitral lateral line) access via B/l FV hemostasis with vascades.  Pt denies complaint post procedure.     MEDICATIONS  (STANDING):    MEDICATIONS  (PRN):    Allergies  No Known Allergies    Intolerances    VS:  T(C): 36.7 (02-05-25 @ 09:43), Max: 36.7 (02-05-25 @ 09:43)  HR: 77 (02-05-25 @ 09:43) (77 - 77)  BP: 158/76 (02-05-25 @ 09:43) (158/76 - 158/76)  RR: 16 (02-05-25 @ 09:43) (16 - 16)  SpO2: 97% (02-05-25 @ 09:43) (97% - 97%)  Wt(kg): --    Post procedure VS:  HR: 91  BP: 157/93  RR: 18  SpO2: 100%    Exam:  General: NAD, A&O x 3  Card: S1/S2, RRR, no m/g/r  Resp: lungs CTA b/l  Abd: S/NT/ND  Groins: hemostatic vascades in place; sites C/D/I; no bleeding, hematoma, erythema, exudate or edema  Ext: no edema; distal pulses intact    I/O's  Input: 1105 cc    ECG:     Assessment:   75-year-old male has a history of persistent atrial fibrillation, initially diagnosed in his 30s, with two radiofrequency ablations performed in 2016. The atrial fibrillation recurred in March 2024, and a monitor in October 2024 demonstrated 100% AF burden. Primarily managed with rate control, he now experiences persistent fatigue and increasing intolerance to his dual beta-blocker and calcium channel blocker regimen. Patient presents for elective AF ablation on 2/5/2025.  Now status post uncomplicated elective Pulsed Field atrial flutter ablation (Redo PVI, PW, Mitral lateral line) access via B/l FV hemostasis with vascades    Plan:   Bedrest x 3 hours, then OOB with assistance and progress as tolerated.   Pending groin status: Restarted Eliquis 5 mg BID first dose at 1830.  DO NOT HOLD, INTERRUPT OR REVERSE ANTICOAGULATION WITHOUT EXPLICIT APPROVAL FROM EP SERVICE  GDMT:   Discontinue Diltiazem 180 mg.   Continue other home medications.   Strict I/Os.  Please encourage incentive spirometry and ambulation once able.  Observation and monitoring on telemetry overnight with anticipated discharge in the AM and outpt follow up in 1 month.   PROCEDURE(S): Pulsed Field Ablation of Atrial Flutter    ELECTROPHYSIOLOGIST(S): Emerson Vega MD         COMPLICATIONS:  none        DISPOSITION: observation      CONDITION: stable    Pt doing well s/p elective Pulsed Field atrial flutter ablation (Redo PVI, PW, Mitral lateral line) access via B/l FV hemostasis with vascades.  Pt denies complaint post procedure.     MEDICATIONS  (STANDING):  apixaban 5 milliGRAM(s) Oral <User Schedule>  atorvastatin 40 milliGRAM(s) Oral at bedtime  cetirizine 10 milliGRAM(s) Oral daily  cholestyramine Powder (Sugar-Free) 4 Gram(s) Oral two times a day  famotidine    Tablet 40 milliGRAM(s) Oral at bedtime  magnesium oxide 400 milliGRAM(s) Oral daily  metoprolol succinate ER 50 milliGRAM(s) Oral daily  pantoprazole    Tablet 40 milliGRAM(s) Oral before breakfast    MEDICATIONS  (PRN):  acetaminophen     Tablet .. 650 milliGRAM(s) Oral every 6 hours PRN Mild Pain (1 - 3)  ALPRAZolam 0.25 milliGRAM(s) Oral every 6 hours PRN anxiety  aluminum hydroxide/magnesium hydroxide/simethicone Suspension 30 milliLiter(s) Oral every 4 hours PRN Dyspepsia  benzocaine/menthol Lozenge 1 Lozenge Oral every 2 hours PRN Sore Throat  ondansetron Injectable 4 milliGRAM(s) IV Push every 6 hours PRN Nausea and/or Vomiting  oxyCODONE    IR 5 milliGRAM(s) Oral every 8 hours PRN Moderate Pain (4 - 6)    Allergies  No Known Allergies    Intolerances    VS:  T(C): 36.7 (02-05-25 @ 09:43), Max: 36.7 (02-05-25 @ 09:43)  HR: 77 (02-05-25 @ 09:43) (77 - 77)  BP: 158/76 (02-05-25 @ 09:43) (158/76 - 158/76)  RR: 16 (02-05-25 @ 09:43) (16 - 16)  SpO2: 97% (02-05-25 @ 09:43) (97% - 97%)  Wt(kg): --    Post procedure VS:  HR: 91  BP: 157/93  RR: 18  SpO2: 100%    Exam:  General: NAD, A&O x 3  Card: S1/S2, RRR, no m/g/r  Resp: lungs CTA b/l  Abd: S/NT/ND  Groins: hemostatic vascades in place; sites C/D/I; no bleeding, hematoma, erythema, exudate or edema  Ext: no edema; distal pulses intact    I/O's  Input: 1105 cc    ECG:     Assessment:   75-year-old male has a history of persistent atrial fibrillation, initially diagnosed in his 30s, with two radiofrequency ablations performed in 2016. The atrial fibrillation recurred in March 2024, and a monitor in October 2024 demonstrated 100% AF burden. Primarily managed with rate control, he now experiences persistent fatigue and increasing intolerance to his dual beta-blocker and calcium channel blocker regimen. Patient presents for elective AF ablation on 2/5/2025.  Now status post uncomplicated elective Pulsed Field atrial flutter ablation (Redo PVI, PW, Mitral lateral line) access via B/l FV hemostasis with vascades    Plan:   Bedrest x 3 hours, then OOB with assistance and progress as tolerated.   Pending groin status: Restarted Eliquis 5 mg BID first dose at 1830.  DO NOT HOLD, INTERRUPT OR REVERSE ANTICOAGULATION WITHOUT EXPLICIT APPROVAL FROM EP SERVICE  GDMT:   Discontinue Diltiazem 180 mg.   Continue other home medications.   Strict I/Os.  Please encourage incentive spirometry and ambulation once able.  Observation and monitoring on telemetry overnight with anticipated discharge in the AM and outpt follow up in 1 month.   PROCEDURE(S): Pulsed Field Ablation of Atrial Flutter    ELECTROPHYSIOLOGIST(S): Emerson Vega MD         COMPLICATIONS:  none        DISPOSITION: observation      CONDITION: stable    Pt doing well s/p elective Pulsed Field atrial flutter ablation (Redo PVI, PW, Mitral lateral line) access via B/l FV hemostasis with vascades.  Pt denies complaint post procedure.     MEDICATIONS  (STANDING):  apixaban 5 milliGRAM(s) Oral <User Schedule>  atorvastatin 40 milliGRAM(s) Oral at bedtime  cetirizine 10 milliGRAM(s) Oral daily  cholestyramine Powder (Sugar-Free) 4 Gram(s) Oral two times a day  famotidine    Tablet 40 milliGRAM(s) Oral at bedtime  magnesium oxide 400 milliGRAM(s) Oral daily  metoprolol succinate ER 50 milliGRAM(s) Oral daily  pantoprazole    Tablet 40 milliGRAM(s) Oral before breakfast    MEDICATIONS  (PRN):  acetaminophen     Tablet .. 650 milliGRAM(s) Oral every 6 hours PRN Mild Pain (1 - 3)  ALPRAZolam 0.25 milliGRAM(s) Oral every 6 hours PRN anxiety  aluminum hydroxide/magnesium hydroxide/simethicone Suspension 30 milliLiter(s) Oral every 4 hours PRN Dyspepsia  benzocaine/menthol Lozenge 1 Lozenge Oral every 2 hours PRN Sore Throat  ondansetron Injectable 4 milliGRAM(s) IV Push every 6 hours PRN Nausea and/or Vomiting  oxyCODONE    IR 5 milliGRAM(s) Oral every 8 hours PRN Moderate Pain (4 - 6)    Allergies  No Known Allergies    Intolerances    VS:  T(C): 36.7 (02-05-25 @ 09:43), Max: 36.7 (02-05-25 @ 09:43)  HR: 77 (02-05-25 @ 09:43) (77 - 77)  BP: 158/76 (02-05-25 @ 09:43) (158/76 - 158/76)  RR: 16 (02-05-25 @ 09:43) (16 - 16)  SpO2: 97% (02-05-25 @ 09:43) (97% - 97%)  Wt(kg): --    Post procedure VS:  HR: 91  BP: 157/93  RR: 18  SpO2: 100%    Exam:  General: NAD, A&O x 3  Card: S1/S2, RRR, no m/g/r  Resp: lungs CTA b/l  Abd: S/NT/ND  Groins: dressing in place; sites C/D/I; no bleeding, hematoma, erythema, exudate or edema  Ext: no edema; distal pulses intact    I/O's  Input: 1105 cc    ECG: SR @ 92, LAD, QRSD 98 ms,  ms    Assessment:   75-year-old male has a history of persistent atrial fibrillation, initially diagnosed in his 30s, with two radiofrequency ablations performed in 2016. The atrial fibrillation recurred in March 2024, and a monitor in October 2024 demonstrated 100% AF burden. Primarily managed with rate control, he now experiences persistent fatigue and increasing intolerance to his dual beta-blocker and calcium channel blocker regimen. Patient presents for elective AF ablation on 2/5/2025.  Now status post uncomplicated elective Pulsed Field atrial flutter ablation (Redo PVI, PW, Mitral lateral line) access via B/l FV hemostasis with vascades.    Plan:   Bedrest x 3 hours, then OOB with assistance and progress as tolerated.   Pending groin status: Restarted Eliquis 5 mg BID first dose at 1830.  DO NOT HOLD, INTERRUPT OR REVERSE ANTICOAGULATION WITHOUT EXPLICIT APPROVAL FROM EP SERVICE  Discontinue Diltiazem 180 mg.   Continue other home medications.   Strict I/Os.  Please encourage incentive spirometry and ambulation once able.  Observation and monitoring on telemetry overnight with anticipated discharge in the AM and outpt follow up in 1 month.   PROCEDURE(S): Pulsed Field Ablation of Atrial Flutter    ELECTROPHYSIOLOGIST(S): Emerson Vega MD         COMPLICATIONS:  none        DISPOSITION: observation      CONDITION: stable    Pt doing well s/p elective Pulsed Field atrial flutter ablation (Redo PVI, PW, Mitral lateral line) access via B/l FV hemostasis with vascades.  Pt denies complaint post procedure.     MEDICATIONS  (STANDING):  apixaban 5 milliGRAM(s) Oral <User Schedule>  atorvastatin 40 milliGRAM(s) Oral at bedtime  cetirizine 10 milliGRAM(s) Oral daily  cholestyramine Powder (Sugar-Free) 4 Gram(s) Oral two times a day  famotidine    Tablet 40 milliGRAM(s) Oral at bedtime  magnesium oxide 400 milliGRAM(s) Oral daily  metoprolol succinate ER 50 milliGRAM(s) Oral daily  pantoprazole    Tablet 40 milliGRAM(s) Oral before breakfast    MEDICATIONS  (PRN):  acetaminophen     Tablet .. 650 milliGRAM(s) Oral every 6 hours PRN Mild Pain (1 - 3)  ALPRAZolam 0.25 milliGRAM(s) Oral every 6 hours PRN anxiety  aluminum hydroxide/magnesium hydroxide/simethicone Suspension 30 milliLiter(s) Oral every 4 hours PRN Dyspepsia  benzocaine/menthol Lozenge 1 Lozenge Oral every 2 hours PRN Sore Throat  ondansetron Injectable 4 milliGRAM(s) IV Push every 6 hours PRN Nausea and/or Vomiting  oxyCODONE    IR 5 milliGRAM(s) Oral every 8 hours PRN Moderate Pain (4 - 6)    Allergies  No Known Allergies    Intolerances    VS:  T(C): 36.7 (02-05-25 @ 09:43), Max: 36.7 (02-05-25 @ 09:43)  HR: 77 (02-05-25 @ 09:43) (77 - 77)  BP: 158/76 (02-05-25 @ 09:43) (158/76 - 158/76)  RR: 16 (02-05-25 @ 09:43) (16 - 16)  SpO2: 97% (02-05-25 @ 09:43) (97% - 97%)  Wt(kg): --    Post procedure VS:  HR: 91  BP: 157/93  RR: 18  SpO2: 100%    Exam:  General: NAD, A&O x 3  Card: S1/S2, RRR, no m/g/r  Resp: lungs CTA b/l  Abd: S/NT/ND  Groins: dressing in place; sites C/D/I; no bleeding, hematoma, erythema, exudate or edema  Ext: no edema; distal pulses intact    I/O's  Input: 1105 cc    ECG: SR @ 92, LAD, QRSD 98 ms,  ms    Assessment:   75-year-old male has a history of persistent atrial fibrillation, initially diagnosed in his 30s, with two radiofrequency ablations performed in 2016. The atrial fibrillation recurred in March 2024, and a monitor in October 2024 demonstrated 100% AF burden. Primarily managed with rate control, he now experiences persistent fatigue and increasing intolerance to his dual beta-blocker and calcium channel blocker regimen. Patient had been complaint with Eliquis. Patient presents for elective AF ablation on 2/5/2025.  Now status post uncomplicated elective Pulsed Field atrial flutter ablation (Redo PVI, PW, Mitral lateral line) access via B/l FV hemostasis with vascades.    Plan:   Bedrest x 3 hours, then OOB with assistance and progress as tolerated.   Pending groin status: Restarted Eliquis 5 mg BID first dose at 1830.  DO NOT HOLD, INTERRUPT OR REVERSE ANTICOAGULATION WITHOUT EXPLICIT APPROVAL FROM EP SERVICE  Discontinue Diltiazem 180 mg.   Continue other home medications.   Strict I/Os.  Please encourage incentive spirometry and ambulation once able.  Observation and monitoring on telemetry overnight with anticipated discharge in the AM and outpt follow up in 1 month.

## 2025-02-05 NOTE — DISCHARGE NOTE PROVIDER - HOSPITAL COURSE
75-year-old male has a history of persistent atrial fibrillation, initially diagnosed in his 30s, with two radiofrequency ablations performed in 2016. The atrial fibrillation recurred in March 2024, and a monitor in October 2024 demonstrated 100% AF burden. Primarily managed with rate control, he now experiences persistent fatigue and increasing intolerance to his dual beta-blocker and calcium channel blocker regimen. Patient presents for elective AF ablation on 2/5/2025.  Now status post uncomplicated elective Pulsed Field atrial flutter ablation (Redo PVI, PW, Mitral lateral line) access via B/l FV hemostasis with vascades 75-year-old male has a history of persistent atrial fibrillation, initially diagnosed in his 30s, with two radiofrequency ablations performed in 2016. The atrial fibrillation recurred in March 2024, and a monitor in October 2024 demonstrated 100% AF burden. Primarily managed with rate control, he now experiences persistent fatigue and increasing intolerance to his dual beta-blocker and calcium channel blocker regimen. Patient had been complaint with Eliquis. Patient presents for elective AF ablation on 2/5/2025.  Now status post uncomplicated elective Pulsed Field atrial flutter ablation (Redo PVI, PW, Mitral lateral line) access via B/l FV hemostasis with vascades.   75-year-old male has a history of persistent atrial fibrillation, initially diagnosed in his 30s, with two radiofrequency ablations performed in 2016. The atrial fibrillation recurred in March 2024, and a monitor in October 2024 demonstrated 100% AF burden. Primarily managed with rate control, he now experiences persistent fatigue and increasing intolerance to his dual beta-blocker and calcium channel blocker regimen. Patient had been complaint with Eliquis. Patient presents for elective AF ablation on 2/5/2025.  Now status post uncomplicated elective Pulsed Field atrial flutter ablation (Redo PVI, PW, Mitral lateral line) access via B/l FV hemostasis with vascades. The patient was observed overnight without event and was discharged home the following morning with a plan for outpatient follow up.

## 2025-02-06 ENCOUNTER — TRANSCRIPTION ENCOUNTER (OUTPATIENT)
Age: 75
End: 2025-02-06

## 2025-02-06 VITALS
DIASTOLIC BLOOD PRESSURE: 62 MMHG | OXYGEN SATURATION: 96 % | SYSTOLIC BLOOD PRESSURE: 132 MMHG | HEART RATE: 80 BPM | RESPIRATION RATE: 16 BRPM

## 2025-02-06 LAB
ANION GAP SERPL CALC-SCNC: 11 MMOL/L — SIGNIFICANT CHANGE UP (ref 5–17)
BUN SERPL-MCNC: 18.2 MG/DL — SIGNIFICANT CHANGE UP (ref 8–20)
CALCIUM SERPL-MCNC: 8.3 MG/DL — LOW (ref 8.4–10.5)
CHLORIDE SERPL-SCNC: 102 MMOL/L — SIGNIFICANT CHANGE UP (ref 96–108)
CO2 SERPL-SCNC: 25 MMOL/L — SIGNIFICANT CHANGE UP (ref 22–29)
CREAT SERPL-MCNC: 0.72 MG/DL — SIGNIFICANT CHANGE UP (ref 0.5–1.3)
EGFR: 95 ML/MIN/1.73M2 — SIGNIFICANT CHANGE UP
GLUCOSE SERPL-MCNC: 121 MG/DL — HIGH (ref 70–99)
HCT VFR BLD CALC: 40.2 % — SIGNIFICANT CHANGE UP (ref 39–50)
HGB BLD-MCNC: 13.8 G/DL — SIGNIFICANT CHANGE UP (ref 13–17)
MAGNESIUM SERPL-MCNC: 1.9 MG/DL — SIGNIFICANT CHANGE UP (ref 1.6–2.6)
MCHC RBC-ENTMCNC: 33.2 PG — SIGNIFICANT CHANGE UP (ref 27–34)
MCHC RBC-ENTMCNC: 34.3 G/DL — SIGNIFICANT CHANGE UP (ref 32–36)
MCV RBC AUTO: 96.6 FL — SIGNIFICANT CHANGE UP (ref 80–100)
PLATELET # BLD AUTO: 150 K/UL — SIGNIFICANT CHANGE UP (ref 150–400)
POTASSIUM SERPL-MCNC: 4.9 MMOL/L — SIGNIFICANT CHANGE UP (ref 3.5–5.3)
POTASSIUM SERPL-SCNC: 4.9 MMOL/L — SIGNIFICANT CHANGE UP (ref 3.5–5.3)
RBC # BLD: 4.16 M/UL — LOW (ref 4.2–5.8)
RBC # FLD: 13.6 % — SIGNIFICANT CHANGE UP (ref 10.3–14.5)
SODIUM SERPL-SCNC: 138 MMOL/L — SIGNIFICANT CHANGE UP (ref 135–145)
WBC # BLD: 9.84 K/UL — SIGNIFICANT CHANGE UP (ref 3.8–10.5)
WBC # FLD AUTO: 9.84 K/UL — SIGNIFICANT CHANGE UP (ref 3.8–10.5)

## 2025-02-06 PROCEDURE — 93005 ELECTROCARDIOGRAM TRACING: CPT

## 2025-02-06 PROCEDURE — 93655 ICAR CATH ABLTJ DSCRT ARRHYT: CPT

## 2025-02-06 PROCEDURE — 93656 COMPRE EP EVAL ABLTJ ATR FIB: CPT

## 2025-02-06 PROCEDURE — 99232 SBSQ HOSP IP/OBS MODERATE 35: CPT

## 2025-02-06 PROCEDURE — 93010 ELECTROCARDIOGRAM REPORT: CPT

## 2025-02-06 PROCEDURE — 83735 ASSAY OF MAGNESIUM: CPT

## 2025-02-06 PROCEDURE — 93657 TX L/R ATRIAL FIB ADDL: CPT

## 2025-02-06 PROCEDURE — C1732: CPT

## 2025-02-06 PROCEDURE — C9399: CPT

## 2025-02-06 PROCEDURE — C1894: CPT

## 2025-02-06 PROCEDURE — 85027 COMPLETE CBC AUTOMATED: CPT

## 2025-02-06 PROCEDURE — 93613 INTRACARDIAC EPHYS 3D MAPG: CPT

## 2025-02-06 PROCEDURE — C1769: CPT

## 2025-02-06 PROCEDURE — 93623 PRGRMD STIMJ&PACG IV RX NFS: CPT

## 2025-02-06 PROCEDURE — 93662 INTRACARDIAC ECG (ICE): CPT

## 2025-02-06 PROCEDURE — 80048 BASIC METABOLIC PNL TOTAL CA: CPT

## 2025-02-06 PROCEDURE — C1730: CPT

## 2025-02-06 PROCEDURE — C1893: CPT

## 2025-02-06 PROCEDURE — 36415 COLL VENOUS BLD VENIPUNCTURE: CPT

## 2025-02-06 PROCEDURE — C1766: CPT

## 2025-02-06 PROCEDURE — C1760: CPT

## 2025-02-06 PROCEDURE — C1759: CPT

## 2025-02-06 RX ORDER — DILTIAZEM HYDROCHLORIDE 60 MG/1
1 TABLET ORAL
Refills: 0 | DISCHARGE

## 2025-02-06 RX ADMIN — APIXABAN 5 MILLIGRAM(S): 5 TABLET, FILM COATED ORAL at 08:30

## 2025-02-06 RX ADMIN — PANTOPRAZOLE 40 MILLIGRAM(S): 20 TABLET, DELAYED RELEASE ORAL at 06:00

## 2025-02-06 NOTE — DISCHARGE NOTE NURSING/CASE MANAGEMENT/SOCIAL WORK - PATIENT PORTAL LINK FT
You can access the FollowMyHealth Patient Portal offered by [facility] by registering at the following website: [email or web address]. By joining [employer]’s FollowMyHealth portal, you will also be able to view your health information using other applications (apps) compatible with our system.

## 2025-02-06 NOTE — DISCHARGE NOTE NURSING/CASE MANAGEMENT/SOCIAL WORK - FINANCIAL ASSISTANCE
[facility] provides services at a reduced cost to those who are determined to be eligible through [facility]’s financial assistance program. Information regarding [facility]’s financial assistance program can be found by going to [email or web address] or by calling [phone].

## 2025-02-06 NOTE — PROGRESS NOTE ADULT - SUBJECTIVE AND OBJECTIVE BOX
Pt doing well POD #1 s/p Pulsed Field atrial flutter ablation (Redo PVI, PW, Mitral lateral line). Pt denies any complaints at time of assessment this AM.     EKG: Sinus rhythm with intact conduction  TELE:Sinus rhythm / Sinus bradycardia with intact conduction     MEDICATIONS  (STANDING):  apixaban 5 milliGRAM(s) Oral <User Schedule>  atorvastatin 40 milliGRAM(s) Oral at bedtime  cetirizine 10 milliGRAM(s) Oral daily  cholestyramine Powder (Sugar-Free) 4 Gram(s) Oral two times a day  famotidine    Tablet 40 milliGRAM(s) Oral at bedtime  magnesium oxide 400 milliGRAM(s) Oral daily  metoprolol succinate ER 50 milliGRAM(s) Oral daily  pantoprazole    Tablet 40 milliGRAM(s) Oral before breakfast    MEDICATIONS  (PRN):  acetaminophen     Tablet .. 650 milliGRAM(s) Oral every 6 hours PRN Mild Pain (1 - 3)  ALPRAZolam 0.25 milliGRAM(s) Oral every 6 hours PRN anxiety  aluminum hydroxide/magnesium hydroxide/simethicone Suspension 30 milliLiter(s) Oral every 4 hours PRN Dyspepsia  benzocaine/menthol Lozenge 1 Lozenge Oral every 2 hours PRN Sore Throat  ondansetron Injectable 4 milliGRAM(s) IV Push every 6 hours PRN Nausea and/or Vomiting  oxyCODONE    IR 5 milliGRAM(s) Oral every 8 hours PRN Moderate Pain (4 - 6)      Allergies    No Known Allergies    Intolerances      PAST MEDICAL & SURGICAL HISTORY:  ENRICO on CPAP      Chronic atrial fibrillation      Retinal drusen, bilateral      History of ulcerative colitis      Skin cancer      Deep vein thrombosis (DVT)      H/O cataract      Edema of both legs      HTN (hypertension)      HLD (hyperlipidemia)      H/O atrial flutter      Colostomy present      H/O colectomy      H/O prior ablation treatment      S/P tonsillectomy      S/P ERCP      S/P cataract surgery      S/P cholecystectomy          Vital Signs Last 24 Hrs  T(C): 36.8 (06 Feb 2025 05:15), Max: 36.8 (05 Feb 2025 18:28)  T(F): 98.2 (06 Feb 2025 05:15), Max: 98.2 (05 Feb 2025 18:28)  HR: 88 (06 Feb 2025 05:15) (77 - 111)  BP: 130/81 (06 Feb 2025 05:15) (120/75 - 158/76)  BP(mean): 97 (06 Feb 2025 05:15) (90 - 97)  RR: 16 (06 Feb 2025 05:15) (16 - 16)  SpO2: 96% (06 Feb 2025 05:15) (94% - 100%)    Parameters below as of 06 Feb 2025 05:15  Patient On (Oxygen Delivery Method): room air        Physical Exam:  Constitutional: NAD, AAOx3  Cardiovascular: +S1S2 RRR  Pulmonary: CTA b/l, unlabored  Abd: soft NTND +BS  Groins: C/D/I bilaterally; no bleeding, hematoma, edema  Extremities: no pedal edema, +distal pulses b/l  Neuro: non focal, SHEIKH x4    LABS:                        13.8   9.84  )-----------( 150      ( 06 Feb 2025 04:42 )             40.2     02-06    138  |  102  |  18.2  ----------------------------<  121[H]  4.9   |  25.0  |  0.72    Ca    8.3[L]      06 Feb 2025 04:42  Mg     1.9     02-06        Urinalysis Basic - ( 06 Feb 2025 04:42 )    Color: x / Appearance: x / SG: x / pH: x  Gluc: 121 mg/dL / Ketone: x  / Bili: x / Urobili: x   Blood: x / Protein: x / Nitrite: x   Leuk Esterase: x / RBC: x / WBC x   Sq Epi: x / Non Sq Epi: x / Bacteria: x        Assessment:   75-year-old male with persistent atrial fibrillation, initially diagnosed in his 30s, with two radiofrequency ablations performed in 2016, now with recurrent persistent atrial fibrillation. Pt is now POD # 1 status post uncomplicated elective Pulsed Field atrial flutter ablation (Redo PVI, PW, Mitral lateral line). Pt reports no complaints time of assessment this AM. b/l groin access sites C/D/I w/o evidence of bleeding.       Plan:   c/w Eliquis 5mg Q12HR  Discontinue Diltiazem 180 mg.   Pt instructed as activity limitations - no lifting/pushing/pulling >10 lbs or strenuous exercise x 1 week.   Pt instructed as to access site care and f/up - written instructions included in d/c documents.  Outpt f/up in 2-4 weeks - office will contact pt to schedule.

## 2025-02-18 ENCOUNTER — APPOINTMENT (OUTPATIENT)
Age: 75
End: 2025-02-18
Payer: MEDICARE

## 2025-02-18 VITALS
OXYGEN SATURATION: 98 % | RESPIRATION RATE: 16 BRPM | DIASTOLIC BLOOD PRESSURE: 80 MMHG | SYSTOLIC BLOOD PRESSURE: 128 MMHG | HEART RATE: 67 BPM

## 2025-02-18 VITALS — WEIGHT: 298 LBS | BODY MASS INDEX: 37.05 KG/M2 | HEIGHT: 75 IN

## 2025-02-18 DIAGNOSIS — I48.4 ATYPICAL ATRIAL FLUTTER: ICD-10-CM

## 2025-02-18 DIAGNOSIS — I48.91 UNSPECIFIED ATRIAL FIBRILLATION: ICD-10-CM

## 2025-02-18 DIAGNOSIS — I10 ESSENTIAL (PRIMARY) HYPERTENSION: ICD-10-CM

## 2025-02-18 PROCEDURE — 99213 OFFICE O/P EST LOW 20 MIN: CPT

## 2025-02-18 PROCEDURE — 93000 ELECTROCARDIOGRAM COMPLETE: CPT

## 2025-04-08 ENCOUNTER — NON-APPOINTMENT (OUTPATIENT)
Age: 75
End: 2025-04-08

## 2025-04-22 ENCOUNTER — APPOINTMENT (OUTPATIENT)
Dept: PULMONOLOGY | Facility: CLINIC | Age: 75
End: 2025-04-22
Payer: MEDICARE

## 2025-04-22 PROCEDURE — 94729 DIFFUSING CAPACITY: CPT | Mod: TC

## 2025-04-22 PROCEDURE — 94060 EVALUATION OF WHEEZING: CPT | Mod: TC

## 2025-04-22 PROCEDURE — 94727 GAS DIL/WSHOT DETER LNG VOL: CPT | Mod: TC

## 2025-04-22 RX ORDER — LEVALBUTEROL HYDROCHLORIDE 1.25 MG/3ML
1.25 SOLUTION RESPIRATORY (INHALATION)
Refills: 0 | Status: COMPLETED | OUTPATIENT
Start: 2025-04-22

## 2025-04-22 RX ADMIN — LEVALBUTEROL 0 MG/3ML: 1.25 SOLUTION RESPIRATORY (INHALATION) at 00:00

## 2025-05-16 ENCOUNTER — APPOINTMENT (OUTPATIENT)
Age: 75
End: 2025-05-16
Payer: MEDICARE

## 2025-05-16 VITALS
DIASTOLIC BLOOD PRESSURE: 82 MMHG | HEART RATE: 92 BPM | OXYGEN SATURATION: 99 % | SYSTOLIC BLOOD PRESSURE: 118 MMHG | WEIGHT: 295 LBS | BODY MASS INDEX: 36.68 KG/M2 | HEIGHT: 75 IN

## 2025-05-16 DIAGNOSIS — I48.91 UNSPECIFIED ATRIAL FIBRILLATION: ICD-10-CM

## 2025-05-16 DIAGNOSIS — I48.4 ATYPICAL ATRIAL FLUTTER: ICD-10-CM

## 2025-05-16 PROCEDURE — 99213 OFFICE O/P EST LOW 20 MIN: CPT

## 2025-05-16 PROCEDURE — 93000 ELECTROCARDIOGRAM COMPLETE: CPT

## 2025-05-16 RX ORDER — DILTIAZEM HYDROCHLORIDE 120 MG/1
120 CAPSULE, EXTENDED RELEASE ORAL DAILY
Qty: 60 | Refills: 1 | Status: ACTIVE | COMMUNITY
Start: 2025-05-16 | End: 1900-01-01

## 2025-05-28 ENCOUNTER — NON-APPOINTMENT (OUTPATIENT)
Age: 75
End: 2025-05-28

## 2025-06-03 ENCOUNTER — APPOINTMENT (OUTPATIENT)
Facility: CLINIC | Age: 75
End: 2025-06-03
Payer: MEDICARE

## 2025-06-03 VITALS
HEIGHT: 75 IN | SYSTOLIC BLOOD PRESSURE: 124 MMHG | OXYGEN SATURATION: 98 % | WEIGHT: 291 LBS | DIASTOLIC BLOOD PRESSURE: 80 MMHG | BODY MASS INDEX: 36.18 KG/M2 | HEART RATE: 75 BPM

## 2025-06-03 DIAGNOSIS — I48.91 UNSPECIFIED ATRIAL FIBRILLATION: ICD-10-CM

## 2025-06-03 DIAGNOSIS — I48.4 ATYPICAL ATRIAL FLUTTER: ICD-10-CM

## 2025-06-03 PROCEDURE — 99213 OFFICE O/P EST LOW 20 MIN: CPT

## 2025-06-03 PROCEDURE — 93000 ELECTROCARDIOGRAM COMPLETE: CPT

## 2025-06-03 RX ORDER — METOPROLOL SUCCINATE 25 MG/1
25 TABLET, EXTENDED RELEASE ORAL DAILY
Qty: 90 | Refills: 3 | Status: ACTIVE | COMMUNITY
Start: 2025-06-03 | End: 1900-01-01

## 2025-07-16 PROCEDURE — 93248 EXT ECG>7D<15D REV&INTERPJ: CPT

## 2025-09-05 ENCOUNTER — APPOINTMENT (OUTPATIENT)
Age: 75
End: 2025-09-05
Payer: MEDICARE

## 2025-09-05 VITALS
HEART RATE: 92 BPM | WEIGHT: 298 LBS | SYSTOLIC BLOOD PRESSURE: 124 MMHG | DIASTOLIC BLOOD PRESSURE: 88 MMHG | OXYGEN SATURATION: 95 % | BODY MASS INDEX: 37.05 KG/M2 | HEIGHT: 75 IN

## 2025-09-05 DIAGNOSIS — I48.91 UNSPECIFIED ATRIAL FIBRILLATION: ICD-10-CM

## 2025-09-05 DIAGNOSIS — I10 ESSENTIAL (PRIMARY) HYPERTENSION: ICD-10-CM

## 2025-09-05 DIAGNOSIS — I48.4 ATYPICAL ATRIAL FLUTTER: ICD-10-CM

## 2025-09-05 PROCEDURE — 99213 OFFICE O/P EST LOW 20 MIN: CPT

## 2025-09-05 PROCEDURE — 93000 ELECTROCARDIOGRAM COMPLETE: CPT

## 2025-09-05 RX ORDER — VIT A/VIT C/VIT E/ZINC/COPPER 4296-226
CAPSULE ORAL
Refills: 0 | Status: ACTIVE | COMMUNITY

## 2025-09-05 RX ORDER — FLUTICASONE PROPIONATE AND SALMETEROL 250; 50 UG/1; UG/1
250-50 POWDER RESPIRATORY (INHALATION)
Refills: 0 | Status: ACTIVE | COMMUNITY

## 2025-09-05 RX ORDER — OLOPATADINE HYDROCHLORIDE AND MOMETASONE FUROATE 25; 665 UG/1; UG/1
665-25 SPRAY, METERED NASAL
Refills: 0 | Status: ACTIVE | COMMUNITY

## 2025-09-05 RX ORDER — GUAIFENESIN 600 MG/1
600 TABLET, EXTENDED RELEASE ORAL
Refills: 0 | Status: ACTIVE | COMMUNITY

## (undated) DEVICE — GUIDEWIRE .035X260 STRAIGHT (2/BX)

## (undated) DEVICE — Device

## (undated) DEVICE — SYRINGE SAFETY 3 ML 18 GA X 1 1/2 BLUNT LL (100/BX 8BX/CA)

## (undated) DEVICE — CATHETER IV SAFETY 20 GA X 1-1/4 (50/BX)

## (undated) DEVICE — TUBE E-T HI-LO CUFF 7.5MM (10EA/PK)

## (undated) DEVICE — SYRINGE 12 CC LUER TIP - (80/BX) OBSOLETE ITEM

## (undated) DEVICE — TOWEL STOP TIMEOUT SAFETY FLAG (40EA/CA)

## (undated) DEVICE — BLOCK BITE ENDOSCOPIC 2809 - (100/BX) INTERMEDIATE

## (undated) DEVICE — KIT  I.V. START (100EA/CA)

## (undated) DEVICE — APPICATOR HEMOSTAT ARISTA XL - FLEXITIP (10EA/CA)

## (undated) DEVICE — DERMABOND ADVANCED - (12EA/BX)

## (undated) DEVICE — SPONGE DRAIN 4 X 4IN 6-PLY - (2/PK25PK/BX12BX/CS)

## (undated) DEVICE — FORCEP RADIAL JAW 4 STANDARD CAPACITY W/NEEDLE 240CM (40EA/BX)

## (undated) DEVICE — NEPTUNE 4 PORT MANIFOLD - (20/PK)

## (undated) DEVICE — SYRINGE SAFETY 5 ML 18 GA X 1-1/2 BLUNT LL (100/BX 4BX/CA)

## (undated) DEVICE — SPONGE GAUZE NON-STERILE 4X4 - (2000/CA 10PK/CA)

## (undated) DEVICE — SUTURE 0 VICRYL PLUS UR-6 - 27 INCH (36/BX)

## (undated) DEVICE — TUBING CLEARLINK DUO-VENT - C-FLO (48EA/CA)

## (undated) DEVICE — KIT ANESTHESIA W/CIRCUIT & 3/LT BAG W/FILTER (20EA/CA)

## (undated) DEVICE — SPHINCTEROTOME CLEVER CUT

## (undated) DEVICE — MASK ANESTHESIA ADULT  - (100/CA)

## (undated) DEVICE — GOWN SURGEONS LARGE - (32/CA)

## (undated) DEVICE — KIT CUSTOM PROCEDURE SINGLE FOR ENDO  (15/CA)

## (undated) DEVICE — WATER IRRIGATION STERILE 1000ML (12EA/CA)

## (undated) DEVICE — ELECTRODE 850 FOAM ADHESIVE - HYDROGEL RADIOTRNSPRNT (50/PK)

## (undated) DEVICE — SYRINGE SAFETY 10 ML 18 GA X 1 1/2 BLUNT LL (100/BX 4BX/CA)

## (undated) DEVICE — MASK WITH FACE SHIELD (25/BX 4BX/CA)

## (undated) DEVICE — LACTATED RINGERS INJ 1000 ML - (14EA/CA 60CA/PF)

## (undated) DEVICE — SUTURE 3-0 ETHILON FS-1 - (36/BX) 30 INCH

## (undated) DEVICE — DETERGENT RENUZYME PLUS 10 OZ PACKET (50/BX)

## (undated) DEVICE — ELECTRODE DUAL RETURN W/ CORD - (50/PK)

## (undated) DEVICE — PENCIL ELECTSURG 10FT BTN SWH - (50/CA)

## (undated) DEVICE — SLEEVE, VASO, THIGH, MED

## (undated) DEVICE — RESERVOIR SUCTION 100 CC - SILICONE (20EA/CA)

## (undated) DEVICE — SENSOR SPO2 ADULT LNCS ADTX (20/BX) ORDER ITEM #19593

## (undated) DEVICE — GLOVE BIOGEL INDICATOR SZ 8 SURGICAL PF LTX - (50/BX 4BX/CA)

## (undated) DEVICE — GLOVE, LITE (PAIR)

## (undated) DEVICE — PROTECTOR ULNA NERVE - (36PR/CA)

## (undated) DEVICE — TUBE SUCTION YANKAUER  1/4 X 6FT (20EA/CA)"

## (undated) DEVICE — SYSTEM CLEARIFY VISUALIZATION (10EA/PK)

## (undated) DEVICE — LEAD 6 SET DISP. GE MARQUETTE (100EA/CA)

## (undated) DEVICE — TROCAR 5X100 NON BLADED Z-TH - READ KII (6/BX)

## (undated) DEVICE — TROCAR 5X100 BLADED Z-THREAD - KII (6/BX)

## (undated) DEVICE — SET EXTENSION WITH 2 PORTS (48EA/CA) ***PART #2C8610 IS A SUBSTITUTE*****

## (undated) DEVICE — SODIUM CHL IRRIGATION 0.9% 1000ML (12EA/CA)

## (undated) DEVICE — SYRINGE DISP. 60 CC LL - (30/BX, 12BX/CA)**WHEN THESE ARE GONE ORDER #500206**

## (undated) DEVICE — SUCTION INSTRUMENT YANKAUER BULBOUS TIP W/O VENT (50EA/CA)

## (undated) DEVICE — TUBING LAPAROSCOPIC PLUME DEVICE (10EA/CA)

## (undated) DEVICE — HEAD HOLDER JUNIOR/ADULT

## (undated) DEVICE — SUTURE GENERAL

## (undated) DEVICE — SPHINCTEROTOME ENDOSCOPIC JAGTOME RX 44 .035IN 30MM 260CM 4.4FR

## (undated) DEVICE — SUTURE 4-0 MONOCRYL PLUS PS-2 - 27 INCH (36/BX)

## (undated) DEVICE — CANISTER SUCTION 3000ML MECHANICAL FILTER AUTO SHUTOFF MEDI-VAC NONSTERILE LF DISP  (40EA/CA)

## (undated) DEVICE — BLADE SURGICAL #15 - (50/BX 3BX/CA)

## (undated) DEVICE — APPLIER 5MM MED/LARGE CLIP - (3/BX)

## (undated) DEVICE — SET SUCTION/IRRIGATION WITH DISPOSABLE TIP (6/CA )PART #0250-070-520 IS A SUB

## (undated) DEVICE — CHLORAPREP 26 ML APPLICATOR - ORANGE TINT(25/CA)

## (undated) DEVICE — ELECTRODE 5MM LHK LAPSCP STERILE DISP- MEGADYNE  (5/CA)

## (undated) DEVICE — TROCAR LAPSCP 100MM 12MM NTHRD - (6/BX)

## (undated) DEVICE — DRAIN FLAT SUCTION 10MMX20CM - LATEX FREE (10EA/CA)

## (undated) DEVICE — EXTRACTOR PRO XL 9-12 MM ABOVE

## (undated) DEVICE — CANISTER SUCTION RIGID RED 1500CC (40EA/CA)

## (undated) DEVICE — SENSOR SPO2 NEO LNCS ADHESIVE (20/BX) SEE USER NOTES

## (undated) DEVICE — BALLOON RETRIEVAL EXTRACTOR PRO RX   9-12MM

## (undated) DEVICE — GOWN WARMING STANDARD FLEX - (30/CA)

## (undated) DEVICE — GUIDE JAGWIRE 035 STRAIGHT (2EA/BX)

## (undated) DEVICE — INTRODUCER STENT NAVIFLEX 10FR

## (undated) DEVICE — CAPTIVATOR II-10MM ROUND STIFF  (40/BX)

## (undated) DEVICE — SOD. CHL. INJ. 0.9% 1000 ML - (14EA/CA 60CA/PF)

## (undated) DEVICE — CANNULA W/SEAL 5X100 Z-THRE - ADED KII (12/BX)

## (undated) DEVICE — HEMOSTAT ARISTA PWD 3 GRAM - (5/CA)

## (undated) DEVICE — SCISSORS 5MM CVD (6EA/BX)

## (undated) DEVICE — GOWN WARMING X-LARGE FLEX - (20/CA)